# Patient Record
Sex: FEMALE | Race: WHITE | NOT HISPANIC OR LATINO | Employment: OTHER | URBAN - METROPOLITAN AREA
[De-identification: names, ages, dates, MRNs, and addresses within clinical notes are randomized per-mention and may not be internally consistent; named-entity substitution may affect disease eponyms.]

---

## 2017-01-20 ENCOUNTER — LAB VISIT (OUTPATIENT)
Dept: LAB | Facility: HOSPITAL | Age: 82
End: 2017-01-20
Attending: INTERNAL MEDICINE
Payer: MEDICARE

## 2017-01-20 DIAGNOSIS — E03.4 HYPOTHYROIDISM DUE TO ACQUIRED ATROPHY OF THYROID: ICD-10-CM

## 2017-01-20 LAB
ALBUMIN SERPL BCP-MCNC: 4.2 G/DL
ALP SERPL-CCNC: 60 U/L
ALT SERPL W/O P-5'-P-CCNC: 12 U/L
ANION GAP SERPL CALC-SCNC: 14 MMOL/L
AST SERPL-CCNC: 18 U/L
BASOPHILS # BLD AUTO: 0.03 K/UL
BASOPHILS NFR BLD: 0.4 %
BILIRUB SERPL-MCNC: 0.9 MG/DL
BUN SERPL-MCNC: 16 MG/DL
CALCIUM SERPL-MCNC: 9.4 MG/DL
CHLORIDE SERPL-SCNC: 105 MMOL/L
CHOLEST/HDLC SERPL: 4.3 {RATIO}
CO2 SERPL-SCNC: 22 MMOL/L
CREAT SERPL-MCNC: 1 MG/DL
DIFFERENTIAL METHOD: NORMAL
EOSINOPHIL # BLD AUTO: 0.1 K/UL
EOSINOPHIL NFR BLD: 0.9 %
ERYTHROCYTE [DISTWIDTH] IN BLOOD BY AUTOMATED COUNT: 13.4 %
EST. GFR  (AFRICAN AMERICAN): >60 ML/MIN/1.73 M^2
EST. GFR  (NON AFRICAN AMERICAN): 53.7 ML/MIN/1.73 M^2
GLUCOSE SERPL-MCNC: 102 MG/DL
HCT VFR BLD AUTO: 44.9 %
HDL/CHOLESTEROL RATIO: 23.4 %
HDLC SERPL-MCNC: 222 MG/DL
HDLC SERPL-MCNC: 52 MG/DL
HGB BLD-MCNC: 14.9 G/DL
LDLC SERPL CALC-MCNC: 136 MG/DL
LYMPHOCYTES # BLD AUTO: 3 K/UL
LYMPHOCYTES NFR BLD: 38.6 %
MCH RBC QN AUTO: 30.3 PG
MCHC RBC AUTO-ENTMCNC: 33.2 %
MCV RBC AUTO: 91 FL
MONOCYTES # BLD AUTO: 0.7 K/UL
MONOCYTES NFR BLD: 9.3 %
NEUTROPHILS # BLD AUTO: 3.9 K/UL
NEUTROPHILS NFR BLD: 50.5 %
NONHDLC SERPL-MCNC: 170 MG/DL
PLATELET # BLD AUTO: 221 K/UL
PMV BLD AUTO: 11.1 FL
POTASSIUM SERPL-SCNC: 4.6 MMOL/L
PROT SERPL-MCNC: 7.4 G/DL
RBC # BLD AUTO: 4.91 M/UL
SODIUM SERPL-SCNC: 141 MMOL/L
TRIGL SERPL-MCNC: 170 MG/DL
TSH SERPL DL<=0.005 MIU/L-ACNC: 1.88 UIU/ML
WBC # BLD AUTO: 7.73 K/UL

## 2017-01-20 PROCEDURE — 80061 LIPID PANEL: CPT

## 2017-01-20 PROCEDURE — 80053 COMPREHEN METABOLIC PANEL: CPT

## 2017-01-20 PROCEDURE — 84443 ASSAY THYROID STIM HORMONE: CPT

## 2017-01-20 PROCEDURE — 85025 COMPLETE CBC W/AUTO DIFF WBC: CPT

## 2017-01-20 PROCEDURE — 36415 COLL VENOUS BLD VENIPUNCTURE: CPT

## 2017-01-23 ENCOUNTER — OFFICE VISIT (OUTPATIENT)
Dept: INTERNAL MEDICINE | Facility: CLINIC | Age: 82
End: 2017-01-23
Payer: MEDICARE

## 2017-01-23 VITALS
WEIGHT: 152.56 LBS | DIASTOLIC BLOOD PRESSURE: 70 MMHG | HEIGHT: 59 IN | BODY MASS INDEX: 30.76 KG/M2 | HEART RATE: 72 BPM | SYSTOLIC BLOOD PRESSURE: 124 MMHG

## 2017-01-23 DIAGNOSIS — E89.0 POSTOPERATIVE HYPOTHYROIDISM: Primary | ICD-10-CM

## 2017-01-23 DIAGNOSIS — I70.0 AORTIC ATHEROSCLEROSIS: ICD-10-CM

## 2017-01-23 PROCEDURE — 1126F AMNT PAIN NOTED NONE PRSNT: CPT | Mod: S$GLB,,, | Performed by: INTERNAL MEDICINE

## 2017-01-23 PROCEDURE — 3074F SYST BP LT 130 MM HG: CPT | Mod: S$GLB,,, | Performed by: INTERNAL MEDICINE

## 2017-01-23 PROCEDURE — 3078F DIAST BP <80 MM HG: CPT | Mod: S$GLB,,, | Performed by: INTERNAL MEDICINE

## 2017-01-23 PROCEDURE — 1157F ADVNC CARE PLAN IN RCRD: CPT | Mod: S$GLB,,, | Performed by: INTERNAL MEDICINE

## 2017-01-23 PROCEDURE — 99999 PR PBB SHADOW E&M-EST. PATIENT-LVL III: CPT | Mod: PBBFAC,,, | Performed by: INTERNAL MEDICINE

## 2017-01-23 PROCEDURE — 1159F MED LIST DOCD IN RCRD: CPT | Mod: S$GLB,,, | Performed by: INTERNAL MEDICINE

## 2017-01-23 PROCEDURE — 99499 UNLISTED E&M SERVICE: CPT | Mod: S$GLB,,, | Performed by: INTERNAL MEDICINE

## 2017-01-23 PROCEDURE — 1160F RVW MEDS BY RX/DR IN RCRD: CPT | Mod: S$GLB,,, | Performed by: INTERNAL MEDICINE

## 2017-01-23 PROCEDURE — 99214 OFFICE O/P EST MOD 30 MIN: CPT | Mod: S$GLB,,, | Performed by: INTERNAL MEDICINE

## 2017-01-23 NOTE — PROGRESS NOTES
Subjective:       Patient ID: Isabel Hernandez is a 81 y.o. female.    Chief Complaint: Hypothyroidism    HPI  Review of Systems   Constitutional: Negative for activity change and fatigue.   Respiratory: Negative for chest tightness.    Cardiovascular: Negative for chest pain and palpitations.   Gastrointestinal: Negative for abdominal pain.   Endocrine: Negative for cold intolerance.   Genitourinary: Negative for difficulty urinating.   Musculoskeletal: Negative for back pain.   Skin: Negative for pallor.   Neurological: Negative for dizziness, weakness and light-headedness.   Psychiatric/Behavioral: Negative for dysphoric mood.       Objective:      Physical Exam   Constitutional: She is oriented to person, place, and time. She appears well-developed and well-nourished.   HENT:   Head: Normocephalic and atraumatic.   Mouth/Throat: Oropharynx is clear and moist.   Eyes: Pupils are equal, round, and reactive to light.   Neck: Normal range of motion. Neck supple.   Cardiovascular: Normal rate, regular rhythm, normal heart sounds and intact distal pulses.  Exam reveals no gallop and no friction rub.    No murmur heard.  Pulmonary/Chest: Effort normal and breath sounds normal. No respiratory distress. She has no wheezes.   Abdominal: Soft. Bowel sounds are normal. She exhibits no distension. There is no tenderness.   Musculoskeletal: Normal range of motion.   Neurological: She is alert and oriented to person, place, and time.   Skin: Skin is warm and dry.   Psychiatric: She has a normal mood and affect. Her behavior is normal.   Nursing note and vitals reviewed.      Assessment:       1. Postoperative hypothyroidism    2. Aortic atherosclerosis        Plan:       Isabel was seen today for hypothyroidism.    Diagnoses and all orders for this visit:    Postoperative hypothyroidism  Comments:  Stable; Asymptomatic with current medication regimen  Orders:  -     Comprehensive metabolic panel; Future  -     CBC auto  differential; Future  -     Lipid panel; Future  -     TSH; Future    Aortic atherosclerosis  Comments:  Stable; Daily aspirin. Discussed proper diet and exercise.            Return in about 6 months (around 7/23/2017) for ANNUAL EXAM WITH PCP/[facility] BEFORE, SOONER IF NEEDED, F/U APPOINTMENT WITH ME, WITH LAB BEFORE.

## 2017-04-25 ENCOUNTER — OFFICE VISIT (OUTPATIENT)
Dept: DERMATOLOGY | Facility: CLINIC | Age: 82
End: 2017-04-25
Payer: MEDICARE

## 2017-04-25 DIAGNOSIS — D18.00 ANGIOMA: ICD-10-CM

## 2017-04-25 DIAGNOSIS — L82.1 SK (SEBORRHEIC KERATOSIS): ICD-10-CM

## 2017-04-25 DIAGNOSIS — Z12.83 SCREENING EXAM FOR SKIN CANCER: ICD-10-CM

## 2017-04-25 DIAGNOSIS — L57.0 AK (ACTINIC KERATOSIS): Primary | ICD-10-CM

## 2017-04-25 DIAGNOSIS — L81.4 LENTIGO: ICD-10-CM

## 2017-04-25 PROCEDURE — 1160F RVW MEDS BY RX/DR IN RCRD: CPT | Mod: S$GLB,,, | Performed by: DERMATOLOGY

## 2017-04-25 PROCEDURE — 99999 PR PBB SHADOW E&M-EST. PATIENT-LVL II: CPT | Mod: PBBFAC,,, | Performed by: DERMATOLOGY

## 2017-04-25 PROCEDURE — 17000 DESTRUCT PREMALG LESION: CPT | Mod: S$GLB,,, | Performed by: DERMATOLOGY

## 2017-04-25 PROCEDURE — 99213 OFFICE O/P EST LOW 20 MIN: CPT | Mod: 25,S$GLB,, | Performed by: DERMATOLOGY

## 2017-04-25 PROCEDURE — 1159F MED LIST DOCD IN RCRD: CPT | Mod: S$GLB,,, | Performed by: DERMATOLOGY

## 2017-04-25 PROCEDURE — 17003 DESTRUCT PREMALG LES 2-14: CPT | Mod: S$GLB,,, | Performed by: DERMATOLOGY

## 2017-04-25 NOTE — PATIENT INSTRUCTIONS

## 2017-04-25 NOTE — PROGRESS NOTES
Subjective:       Patient ID:  Isabel Hernandez is a 81 y.o. female who presents for   Chief Complaint   Patient presents with    Spot     left forehead x few months, scabs no prior tx     Skin Check     UBSE     HPI Comments: History of Present Illness: The patient presents for follow up of skin check.    The patient was last seen on: 6/8/2015 for cryosurgery to actinic keratoses which have resolved.     Other skin complaints: lesion on L superior forehead x 3 months, see HPI.    Denies personal history of NMSC or MM.         Spot  - Initial  Affected locations: forehead  Duration: 3 months  Signs / symptoms: crusting  Severity: mild  Timing: constant (she thinks a lesion in that area was previously biopsied, was told it was benign)  Aggravated by: nothing  Relieving factors/Treatments tried: nothing        Review of Systems   Skin: Positive for activity-related sunscreen use ( rarely outdoors, when outside for hours does wear sunscreen). Negative for itching, rash, dry skin, daily sunscreen use, recent sunburn and wears hat.   Hematologic/Lymphatic: Does not bruise/bleed easily (on asa).        Objective:    Physical Exam   Constitutional: She appears well-developed and well-nourished. She is obese.  No distress.   Neurological: She is alert and oriented to person, place, and time. She is not disoriented.   Psychiatric: She has a normal mood and affect.   Skin:   Areas Examined (abnormalities noted in diagram):   Scalp / Hair Palpated and Inspected  Head / Face Inspection Performed  Neck Inspection Performed  Chest / Axilla Inspection Performed  Abdomen Inspection Performed  Back Inspection Performed  RUE Inspected  LUE Inspection Performed                       Diagram Legend     Erythematous scaling macule/papule c/w actinic keratosis       Vascular papule c/w angioma      Pigmented verrucoid papule/plaque c/w seborrheic keratosis      Yellow umbilicated papule c/w sebaceous hyperplasia      Irregularly shaped  tan macule c/w lentigo     1-2 mm smooth white papules consistent with Milia      Movable subcutaneous cyst with punctum c/w epidermal inclusion cyst      Subcutaneous movable cyst c/w pilar cyst      Firm pink to brown papule c/w dermatofibroma      Pedunculated fleshy papule(s) c/w skin tag(s)      Evenly pigmented macule c/w junctional nevus     Mildly variegated pigmented, slightly irregular-bordered macule c/w mildly atypical nevus      Flesh colored to evenly pigmented papule c/w intradermal nevus       Pink pearly papule/plaque c/w basal cell carcinoma      Erythematous hyperkeratotic cursted plaque c/w SCC      Surgical scar with no sign of skin cancer recurrence      Open and closed comedones      Inflammatory papules and pustules      Verrucoid papule consistent consistent with wart     Erythematous eczematous patches and plaques     Dystrophic onycholytic nail with subungual debris c/w onychomycosis     Umbilicated papule    Erythematous-base heme-crusted tan verrucoid plaque consistent with inflamed seborrheic keratosis     Erythematous Silvery Scaling Plaque c/w Psoriasis     See annotation      Assessment / Plan:        AK (actinic keratosis)  Cryosurgery Procedure Note    Verbal consent from the patient is obtained and the patient is aware of the precancerous quality and need for treatment of these lesions. Liquid nitrogen cryosurgery is applied to the 7 actinic keratoses, as detailed in the physical exam, to produce a freeze injury. The patient is aware that blisters may form and is instructed on wound care with gentle cleansing and use of vaseline ointment to keep moist until healed. The patient is supplied a handout on cryosurgery and is instructed to call if lesions do not completely resolve.    If lesion forehead not resolved in 2months, f/u  Angiomas   - stable and chronic      SK (seborrheic keratosis)   - stable and chronic      Screening exam for skin cancer  Upper body skin examination  performed today including at least 6 points as noted in physical examination. No lesions suspicious for malignancy noted.      Lentigo  This is a benign hyperpigmented sun induced lesion. Daily sun protection will reduce the number of new lesions. Treatment of these benign lesions are considered cosmetic.               Return in about 1 year (around 4/25/2018).

## 2017-05-23 ENCOUNTER — TELEPHONE (OUTPATIENT)
Dept: INTERNAL MEDICINE | Facility: CLINIC | Age: 82
End: 2017-05-23

## 2017-05-23 NOTE — TELEPHONE ENCOUNTER
----- Message from Javier Martinez sent at 5/23/2017  3:35 PM CDT -----  Contact: Self [phone]  Type: Orders Request    What orders/ testing are being requested? Labs    Is there a future appointment scheduled for the patient with PCP? Yes    When?08/02/2017    Comments:Please call and advice    Thanks

## 2017-07-11 RX ORDER — LEVOTHYROXINE SODIUM 50 UG/1
TABLET ORAL
Qty: 90 TABLET | Refills: 3 | Status: SHIPPED | OUTPATIENT
Start: 2017-07-11 | End: 2018-07-18 | Stop reason: SDUPTHER

## 2017-07-26 ENCOUNTER — OFFICE VISIT (OUTPATIENT)
Dept: INTERNAL MEDICINE | Facility: CLINIC | Age: 82
End: 2017-07-26
Payer: MEDICARE

## 2017-07-26 ENCOUNTER — LAB VISIT (OUTPATIENT)
Dept: LAB | Facility: HOSPITAL | Age: 82
End: 2017-07-26
Attending: NURSE PRACTITIONER
Payer: MEDICARE

## 2017-07-26 VITALS
SYSTOLIC BLOOD PRESSURE: 130 MMHG | WEIGHT: 142.44 LBS | HEIGHT: 59 IN | OXYGEN SATURATION: 98 % | HEART RATE: 61 BPM | DIASTOLIC BLOOD PRESSURE: 70 MMHG | BODY MASS INDEX: 28.72 KG/M2

## 2017-07-26 DIAGNOSIS — I70.0 AORTIC ATHEROSCLEROSIS: ICD-10-CM

## 2017-07-26 DIAGNOSIS — Z00.00 ENCOUNTER FOR PREVENTIVE HEALTH EXAMINATION: Primary | ICD-10-CM

## 2017-07-26 DIAGNOSIS — E89.0 POSTOPERATIVE HYPOTHYROIDISM: ICD-10-CM

## 2017-07-26 DIAGNOSIS — N18.30 CKD (CHRONIC KIDNEY DISEASE), STAGE III: ICD-10-CM

## 2017-07-26 DIAGNOSIS — N20.0 NEPHROLITHIASIS: ICD-10-CM

## 2017-07-26 DIAGNOSIS — M81.0 OSTEOPOROSIS, UNSPECIFIED OSTEOPOROSIS TYPE, UNSPECIFIED PATHOLOGICAL FRACTURE PRESENCE: ICD-10-CM

## 2017-07-26 LAB
ALBUMIN SERPL BCP-MCNC: 4.1 G/DL
ALP SERPL-CCNC: 59 U/L
ALT SERPL W/O P-5'-P-CCNC: 15 U/L
ANION GAP SERPL CALC-SCNC: 12 MMOL/L
AST SERPL-CCNC: 17 U/L
BASOPHILS # BLD AUTO: 0.04 K/UL
BASOPHILS NFR BLD: 0.5 %
BILIRUB SERPL-MCNC: 0.7 MG/DL
BUN SERPL-MCNC: 14 MG/DL
CALCIUM SERPL-MCNC: 9.5 MG/DL
CHLORIDE SERPL-SCNC: 106 MMOL/L
CHOLEST/HDLC SERPL: 4 {RATIO}
CO2 SERPL-SCNC: 24 MMOL/L
CREAT SERPL-MCNC: 1 MG/DL
DIFFERENTIAL METHOD: NORMAL
EOSINOPHIL # BLD AUTO: 0.1 K/UL
EOSINOPHIL NFR BLD: 0.9 %
ERYTHROCYTE [DISTWIDTH] IN BLOOD BY AUTOMATED COUNT: 13.6 %
EST. GFR  (AFRICAN AMERICAN): >60 ML/MIN/1.73 M^2
EST. GFR  (NON AFRICAN AMERICAN): 54 ML/MIN/1.73 M^2
GLUCOSE SERPL-MCNC: 103 MG/DL
HCT VFR BLD AUTO: 44.9 %
HDL/CHOLESTEROL RATIO: 24.9 %
HDLC SERPL-MCNC: 233 MG/DL
HDLC SERPL-MCNC: 58 MG/DL
HGB BLD-MCNC: 14.9 G/DL
LDLC SERPL CALC-MCNC: 149.8 MG/DL
LYMPHOCYTES # BLD AUTO: 3.3 K/UL
LYMPHOCYTES NFR BLD: 38.4 %
MCH RBC QN AUTO: 30.6 PG
MCHC RBC AUTO-ENTMCNC: 33.2 G/DL
MCV RBC AUTO: 92 FL
MONOCYTES # BLD AUTO: 0.8 K/UL
MONOCYTES NFR BLD: 9.6 %
NEUTROPHILS # BLD AUTO: 4.3 K/UL
NEUTROPHILS NFR BLD: 50.4 %
NONHDLC SERPL-MCNC: 175 MG/DL
PLATELET # BLD AUTO: 224 K/UL
PMV BLD AUTO: 11 FL
POTASSIUM SERPL-SCNC: 4.8 MMOL/L
PROT SERPL-MCNC: 7.3 G/DL
RBC # BLD AUTO: 4.87 M/UL
SODIUM SERPL-SCNC: 142 MMOL/L
TRIGL SERPL-MCNC: 126 MG/DL
TSH SERPL DL<=0.005 MIU/L-ACNC: 2.83 UIU/ML
WBC # BLD AUTO: 8.51 K/UL

## 2017-07-26 PROCEDURE — 99999 PR PBB SHADOW E&M-EST. PATIENT-LVL III: CPT | Mod: PBBFAC,,, | Performed by: NURSE PRACTITIONER

## 2017-07-26 PROCEDURE — 99499 UNLISTED E&M SERVICE: CPT | Mod: S$GLB,,, | Performed by: NURSE PRACTITIONER

## 2017-07-26 PROCEDURE — G0439 PPPS, SUBSEQ VISIT: HCPCS | Mod: S$GLB,,, | Performed by: NURSE PRACTITIONER

## 2017-07-26 NOTE — PROGRESS NOTES
"Isabel Hernandez presented for a  Medicare AWV and comprehensive Health Risk Assessment today. The following components were reviewed and updated:    · Medical history  · Family History  · Social history  · Allergies and Current Medications  · Health Risk Assessment  · Health Maintenance  · Care Team     ** See Completed Assessments for Annual Wellness Visit within the encounter summary.**       The following assessments were completed:  · Living Situation  · CAGE  · Depression Screening  · Timed Get Up and Go  · Whisper Test  · Cognitive Function Screening  · Nutrition Screening  · ADL Screening  · PAQ Screening    Vitals:    07/26/17 0911   BP: 130/70   Pulse: 61   SpO2: 98%   Weight: 64.6 kg (142 lb 6.7 oz)   Height: 4' 11" (1.499 m)     Body mass index is 28.76 kg/m².  Physical Exam   Constitutional: She is oriented to person, place, and time. She appears well-developed and well-nourished.   HENT:   Head: Normocephalic and atraumatic.   Nose: Nose normal.   Eyes: Conjunctivae and EOM are normal.   Neck: Normal range of motion. Neck supple.   Cardiovascular: Normal rate, regular rhythm, normal heart sounds and intact distal pulses.    No murmur heard.  Pulmonary/Chest: Effort normal and breath sounds normal.   Musculoskeletal: Normal range of motion.   Neurological: She is alert and oriented to person, place, and time.   Skin: Skin is warm and dry.   Psychiatric: She has a normal mood and affect. Her behavior is normal. Judgment and thought content normal.   Nursing note and vitals reviewed.        Diagnoses and health risks identified today and associated recommendations/orders:    1. Encounter for preventive health examination  Assessment performed. Health maintenance updated. Chart review completed.    2. Aortic atherosclerosis  Noted on imagine. Not currently on statin. BP controlled. Followed by PCP.    3. Postoperative hypothyroidism  Stable with current regimen. Followed by PCP.    4. Osteoporosis, unspecified " osteoporosis type, unspecified pathological fracture presence  Stable. Last bone density 2015, repeat in 2 years. Not currently using medication therapy. Followed by PCP.    5. Nephrolithiasis  Stable. Followed by PCP.    6. CKD (chronic kidney disease), stage III  Last creatinine 1.0. Stable, Followed by PCP.      Provided Isabel with a 5-10 year written screening schedule and personal prevention plan. Recommendations were developed using the USPSTF age appropriate recommendations. Education, counseling, and referrals were provided as needed. After Visit Summary printed and given to patient which includes a list of additional screenings\tests needed.    Return for follow up with Primary Care Provider as instructed, ;sooner if problems, HRA in 1 year.    TRINI Pereira

## 2017-07-26 NOTE — PATIENT INSTRUCTIONS
Counseling and Referral of Other Preventative  (Italic type indicates deductible and co-insurance are waived)    Patient Name: Isabel Hernandez  Today's Date: 7/26/2017      SERVICE LIMITATIONS RECOMMENDATION    Vaccines    · Pneumococcal (once after 65)    · Influenza (annually)    · Hepatitis B (if medium/high risk)    · Prevnar 13      Hepatitis B medium/high risk factors:       - End-stage renal disease       - Hemophiliacs who received Factor VII or         IX concentrates       - Clients of institutions for the mentally             retarded       - Persons who live in the same house as          a HepB carrier       - Homosexual men       - Illicit injectable drug abusers     Pneumococcal: Done, no repeat necessary     Influenza: Done, repeat in one year     Hepatitis B: N/A     Prevnar 13: Done, no repeat necessary    Mammogram (biennial age 50-74)  Annually (age 40 or over)  Done this year, repeat every year    Pap (up to age 70 and after 70 if unknown history or abnormal study last 10 years)    N/A     The USPSTF recommends against screening for cervical cancer in women older than age 65 years who have had adequate prior screening and are not otherwise at high risk for cervical cancer.      Colorectal cancer screening (to age 75)    · Fecal occult blood test (annual)  · Flexible sigmoidoscopy (5y)  · Screening colonoscopy (10y)  · Barium enema   Last done 2009, recommend to repeat every 7-10  years    Diabetes self-management training (no USPSTF recommendations)  Requires referral by treating physician for patient with diabetes or renal disease. 10 hours of initial DSMT sessions of no less than 30 minutes each in a continuous 12-month period. 2 hours of follow-up DSMT in subsequent years.  N/A    Bone mass measurements (age 65 & older, biennial)  Requires diagnosis related to osteoporosis or estrogen deficiency. Biennial benefit unless patient has history of long-term glucocorticoid  Last done 9/2015,  recommend to repeat every 2  years    Glaucoma screening (no USPSTF recommendation)  Diabetes mellitus, family history   , age 50 or over    American, age 65 or over  Done this year, repeat every year    Medical nutrition therapy for diabetes or renal disease (no recommended schedule)  Requires referral by treating physician for patient with diabetes or renal disease or kidney transplant within the past 3 years.  Can be provided in same year as diabetes self-management training (DSMT), and CMS recommends medical nutrition therapy take place after DSMT. Up to 3 hours for initial year and 2 hours in subsequent years.  N/A    Cardiovascular screening blood tests (every 5 years)  · Fasting lipid panel  Order as a panel if possible  Done this year, repeat every year    Diabetes screening tests (at least every 3 years, Medicare covers annually or at 6-month intervals for prediabetic patients)  · Fasting blood sugar (FBS) or glucose tolerance test (GTT)  Patient must be diagnosed with one of the following:       - Hypertension       - Dyslipidemia       - Obesity (BMI 30kg/m2)       - Previous elevated impaired FBS or GTT       ... or any two of the following:       - Overweight (BMI 25 but <30)       - Family history of diabetes       - Age 65 or older       - History of gestational diabetes or birth of baby weighing more than 9 pounds  Done this year, repeat every year    Abdominal aortic aneurysm screening (once)  · Sonogram   Limited to patients who meet one of the following criteria:       - Men who are 65-75 years old and have smoked more than 100 cigarette in their lifetime       - Anyone with a family history of abdominal aortic aneurysm       - Anyone recommended for screening by the USPSTF  N/A    HIV screening (annually for increased risk patients)  · HIV-1 and HIV-2 by EIA, or PEPE, rapid antibody test or oral mucosa transudate  Patients must be at increased risk for HIV infection per  USPSTF guidelines or pregnant. Tests covered annually for patient at increased risk or as requested by the patient. Pregnant patients may receive up to 3 tests during pregnancy.  Risks discussed, screening is not recommended       Smoking cessation counseling (up to 8 sessions per year)  Patients must be asymptomatic of tobacco-related conditions to receive as a preventative service.  Non-smoker    Subsequent annual wellness visit  At least 12 months since last AWV  Return in one year     The following information is provided to all patients.  This information is to help you find resources for any of the problems found today that may be affecting your health:                Living healthy guide: www.Count includes the [facility].[place].[facility]      Understanding Diabetes: www.diabetes.org      Eating healthy: www.cdc.gov/healthyweight      CDC home safety checklist: www.cdc.gov/steadi/patient.html      Agency on Aging: www.goea.[place].[facility]      Alcoholics anonymous (AA): www.aa.org      Physical Activity: www.david.nih.gov/cr8pyia      Tobacco use: www.quitwithusla.org

## 2017-08-02 ENCOUNTER — OFFICE VISIT (OUTPATIENT)
Dept: INTERNAL MEDICINE | Facility: CLINIC | Age: 82
End: 2017-08-02
Payer: MEDICARE

## 2017-08-02 VITALS
HEART RATE: 72 BPM | SYSTOLIC BLOOD PRESSURE: 124 MMHG | BODY MASS INDEX: 30.35 KG/M2 | WEIGHT: 150.56 LBS | HEIGHT: 59 IN | DIASTOLIC BLOOD PRESSURE: 64 MMHG

## 2017-08-02 DIAGNOSIS — E03.9 HYPOTHYROIDISM, UNSPECIFIED TYPE: Primary | ICD-10-CM

## 2017-08-02 DIAGNOSIS — J30.9 CHRONIC ALLERGIC RHINITIS, UNSPECIFIED SEASONALITY, UNSPECIFIED TRIGGER: ICD-10-CM

## 2017-08-02 DIAGNOSIS — Z00.00 ROUTINE GENERAL MEDICAL EXAMINATION AT A HEALTH CARE FACILITY: ICD-10-CM

## 2017-08-02 PROCEDURE — 99214 OFFICE O/P EST MOD 30 MIN: CPT | Mod: S$GLB,,, | Performed by: INTERNAL MEDICINE

## 2017-08-02 PROCEDURE — 3008F BODY MASS INDEX DOCD: CPT | Mod: S$GLB,,, | Performed by: INTERNAL MEDICINE

## 2017-08-02 PROCEDURE — 99499 UNLISTED E&M SERVICE: CPT | Mod: S$GLB,,, | Performed by: INTERNAL MEDICINE

## 2017-08-02 PROCEDURE — 1126F AMNT PAIN NOTED NONE PRSNT: CPT | Mod: S$GLB,,, | Performed by: INTERNAL MEDICINE

## 2017-08-02 PROCEDURE — 1159F MED LIST DOCD IN RCRD: CPT | Mod: S$GLB,,, | Performed by: INTERNAL MEDICINE

## 2017-08-02 PROCEDURE — 99999 PR PBB SHADOW E&M-EST. PATIENT-LVL III: CPT | Mod: PBBFAC,,, | Performed by: INTERNAL MEDICINE

## 2017-08-07 NOTE — PROGRESS NOTES
Subjective:       Patient ID: Isabel Hernandez is a 81 y.o. female.    Chief Complaint: Annual Exam and Sinus Problem    Patient presents for routine annual exam.  Has been feeling well.  A few specific concerns today.  Prior lab results, need for future labs, screening studies and issues related to family history were assessed.        Sinus Problem   This is a chronic problem. The problem has been waxing and waning since onset. There has been no fever. Associated symptoms include congestion, sinus pressure and sneezing. Pertinent negatives include no headaches, shortness of breath or sore throat. Past treatments include nothing.     Review of Systems   Constitutional: Negative for fatigue.   HENT: Positive for congestion, sinus pressure and sneezing. Negative for sore throat.    Eyes: Negative for visual disturbance.   Respiratory: Negative for shortness of breath.    Cardiovascular: Negative for chest pain and palpitations.   Gastrointestinal: Negative for abdominal pain.   Genitourinary: Negative for dysuria, frequency and hematuria.   Musculoskeletal: Negative for joint swelling.   Skin: Negative for rash.   Neurological: Negative for speech difficulty, weakness and headaches.   Psychiatric/Behavioral: Negative for dysphoric mood.       Objective:      Physical Exam   Constitutional: She is oriented to person, place, and time. She appears well-developed and well-nourished. No distress.   HENT:   Head: Normocephalic and atraumatic.   Mouth/Throat: Oropharynx is clear and moist.   Eyes: Conjunctivae are normal. Pupils are equal, round, and reactive to light.   Neck: Normal range of motion. Neck supple.   Cardiovascular: Normal rate, regular rhythm and normal heart sounds.    Pulmonary/Chest: Effort normal and breath sounds normal. She has no wheezes.   Abdominal: Soft. Bowel sounds are normal. There is no tenderness.   Musculoskeletal: Normal range of motion. She exhibits no edema or tenderness.   Neurological: She  is alert and oriented to person, place, and time. No cranial nerve deficit.   Skin: No erythema.   Psychiatric: She has a normal mood and affect.   Vitals reviewed.      Assessment:       1. Hypothyroidism, unspecified type    2. Routine general medical examination at a health care facility    3. Chronic allergic rhinitis, unspecified seasonality, unspecified trigger        Plan:       Isabel was seen today for annual exam and sinus problem.    Diagnoses and all orders for this visit:    Hypothyroidism, unspecified type  -     CBC auto differential; Future  -     Comprehensive metabolic panel; Future  -     Lipid panel; Future  -     TSH; Future    Routine general medical examination at a health care facility  -     CBC auto differential; Future  -     Comprehensive metabolic panel; Future  -     Lipid panel; Future  -     TSH; Future    Chronic allergic rhinitis, unspecified seasonality, unspecified trigger  Comments:  trial of Flonase        Return in about 1 year (around 8/2/2018) for PHYSICAL EXAM, WITH LAB BEFORE.

## 2017-10-03 ENCOUNTER — OFFICE VISIT (OUTPATIENT)
Dept: DERMATOLOGY | Facility: CLINIC | Age: 82
End: 2017-10-03
Payer: MEDICARE

## 2017-10-03 DIAGNOSIS — D48.9 NEOPLASM OF UNCERTAIN BEHAVIOR: Primary | ICD-10-CM

## 2017-10-03 PROCEDURE — 88305 TISSUE EXAM BY PATHOLOGIST: CPT | Performed by: PATHOLOGY

## 2017-10-03 PROCEDURE — 11100 PR BIOPSY OF SKIN LESION: CPT | Mod: S$GLB,,, | Performed by: DERMATOLOGY

## 2017-10-03 PROCEDURE — 99999 PR PBB SHADOW E&M-EST. PATIENT-LVL II: CPT | Mod: PBBFAC,,, | Performed by: DERMATOLOGY

## 2017-10-03 PROCEDURE — 99499 UNLISTED E&M SERVICE: CPT | Mod: S$GLB,,, | Performed by: DERMATOLOGY

## 2017-10-03 NOTE — PROGRESS NOTES
Subjective:       Patient ID:  Isabel Hernandez is a 81 y.o. female who presents for   Chief Complaint   Patient presents with    Lesion     forehead     Lesion  - Follow-up  Symptom Course: recurred after cryo on 4/25/17.  Affected locations: forehead  Signs / symptoms: rough        Review of Systems   Skin: Positive for activity-related sunscreen use ( rarely outdoors, when outside for hours does wear sunscreen). Negative for itching, rash, dry skin, daily sunscreen use, recent sunburn and wears hat.   Hematologic/Lymphatic: Does not bruise/bleed easily (on asa).        Objective:    Physical Exam   Constitutional: She appears well-developed and well-nourished. No distress.   Neurological: She is alert and oriented to person, place, and time. She is not disoriented.   Psychiatric: She has a normal mood and affect.   Skin:   Areas Examined (abnormalities noted in diagram):   Head / Face Inspection Performed              Diagram Legend     Erythematous scaling macule/papule c/w actinic keratosis       Vascular papule c/w angioma      Pigmented verrucoid papule/plaque c/w seborrheic keratosis      Yellow umbilicated papule c/w sebaceous hyperplasia      Irregularly shaped tan macule c/w lentigo     1-2 mm smooth white papules consistent with Milia      Movable subcutaneous cyst with punctum c/w epidermal inclusion cyst      Subcutaneous movable cyst c/w pilar cyst      Firm pink to brown papule c/w dermatofibroma      Pedunculated fleshy papule(s) c/w skin tag(s)      Evenly pigmented macule c/w junctional nevus     Mildly variegated pigmented, slightly irregular-bordered macule c/w mildly atypical nevus      Flesh colored to evenly pigmented papule c/w intradermal nevus       Pink pearly papule/plaque c/w basal cell carcinoma      Erythematous hyperkeratotic cursted plaque c/w SCC      Surgical scar with no sign of skin cancer recurrence      Open and closed comedones      Inflammatory papules and pustules       Verrucoid papule consistent consistent with wart     Erythematous eczematous patches and plaques     Dystrophic onycholytic nail with subungual debris c/w onychomycosis     Umbilicated papule    Erythematous-base heme-crusted tan verrucoid plaque consistent with inflamed seborrheic keratosis     Erythematous Silvery Scaling Plaque c/w Psoriasis     See annotation          Assessment / Plan:      Pathology Orders:      Normal Orders This Visit    Tissue Specimen To Pathology, Dermatology     Questions:    Directional Terms:  Other(comment)    Clinical information:  r/o hak vs other    Specific Site:  left upper forehead        Neoplasm of uncertain behavior  -     Tissue Specimen To Pathology, Dermatology  Shave biopsy procedure note:    Shave biopsy performed after verbal consent including risk of infection, scar, recurrence, need for additional treatment of site. Area prepped with alcohol, anesthetized with approximately 1.0cc of 1% lidocaine with epinephrine. Lesional tissue shaved with razor blade. Hemostasis achieved with application of aluminum chloride followed by hyfrecation. No complications. Dressing applied. Wound care explained.    If biopsy positive for malignancy, refer to Dr. White for Mohs surgery consultation.               Return if symptoms worsen or fail to improve.

## 2017-10-03 NOTE — PATIENT INSTRUCTIONS
Shave Biopsy Wound Care    Your doctor has performed a shave biopsy today.  A band aid and vaseline ointment has been placed over the site.  This should remain in place for 24 hours.  It is recommended that you keep the area dry for the first 24 hours.  After 24 hours, you may remove the band aid and wash the area with warm soap and water and apply Vaseline jelly.  Many patients prefer to use Neosporin or Bacitracin ointment.  This is acceptable; however, know that you can develop an allergy to this medication even if you have used it safely for years.  It is important to keep the area moist.  Letting it dry out and get air slows healing time, and will worsen the scar.  Band aid is optional after first 24 hours.      If you notice increasing redness, tenderness, pain, or yellow drainage at the biopsy site, please notify your doctor.  These are signs of an infection.    If your biopsy site is bleeding, apply firm pressure for 15 minutes straight.  Repeat for another 15 minutes, if it is still bleeding.   If the surgical site continues to bleed, then please contact your doctor.      [facility] [place] [address]/ [phone] FAX/ [email or web address]

## 2017-10-16 ENCOUNTER — TELEPHONE (OUTPATIENT)
Dept: DERMATOLOGY | Facility: CLINIC | Age: 82
End: 2017-10-16

## 2017-10-16 RX ORDER — FLUOROURACIL 50 MG/G
CREAM TOPICAL
Qty: 40 G | Refills: 1 | Status: SHIPPED | OUTPATIENT
Start: 2017-10-16 | End: 2019-04-17 | Stop reason: ALTCHOICE

## 2017-10-16 NOTE — TELEPHONE ENCOUNTER
FINAL PATHOLOGIC DIAGNOSIS  1. 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  - THE ATYPICAL SQUAMOUS EPITHELIUM EXTENDS TO THE BASE OF THE BIOPSY, AND AN UNDERLYING  INVASIVE SQUAMOUS CELL CARCINOMA CANNOT BE EXCLUDED    Discussed results with pt.     Sent Rx for efudex cream bid x 4 weeks    F/u 3 months to recheck    Pt expresses understanding and all questions answered

## 2017-10-16 NOTE — TELEPHONE ENCOUNTER
----- Message from Jessica Martinez MD sent at 10/16/2017  7:48 AM CDT -----  FINAL PATHOLOGIC DIAGNOSIS  1. 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  - THE ATYPICAL SQUAMOUS EPITHELIUM EXTENDS TO THE BASE OF THE BIOPSY, AND AN UNDERLYING  INVASIVE SQUAMOUS CELL CARCINOMA CANNOT BE EXCLUDED    Spoke to pt.   Sent rx for efudex to [facility] bid x 4 weeks    F/u with me in 3 months

## 2018-01-19 ENCOUNTER — OFFICE VISIT (OUTPATIENT)
Dept: DERMATOLOGY | Facility: CLINIC | Age: 83
End: 2018-01-19
Payer: MEDICARE

## 2018-01-19 DIAGNOSIS — D04.30 BOWEN'S DISEASE OF FACE: Primary | ICD-10-CM

## 2018-01-19 PROCEDURE — 99999 PR PBB SHADOW E&M-EST. PATIENT-LVL III: CPT | Mod: PBBFAC,,, | Performed by: DERMATOLOGY

## 2018-01-19 PROCEDURE — 99212 OFFICE O/P EST SF 10 MIN: CPT | Mod: S$GLB,,, | Performed by: DERMATOLOGY

## 2018-01-19 NOTE — PROGRESS NOTES
Subjective:       Patient ID:  Isabel Hernandez is a 82 y.o. 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 Negative for itching, rash, dry skin, daily sunscreen use, recent sunburn and wears hat.   Hematologic/Lymphatic: Does not bruise/bleed easily (on asa).        Objective:    Physical Exam   Constitutional: She appears well-developed and well-nourished. No distress.   Neurological: She is alert and oriented to person, place, and time. She is not disoriented.   Psychiatric: She has a normal mood and affect.   Skin:   Areas Examined (abnormalities noted in diagram):   Head / Face Inspection Performed              Diagram Legend     Erythematous scaling macule/papule c/w actinic keratosis       Vascular papule c/w angioma      Pigmented verrucoid papule/plaque c/w seborrheic keratosis      Yellow umbilicated papule c/w sebaceous hyperplasia      Irregularly shaped tan macule c/w lentigo     1-2 mm smooth white papules consistent with Milia      Movable subcutaneous cyst with punctum c/w epidermal inclusion cyst      Subcutaneous movable cyst c/w pilar cyst      Firm pink to brown papule c/w dermatofibroma      Pedunculated fleshy papule(s) c/w skin tag(s)      Evenly pigmented macule c/w junctional nevus     Mildly variegated pigmented, slightly irregular-bordered macule c/w mildly atypical nevus      Flesh colored to evenly pigmented papule c/w intradermal nevus       Pink pearly papule/plaque c/w basal cell carcinoma      Erythematous hyperkeratotic cursted plaque c/w SCC      Surgical scar with no sign of skin cancer recurrence       Open and closed comedones      Inflammatory papules and pustules      Verrucoid papule consistent consistent with wart     Erythematous eczematous patches and plaques     Dystrophic onycholytic nail with subungual debris c/w onychomycosis     Umbilicated papule    Erythematous-base heme-crusted tan verrucoid plaque consistent with inflamed seborrheic keratosis     Erythematous Silvery Scaling Plaque c/w Psoriasis     See annotation      Assessment / Plan:        Bowen's disease of left upper forehead  Restart efudex bid x 2 weeks to left upper forehead                 Follow-up in about 3 months (around 4/19/2018).

## 2018-03-27 ENCOUNTER — TELEPHONE (OUTPATIENT)
Dept: INTERNAL MEDICINE | Facility: CLINIC | Age: 83
End: 2018-03-27

## 2018-03-27 NOTE — TELEPHONE ENCOUNTER
----- Message from Beni Fernandez sent at 3/27/2018  2:03 PM CDT -----  Contact: self/[phone]  Pt is calling to speak with someone in the office. Please advise.        Thanks

## 2018-04-05 ENCOUNTER — TELEPHONE (OUTPATIENT)
Dept: INTERNAL MEDICINE | Facility: CLINIC | Age: 83
End: 2018-04-05

## 2018-04-05 NOTE — TELEPHONE ENCOUNTER
----- Message from Mayelin Altamirano sent at 4/5/2018 10:36 AM CDT -----  Contact: Patient [phone]  Patient has a Physical scheduled on: 08/07/2018    Please call and advise for lab appt.    Thank you

## 2018-04-12 ENCOUNTER — PES CALL (OUTPATIENT)
Dept: ADMINISTRATIVE | Facility: CLINIC | Age: 83
End: 2018-04-12

## 2018-05-02 ENCOUNTER — TELEPHONE (OUTPATIENT)
Dept: INTERNAL MEDICINE | Facility: CLINIC | Age: 83
End: 2018-05-02

## 2018-05-02 NOTE — TELEPHONE ENCOUNTER
----- Message from Juan Chin sent at 5/2/2018  3:19 PM CDT -----  Contact: Patient [phone]  Patient is returning a phone call.  Who left a message for the patient: Rubia  Does patient know what this is regarding:  Yes she said you want to make her another appointment. She would let me try.

## 2018-05-18 ENCOUNTER — OFFICE VISIT (OUTPATIENT)
Dept: DERMATOLOGY | Facility: CLINIC | Age: 83
End: 2018-05-18
Payer: MEDICARE

## 2018-05-18 DIAGNOSIS — Z85.828 HISTORY OF NONMELANOMA SKIN CANCER: Primary | ICD-10-CM

## 2018-05-18 PROCEDURE — 99999 PR PBB SHADOW E&M-EST. PATIENT-LVL II: CPT | Mod: PBBFAC,,, | Performed by: DERMATOLOGY

## 2018-05-18 PROCEDURE — 99212 OFFICE O/P EST SF 10 MIN: CPT | Mod: S$GLB,,, | Performed by: DERMATOLOGY

## 2018-05-18 NOTE — PROGRESS NOTES
Subjective:       Patient ID:  Isabel Hernandez is a 82 y.o. female who presents for   Chief Complaint   Patient presents with    Skin Check     follow up Efudex     Pt here to f/u from treatment of bowen's disease left upper forehead (bx done 10/17) with efudex bid x 1 month (not consecutive)        Review of Systems   Skin: Positive for activity-related sunscreen use ( rarely outdoors, when outside for hours does wear sunscreen). Negative for itching, rash, dry skin, daily sunscreen use, recent sunburn and wears hat.   Hematologic/Lymphatic: Does not bruise/bleed easily (on asa).        Objective:    Physical Exam   Constitutional: She appears well-developed and well-nourished. No distress.   Neurological: She is alert and oriented to person, place, and time. She is not disoriented.   Psychiatric: She has a normal mood and affect.   Skin:                 Diagram Legend     Erythematous scaling macule/papule c/w actinic keratosis       Vascular papule c/w angioma      Pigmented verrucoid papule/plaque c/w seborrheic keratosis      Yellow umbilicated papule c/w sebaceous hyperplasia      Irregularly shaped tan macule c/w lentigo     1-2 mm smooth white papules consistent with Milia      Movable subcutaneous cyst with punctum c/w epidermal inclusion cyst      Subcutaneous movable cyst c/w pilar cyst      Firm pink to brown papule c/w dermatofibroma      Pedunculated fleshy papule(s) c/w skin tag(s)      Evenly pigmented macule c/w junctional nevus     Mildly variegated pigmented, slightly irregular-bordered macule c/w mildly atypical nevus      Flesh colored to evenly pigmented papule c/w intradermal nevus       Pink pearly papule/plaque c/w basal cell carcinoma      Erythematous hyperkeratotic cursted plaque c/w SCC      Surgical scar with no sign of skin cancer recurrence      Open and closed comedones      Inflammatory papules and pustules      Verrucoid papule consistent consistent with wart     Erythematous  eczematous patches and plaques     Dystrophic onycholytic nail with subungual debris c/w onychomycosis     Umbilicated papule    Erythematous-base heme-crusted tan verrucoid plaque consistent with inflamed seborrheic keratosis     Erythematous Silvery Scaling Plaque c/w Psoriasis     See annotation      Assessment / Plan:        History of nonmelanoma skin cancer - bowen's disease left upper forehead  S/p treatment with efudex - clear             Follow-up in about 6 months (around 11/18/2018).

## 2018-06-07 ENCOUNTER — OFFICE VISIT (OUTPATIENT)
Dept: OPTOMETRY | Facility: CLINIC | Age: 83
End: 2018-06-07
Payer: MEDICARE

## 2018-06-07 DIAGNOSIS — H43.821 VITREOMACULAR TRACTION SYNDROME OF RIGHT EYE: ICD-10-CM

## 2018-06-07 DIAGNOSIS — H35.342 MACULAR HOLE OF LEFT EYE: Primary | ICD-10-CM

## 2018-06-07 DIAGNOSIS — H26.9 CORTICAL CATARACT OF BOTH EYES: ICD-10-CM

## 2018-06-07 PROCEDURE — 92004 COMPRE OPH EXAM NEW PT 1/>: CPT | Mod: S$GLB,,, | Performed by: OPTOMETRIST

## 2018-06-07 PROCEDURE — 99999 PR PBB SHADOW E&M-EST. PATIENT-LVL I: CPT | Mod: PBBFAC,,, | Performed by: OPTOMETRIST

## 2018-06-07 PROCEDURE — 92134 CPTRZ OPH DX IMG PST SGM RTA: CPT | Mod: S$GLB,,, | Performed by: OPTOMETRIST

## 2018-06-07 NOTE — PROGRESS NOTES
HPI     Last eye exam 4 years ago  DLS DR Pacheco 9/4/12  Has noticed decrease in vision at near over the past few months  No flashes or floaters   No itching burning or tearing  No diplopia    Last edited by Amy Hdz on 6/7/2018  1:35 PM. (History)            Assessment /Plan     For exam results, see Encounter Report.    Macular hole of left eye    Vitreomacular traction syndrome of right eye   Posterior Segment OCT Retina-  OD VMT, OS mac hole    Cortical cataract of both eyes   Visually significant, consider cataract eval after Retina    RTC 1 year, annual

## 2018-06-15 ENCOUNTER — INITIAL CONSULT (OUTPATIENT)
Dept: OPHTHALMOLOGY | Facility: CLINIC | Age: 83
End: 2018-06-15
Payer: MEDICARE

## 2018-06-15 ENCOUNTER — TELEPHONE (OUTPATIENT)
Dept: OPHTHALMOLOGY | Facility: CLINIC | Age: 83
End: 2018-06-15

## 2018-06-15 VITALS — DIASTOLIC BLOOD PRESSURE: 76 MMHG | HEART RATE: 76 BPM | SYSTOLIC BLOOD PRESSURE: 159 MMHG

## 2018-06-15 DIAGNOSIS — H43.829: ICD-10-CM

## 2018-06-15 DIAGNOSIS — H35.342 MACULAR HOLE, FULL THICKNESS, LEFT: Primary | ICD-10-CM

## 2018-06-15 DIAGNOSIS — H35.349 MACULAR HOLE, UNSPECIFIED LATERALITY: Primary | ICD-10-CM

## 2018-06-15 DIAGNOSIS — H25.13 NUCLEAR SCLEROSIS OF BOTH EYES: ICD-10-CM

## 2018-06-15 DIAGNOSIS — H43.821 VITREOMACULAR TRACTION SYNDROME OF RIGHT EYE: ICD-10-CM

## 2018-06-15 PROCEDURE — 99999 PR PBB SHADOW E&M-EST. PATIENT-LVL II: CPT | Mod: PBBFAC,,, | Performed by: OPHTHALMOLOGY

## 2018-06-15 PROCEDURE — 92014 COMPRE OPH EXAM EST PT 1/>: CPT | Mod: S$GLB,,, | Performed by: OPHTHALMOLOGY

## 2018-06-15 PROCEDURE — 92134 CPTRZ OPH DX IMG PST SGM RTA: CPT | Mod: S$GLB,,, | Performed by: OPHTHALMOLOGY

## 2018-06-15 NOTE — LETTER
Marsha 15, 2018      Rosa Pacheco, OD  [address]  [facility] [address]           [facility] - Ophthal[facility]  [address]  [facility] [address]  Phone: [phone]  Fax: [phone]          Patient: Isabel Hernandez   MR Number: [id number]   YOB: 1935   Date of Visit: 6/15/2018       Dear Dr. Rosa Pacheco:    Thank you for referring Isabel Hernandez to me for evaluation. Attached you will find relevant portions of my assessment and plan of care.    If you have questions, please do not hesitate to call me. I look forward to following Isabel Hernandez along with you.    Sincerely,    Johnathan Christopher MD    Enclosure  CC:  No Recipients    If you would like to receive this communication electronically, please contact [email or web address] or [phone] to request more information on [employer] Link access.    For providers and/or their staff who would like to refer a patient to [facility], please contact us through our one-stop-shop provider referral line, [facility] [place], [facility], at [phone].    If you feel you have received this communication in error or would no longer like to receive these types of communications, please e-mail [email or web address]

## 2018-06-18 ENCOUNTER — TELEPHONE (OUTPATIENT)
Dept: OPHTHALMOLOGY | Facility: CLINIC | Age: 83
End: 2018-06-18

## 2018-06-18 NOTE — TELEPHONE ENCOUNTER
----- Message from Tatiana Acosta sent at 6/18/2018 11:11 AM CDT -----  Contact: Isabel      ----- Message -----  From: Cherry Hernandez  Sent: 6/18/2018  11:09 AM  To: Ashwin Mann Staff    Needs Advice    Reason for call:    Pt called to get further instructions for surgery.  Communication Preference:[phone]  Additional Information:      I called patient back---no answer, no voice mail ---I try again later to reach patient

## 2018-06-25 ENCOUNTER — TELEPHONE (OUTPATIENT)
Dept: OPHTHALMOLOGY | Facility: CLINIC | Age: 83
End: 2018-06-25

## 2018-06-26 ENCOUNTER — ANESTHESIA (OUTPATIENT)
Dept: SURGERY | Facility: HOSPITAL | Age: 83
End: 2018-06-26
Payer: MEDICARE

## 2018-06-26 ENCOUNTER — HOSPITAL ENCOUNTER (OUTPATIENT)
Facility: HOSPITAL | Age: 83
Discharge: HOME | End: 2018-06-26
Attending: OPHTHALMOLOGY | Admitting: OPHTHALMOLOGY
Payer: MEDICARE

## 2018-06-26 ENCOUNTER — SURGERY (OUTPATIENT)
Age: 83
End: 2018-06-26

## 2018-06-26 ENCOUNTER — ANESTHESIA EVENT (OUTPATIENT)
Dept: SURGERY | Facility: HOSPITAL | Age: 83
End: 2018-06-26
Payer: MEDICARE

## 2018-06-26 VITALS
BODY MASS INDEX: 30.35 KG/M2 | SYSTOLIC BLOOD PRESSURE: 139 MMHG | HEART RATE: 56 BPM | OXYGEN SATURATION: 99 % | TEMPERATURE: 99 F | HEIGHT: 59 IN | DIASTOLIC BLOOD PRESSURE: 76 MMHG | WEIGHT: 150.56 LBS | RESPIRATION RATE: 16 BRPM

## 2018-06-26 DIAGNOSIS — H35.342 FULL THICKNESS MACULAR HOLE, LEFT: Primary | ICD-10-CM

## 2018-06-26 DIAGNOSIS — H35.349: ICD-10-CM

## 2018-06-26 PROCEDURE — 36000707: Performed by: OPHTHALMOLOGY

## 2018-06-26 PROCEDURE — 67042 VIT FOR MACULAR HOLE: CPT | Mod: LT,,, | Performed by: OPHTHALMOLOGY

## 2018-06-26 PROCEDURE — 63600175 PHARM REV CODE 636 W HCPCS: Performed by: NURSE ANESTHETIST, CERTIFIED REGISTERED

## 2018-06-26 PROCEDURE — 63600175 PHARM REV CODE 636 W HCPCS: Performed by: OPHTHALMOLOGY

## 2018-06-26 PROCEDURE — 71000033 HC RECOVERY, INTIAL HOUR: Performed by: OPHTHALMOLOGY

## 2018-06-26 PROCEDURE — D9220A PRA ANESTHESIA: Mod: ANES,,, | Performed by: ANESTHESIOLOGY

## 2018-06-26 PROCEDURE — 37000009 HC ANESTHESIA EA ADD 15 MINS: Performed by: OPHTHALMOLOGY

## 2018-06-26 PROCEDURE — 25000003 PHARM REV CODE 250: Performed by: OPHTHALMOLOGY

## 2018-06-26 PROCEDURE — 25000003 PHARM REV CODE 250: Performed by: NURSE ANESTHETIST, CERTIFIED REGISTERED

## 2018-06-26 PROCEDURE — 27201423 OPTIME MED/SURG SUP & DEVICES STERILE SUPPLY: Performed by: OPHTHALMOLOGY

## 2018-06-26 PROCEDURE — 27600004 OPTIME MED/SURG SUP & DEVICES INTRAOCULAR LENS: Performed by: OPHTHALMOLOGY

## 2018-06-26 PROCEDURE — 99499 UNLISTED E&M SERVICE: CPT | Mod: ,,, | Performed by: OPHTHALMOLOGY

## 2018-06-26 PROCEDURE — D9220A PRA ANESTHESIA: Mod: CRNA,,, | Performed by: NURSE ANESTHETIST, CERTIFIED REGISTERED

## 2018-06-26 PROCEDURE — 36000706: Performed by: OPHTHALMOLOGY

## 2018-06-26 PROCEDURE — 37000008 HC ANESTHESIA 1ST 15 MINUTES: Performed by: OPHTHALMOLOGY

## 2018-06-26 PROCEDURE — S0020 INJECTION, BUPIVICAINE HYDRO: HCPCS | Performed by: OPHTHALMOLOGY

## 2018-06-26 RX ORDER — TROPICAMIDE 10 MG/ML
SOLUTION/ DROPS OPHTHALMIC
Status: DISCONTINUED
Start: 2018-06-26 | End: 2018-06-26 | Stop reason: HOSPADM

## 2018-06-26 RX ORDER — TROPICAMIDE 10 MG/ML
1 SOLUTION/ DROPS OPHTHALMIC
Status: DISCONTINUED | OUTPATIENT
Start: 2018-06-26 | End: 2018-06-26 | Stop reason: HOSPADM

## 2018-06-26 RX ORDER — VANCOMYCIN HYDROCHLORIDE 500 MG/10ML
INJECTION, POWDER, LYOPHILIZED, FOR SOLUTION INTRAVENOUS
Status: DISCONTINUED
Start: 2018-06-26 | End: 2018-06-26 | Stop reason: HOSPADM

## 2018-06-26 RX ORDER — EPINEPHRINE 1 MG/ML
INJECTION, SOLUTION INTRACARDIAC; INTRAMUSCULAR; INTRAVENOUS; SUBCUTANEOUS
Status: DISCONTINUED | OUTPATIENT
Start: 2018-06-26 | End: 2018-06-26 | Stop reason: HOSPADM

## 2018-06-26 RX ORDER — INDOCYANINE GREEN AND WATER 25 MG
KIT INJECTION
Status: DISCONTINUED | OUTPATIENT
Start: 2018-06-26 | End: 2018-06-26 | Stop reason: HOSPADM

## 2018-06-26 RX ORDER — ONDANSETRON 2 MG/ML
INJECTION INTRAMUSCULAR; INTRAVENOUS
Status: DISCONTINUED | OUTPATIENT
Start: 2018-06-26 | End: 2018-06-26

## 2018-06-26 RX ORDER — CYCLOPENTOLATE HYDROCHLORIDE 10 MG/ML
1 SOLUTION/ DROPS OPHTHALMIC
Status: DISCONTINUED | OUTPATIENT
Start: 2018-06-26 | End: 2018-06-26 | Stop reason: HOSPADM

## 2018-06-26 RX ORDER — PHENYLEPHRINE HYDROCHLORIDE 25 MG/ML
1 SOLUTION/ DROPS OPHTHALMIC
Status: DISCONTINUED | OUTPATIENT
Start: 2018-06-26 | End: 2018-06-26 | Stop reason: HOSPADM

## 2018-06-26 RX ORDER — SODIUM CHLORIDE 9 MG/ML
INJECTION, SOLUTION INTRAVENOUS CONTINUOUS
Status: DISCONTINUED | OUTPATIENT
Start: 2018-06-26 | End: 2018-06-26 | Stop reason: HOSPADM

## 2018-06-26 RX ORDER — MOXIFLOXACIN 5 MG/ML
SOLUTION/ DROPS OPHTHALMIC
Status: DISCONTINUED
Start: 2018-06-26 | End: 2018-06-26 | Stop reason: HOSPADM

## 2018-06-26 RX ORDER — TETRACAINE HYDROCHLORIDE 5 MG/ML
SOLUTION OPHTHALMIC
Status: DISCONTINUED
Start: 2018-06-26 | End: 2018-06-26 | Stop reason: HOSPADM

## 2018-06-26 RX ORDER — TETRACAINE HYDROCHLORIDE 5 MG/ML
1 SOLUTION OPHTHALMIC
Status: DISCONTINUED | OUTPATIENT
Start: 2018-06-26 | End: 2018-06-26 | Stop reason: HOSPADM

## 2018-06-26 RX ORDER — PREDNISOLONE ACETATE 10 MG/ML
SUSPENSION/ DROPS OPHTHALMIC
Status: DISCONTINUED
Start: 2018-06-26 | End: 2018-06-26 | Stop reason: HOSPADM

## 2018-06-26 RX ORDER — BUPIVACAINE HYDROCHLORIDE 7.5 MG/ML
INJECTION, SOLUTION EPIDURAL; RETROBULBAR
Status: DISCONTINUED
Start: 2018-06-26 | End: 2018-06-26 | Stop reason: HOSPADM

## 2018-06-26 RX ORDER — LORAZEPAM 2 MG/ML
0.25 INJECTION INTRAMUSCULAR ONCE AS NEEDED
Status: DISCONTINUED | OUTPATIENT
Start: 2018-06-26 | End: 2018-06-26 | Stop reason: HOSPADM

## 2018-06-26 RX ORDER — CYCLOPENTOLATE HYDROCHLORIDE 10 MG/ML
SOLUTION/ DROPS OPHTHALMIC
Status: DISCONTINUED
Start: 2018-06-26 | End: 2018-06-26 | Stop reason: HOSPADM

## 2018-06-26 RX ORDER — LIDOCAINE HCL/PF 100 MG/5ML
SYRINGE (ML) INTRAVENOUS
Status: DISCONTINUED | OUTPATIENT
Start: 2018-06-26 | End: 2018-06-26

## 2018-06-26 RX ORDER — EPINEPHRINE 1 MG/ML
INJECTION, SOLUTION INTRACARDIAC; INTRAMUSCULAR; INTRAVENOUS; SUBCUTANEOUS
Status: DISCONTINUED
Start: 2018-06-26 | End: 2018-06-26 | Stop reason: HOSPADM

## 2018-06-26 RX ORDER — ACETAMINOPHEN 325 MG/1
325 TABLET ORAL EVERY 6 HOURS PRN
Refills: 0 | COMMUNITY
Start: 2018-06-26 | End: 2019-04-17

## 2018-06-26 RX ORDER — PROPOFOL 10 MG/ML
VIAL (ML) INTRAVENOUS
Status: DISCONTINUED | OUTPATIENT
Start: 2018-06-26 | End: 2018-06-26

## 2018-06-26 RX ORDER — DEXAMETHASONE SODIUM PHOSPHATE 4 MG/ML
INJECTION, SOLUTION INTRA-ARTICULAR; INTRALESIONAL; INTRAMUSCULAR; INTRAVENOUS; SOFT TISSUE
Status: DISCONTINUED
Start: 2018-06-26 | End: 2018-06-26 | Stop reason: HOSPADM

## 2018-06-26 RX ORDER — MOXIFLOXACIN 5 MG/ML
1 SOLUTION/ DROPS OPHTHALMIC
Status: DISCONTINUED | OUTPATIENT
Start: 2018-06-26 | End: 2018-06-26 | Stop reason: HOSPADM

## 2018-06-26 RX ORDER — ACETAMINOPHEN 325 MG/1
650 TABLET ORAL EVERY 4 HOURS PRN
Status: DISCONTINUED | OUTPATIENT
Start: 2018-06-26 | End: 2018-06-26 | Stop reason: HOSPADM

## 2018-06-26 RX ORDER — MEPERIDINE HYDROCHLORIDE 25 MG/ML
12.5 INJECTION INTRAMUSCULAR; INTRAVENOUS; SUBCUTANEOUS ONCE AS NEEDED
Status: DISCONTINUED | OUTPATIENT
Start: 2018-06-26 | End: 2018-06-26 | Stop reason: HOSPADM

## 2018-06-26 RX ORDER — LIDOCAINE HYDROCHLORIDE 20 MG/ML
INJECTION, SOLUTION EPIDURAL; INFILTRATION; INTRACAUDAL; PERINEURAL
Status: DISCONTINUED
Start: 2018-06-26 | End: 2018-06-26 | Stop reason: HOSPADM

## 2018-06-26 RX ORDER — NEOMYCIN SULFATE, POLYMYXIN B SULFATE, AND DEXAMETHASONE 3.5; 10000; 1 MG/G; [USP'U]/G; MG/G
OINTMENT OPHTHALMIC
Status: DISCONTINUED
Start: 2018-06-26 | End: 2018-06-26 | Stop reason: HOSPADM

## 2018-06-26 RX ORDER — DEXAMETHASONE SODIUM PHOSPHATE 4 MG/ML
INJECTION, SOLUTION INTRA-ARTICULAR; INTRALESIONAL; INTRAMUSCULAR; INTRAVENOUS; SOFT TISSUE
Status: DISCONTINUED | OUTPATIENT
Start: 2018-06-26 | End: 2018-06-26 | Stop reason: HOSPADM

## 2018-06-26 RX ORDER — HYDROMORPHONE HYDROCHLORIDE 1 MG/ML
0.2 INJECTION, SOLUTION INTRAMUSCULAR; INTRAVENOUS; SUBCUTANEOUS EVERY 5 MIN PRN
Status: DISCONTINUED | OUTPATIENT
Start: 2018-06-26 | End: 2018-06-26 | Stop reason: HOSPADM

## 2018-06-26 RX ORDER — PREDNISOLONE ACETATE 10 MG/ML
1 SUSPENSION/ DROPS OPHTHALMIC
Status: DISCONTINUED | OUTPATIENT
Start: 2018-06-26 | End: 2018-06-26 | Stop reason: HOSPADM

## 2018-06-26 RX ORDER — SODIUM CHLORIDE 9 MG/ML
INJECTION, SOLUTION INTRAVENOUS CONTINUOUS PRN
Status: DISCONTINUED | OUTPATIENT
Start: 2018-06-26 | End: 2018-06-26

## 2018-06-26 RX ORDER — FENTANYL CITRATE 50 UG/ML
INJECTION, SOLUTION INTRAMUSCULAR; INTRAVENOUS
Status: DISCONTINUED | OUTPATIENT
Start: 2018-06-26 | End: 2018-06-26

## 2018-06-26 RX ORDER — BUPIVACAINE HYDROCHLORIDE 7.5 MG/ML
INJECTION, SOLUTION EPIDURAL; RETROBULBAR
Status: DISCONTINUED | OUTPATIENT
Start: 2018-06-26 | End: 2018-06-26 | Stop reason: HOSPADM

## 2018-06-26 RX ORDER — PHENYLEPHRINE HYDROCHLORIDE 25 MG/ML
SOLUTION/ DROPS OPHTHALMIC
Status: DISCONTINUED
Start: 2018-06-26 | End: 2018-06-26 | Stop reason: HOSPADM

## 2018-06-26 RX ORDER — INDOCYANINE GREEN AND WATER 25 MG
KIT INJECTION
Status: DISCONTINUED
Start: 2018-06-26 | End: 2018-06-26 | Stop reason: HOSPADM

## 2018-06-26 RX ORDER — VANCOMYCIN HYDROCHLORIDE 500 MG/10ML
INJECTION, POWDER, LYOPHILIZED, FOR SOLUTION INTRAVENOUS
Status: DISCONTINUED | OUTPATIENT
Start: 2018-06-26 | End: 2018-06-26 | Stop reason: HOSPADM

## 2018-06-26 RX ORDER — LIDOCAINE HYDROCHLORIDE 20 MG/ML
INJECTION, SOLUTION EPIDURAL; INFILTRATION; INTRACAUDAL; PERINEURAL
Status: DISCONTINUED | OUTPATIENT
Start: 2018-06-26 | End: 2018-06-26 | Stop reason: HOSPADM

## 2018-06-26 RX ADMIN — LIDOCAINE HYDROCHLORIDE 5 ML: 20 INJECTION, SOLUTION EPIDURAL; INFILTRATION; INTRACAUDAL; PERINEURAL at 09:06

## 2018-06-26 RX ADMIN — MOXIFLOXACIN HYDROCHLORIDE 1 DROP: 5 SOLUTION/ DROPS OPHTHALMIC at 07:06

## 2018-06-26 RX ADMIN — CYCLOPENTOLATE HYDROCHLORIDE 1 DROP: 10 SOLUTION/ DROPS OPHTHALMIC at 07:06

## 2018-06-26 RX ADMIN — BUPIVACAINE HYDROCHLORIDE 5 ML: 7.5 INJECTION, SOLUTION EPIDURAL; RETROBULBAR at 09:06

## 2018-06-26 RX ADMIN — VANCOMYCIN HYDROCHLORIDE 500 MG: 500 INJECTION, POWDER, LYOPHILIZED, FOR SOLUTION INTRAVENOUS at 09:06

## 2018-06-26 RX ADMIN — PREDNISOLONE ACETATE 1 DROP: 10 SUSPENSION/ DROPS OPHTHALMIC at 07:06

## 2018-06-26 RX ADMIN — PHENYLEPHRINE HYDROCHLORIDE 1 DROP: 25 SOLUTION/ DROPS OPHTHALMIC at 07:06

## 2018-06-26 RX ADMIN — INDOCYANINE GREEN 2.5 MG: KIT INTRAVENOUS at 09:06

## 2018-06-26 RX ADMIN — TROPICAMIDE 1 DROP: 10 SOLUTION/ DROPS OPHTHALMIC at 07:06

## 2018-06-26 RX ADMIN — HOMATROPINE HYDROBROMIDE 1 DROP: 50 SOLUTION OPHTHALMIC at 07:06

## 2018-06-26 RX ADMIN — EPINEPHRINE 0.3 MG: 1 INJECTION, SOLUTION INTRAMUSCULAR; SUBCUTANEOUS at 09:06

## 2018-06-26 RX ADMIN — TETRACAINE HYDROCHLORIDE 1 DROP: 5 SOLUTION OPHTHALMIC at 09:06

## 2018-06-26 RX ADMIN — FENTANYL CITRATE 25 MCG: 50 INJECTION, SOLUTION INTRAMUSCULAR; INTRAVENOUS at 09:06

## 2018-06-26 RX ADMIN — PROPOFOL 30 MG: 10 INJECTION, EMULSION INTRAVENOUS at 08:06

## 2018-06-26 RX ADMIN — FENTANYL CITRATE 25 MCG: 50 INJECTION, SOLUTION INTRAMUSCULAR; INTRAVENOUS at 08:06

## 2018-06-26 RX ADMIN — LIDOCAINE HYDROCHLORIDE 100 MG: 20 INJECTION, SOLUTION INTRAVENOUS at 08:06

## 2018-06-26 RX ADMIN — ONDANSETRON 4 MG: 2 INJECTION INTRAMUSCULAR; INTRAVENOUS at 10:06

## 2018-06-26 RX ADMIN — SODIUM CHLORIDE: 9 INJECTION, SOLUTION INTRAVENOUS at 08:06

## 2018-06-26 RX ADMIN — DEXAMETHASONE SODIUM PHOSPHATE 2 MG: 4 INJECTION, SOLUTION INTRAMUSCULAR; INTRAVENOUS at 09:06

## 2018-06-26 RX ADMIN — TETRACAINE HYDROCHLORIDE 1 DROP: 5 SOLUTION OPHTHALMIC at 07:06

## 2018-06-26 NOTE — OP NOTE
Pre-Op Dx: Macular hole, left eye  Post Op Dx: Same  Procedure Performed: Pars plana vitrectomy, internal limiting membrane peel, left eye  Attending Surgeon: Ashwin  Assistant Surgeon: David Bishop  Anesthesia: Local/Mac, retrobulbar injection of 5cc mixture 0.75% bupivicaine, 2% lidocaine  Estimated blood loss: Minimal  Complication: None  Specimen: None  Disposition: Stable to recovery  Findings/Outcome: Stable        Informed consent was obtained and placed in the medical record. The patient was properly identified and taken to the operating room. MAC anesthesia was begun by the anesthesia team. A retrobulbar block consisting of a 50/50 mixture of 2% lidocaine and 0.75% bupivicaine was performed in the left eye without complication. The left eye was prepped and draped in the standard ophthalmic fashion taking care to exclude the lashes from the operative field.    Standard 25 gauge vitrectomy setup was achieved with all ports 4.0 mm posterior to the limbus. The [employer] visualization system alternating with a high magnification contact lens were used. Core vitrectomy was performed. The hyaloid membrane was elevated using the cutter on suction mode.  The peripheral vitreous was shaved to the vitreous base. The ILM was highlighted with ICG dye and peeled from the macular surface including the edges of the macular hole using intraocular ILM forceps.  The retina was inspected for 360 degrees to the ora trang using scleral depression. There were no breaks or detachments.  Fluid/air exchange was performed. The retina was lying nicely flat under air.  The air was exchanged for 20% SF6 gas through the infusion cannula using a chimney through one of the ports for exhaust. The ports were removed. The infusion cannula was removed.  All wounds were checked and noted not to be leaking. The eye was normotensive. A subconjunctival injection of vancomycin and dexamethasone was placed.     The eye was patched. A Marte shield  was placed. The patient was awakened from MAC anesthesia by the anesthesia team having tolerated the procedure well.

## 2018-06-26 NOTE — PLAN OF CARE
Discharge istructions reveiwed with pt and daughter. Understanding verbalized. Pt able to tolerate PO intake. No complaints of pain reported. MD to bedside to address follow up car. Transported to car by RN.

## 2018-06-26 NOTE — H&P
Pre-Operative History & Physical  Ophthalmology      SUBJECTIVE:     History of Present Illness:  Patient is a 82 y.o. female presents with Macular hole, unspecified laterality [H35.349]  Vitreomacular traction syndrome, unspecified laterality [H43.829].    MEDICATIONS:   No prescriptions prior to admission.       ALLERGIES:   Review of patient's allergies indicates:   Allergen Reactions    Codeine Nausea And Vomiting    Fosamax [alendronate] Nausea Only    Iron analogues Nausea And Vomiting       PAST MEDICAL HISTORY:   Past Medical History:   Diagnosis Date    Arthritis     Cataract     Hypothyroidism     Osteoporosis 2015     PAST SURGICAL HISTORY:   Past Surgical History:   Procedure Laterality Date    ADENOIDECTOMY      BREAST SURGERY Bilateral     cyst removal     SECTION      CHOLECYSTECTOMY      HYSTERECTOMY      THYROIDECTOMY, PARTIAL      TONSILLECTOMY       PAST FAMILY HISTORY:   Family History   Problem Relation Age of Onset    Hypertension Mother     Thyroid disease Mother     Kidney disease Mother     Cancer Father         lung    Cataracts Father     Glaucoma Father     Cancer Sister         stomach    Cancer Brother         prostate    No Known Problems Brother     No Known Problems Daughter     Amblyopia Neg Hx     Blindness Neg Hx     Diabetes Neg Hx     Macular degeneration Neg Hx     Retinal detachment Neg Hx     Strabismus Neg Hx     Stroke Neg Hx     Melanoma Neg Hx      SOCIAL HISTORY:   Social History   Substance Use Topics    Smoking status: Never Smoker    Smokeless tobacco: Never Used    Alcohol use No        MENTAL STATUS: Alert    REVIEW OF SYSTEMS: Negative    OBJECTIVE:     Vital Signs (Most Recent)       Physical Exam:  General: NAD  HEENT: Atraumatic  Lungs: Adequate respirations, LCTAB  Heart: RRR, No murmur  Abdomen: Soft NT    ASSESSMENT/PLAN:     Patient is a 82 y.o. female with Macular hole, unspecified laterality  [H35.349]  Vitreomacular traction syndrome, unspecified laterality [H43.829].     - Plan for surgical correction 25 ga PPM/ILM peel OS  Local MAC         60 minutes   - Risks/benefits/alternatives of the procedure including, but not limited to scarring, bleeding, infection, loss or decreased vision, and/or need for possible repeat surgery discussed with the patient and family.   - Informed consent obtained prior to surgery and the patient/family voiced good understanding.    Thom Bishop  6/26/2018  6:09 AM

## 2018-06-26 NOTE — ANESTHESIA POSTPROCEDURE EVALUATION
"Anesthesia Post Evaluation    Patient: Isabel Hernandez    Procedure(s) Performed: Procedure(s) (LRB):  VITRECTOMY, PARS PLANA APPROch   internal limitng membrane peel (Left)    Final Anesthesia Type: MAC  Patient location during evaluation: OPS  Patient participation: Yes- Able to Participate  Level of consciousness: awake and alert  Post-procedure vital signs: reviewed and stable  Pain management: adequate  Airway patency: patent  PONV status at discharge: No PONV  Anesthetic complications: no      Cardiovascular status: blood pressure returned to baseline and hemodynamically stable  Respiratory status: unassisted, spontaneous ventilation and room air  Hydration status: euvolemic  Follow-up not needed.        Visit Vitals  BP (!) 180/77 (BP Location: Right arm, Patient Position: Lying)   Pulse 71   Temp 36.6 °C (97.9 °F) (Temporal)   Resp 16   Ht 4' 11" (1.499 m)   Wt 68.3 kg (150 lb 9.2 oz)   SpO2 98%   BMI 30.41 kg/m²       Pain/Beatrice Score: Pain Assessment Performed: Yes (6/26/2018  7:31 AM)  Presence of Pain: denies (6/26/2018  7:31 AM)      "

## 2018-06-26 NOTE — ANESTHESIA PREPROCEDURE EVALUATION
06/26/2018  Isabel Hernandez is a 82 y.o., female.    Anesthesia Evaluation    I have reviewed the Patient Summary Reports.    I have reviewed the Nursing Notes.      Review of Systems  Anesthesia Hx:  No problems with previous Anesthesia    Hematology/Oncology:  Hematology Normal   Oncology Normal     EENT/Dental:EENT/Dental Normal   Cardiovascular:  Cardiovascular Normal     Pulmonary:  Pulmonary Normal    Renal/:   Chronic Renal Disease, CRI    Hepatic/GI:  Hepatic/GI Normal    Musculoskeletal:  Musculoskeletal Normal    Neurological:  Neurology Normal    Endocrine:   Hypothyroidism    Dermatological:  Skin Normal    Psych:  Psychiatric Normal           Physical Exam  General:  Well nourished    Airway/Jaw/Neck:  Airway Findings: Mouth Opening: Normal Tongue: Normal  General Airway Assessment: Adult  Mallampati: II  TM Distance: Normal, at least 6 cm        Eyes/Ears/Nose:  EYES/EARS/NOSE FINDINGS: Normal   Dental:  Dental Findings: In tact   Chest/Lungs:  Chest/Lungs Clear    Heart/Vascular:  Heart Findings: Normal Heart murmur: negative Vascular Findings: Normal    Abdomen:  Abdomen Findings: Normal    Musculoskeletal:  Musculoskeletal Findings: Normal   Skin:  Skin Findings: Normal    Mental Status:  Mental Status Findings: Normal        Anesthesia Plan  Type of Anesthesia, risks & benefits discussed:  Anesthesia Type:  general, MAC  Patient's Preference:   Intra-op Monitoring Plan:   Intra-op Monitoring Plan Comments:   Post Op Pain Control Plan:   Post Op Pain Control Plan Comments:   Induction:   IV  Beta Blocker:  Patient is not currently on a Beta-Blocker (No further documentation required).       Informed Consent: Patient understands risks and agrees with Anesthesia plan.  Questions answered. Anesthesia consent signed with patient.  ASA Score: 3     Day of Surgery Review of History & Physical:     H&P update referred to the surgeon.         Ready For Surgery From Anesthesia Perspective.

## 2018-06-26 NOTE — BRIEF OP NOTE
Pre-Op Dx: Macular hole, left eye    Post Op Dx: Same    Procedure Performed: Pars plana vitrectomy, internal limiting membrane peel, left eye    Attending Surgeon: Ashwin    Assistant Surgeon: David Bishop    Anesthesia: Local/Mac, retrobulbar injection of 5cc mixture 0.75% bupivicaine, 2% lidocaine    Estimated blood loss: Minimal    Complication: None    Specimen: None    Disposition: Stable to recovery    Findings/Outcome: Stable    Date of Discharge: 06/26/2018    Discharge Disposition: Home    F/U: 06/27/18

## 2018-06-26 NOTE — INTERVAL H&P NOTE
H&P completed on 06/26/18 has been reviewed, the patient has been examined and:  I concur with the findings and no changes have occurred since H&P was written.    Active Hospital Problems    Diagnosis  POA    Full thickness macular hole, left [H35.342]  Yes      Resolved Hospital Problems    Diagnosis Date Resolved POA   No resolved problems to display.

## 2018-06-26 NOTE — DISCHARGE INSTRUCTIONS
Face down positioning as much as possible. When cannot maintain face down, lie with right side down        Having a Vitrectomy    A vitrectomy is a type of eye surgery to treat problems with the retina and vitreous. The vitreous is a gel-like substance that fills the middle portion of your eye. During the surgery, your surgeon removes the vitreous and replaces it with another solution.  What to tell your health care provider  Before your surgery, tell your health care provider:  · What medicines you take. This includes over-the-counter medicines such as ibuprofen. It also includes vitamins, herbs, and other supplements. You may need to stop taking some medicines before the procedure, such as blood thinners and aspirin.  · If you smoke. You may need to stop before your surgery. Smoking can delay healing. Talk with your healthcare provider if you need help to stop smoking.  · If youve had recent changes in your health. This includes an infection or fever.  · If you are sensitive or allergic to anything. This includes medicines, latex, tape, and anesthetic medicines.  · If you are pregnant. Also tell your healthcare provider if you think you may be pregnant.  Getting ready for your surgery  Make sure to:  · Ask a family member or friend to take you home from the hospital  · Make plans for some help at home while you recover  · Follow all other instructions from your health care provider  · Read the consent form and ask questions if something is not clear  · Not eat or drink after midnight before your surgery  Tests before your surgery  Before your surgery, your doctor may look at your retina. Special tools are used to shine a light in your eye and look at your retina. You may need to have your eyes dilated for this eye exam. You also may need to have an ultrasound of your eye. This helps your doctor view the retina. An ultrasound uses sound waves to create images on a computer screen.  On the day of your surgery  Talk  with your doctor about what to expect during your surgery. The details of the surgery may differ somewhat. A doctor specially trained in eye surgery (ophthalmologist) will do your operation. In general, you can expect the following:  · You may have general anesthesia. This will cause you to sleep through the surgery. Or you may be awake during the surgery. You will receive a medicine to help you relax. You also may be given anesthetic eye drops and injections. This is to make sure you dont feel anything.  · Your surgeon will make an incision in the outer layer of your eye.  · He or she will make a small cut in the white part of the eye (sclera).  · Your doctor will remove the vitreous and any scar tissue or other material.  · Your doctor will do other repairs to your eye as needed. For example, he or she might use a laser to fix a tear in your retina. In some cases, your doctor may inject a gas bubble into your eye. This is to help keep the retina in place.  · Your doctor will replace the vitreous with another type of fluid. It may be replaced with silicone oil or saline.  · Your doctor will close your incisions with stitches.  · Your doctor will put an antibiotic ointment on your eye to help prevent infection.  · Your eye will be covered with a patch.  After your surgery  Youll likely be able to go home the same day. Have someone drive you home.  Recovering at home  Follow all of your doctors instructions about eye care. Your eye may be a little sore after the procedure. You can take over-the-counter pain medicine as approved by your healthcare provider. You may need to use eye drops with antibiotics. This is to help prevent infection. You may need to wear an eye patch for a day or so.  If you had a gas bubble placed in your eye during your vitrectomy, you will need to follow specific instructions about positioning. You will also need to not travel on an airplane for a period of time after the surgery. Ask your  doctor when it will be safe for you to fly again.  Follow-up care  You will need close follow-up with your doctor to see how well the surgery worked. You may have an appointment the day after the surgery. You may need follow-up surgery in the future to remove the replacement fluid from your eye.  Your vision may not be completely normal after your vitrectomy, especially if you had permanent damage to your retina. Ask your doctor how much improvement you can expect.     When to call your health care provider  Call your health care provider right away if you have any of the below:  · Vision that gets worse  · Pain or swelling around your eye that gets worse   Date Last Reviewed: 6/16/2015  © 2142-7361 [email or web address]. [address] [place] [address], [place] [address]. All rights reserved. This information is not intended as a substitute for professional medical care. Always follow your healthcare professional's instructions.

## 2018-06-26 NOTE — TRANSFER OF CARE
"Anesthesia Transfer of Care Note    Patient: Isabel Hernandez    Procedure(s) Performed: Procedure(s) (LRB):  VITRECTOMY, PARS PLANA APPROch   internal limitng membrane peel (Left)    Patient location: PACU    Anesthesia Type: MAC    Transport from OR: Transported from OR on room air with adequate spontaneous ventilation    Post pain: adequate analgesia    Post assessment: no apparent anesthetic complications and tolerated procedure well    Post vital signs: stable    Level of consciousness: awake, alert and oriented    Nausea/Vomiting: no nausea/vomiting    Complications: none    Transfer of care protocol was followed      Last vitals:   Visit Vitals  BP (!) 180/77 (BP Location: Right arm, Patient Position: Lying)   Pulse 71   Temp 36.6 °C (97.9 °F) (Temporal)   Resp 16   Ht 4' 11" (1.499 m)   Wt 68.3 kg (150 lb 9.2 oz)   SpO2 98%   BMI 30.41 kg/m²     "

## 2018-06-27 ENCOUNTER — OFFICE VISIT (OUTPATIENT)
Dept: OPHTHALMOLOGY | Facility: CLINIC | Age: 83
End: 2018-06-27
Payer: MEDICARE

## 2018-06-27 DIAGNOSIS — H35.342 MACULAR HOLE, FULL THICKNESS, LEFT: Primary | ICD-10-CM

## 2018-06-27 PROCEDURE — 99999 PR PBB SHADOW E&M-EST. PATIENT-LVL II: CPT | Mod: PBBFAC,,, | Performed by: OPHTHALMOLOGY

## 2018-06-27 PROCEDURE — 99024 POSTOP FOLLOW-UP VISIT: CPT | Mod: S$GLB,,, | Performed by: OPHTHALMOLOGY

## 2018-06-27 NOTE — PROGRESS NOTES
HPI     DLS:  06/26/2018 ---S/p PPV/ILM OS      Pt is her tosday for POD 1---patch was removed by tech this am and po   instruction was reviewed with patient and daughter ----    No pain.  Able to position face down overnight.  Va very blurry          Last edited by Johnathan Christopher MD on 6/27/2018  8:11 AM. (History)            Assessment /Plan     For exam results, see Encounter Report.    Macular hole, full thickness, left  -     Posterior Segment OCT Retina-Both eyes    Doing well POD#1  PF 0/4  Vig 0/4  HA 0/1  Maxitrol marisela 0/HS  No vigorous activity, no lifting, bending, etc.  Marte shield sleeping  Marte shield, glasses, or sunglasses all times for protection   No shower   Face down down positioning  RD/Endophthalmitis precautions   RTC 1 wk sooner PRN if any problems (ishaan pain, redness, dimming or loss of vision)

## 2018-06-27 NOTE — ANESTHESIA POSTPROCEDURE EVALUATION
"Anesthesia Post Evaluation    Patient: Isabel Hernandez    Procedure(s) Performed: Procedure(s) (LRB):  VITRECTOMY, PARS PLANA APPROch   internal limitng membrane peel (Left)    Final Anesthesia Type: MAC  Patient location during evaluation: PACU  Patient participation: Yes- Able to Participate  Level of consciousness: awake and alert  Post-procedure vital signs: reviewed and stable  Pain management: adequate  Airway patency: patent  PONV status at discharge: No PONV  Anesthetic complications: no      Cardiovascular status: blood pressure returned to baseline  Respiratory status: spontaneous ventilation and room air  Hydration status: euvolemic  Follow-up not needed.        Visit Vitals  /76   Pulse (!) 56   Temp 36.9 °C (98.5 °F) (Skin)   Resp 16   Ht 4' 11" (1.499 m)   Wt 68.3 kg (150 lb 9.2 oz)   SpO2 99%   BMI 30.41 kg/m²       Pain/Beatrice Score: Pain Assessment Performed: Yes (6/26/2018 10:57 AM)  Presence of Pain: denies (6/26/2018 10:57 AM)  Beatrice Score: 10 (6/26/2018 10:57 AM)      "

## 2018-07-03 ENCOUNTER — TELEPHONE (OUTPATIENT)
Dept: OPHTHALMOLOGY | Facility: CLINIC | Age: 83
End: 2018-07-03

## 2018-07-03 NOTE — TELEPHONE ENCOUNTER
----- Message from Viet Wells sent at 7/3/2018 11:02 AM CDT -----  Contact: Pt  Needs Advice    Reason for call:    Would like to know if she need to put eye drops in, before appt 7-5-18.   Communication Preference: [phone]   Additional Information: she would like a call today.

## 2018-07-05 ENCOUNTER — OFFICE VISIT (OUTPATIENT)
Dept: OPHTHALMOLOGY | Facility: CLINIC | Age: 83
End: 2018-07-05
Payer: MEDICARE

## 2018-07-05 DIAGNOSIS — H35.342 MACULAR HOLE, FULL THICKNESS, LEFT: Primary | ICD-10-CM

## 2018-07-05 PROCEDURE — 92134 CPTRZ OPH DX IMG PST SGM RTA: CPT | Mod: S$GLB,,, | Performed by: OPHTHALMOLOGY

## 2018-07-05 PROCEDURE — 99024 POSTOP FOLLOW-UP VISIT: CPT | Mod: S$GLB,,, | Performed by: OPHTHALMOLOGY

## 2018-07-05 PROCEDURE — 99999 PR PBB SHADOW E&M-EST. PATIENT-LVL I: CPT | Mod: PBBFAC,,, | Performed by: OPHTHALMOLOGY

## 2018-07-05 RX ORDER — PREDNISOLONE ACETATE 10 MG/ML
1 SUSPENSION/ DROPS OPHTHALMIC 4 TIMES DAILY
Qty: 10 ML | Refills: 0 | Status: SHIPPED | OUTPATIENT
Start: 2018-07-05 | End: 2018-08-23

## 2018-07-05 NOTE — PROGRESS NOTES
HPI     DLS:  06/27/2018 ---    1 week ck     S/p PPV/ILM OS (6/26/18)     Pt noticed that VA in OS is getting better---denies eye pain    Face Down position was completed on Sunday       Gtts V/PF OS QID          HA OS QHS              Last edited by Tessa Wood on 7/5/2018  7:54 AM. (History)        [place] SDOCT:     OS: good quality, hole closed, outer retina atrophy, no ME  Much improved over preop scan      Assessment /Plan     For exam results, see Encounter Report.    There are no diagnoses linked to this encounter.    Doing well POW#1  PF 0/4  Vig d/c  HA d/c  No vigorous activity, no lifting, bending, etc.  Marte shield sleeping    RD/Endophthalmitis precautions   RTC 2 wk sooner PRN if any problems (ishaan pain, redness, dimming or loss of vision)

## 2018-07-11 ENCOUNTER — TELEPHONE (OUTPATIENT)
Dept: OPHTHALMOLOGY | Facility: CLINIC | Age: 83
End: 2018-07-11

## 2018-07-11 NOTE — TELEPHONE ENCOUNTER
----- Message from Tatiana Acosta sent at 7/11/2018 10:39 AM CDT -----  Contact: Isabel      ----- Message -----  From: Brigid Felix  Sent: 7/11/2018  10:35 AM  To: Ashwin Mann Staff    Needs Advice    Reason for call:  Pt states that she has a bubble in her eye since the procedure done not too long ago but it is black now and she wants to be sure that it is ok.    Communication Preference: [phone]    Additional Information:    MD talked with patient. Patient will follow up as planned

## 2018-07-18 RX ORDER — LEVOTHYROXINE SODIUM 50 UG/1
TABLET ORAL
Qty: 90 TABLET | Refills: 3 | Status: SHIPPED | OUTPATIENT
Start: 2018-07-18 | End: 2019-05-21 | Stop reason: SDUPTHER

## 2018-07-19 ENCOUNTER — OFFICE VISIT (OUTPATIENT)
Dept: OPHTHALMOLOGY | Facility: CLINIC | Age: 83
End: 2018-07-19
Payer: MEDICARE

## 2018-07-19 VITALS — DIASTOLIC BLOOD PRESSURE: 72 MMHG | SYSTOLIC BLOOD PRESSURE: 149 MMHG | HEART RATE: 73 BPM

## 2018-07-19 DIAGNOSIS — H35.342 MACULAR HOLE, FULL THICKNESS, LEFT: ICD-10-CM

## 2018-07-19 PROCEDURE — 99024 POSTOP FOLLOW-UP VISIT: CPT | Mod: S$GLB,,, | Performed by: OPHTHALMOLOGY

## 2018-07-19 PROCEDURE — 99999 PR PBB SHADOW E&M-EST. PATIENT-LVL III: CPT | Mod: PBBFAC,,, | Performed by: OPHTHALMOLOGY

## 2018-07-19 PROCEDURE — 92134 CPTRZ OPH DX IMG PST SGM RTA: CPT | Mod: S$GLB,,, | Performed by: OPHTHALMOLOGY

## 2018-07-19 NOTE — PROGRESS NOTES
HPI     DLS:  07/05/2018 - Dr. Christopher    Pt states that vision is little bettter no perfect but better than last   visit don't have that floater anymore.  No pain flashes or double vision.     S/p PPV/ILM OS (6/26/18)     Eye Med(s):  PredForte - OS QID                Last edited by Ashia Taylor MA on 7/19/2018  1:52 PM. (History)          [place] SDOCT:   OD: good quality, VMT, stable since 7/5/18 scan  OS: good quality, hole closed, outer retina atrophy, no ME, stable since 7/5/18 scan    Assessment /Plan     For exam results, see Encounter Report.    Macular hole, full thickness, left  -     Posterior Segment OCT Retina-Both eyes    Hole closed, doing well  Taper PF 0/3,0/2,0/1,D/C- decreasing by 1 gtt/day/wk until off  RD/endophthalmitis precautions  RTC 1 month, sooner PRN

## 2018-08-22 ENCOUNTER — LAB VISIT (OUTPATIENT)
Dept: LAB | Facility: HOSPITAL | Age: 83
End: 2018-08-22
Attending: INTERNAL MEDICINE
Payer: MEDICARE

## 2018-08-22 DIAGNOSIS — Z00.00 ROUTINE GENERAL MEDICAL EXAMINATION AT A HEALTH CARE FACILITY: ICD-10-CM

## 2018-08-22 DIAGNOSIS — E03.9 HYPOTHYROIDISM, UNSPECIFIED TYPE: ICD-10-CM

## 2018-08-22 LAB
ALBUMIN SERPL BCP-MCNC: 4.1 G/DL
ALP SERPL-CCNC: 56 U/L
ALT SERPL W/O P-5'-P-CCNC: 13 U/L
ANION GAP SERPL CALC-SCNC: 11 MMOL/L
AST SERPL-CCNC: 17 U/L
BASOPHILS # BLD AUTO: 0.04 K/UL
BASOPHILS NFR BLD: 0.4 %
BILIRUB SERPL-MCNC: 0.9 MG/DL
BUN SERPL-MCNC: 17 MG/DL
CALCIUM SERPL-MCNC: 9.2 MG/DL
CHLORIDE SERPL-SCNC: 105 MMOL/L
CHOLEST SERPL-MCNC: 222 MG/DL
CHOLEST/HDLC SERPL: 4 {RATIO}
CO2 SERPL-SCNC: 23 MMOL/L
CREAT SERPL-MCNC: 0.9 MG/DL
DIFFERENTIAL METHOD: NORMAL
EOSINOPHIL # BLD AUTO: 0.1 K/UL
EOSINOPHIL NFR BLD: 0.6 %
ERYTHROCYTE [DISTWIDTH] IN BLOOD BY AUTOMATED COUNT: 13.6 %
EST. GFR  (AFRICAN AMERICAN): >60 ML/MIN/1.73 M^2
EST. GFR  (NON AFRICAN AMERICAN): >60 ML/MIN/1.73 M^2
GLUCOSE SERPL-MCNC: 107 MG/DL
HCT VFR BLD AUTO: 43.8 %
HDLC SERPL-MCNC: 55 MG/DL
HDLC SERPL: 24.8 %
HGB BLD-MCNC: 14.6 G/DL
LDLC SERPL CALC-MCNC: 138 MG/DL
LYMPHOCYTES # BLD AUTO: 3.6 K/UL
LYMPHOCYTES NFR BLD: 37.9 %
MCH RBC QN AUTO: 30.7 PG
MCHC RBC AUTO-ENTMCNC: 33.3 G/DL
MCV RBC AUTO: 92 FL
MONOCYTES # BLD AUTO: 0.7 K/UL
MONOCYTES NFR BLD: 7.9 %
NEUTROPHILS # BLD AUTO: 5 K/UL
NEUTROPHILS NFR BLD: 53 %
NONHDLC SERPL-MCNC: 167 MG/DL
PLATELET # BLD AUTO: 221 K/UL
PMV BLD AUTO: 10.9 FL
POTASSIUM SERPL-SCNC: 4.5 MMOL/L
PROT SERPL-MCNC: 7.2 G/DL
RBC # BLD AUTO: 4.76 M/UL
SODIUM SERPL-SCNC: 139 MMOL/L
TRIGL SERPL-MCNC: 145 MG/DL
TSH SERPL DL<=0.005 MIU/L-ACNC: 1.99 UIU/ML
WBC # BLD AUTO: 9.42 K/UL

## 2018-08-22 PROCEDURE — 36415 COLL VENOUS BLD VENIPUNCTURE: CPT

## 2018-08-22 PROCEDURE — 80053 COMPREHEN METABOLIC PANEL: CPT

## 2018-08-22 PROCEDURE — 84443 ASSAY THYROID STIM HORMONE: CPT

## 2018-08-22 PROCEDURE — 80061 LIPID PANEL: CPT

## 2018-08-22 PROCEDURE — 85025 COMPLETE CBC W/AUTO DIFF WBC: CPT

## 2018-08-23 ENCOUNTER — OFFICE VISIT (OUTPATIENT)
Dept: OPHTHALMOLOGY | Facility: CLINIC | Age: 83
End: 2018-08-23
Payer: MEDICARE

## 2018-08-23 VITALS — DIASTOLIC BLOOD PRESSURE: 73 MMHG | HEART RATE: 66 BPM | SYSTOLIC BLOOD PRESSURE: 146 MMHG

## 2018-08-23 DIAGNOSIS — H35.342 MACULAR HOLE, FULL THICKNESS, LEFT: Primary | ICD-10-CM

## 2018-08-23 DIAGNOSIS — H43.821 VITREOMACULAR TRACTION SYNDROME OF RIGHT EYE: ICD-10-CM

## 2018-08-23 PROCEDURE — 92134 CPTRZ OPH DX IMG PST SGM RTA: CPT | Mod: S$GLB,,, | Performed by: OPHTHALMOLOGY

## 2018-08-23 PROCEDURE — 99024 POSTOP FOLLOW-UP VISIT: CPT | Mod: S$GLB,,, | Performed by: OPHTHALMOLOGY

## 2018-08-23 PROCEDURE — 99999 PR PBB SHADOW E&M-EST. PATIENT-LVL II: CPT | Mod: PBBFAC,,, | Performed by: OPHTHALMOLOGY

## 2018-08-23 NOTE — PROGRESS NOTES
HPI     DLS:  07/05/2018 - Dr. Christopher    Pt states that vision is about the same as last visit but little better.   Va OD stable.  No pain flashes, floaters or double vision.     S/p PPV/ILM OS (6/26/18)     Eye Med(s):  none                Last edited by Johnathan Christopher MD on 8/23/2018  9:53 AM. (History)        [place] SDOCT:   OD: good quality, VMT, stable since 7/19/18 scan  OS: good quality, hole closed, outer retina atrophy, no ME, stable since 7/19/18 scan    Assessment /Plan     For exam results, see Encounter Report.    Macular hole, full thickness, left  -     Posterior Segment OCT Retina-Both eyes  -     Posterior Segment OCT Retina-Both eyes; Future    Vitreomacular traction syndrome of right eye  -     Posterior Segment OCT Retina-Both eyes  -     Posterior Segment OCT Retina-Both eyes; Future      VMT OD stable over 3 mo obs  MH OS closed.  Vision still limited by outer atrophy.  Hole was likely chronic as pt didn't notice Va change  Va improved OS.  Pt happy with improvement    Stable off gtts    RD and MH precautions.  RTC 6 months, sooner PRN

## 2018-08-27 ENCOUNTER — OFFICE VISIT (OUTPATIENT)
Dept: INTERNAL MEDICINE | Facility: CLINIC | Age: 83
End: 2018-08-27
Payer: MEDICARE

## 2018-08-27 VITALS
HEIGHT: 59 IN | HEIGHT: 59 IN | BODY MASS INDEX: 28.98 KG/M2 | SYSTOLIC BLOOD PRESSURE: 134 MMHG | DIASTOLIC BLOOD PRESSURE: 62 MMHG | BODY MASS INDEX: 28.98 KG/M2 | WEIGHT: 143.75 LBS | SYSTOLIC BLOOD PRESSURE: 134 MMHG | DIASTOLIC BLOOD PRESSURE: 62 MMHG | HEART RATE: 71 BPM | OXYGEN SATURATION: 98 % | HEART RATE: 71 BPM | OXYGEN SATURATION: 98 % | WEIGHT: 143.75 LBS | RESPIRATION RATE: 18 BRPM

## 2018-08-27 DIAGNOSIS — N18.30 CKD (CHRONIC KIDNEY DISEASE), STAGE III: ICD-10-CM

## 2018-08-27 DIAGNOSIS — H35.342 FULL THICKNESS MACULAR HOLE, LEFT: ICD-10-CM

## 2018-08-27 DIAGNOSIS — Z00.00 ENCOUNTER FOR PREVENTIVE HEALTH EXAMINATION: Primary | ICD-10-CM

## 2018-08-27 DIAGNOSIS — M81.0 OSTEOPOROSIS, UNSPECIFIED OSTEOPOROSIS TYPE, UNSPECIFIED PATHOLOGICAL FRACTURE PRESENCE: ICD-10-CM

## 2018-08-27 DIAGNOSIS — E89.0 POSTOPERATIVE HYPOTHYROIDISM: ICD-10-CM

## 2018-08-27 DIAGNOSIS — I70.0 AORTIC ATHEROSCLEROSIS: ICD-10-CM

## 2018-08-27 DIAGNOSIS — E03.9 HYPOTHYROIDISM, UNSPECIFIED TYPE: Primary | ICD-10-CM

## 2018-08-27 PROCEDURE — 3075F SYST BP GE 130 - 139MM HG: CPT | Mod: CPTII,S$GLB,, | Performed by: NURSE PRACTITIONER

## 2018-08-27 PROCEDURE — 99999 PR PBB SHADOW E&M-EST. PATIENT-LVL IV: CPT | Mod: PBBFAC,,, | Performed by: NURSE PRACTITIONER

## 2018-08-27 PROCEDURE — 3078F DIAST BP <80 MM HG: CPT | Mod: CPTII,S$GLB,, | Performed by: INTERNAL MEDICINE

## 2018-08-27 PROCEDURE — 99499 UNLISTED E&M SERVICE: CPT | Mod: HCNC,S$GLB,, | Performed by: INTERNAL MEDICINE

## 2018-08-27 PROCEDURE — 3075F SYST BP GE 130 - 139MM HG: CPT | Mod: CPTII,S$GLB,, | Performed by: INTERNAL MEDICINE

## 2018-08-27 PROCEDURE — 99214 OFFICE O/P EST MOD 30 MIN: CPT | Mod: S$GLB,,, | Performed by: INTERNAL MEDICINE

## 2018-08-27 PROCEDURE — 3078F DIAST BP <80 MM HG: CPT | Mod: CPTII,S$GLB,, | Performed by: NURSE PRACTITIONER

## 2018-08-27 PROCEDURE — G0439 PPPS, SUBSEQ VISIT: HCPCS | Mod: S$GLB,,, | Performed by: NURSE PRACTITIONER

## 2018-08-27 PROCEDURE — 99999 PR PBB SHADOW E&M-EST. PATIENT-LVL III: CPT | Mod: PBBFAC,,, | Performed by: INTERNAL MEDICINE

## 2018-08-27 NOTE — PROGRESS NOTES
Subjective:       Patient ID: Isabel Hernandez is a 82 y.o. female.    Chief Complaint: Annual Exam    Patient presents for routine annual exam.  Has been feeling well.  No specific concerns today.  Prior lab results, need for future labs, screening studies and issues related to family history were assessed.        Review of Systems   Constitutional: Negative for fatigue.   HENT: Negative for sore throat.    Eyes: Negative for visual disturbance.   Respiratory: Negative for shortness of breath.    Cardiovascular: Negative for chest pain and palpitations.   Gastrointestinal: Negative for abdominal pain.   Genitourinary: Negative for dysuria, frequency and hematuria.   Musculoskeletal: Negative for joint swelling.   Skin: Negative for rash.   Neurological: Negative for speech difficulty and weakness.   Psychiatric/Behavioral: Negative for dysphoric mood.       Objective:      Physical Exam   Constitutional: She is oriented to person, place, and time. She appears well-developed and well-nourished. No distress.   HENT:   Head: Normocephalic and atraumatic.   Mouth/Throat: Oropharynx is clear and moist.   Eyes: Conjunctivae are normal. Pupils are equal, round, and reactive to light.   Neck: Normal range of motion. Neck supple.   Cardiovascular: Normal rate, regular rhythm and normal heart sounds.   Pulmonary/Chest: Effort normal and breath sounds normal. She has no wheezes.   Abdominal: Soft. Bowel sounds are normal. There is no tenderness.   Musculoskeletal: Normal range of motion. She exhibits no edema or tenderness.   Neurological: She is alert and oriented to person, place, and time. No cranial nerve deficit.   Skin: No erythema.   Psychiatric: She has a normal mood and affect.   Vitals reviewed.      Assessment:       1. Hypothyroidism, unspecified type    2. Aortic atherosclerosis    3. CKD (chronic kidney disease), stage III    4. Full thickness macular hole, left        Plan:       Isabel was seen today for annual  exam.    Diagnoses and all orders for this visit:    Hypothyroidism, unspecified type  Comments:  stable, cont meds  Orders:  -     Basic metabolic panel; Future  -     TSH; Future    Aortic atherosclerosis  Comments:  cont asa    CKD (chronic kidney disease), stage III  Comments:  stable  Orders:  -     Basic metabolic panel; Future  -     TSH; Future    Full thickness macular hole, left  Comments:  review recent event, educ given        Follow-up in about 6 months (around 2/27/2019) for F/U WITH ME OR APC, WITH LAB BEFORE.

## 2018-08-27 NOTE — PROGRESS NOTES
"Isabel Hernandez presented for a  Medicare AWV and comprehensive Health Risk Assessment today. The following components were reviewed and updated:    · Medical history  · Family History  · Social history  · Allergies and Current Medications  · Health Risk Assessment  · Health Maintenance  · Care Team     ** See Completed Assessments for Annual Wellness Visit within the encounter summary.**       The following assessments were completed:  · Living Situation  · CAGE  · Depression Screening  · Timed Get Up and Go  · Whisper Test  · Cognitive Function Screening        · Nutrition Screening  · ADL Screening  · PAQ Screening    Vitals:    08/27/18 0924   BP: 134/62   Pulse: 71   Resp: 18   SpO2: 98%   Weight: 65.2 kg (143 lb 11.8 oz)   Height: 4' 11" (1.499 m)     Body mass index is 29.03 kg/m².     Physical Exam   Constitutional: She is oriented to person, place, and time. Vital signs are normal. She appears well-developed and well-nourished.   overweight   HENT:   Head: Normocephalic.   Right Ear: Hearing, tympanic membrane, external ear and ear canal normal.   Left Ear: Hearing, tympanic membrane, external ear and ear canal normal.   Eyes: Conjunctivae, EOM and lids are normal. Pupils are equal, round, and reactive to light. Lids are everted and swept, no foreign bodies found.   Neck: Trachea normal, normal range of motion and full passive range of motion without pain. Neck supple. No JVD present. Carotid bruit is not present.   Cardiovascular: Normal rate, regular rhythm, S1 normal, S2 normal, normal heart sounds, intact distal pulses and normal pulses.   Pulmonary/Chest: Effort normal and breath sounds normal.   Abdominal: Soft. Normal appearance and bowel sounds are normal. There is no hepatosplenomegaly.   Musculoskeletal: Normal range of motion.   Neurological: She is alert and oriented to person, place, and time. She has normal strength and normal reflexes.   Skin: Skin is warm, dry and intact. Capillary refill takes " less than 2 seconds.   Psychiatric: She has a normal mood and affect. Her speech is normal and behavior is normal. Judgment and thought content normal. Cognition and memory are normal.   Vitals reviewed.        Diagnoses and health risks identified today and associated recommendations/orders:    1. Encounter for preventive health examination  Exam done    Health Maintenance updated    Records reviewed    2. Aortic atherosclerosis  Noted on imagine. Not currently on statin. BP controlled. Followed by PCP.    3. CKD (chronic kidney disease), stage III  Stable, followed by PCP      4. Postoperative hypothyroidism  Stable, followed by PCP  On thyroid meds, advised to take separate from other meds and vitamins    5. Osteoporosis, unspecified osteoporosis type, unspecified pathological fracture presence  DEXA ordered, schedule 9-30-18 or later    6. BMI 29.0-29.9,adult  BMI reviewed      Provided Isabel with a 5-10 year written screening schedule and personal prevention plan. Recommendations were developed using the USPSTF age appropriate recommendations. Education, counseling, and referrals were provided as needed. After Visit Summary printed and given to patient which includes a list of additional screenings\tests needed.    F/U in 6 months with PCP Dr. Nye, saw him today prior to my HRA visit.    Janay Talavera, DNP

## 2018-08-27 NOTE — PATIENT INSTRUCTIONS
Counseling and Referral of Other Preventative  (Italic type indicates deductible and co-insurance are waived)    Patient Name: Isabel Hernandez  Today's Date: 8/27/2018    Health Maintenance       Date Due Completion Date    Influenza Vaccine 10/15/2018 (Originally 8/1/2018) 10/4/2017    Override on 10/10/2016: Done    DEXA SCAN 09/30/2018 9/30/2015--ordered     Override on 9/23/2015: (N/S) (ordered)    Lipid Panel 08/22/2023 8/22/2018    TETANUS VACCINE 05/23/2026 5/23/2016 (Done)    Override on 5/23/2016: Done        Orders Placed This Encounter   Procedures    DXA Bone Density Spine And Hip     The following information is provided to all patients.  This information is to help you find resources for any of the problems found today that may be affecting your health:                Living healthy guide: www.[facility].[place].[facility]      Understanding Diabetes: www.diabetes.org      Eating healthy: www.cdc.gov/healthyweight      [facility] home safety checklist: www.cdc.gov/steadi/patient.html      Agency on Aging: www.goea.[place].[facility]      Alcoholics anonymous (AA): www.aa.org      Physical Activity: www.david.nih.gov/df5jovg      Tobacco use: www.quitwithusla.org

## 2018-09-09 ENCOUNTER — OFFICE VISIT (OUTPATIENT)
Dept: URGENT CARE | Facility: CLINIC | Age: 83
End: 2018-09-09
Payer: MEDICARE

## 2018-09-09 VITALS
DIASTOLIC BLOOD PRESSURE: 84 MMHG | TEMPERATURE: 99 F | BODY MASS INDEX: 28.22 KG/M2 | HEART RATE: 81 BPM | SYSTOLIC BLOOD PRESSURE: 165 MMHG | OXYGEN SATURATION: 95 % | HEIGHT: 59 IN | WEIGHT: 140 LBS

## 2018-09-09 DIAGNOSIS — L08.9 INFECTED SKIN TEAR: Primary | ICD-10-CM

## 2018-09-09 DIAGNOSIS — T14.8XXA INFECTED SKIN TEAR: Primary | ICD-10-CM

## 2018-09-09 PROCEDURE — 1101F PT FALLS ASSESS-DOCD LE1/YR: CPT | Mod: CPTII,S$GLB,, | Performed by: FAMILY MEDICINE

## 2018-09-09 PROCEDURE — 3079F DIAST BP 80-89 MM HG: CPT | Mod: CPTII,S$GLB,, | Performed by: FAMILY MEDICINE

## 2018-09-09 PROCEDURE — 99213 OFFICE O/P EST LOW 20 MIN: CPT | Mod: S$GLB,,, | Performed by: FAMILY MEDICINE

## 2018-09-09 PROCEDURE — 3077F SYST BP >= 140 MM HG: CPT | Mod: CPTII,S$GLB,, | Performed by: FAMILY MEDICINE

## 2018-09-09 RX ORDER — MUPIROCIN 20 MG/G
OINTMENT TOPICAL
Qty: 22 G | Refills: 1 | Status: SHIPPED | OUTPATIENT
Start: 2018-09-09 | End: 2019-04-17 | Stop reason: ALTCHOICE

## 2018-09-09 RX ORDER — AMOXICILLIN AND CLAVULANATE POTASSIUM 875; 125 MG/1; MG/1
1 TABLET, FILM COATED ORAL 2 TIMES DAILY
Qty: 14 TABLET | Refills: 0 | Status: SHIPPED | OUTPATIENT
Start: 2018-09-09 | End: 2018-09-12 | Stop reason: SINTOL

## 2018-09-09 NOTE — PATIENT INSTRUCTIONS
Wound Check, Infection  You have a wound that has become infected. The wound will not heal properly unless the infection is cleared. Infection in a wound may also spread if it is not treated. In most cases, antibiotic medicines are prescribed to treat a wound infection.   Symptoms of a wound infection include:  · Redness or swelling around the wound  · Warmth coming from the wound  · New or worsening pain  · Red streaks around the wound  · Draining pus  · Fever  Home care  Follow all directions you are given to treat the infection.  Medicines  Take all medicines as prescribed.   · If you were given antibiotics, take them until they are gone or your healthcare provider tells you to stop. It is vital to finish the antibiotics even if you feel better. If you do not finish them, the infection may come back and be harder to treat.  · If your infection is not responding to the medicines you are taking, you may be prescribed new medicines.  · Take medicine for pain as directed by your healthcare provider.  Wound care  Care for your wound as directed by your healthcare provider.  · Apply a warm compress (clean cloth soaked in hot water) to the infected area for about 5 to 10 minutes at a time. Be very careful not to burn yourself. Test the cloth on a non-infected area to make sure it is not too hot.  · Continue to change the dressing daily. If it becomes wet, stained with wound fluid, or dirty, change it sooner. To change it:  ¨ Wash your hands with soap and water before changing the dressing.  ¨ Carefully remove the dressing and tape. If it sticks to the wound, you may need to wet it a little to remove it. (Do not do this if your healthcare provider has told you not to.)  ¨ Gently clean the wound with clean water (or saline) using gauze, a clean washcloth, or cotton swab.  ¨ Do not use soap, alcohol, peroxide or other cleansers.  ¨ If you were told to dry the wound before putting on a new dressing, gently pat. Do not  rub.  ¨ Throw out the old dressing.  ¨ Wash your hands again before opening the new, clean dressing.  ¨ Wash your hands again when you are done.  Follow-up care  Follow up with your healthcare provider as advised. If a culture was done, you will be notified if your treatment needs to change. Call as directed for the results.  When to seek medical advice  Call your health care provider right away if any of these occur:  · Symptoms of infection don't start to improve within 2 days of starting antibiotics  · Symptoms of infection get worse  · New symptoms, such as red streaks around the wound  · Fever of 100.4°F (38.0°C) or higher for more than 2 days after starting the antibiotics  Date Last Reviewed: 8/10/2015  © 5021-1629 [employer]. [address] [place] [address], [place] [address]. All rights reserved. This information is not intended as a substitute for professional medical care. Always follow your healthcare professional's instructions.

## 2018-09-09 NOTE — PROGRESS NOTES
"Subjective:       Patient ID: Isabel Hernandez is a 82 y.o. female.    Vitals:  height is 4' 11" (1.499 m) and weight is 63.5 kg (140 lb). Her oral temperature is 99 °F (37.2 °C). Her blood pressure is 165/84 (abnormal) and her pulse is 81. Her oxygen saturation is 95%.     Chief Complaint: Abrasion    This is a 82 y.o. female   with Past Medical History:  No date: Arthritis  No date: Cataract  No date: Hypothyroidism  2015: Osteoporosis   and Past Surgical History:  No date: ADENOIDECTOMY  No date: BREAST SURGERY; Bilateral      Comment:  cyst removal  No date:  SECTION  No date: CHOLECYSTECTOMY  No date: HYSTERECTOMY  No date: THYROIDECTOMY, PARTIAL  No date: TONSILLECTOMY  2018: VITRECTOMY BY PARS PLANA APPROACH; Left      Comment:  Procedure: VITRECTOMY, PARS PLANA APPROch   internal                limitng membrane peel;  Surgeon: Johnathan Christopher MD;               Location: [facility] OR [address] [place] [address];  Service: Ophthalmology;                 Laterality: Left;  2018: VITRECTOMY, PARS PLANA APPROch   internal limitng membrane   peel; Left      Comment:  Performed by Johnathan Christopher MD at [facility] OR [address] [place] [address]  who presents today with a chief complaint of a skin tear on her right arm. She scraped her arm over a week ago while at home. The wound has not healed since then. She's used triple antibiotic ointment to help with her symptoms. She says she soaks the wound twice a day .      Arm Injury    The incident occurred more than 1 week ago. The incident occurred at home. There was no injury mechanism. The pain is present in the right forearm. The pain does not radiate. The pain is at a severity of 0/10. The patient is experiencing no pain. Pertinent negatives include no chest pain. Nothing aggravates the symptoms.     Review of Systems   Eyes: Negative for blurred vision.   Cardiovascular: Negative for chest pain.   Skin: Positive for color change, poor wound healing and suspicious lesions. "   Musculoskeletal: Negative for back pain.   Gastrointestinal: Negative for abdominal pain and nausea.   Psychiatric/Behavioral: The patient is not nervous/anxious.        Objective:      Physical Exam   Constitutional: She appears well-developed and well-nourished.   Eyes: EOM are normal. Pupils are equal, round, and reactive to light.   Cardiovascular: Normal rate and regular rhythm.   Skin: Abrasion (irregular 4X1 cm skin tear right forearm  with scant mucopurulent drainage and surrounding erythma right forearm) noted. There is erythema.   Nursing note and vitals reviewed.      Assessment:       1. Infected skin tear        Plan:         Infected skin tear  -     mupirocin (BACTROBAN) 2 % ointment; Apply to affected area 3 times daily  Dispense: 22 g; Refill: 1  -     amoxicillin-clavulanate 875-125mg (AUGMENTIN) 875-125 mg per tablet; Take 1 tablet by mouth 2 (two) times daily. for 7 days  Dispense: 14 tablet; Refill: 0    to followup with PCP this week. Discussed wound care with patient.

## 2018-09-10 ENCOUNTER — TELEPHONE (OUTPATIENT)
Dept: INTERNAL MEDICINE | Facility: CLINIC | Age: 83
End: 2018-09-10

## 2018-09-10 NOTE — TELEPHONE ENCOUNTER
----- Message from Yamilet Rodriguez sent at 9/10/2018  8:20 AM CDT -----  Contact: self [phone]      ----- Message -----  From: Sarabjit Temple  Sent: 9/10/2018   7:59 AM  To: Parris Collins Staff    Patient requesting a call from the office to discuss infection in right arm . Please advise, Thanks

## 2018-09-10 NOTE — TELEPHONE ENCOUNTER
----- Message from Yamilet Rodriguez sent at 9/10/2018  8:20 AM CDT -----  Contact: self [phone]      ----- Message -----  From: Sarabjit Temple  Sent: 9/10/2018   7:59 AM  To: Parris Collins Staff    Patient requesting a call from the office to discuss infection in right arm . Please advise, Thanks

## 2018-09-10 NOTE — TELEPHONE ENCOUNTER
Pt. states that she she saw an urgent care doctor over the weekend for an infected skin tear.  She was told to f/u on Wednesday.  She insists on seeing Dr Nye only. She states that she is a friend of his.

## 2018-09-10 NOTE — TELEPHONE ENCOUNTER
----- Message from Maliha Reed sent at 9/10/2018  3:53 PM CDT -----  Contact: patient [phone]  Pt called again for the nurse . She called this morning to ask a question and thought that Danielle would call her back.

## 2018-09-12 ENCOUNTER — OFFICE VISIT (OUTPATIENT)
Dept: INTERNAL MEDICINE | Facility: CLINIC | Age: 83
End: 2018-09-12
Payer: MEDICARE

## 2018-09-12 VITALS
BODY MASS INDEX: 28.98 KG/M2 | WEIGHT: 143.75 LBS | OXYGEN SATURATION: 97 % | SYSTOLIC BLOOD PRESSURE: 136 MMHG | HEIGHT: 59 IN | HEART RATE: 81 BPM | DIASTOLIC BLOOD PRESSURE: 82 MMHG

## 2018-09-12 DIAGNOSIS — S51.811A SKIN TEAR OF FOREARM WITHOUT COMPLICATION, RIGHT, INITIAL ENCOUNTER: Primary | ICD-10-CM

## 2018-09-12 PROCEDURE — 99999 PR PBB SHADOW E&M-EST. PATIENT-LVL III: CPT | Mod: PBBFAC,,, | Performed by: INTERNAL MEDICINE

## 2018-09-12 PROCEDURE — 1101F PT FALLS ASSESS-DOCD LE1/YR: CPT | Mod: CPTII,,, | Performed by: INTERNAL MEDICINE

## 2018-09-12 PROCEDURE — 3079F DIAST BP 80-89 MM HG: CPT | Mod: CPTII,,, | Performed by: INTERNAL MEDICINE

## 2018-09-12 PROCEDURE — 99212 OFFICE O/P EST SF 10 MIN: CPT | Mod: S$PBB,,, | Performed by: INTERNAL MEDICINE

## 2018-09-12 PROCEDURE — 99213 OFFICE O/P EST LOW 20 MIN: CPT | Mod: PBBFAC | Performed by: INTERNAL MEDICINE

## 2018-09-12 PROCEDURE — 3075F SYST BP GE 130 - 139MM HG: CPT | Mod: CPTII,,, | Performed by: INTERNAL MEDICINE

## 2018-09-12 NOTE — PROGRESS NOTES
Subjective:       Patient ID: Isabel Hernandez is a 82 y.o. female.    Chief Complaint: Follow-up and Wound Infection    Laceration    The incident occurred 3 to 5 days ago. The laceration is located on the right arm. The laceration is 4 cm in size. The laceration mechanism was a blunt object. The pain is mild.     Review of Systems    Objective:      Physical Exam   Skin:            Assessment:       1. Skin tear of forearm without complication, right, initial encounter        Plan:       Isabel was seen today for follow-up and wound infection.    Diagnoses and all orders for this visit:    Skin tear of forearm without complication, right, initial encounter  Comments:  no sign infection-  local care discussed- stop Augmentin (diarrhea)        Follow-up if symptoms worsen or fail to improve.

## 2019-01-07 ENCOUNTER — TELEPHONE (OUTPATIENT)
Dept: INTERNAL MEDICINE | Facility: CLINIC | Age: 84
End: 2019-01-07

## 2019-01-07 NOTE — TELEPHONE ENCOUNTER
----- Message from Belkys Ceron sent at 1/7/2019 10:23 AM CST -----  Doctor appointment and lab have been scheduled.  Please link lab orders to the lab appointment.  Date of doctor appointment:  4/17  Physical or EP:  6 Follow up  Date of lab appointment:  4/12  Comments:

## 2019-02-21 ENCOUNTER — OFFICE VISIT (OUTPATIENT)
Dept: OPHTHALMOLOGY | Facility: CLINIC | Age: 84
End: 2019-02-21
Payer: MEDICARE

## 2019-02-21 DIAGNOSIS — H43.821 VITREOMACULAR TRACTION SYNDROME OF RIGHT EYE: Primary | ICD-10-CM

## 2019-02-21 DIAGNOSIS — H35.342 MACULAR HOLE, FULL THICKNESS, LEFT: ICD-10-CM

## 2019-02-21 DIAGNOSIS — H25.13 NUCLEAR SCLEROSIS OF BOTH EYES: ICD-10-CM

## 2019-02-21 PROCEDURE — 92134 POSTERIOR SEGMENT OCT RETINA (OCULAR COHERENCE TOMOGRAPHY)-BOTH EYES: ICD-10-PCS | Mod: HCNC,S$GLB,, | Performed by: OPHTHALMOLOGY

## 2019-02-21 PROCEDURE — 99999 PR PBB SHADOW E&M-EST. PATIENT-LVL II: ICD-10-PCS | Mod: PBBFAC,HCNC,, | Performed by: OPHTHALMOLOGY

## 2019-02-21 PROCEDURE — 92134 CPTRZ OPH DX IMG PST SGM RTA: CPT | Mod: HCNC,S$GLB,, | Performed by: OPHTHALMOLOGY

## 2019-02-21 PROCEDURE — 92014 COMPRE OPH EXAM EST PT 1/>: CPT | Mod: HCNC,S$GLB,, | Performed by: OPHTHALMOLOGY

## 2019-02-21 PROCEDURE — 92014 PR EYE EXAM, EST PATIENT,COMPREHESV: ICD-10-PCS | Mod: HCNC,S$GLB,, | Performed by: OPHTHALMOLOGY

## 2019-02-21 PROCEDURE — 99999 PR PBB SHADOW E&M-EST. PATIENT-LVL II: CPT | Mod: PBBFAC,HCNC,, | Performed by: OPHTHALMOLOGY

## 2019-02-21 NOTE — PROGRESS NOTES
HPI     DLS:  8/23/18 - Dr. Christopher  Pt here for evaluation of vision and eyes.    VA stable OU, good OD and blurry OS.  Feels that vision has improved OS   since MH surgery.  No f/f/pain OU.     S/p PPV/ILM OS (6/26/18)     Eye Med(s):  none                Last edited by Johnathan Christopher MD on 2/22/2019 11:06 AM. (History)      [place] SDOCT:   OD: good quality, VMT, stable since 8/23/18 scan  OS: good quality, hole closed, outer retina atrophy, no ME, stable since 8/23/18 scan        Assessment /Plan     For exam results, see Encounter Report.    Vitreomacular traction syndrome of right eye  Comments:  Stable  good vision  better-seeing eye  Observe  Orders:  -     Posterior Segment OCT Retina-Both eyes    Macular hole, full thickness, left  Comments:  Closed s/p MH repair.   Per pt had visual benefit  Acuity still poor, limited by outer atrophy  Likely some cataract contribuition  Hole also was chronic preop  Orders:  -     Posterior Segment OCT Retina-Both eyes    Nuclear sclerosis of both eyes  Comments:  No contraindication to CE/IOL  Pt not interested now      Refer back to Dr Pacheco for comprehensive care/refraction  RTC with me 6 months to watch VMT OD, sooner PRN

## 2019-03-27 ENCOUNTER — OFFICE VISIT (OUTPATIENT)
Dept: OPTOMETRY | Facility: CLINIC | Age: 84
End: 2019-03-27
Payer: COMMERCIAL

## 2019-03-27 DIAGNOSIS — H26.9 CORTICAL CATARACT OF BOTH EYES: ICD-10-CM

## 2019-03-27 DIAGNOSIS — H25.13 NS (NUCLEAR SCLEROSIS), BILATERAL: ICD-10-CM

## 2019-03-27 DIAGNOSIS — H43.821 VITREOMACULAR TRACTION SYNDROME OF RIGHT EYE: ICD-10-CM

## 2019-03-27 DIAGNOSIS — H35.342 MACULAR HOLE, FULL THICKNESS, LEFT: ICD-10-CM

## 2019-03-27 DIAGNOSIS — H52.4 PRESBYOPIA: Primary | ICD-10-CM

## 2019-03-27 PROCEDURE — 92014 PR EYE EXAM, EST PATIENT,COMPREHESV: ICD-10-PCS | Mod: S$GLB,,, | Performed by: OPTOMETRIST

## 2019-03-27 PROCEDURE — 92015 PR REFRACTION: ICD-10-PCS | Mod: S$GLB,,, | Performed by: OPTOMETRIST

## 2019-03-27 PROCEDURE — 99999 PR PBB SHADOW E&M-EST. PATIENT-LVL II: CPT | Mod: PBBFAC,,, | Performed by: OPTOMETRIST

## 2019-03-27 PROCEDURE — 99999 PR PBB SHADOW E&M-EST. PATIENT-LVL II: ICD-10-PCS | Mod: PBBFAC,,, | Performed by: OPTOMETRIST

## 2019-03-27 PROCEDURE — 92015 DETERMINE REFRACTIVE STATE: CPT | Mod: S$GLB,,, | Performed by: OPTOMETRIST

## 2019-03-27 PROCEDURE — 92014 COMPRE OPH EXAM EST PT 1/>: CPT | Mod: S$GLB,,, | Performed by: OPTOMETRIST

## 2019-03-30 NOTE — PROGRESS NOTES
HPI     Pt here because she wants to get a new rx and new glasses. Pt denies   flashes of light, floaters, eye pain, itching or burning, or double   vision. Pt not currently using gtts.    Last edited by Cherelle King on 3/27/2019  1:24 PM. (History)            Assessment /Plan     For exam results, see Encounter Report.    Presbyopia   Rx specs, increased add power    Cortical cataract of both eyes  NS (nuclear sclerosis), bilateral  OS>OD  Monitor    Vitreomacular traction syndrome of right eye  Comments:  Stable  good vision  better-seeing eye  Observe  Feb 2019    Posterior Segment OCT Retina-Both eyes     Macular hole, full thickness, left  Comments:  Closed s/p MH repair.   Per pt had visual benefit  Acuity still poor, limited by outer atrophy  Likely some cataract contribuition  Hole also was chronic preop  Orders:  -     Posterior Segment OCT Retina-Both eyes       RTC Ashwin 6 months to watch VMT OD, sooner PRN

## 2019-04-03 ENCOUNTER — PES CALL (OUTPATIENT)
Dept: ADMINISTRATIVE | Facility: CLINIC | Age: 84
End: 2019-04-03

## 2019-04-12 ENCOUNTER — LAB VISIT (OUTPATIENT)
Dept: LAB | Facility: HOSPITAL | Age: 84
End: 2019-04-12
Payer: MEDICARE

## 2019-04-12 DIAGNOSIS — E03.9 HYPOTHYROIDISM, UNSPECIFIED TYPE: ICD-10-CM

## 2019-04-12 DIAGNOSIS — N18.30 CKD (CHRONIC KIDNEY DISEASE), STAGE III: ICD-10-CM

## 2019-04-12 LAB
ANION GAP SERPL CALC-SCNC: 11 MMOL/L (ref 8–16)
BUN SERPL-MCNC: 14 MG/DL (ref 8–23)
CALCIUM SERPL-MCNC: 9.5 MG/DL (ref 8.7–10.5)
CHLORIDE SERPL-SCNC: 104 MMOL/L (ref 95–110)
CO2 SERPL-SCNC: 25 MMOL/L (ref 23–29)
CREAT SERPL-MCNC: 1 MG/DL (ref 0.5–1.4)
EST. GFR  (AFRICAN AMERICAN): >60 ML/MIN/1.73 M^2
EST. GFR  (NON AFRICAN AMERICAN): 53 ML/MIN/1.73 M^2
GLUCOSE SERPL-MCNC: 90 MG/DL (ref 70–110)
POTASSIUM SERPL-SCNC: 4.1 MMOL/L (ref 3.5–5.1)
SODIUM SERPL-SCNC: 140 MMOL/L (ref 136–145)
TSH SERPL DL<=0.005 MIU/L-ACNC: 2.76 UIU/ML (ref 0.4–4)

## 2019-04-12 PROCEDURE — 36415 COLL VENOUS BLD VENIPUNCTURE: CPT | Mod: HCNC

## 2019-04-12 PROCEDURE — 84443 ASSAY THYROID STIM HORMONE: CPT | Mod: HCNC

## 2019-04-12 PROCEDURE — 80048 BASIC METABOLIC PNL TOTAL CA: CPT | Mod: HCNC

## 2019-04-17 ENCOUNTER — OFFICE VISIT (OUTPATIENT)
Dept: INTERNAL MEDICINE | Facility: CLINIC | Age: 84
End: 2019-04-17
Payer: MEDICARE

## 2019-04-17 VITALS
SYSTOLIC BLOOD PRESSURE: 108 MMHG | TEMPERATURE: 98 F | HEART RATE: 68 BPM | DIASTOLIC BLOOD PRESSURE: 62 MMHG | HEIGHT: 59 IN | WEIGHT: 141.56 LBS | BODY MASS INDEX: 28.54 KG/M2

## 2019-04-17 DIAGNOSIS — I70.0 AORTIC ATHEROSCLEROSIS: ICD-10-CM

## 2019-04-17 DIAGNOSIS — E03.9 HYPOTHYROIDISM, UNSPECIFIED TYPE: Primary | ICD-10-CM

## 2019-04-17 DIAGNOSIS — N18.30 CKD (CHRONIC KIDNEY DISEASE), STAGE III: ICD-10-CM

## 2019-04-17 PROCEDURE — 3074F PR MOST RECENT SYSTOLIC BLOOD PRESSURE < 130 MM HG: ICD-10-PCS | Mod: HCNC,CPTII,S$GLB, | Performed by: INTERNAL MEDICINE

## 2019-04-17 PROCEDURE — 1101F PR PT FALLS ASSESS DOC 0-1 FALLS W/OUT INJ PAST YR: ICD-10-PCS | Mod: HCNC,CPTII,S$GLB, | Performed by: INTERNAL MEDICINE

## 2019-04-17 PROCEDURE — 1101F PT FALLS ASSESS-DOCD LE1/YR: CPT | Mod: HCNC,CPTII,S$GLB, | Performed by: INTERNAL MEDICINE

## 2019-04-17 PROCEDURE — 3078F PR MOST RECENT DIASTOLIC BLOOD PRESSURE < 80 MM HG: ICD-10-PCS | Mod: HCNC,CPTII,S$GLB, | Performed by: INTERNAL MEDICINE

## 2019-04-17 PROCEDURE — 99214 OFFICE O/P EST MOD 30 MIN: CPT | Mod: HCNC,S$GLB,, | Performed by: INTERNAL MEDICINE

## 2019-04-17 PROCEDURE — 99214 PR OFFICE/OUTPT VISIT, EST, LEVL IV, 30-39 MIN: ICD-10-PCS | Mod: HCNC,S$GLB,, | Performed by: INTERNAL MEDICINE

## 2019-04-17 PROCEDURE — 3078F DIAST BP <80 MM HG: CPT | Mod: HCNC,CPTII,S$GLB, | Performed by: INTERNAL MEDICINE

## 2019-04-17 PROCEDURE — 99999 PR PBB SHADOW E&M-EST. PATIENT-LVL III: CPT | Mod: PBBFAC,HCNC,, | Performed by: INTERNAL MEDICINE

## 2019-04-17 PROCEDURE — 99999 PR PBB SHADOW E&M-EST. PATIENT-LVL III: ICD-10-PCS | Mod: PBBFAC,HCNC,, | Performed by: INTERNAL MEDICINE

## 2019-04-17 PROCEDURE — 3074F SYST BP LT 130 MM HG: CPT | Mod: HCNC,CPTII,S$GLB, | Performed by: INTERNAL MEDICINE

## 2019-04-18 NOTE — PROGRESS NOTES
Subjective:       Patient ID: Isabel Hernandez is a 83 y.o. female.    Chief Complaint: Hypothyroidism; Osteoporosis; and Nasal Congestion    Thyroid Problem   Presents for follow-up visit. Patient reports no cold intolerance, fatigue, heat intolerance, palpitations, weight gain or weight loss. The symptoms have been stable.     Review of Systems   Constitutional: Negative for fatigue, weight gain and weight loss.   HENT: Negative for sore throat.    Eyes: Negative for visual disturbance.   Respiratory: Negative for shortness of breath.    Cardiovascular: Negative for chest pain and palpitations.   Gastrointestinal: Negative for abdominal pain.   Endocrine: Negative for cold intolerance and heat intolerance.   Genitourinary: Negative for dysuria, frequency and hematuria.   Musculoskeletal: Negative for joint swelling.   Skin: Negative for rash.   Neurological: Negative for speech difficulty and weakness.   Psychiatric/Behavioral: Negative for dysphoric mood.       Objective:      Physical Exam   Constitutional: She is oriented to person, place, and time. She appears well-developed and well-nourished. No distress.   HENT:   Head: Normocephalic and atraumatic.   Mouth/Throat: Oropharynx is clear and moist.   Eyes: Pupils are equal, round, and reactive to light. Conjunctivae are normal.   Neck: Normal range of motion. Neck supple.   Cardiovascular: Normal rate, regular rhythm and normal heart sounds.   Pulmonary/Chest: Effort normal and breath sounds normal. She has no wheezes.   Abdominal: Soft. Bowel sounds are normal. There is no tenderness.   Musculoskeletal: Normal range of motion. She exhibits no edema or tenderness.   Neurological: She is alert and oriented to person, place, and time. No cranial nerve deficit.   Skin: No erythema.   Psychiatric: She has a normal mood and affect.   Vitals reviewed.      Assessment:       1. Hypothyroidism, unspecified type    2. Aortic atherosclerosis    3. CKD (chronic kidney  disease), stage III        Plan:       Isabel was seen today for hypothyroidism, osteoporosis and nasal congestion.    Diagnoses and all orders for this visit:    Hypothyroidism, unspecified type  -     CBC auto differential; Future  -     Comprehensive metabolic panel; Future  -     Lipid panel; Future  -     TSH; Future    Aortic atherosclerosis    CKD (chronic kidney disease), stage III        Follow up in about 1 year (around 4/17/2020) for PHYSICAL EXAM, WITH LAB BEFORE.

## 2019-05-22 RX ORDER — LEVOTHYROXINE SODIUM 50 UG/1
TABLET ORAL
Qty: 90 TABLET | Refills: 3 | Status: SHIPPED | OUTPATIENT
Start: 2019-05-22 | End: 2020-03-16

## 2019-06-10 ENCOUNTER — OFFICE VISIT (OUTPATIENT)
Dept: INTERNAL MEDICINE | Facility: CLINIC | Age: 84
End: 2019-06-10
Payer: MEDICARE

## 2019-06-10 VITALS
HEART RATE: 60 BPM | WEIGHT: 141.56 LBS | DIASTOLIC BLOOD PRESSURE: 70 MMHG | HEIGHT: 59 IN | BODY MASS INDEX: 28.54 KG/M2 | SYSTOLIC BLOOD PRESSURE: 128 MMHG

## 2019-06-10 DIAGNOSIS — N18.30 CKD (CHRONIC KIDNEY DISEASE), STAGE III: ICD-10-CM

## 2019-06-10 DIAGNOSIS — I70.0 AORTIC ATHEROSCLEROSIS: ICD-10-CM

## 2019-06-10 DIAGNOSIS — M81.0 AGE-RELATED OSTEOPOROSIS WITHOUT CURRENT PATHOLOGICAL FRACTURE: ICD-10-CM

## 2019-06-10 DIAGNOSIS — E03.9 HYPOTHYROIDISM, UNSPECIFIED TYPE: ICD-10-CM

## 2019-06-10 DIAGNOSIS — H35.342 MACULAR HOLE, FULL THICKNESS, LEFT: ICD-10-CM

## 2019-06-10 DIAGNOSIS — H43.821 VITREOMACULAR TRACTION SYNDROME OF RIGHT EYE: ICD-10-CM

## 2019-06-10 DIAGNOSIS — Z00.00 ENCOUNTER FOR PREVENTIVE HEALTH EXAMINATION: Primary | ICD-10-CM

## 2019-06-10 DIAGNOSIS — N20.0 NEPHROLITHIASIS: ICD-10-CM

## 2019-06-10 PROCEDURE — 3078F PR MOST RECENT DIASTOLIC BLOOD PRESSURE < 80 MM HG: ICD-10-PCS | Mod: HCNC,CPTII,S$GLB, | Performed by: NURSE PRACTITIONER

## 2019-06-10 PROCEDURE — 3078F DIAST BP <80 MM HG: CPT | Mod: HCNC,CPTII,S$GLB, | Performed by: NURSE PRACTITIONER

## 2019-06-10 PROCEDURE — 3074F SYST BP LT 130 MM HG: CPT | Mod: HCNC,CPTII,S$GLB, | Performed by: NURSE PRACTITIONER

## 2019-06-10 PROCEDURE — 99999 PR PBB SHADOW E&M-EST. PATIENT-LVL IV: ICD-10-PCS | Mod: PBBFAC,HCNC,, | Performed by: NURSE PRACTITIONER

## 2019-06-10 PROCEDURE — G0439 PPPS, SUBSEQ VISIT: HCPCS | Mod: HCNC,S$GLB,, | Performed by: NURSE PRACTITIONER

## 2019-06-10 PROCEDURE — 3074F PR MOST RECENT SYSTOLIC BLOOD PRESSURE < 130 MM HG: ICD-10-PCS | Mod: HCNC,CPTII,S$GLB, | Performed by: NURSE PRACTITIONER

## 2019-06-10 PROCEDURE — G0439 PR MEDICARE ANNUAL WELLNESS SUBSEQUENT VISIT: ICD-10-PCS | Mod: HCNC,S$GLB,, | Performed by: NURSE PRACTITIONER

## 2019-06-10 PROCEDURE — 99999 PR PBB SHADOW E&M-EST. PATIENT-LVL IV: CPT | Mod: PBBFAC,HCNC,, | Performed by: NURSE PRACTITIONER

## 2019-06-10 NOTE — PATIENT INSTRUCTIONS
Counseling and Referral of Other Preventative  (Italic type indicates deductible and co-insurance are waived)    Patient Name: Isabel Hernandez  Today's Date: 6/10/2019    Health Maintenance       Date Due Completion Date    DEXA SCAN 09/30/2018 9/30/2015 - order placed - need to schedule    Override on 9/23/2015: (N/S) (ordered)    Shingles Vaccine (2 of 3) 09/01/2020 (Originally 7/17/2014) 5/22/2014-Obtain new shingles vaccine - SHINGRIX - when available    Influenza Vaccine 08/01/2019 10/17/2018    Override on 10/10/2016: Done    Lipid Panel 08/22/2023 8/22/2018 -scheduled for July or aug    TETANUS VACCINE 05/23/2026 5/23/2016 (Done)    Override on 5/23/2016: Done        No orders of the defined types were placed in this encounter.    The following information is provided to all patients.  This information is to help you find resources for any of the problems found today that may be affecting your health:                Living healthy guide: www.[facility].[place].gov      Understanding Diabetes: www.diabetes.org      Eating healthy: www.cdc.gov/healthyweight      CDC home safety checklist: www.cdc.gov/steadi/patient.html      Agency on Aging: www.goea.[place].gov      Alcoholics anonymous (AA): www.aa.org      Physical Activity: www.david.nih.gov/pq7ubxn      Tobacco use: www.quitwithusla.org

## 2019-06-10 NOTE — PROGRESS NOTES
I offered to discuss end of life issues, including information on how to make advance directives that the patient could use to name someone who would make medical decisions on their behalf if they became too ill to make themselves.    ___Patient declined  _X_Advanced Directives on file

## 2019-06-10 NOTE — PROGRESS NOTES
"Isabel Hernandez presented for a  Medicare AWV and comprehensive Health Risk Assessment today. The following components were reviewed and updated:    · Medical history  · Family History  · Social history  · Allergies and Current Medications  · Health Risk Assessment  · Health Maintenance  · Care Team     ** See Completed Assessments for Annual Wellness Visit within the encounter summary.**       The following assessments were completed:  · Living Situation  · CAGE  · Depression Screening  · Timed Get Up and Go  · Whisper Test  · Cognitive Function Screening  ·   ·   ·   · Nutrition Screening  · ADL Screening  · PAQ Screening    Vitals:    06/10/19 1050   BP: 128/70   BP Location: Right arm   Pulse: 60   Weight: 64.2 kg (141 lb 8.6 oz)   Height: 4' 11" (1.499 m)     Body mass index is 28.59 kg/m².  Physical Exam   Constitutional: She is oriented to person, place, and time.   Overweight   HENT:   Head: Normocephalic.   Cardiovascular: Normal rate and regular rhythm.   Pulmonary/Chest: Effort normal and breath sounds normal.   Abdominal: Soft. Bowel sounds are normal.   Musculoskeletal: Normal range of motion. She exhibits no edema.   Neurological: She is alert and oriented to person, place, and time.   Skin: Skin is warm.   Psychiatric: She has a normal mood and affect.   Nursing note and vitals reviewed.        Diagnoses and health risks identified today and associated recommendations/orders:    1. Encounter for preventive health examination  Here for Health Risk Assessment/Annual Wellness Visit.  Health maintenance reviewed and updated. Follow up in one year.    2. Aortic atherosclerosis  Chronic, stable. Noted CT Renal Stone Study 9/08/04. . Followed by PCP.    3. CKD (chronic kidney disease), stage III  Chronic, stable. Followed by PCP.    4. Nephrolithiasis  Chronic, stable. Followed by PCP.    5. Hypothyroidism, unspecified type  Chronic, stable on current medication. Followed by PCP.    6. Age-related osteoporosis " without current pathological fracture  Chronic, stable. Followed by PCP.    7. Macular hole, full thickness, left  Chronic, stable. Followed by Optometry/Ophthalmology.    8. Vitreomacular traction syndrome of right eye  Chronic, stable. Followed by Optometry/Ophthalmology.    9. Abnormal cognitive screening  New onset. Mini-Cog score 4/7. 2/3 word recall, 2/4 clock drawing (5/7 in 2018). She lives alone and reports that she is managing medication/finances without difficulty. Driving without getting lost. PCP notified.     Provided Isabel with a 5-10 year written screening schedule and personal prevention plan. Recommendations were developed using the USPSTF age appropriate recommendations. Education, counseling, and referrals were provided as needed. After Visit Summary printed and given to patient which includes a list of additional screenings\tests needed.    Follow up in 10 months (on 4/17/2020). with PCP    Lynne Esqueda NP

## 2019-06-10 NOTE — Clinical Note
Here for HRA. Mini-Cog score 4/7. 2/3 word recall, 2/4 clock drawing (5/7 in 2018). She lives alone and reports that she is managing medication/finances without difficulty. Driving without getting lost. Thanks

## 2019-07-18 ENCOUNTER — HOSPITAL ENCOUNTER (OUTPATIENT)
Dept: RADIOLOGY | Facility: CLINIC | Age: 84
Discharge: HOME | End: 2019-07-18
Attending: NURSE PRACTITIONER
Payer: MEDICARE

## 2019-07-18 DIAGNOSIS — M81.0 OSTEOPOROSIS, UNSPECIFIED OSTEOPOROSIS TYPE, UNSPECIFIED PATHOLOGICAL FRACTURE PRESENCE: ICD-10-CM

## 2019-07-18 PROCEDURE — 77080 DXA BONE DENSITY AXIAL: CPT | Mod: 26,HCNC,, | Performed by: INTERNAL MEDICINE

## 2019-07-18 PROCEDURE — 77080 DXA BONE DENSITY AXIAL: CPT | Mod: TC,HCNC

## 2019-07-18 PROCEDURE — 77080 DEXA BONE DENSITY SPINE HIP: ICD-10-PCS | Mod: 26,HCNC,, | Performed by: INTERNAL MEDICINE

## 2019-07-23 ENCOUNTER — TELEPHONE (OUTPATIENT)
Dept: INTERNAL MEDICINE | Facility: CLINIC | Age: 84
End: 2019-07-23

## 2019-07-23 NOTE — PROGRESS NOTES
Bone density showed osteoporosis. I noticed that fosamax was listed as a drug allergy so may need to talk with Dr. Nye for any other tx options. Recommend you take adequate Calcium 1000-1200mg daily and Vitamin D 800-1000 units daily. Repeat DEXA scan in 2 yrs.

## 2019-07-23 NOTE — TELEPHONE ENCOUNTER
I spoke to the patient. She states that she is allergic to Calcium because she had kidney stones in the past. She states that she is allergic to Fosamax because of nausea.  She states that she will try the Vitamin D.

## 2019-09-05 ENCOUNTER — OFFICE VISIT (OUTPATIENT)
Dept: OPHTHALMOLOGY | Facility: CLINIC | Age: 84
End: 2019-09-05
Payer: MEDICARE

## 2019-09-05 DIAGNOSIS — H43.821 VITREOMACULAR TRACTION SYNDROME OF RIGHT EYE: Primary | ICD-10-CM

## 2019-09-05 DIAGNOSIS — H35.342 MACULAR HOLE, FULL THICKNESS, LEFT: ICD-10-CM

## 2019-09-05 DIAGNOSIS — H25.13 NUCLEAR SCLEROSIS OF BOTH EYES: ICD-10-CM

## 2019-09-05 PROCEDURE — 99999 PR PBB SHADOW E&M-EST. PATIENT-LVL II: CPT | Mod: PBBFAC,HCNC,, | Performed by: OPHTHALMOLOGY

## 2019-09-05 PROCEDURE — 92014 PR EYE EXAM, EST PATIENT,COMPREHESV: ICD-10-PCS | Mod: HCNC,S$GLB,, | Performed by: OPHTHALMOLOGY

## 2019-09-05 PROCEDURE — 92134 CPTRZ OPH DX IMG PST SGM RTA: CPT | Mod: HCNC,S$GLB,, | Performed by: OPHTHALMOLOGY

## 2019-09-05 PROCEDURE — 92134 POSTERIOR SEGMENT OCT RETINA (OCULAR COHERENCE TOMOGRAPHY)-BOTH EYES: ICD-10-PCS | Mod: HCNC,S$GLB,, | Performed by: OPHTHALMOLOGY

## 2019-09-05 PROCEDURE — 92014 COMPRE OPH EXAM EST PT 1/>: CPT | Mod: HCNC,S$GLB,, | Performed by: OPHTHALMOLOGY

## 2019-09-05 PROCEDURE — 99999 PR PBB SHADOW E&M-EST. PATIENT-LVL II: ICD-10-PCS | Mod: PBBFAC,HCNC,, | Performed by: OPHTHALMOLOGY

## 2019-09-08 NOTE — PROGRESS NOTES
Subjective:       Patient ID: Isabel Hernandez is a 83 y.o. female      Chief Complaint   Patient presents with    vitremeomacular traction syndrome OD    follow up as instructed     History of Present Illness  HPI     Pt feels that vision is stable OU.  Happy with current vision.  No   f/f/pain/distortions OU    Last edited by Johnathan Christopher MD on 9/8/2019  1:48 PM. (History)        Imaging:    See report    Assessment/Plan:     1. Vitreomacular traction syndrome of right eye  Stable.  Watch closely.  Better-seeing eye    - Posterior Segment OCT Retina-Both eyes  - Posterior Segment OCT Retina-Both eyes; Future    2. Macular hole, full thickness, left  Hole closed   Vision limited by chronicity and outer layer atrophy  - Posterior Segment OCT Retina-Both eyes  - Posterior Segment OCT Retina-Both eyes; Future    3. Nuclear sclerosis of both eyes  Likely some visual benefit from sx OD but pt happy and better-seeing eye.  Observe    Follow up in about 6 months (around 3/5/2020), or if symptoms worsen or fail to improve, for Dilated examination, OCT Mac.

## 2019-10-08 ENCOUNTER — PATIENT OUTREACH (OUTPATIENT)
Dept: ADMINISTRATIVE | Facility: OTHER | Age: 84
End: 2019-10-08

## 2019-10-11 ENCOUNTER — OFFICE VISIT (OUTPATIENT)
Dept: DERMATOLOGY | Facility: CLINIC | Age: 84
End: 2019-10-11
Payer: MEDICARE

## 2019-10-11 DIAGNOSIS — Z85.828 HISTORY OF NONMELANOMA SKIN CANCER: ICD-10-CM

## 2019-10-11 DIAGNOSIS — D18.00 HEMANGIOMA, UNSPECIFIED SITE: ICD-10-CM

## 2019-10-11 DIAGNOSIS — L57.0 AK (ACTINIC KERATOSIS): Primary | ICD-10-CM

## 2019-10-11 PROCEDURE — 99213 PR OFFICE/OUTPT VISIT, EST, LEVL III, 20-29 MIN: ICD-10-PCS | Mod: 25,HCNC,S$GLB, | Performed by: DERMATOLOGY

## 2019-10-11 PROCEDURE — 1101F PT FALLS ASSESS-DOCD LE1/YR: CPT | Mod: HCNC,CPTII,S$GLB, | Performed by: DERMATOLOGY

## 2019-10-11 PROCEDURE — 17000 PR DESTRUCTION(LASER SURGERY,CRYOSURGERY,CHEMOSURGERY),PREMALIGNANT LESIONS,FIRST LESION: ICD-10-PCS | Mod: HCNC,S$GLB,, | Performed by: DERMATOLOGY

## 2019-10-11 PROCEDURE — 1101F PR PT FALLS ASSESS DOC 0-1 FALLS W/OUT INJ PAST YR: ICD-10-PCS | Mod: HCNC,CPTII,S$GLB, | Performed by: DERMATOLOGY

## 2019-10-11 PROCEDURE — 99999 PR PBB SHADOW E&M-EST. PATIENT-LVL II: CPT | Mod: PBBFAC,HCNC,, | Performed by: DERMATOLOGY

## 2019-10-11 PROCEDURE — 17003 DESTRUCTION, PREMALIGNANT LESIONS; SECOND THROUGH 14 LESIONS: ICD-10-PCS | Mod: HCNC,S$GLB,, | Performed by: DERMATOLOGY

## 2019-10-11 PROCEDURE — 17003 DESTRUCT PREMALG LES 2-14: CPT | Mod: HCNC,S$GLB,, | Performed by: DERMATOLOGY

## 2019-10-11 PROCEDURE — 99213 OFFICE O/P EST LOW 20 MIN: CPT | Mod: 25,HCNC,S$GLB, | Performed by: DERMATOLOGY

## 2019-10-11 PROCEDURE — 99999 PR PBB SHADOW E&M-EST. PATIENT-LVL II: ICD-10-PCS | Mod: PBBFAC,HCNC,, | Performed by: DERMATOLOGY

## 2019-10-11 PROCEDURE — 17000 DESTRUCT PREMALG LESION: CPT | Mod: HCNC,S$GLB,, | Performed by: DERMATOLOGY

## 2019-10-11 NOTE — PROGRESS NOTES
Subjective:       Patient ID:  Isabel Hernandez is a 83 y.o. female who presents for   Chief Complaint   Patient presents with    Skin Check     UBSE     History of Present Illness: The patient presents for follow up of skin check.    The patient was last seen on: 5/18/18 for skin check of treatment of villatoro's left upper forehead (2017) and treated with efudex  This is a high risk patient here to check for the development of new lesions.      Other skin complaints: none        Review of Systems   Skin: Positive for activity-related sunscreen use ( rarely outdoors, when outside for hours does wear sunscreen). Negative for itching, rash, dry skin, daily sunscreen use, recent sunburn and wears hat.   Hematologic/Lymphatic: Does not bruise/bleed easily (on asa).        Objective:    Physical Exam   Constitutional: She appears well-developed and well-nourished. No distress.   Neurological: She is alert and oriented to person, place, and time. She is not disoriented.   Psychiatric: She has a normal mood and affect.   Skin:   Areas Examined (abnormalities noted in diagram):   Head / Face Inspection Performed  Neck Inspection Performed  Chest / Axilla Inspection Performed  Back Inspection Performed  RUE Inspected  LUE Inspection Performed                   Diagram Legend     Erythematous scaling macule/papule c/w actinic keratosis       Vascular papule c/w angioma      Pigmented verrucoid papule/plaque c/w seborrheic keratosis      Yellow umbilicated papule c/w sebaceous hyperplasia      Irregularly shaped tan macule c/w lentigo     1-2 mm smooth white papules consistent with Milia      Movable subcutaneous cyst with punctum c/w epidermal inclusion cyst      Subcutaneous movable cyst c/w pilar cyst      Firm pink to brown papule c/w dermatofibroma      Pedunculated fleshy papule(s) c/w skin tag(s)      Evenly pigmented macule c/w junctional nevus     Mildly variegated pigmented, slightly irregular-bordered macule c/w  mildly atypical nevus      Flesh colored to evenly pigmented papule c/w intradermal nevus       Pink pearly papule/plaque c/w basal cell carcinoma      Erythematous hyperkeratotic cursted plaque c/w SCC      Surgical scar with no sign of skin cancer recurrence      Open and closed comedones      Inflammatory papules and pustules      Verrucoid papule consistent consistent with wart     Erythematous eczematous patches and plaques     Dystrophic onycholytic nail with subungual debris c/w onychomycosis     Umbilicated papule    Erythematous-base heme-crusted tan verrucoid plaque consistent with inflamed seborrheic keratosis     Erythematous Silvery Scaling Plaque c/w Psoriasis     See annotation      Assessment / Plan:        AK (actinic keratosis)  Cryosurgery Procedure Note    Verbal consent from the patient is obtained including, but not limited to, risk of hypopigmentation/hyperpigmentation, scar, recurrence of lesion. The patient is aware of the precancerous quality and need for treatment of these lesions. Liquid nitrogen cryosurgery is applied to the 3 actinic keratoses, as detailed in the physical exam, to produce a freeze injury. The patient is aware that blisters may form and is instructed on wound care with gentle cleansing and use of vaseline ointment to keep moist until healed. The patient is supplied a handout on cryosurgery and is instructed to call if lesions do not completely resolve.      Hemangioma, unspecified site  This is a benign vascular lesion. Reassurance given. No treatment required.       History of nonmelanoma skin cancer  Area(s) of previous NMSC evaluated with no signs of recurrence.    Upper body skin examination performed today including at least 6 points as noted in physical examination. No lesions suspicious for malignancy noted.               Follow up in about 1 year (around 10/11/2020).

## 2019-10-11 NOTE — PATIENT INSTRUCTIONS

## 2020-03-03 ENCOUNTER — TELEPHONE (OUTPATIENT)
Dept: INTERNAL MEDICINE | Facility: CLINIC | Age: 85
End: 2020-03-03

## 2020-03-03 DIAGNOSIS — E03.9 HYPOTHYROIDISM, UNSPECIFIED TYPE: Primary | ICD-10-CM

## 2020-03-03 NOTE — TELEPHONE ENCOUNTER
----- Message from Karri Felix sent at 3/3/2020 10:53 AM CST -----  Contact: self    Doctor appointment and lab have been scheduled.  Please link lab orders to the lab appointment.  Date of doctor appointment:  04/29/20  Date of lab appointment:  04/22/20  Physical or EP: physical  Comments:

## 2020-03-05 ENCOUNTER — OFFICE VISIT (OUTPATIENT)
Dept: OPHTHALMOLOGY | Facility: CLINIC | Age: 85
End: 2020-03-05
Payer: MEDICARE

## 2020-03-05 DIAGNOSIS — H35.342 MACULAR HOLE, FULL THICKNESS, LEFT: ICD-10-CM

## 2020-03-05 DIAGNOSIS — H43.821 VITREOMACULAR TRACTION SYNDROME OF RIGHT EYE: ICD-10-CM

## 2020-03-05 PROCEDURE — 92012 PR EYE EXAM, EST PATIENT,INTERMED: ICD-10-PCS | Mod: HCNC,S$GLB,, | Performed by: OPHTHALMOLOGY

## 2020-03-05 PROCEDURE — 99999 PR PBB SHADOW E&M-EST. PATIENT-LVL II: ICD-10-PCS | Mod: PBBFAC,HCNC,, | Performed by: OPHTHALMOLOGY

## 2020-03-05 PROCEDURE — 99999 PR PBB SHADOW E&M-EST. PATIENT-LVL II: CPT | Mod: PBBFAC,HCNC,, | Performed by: OPHTHALMOLOGY

## 2020-03-05 PROCEDURE — 92012 INTRM OPH EXAM EST PATIENT: CPT | Mod: HCNC,S$GLB,, | Performed by: OPHTHALMOLOGY

## 2020-03-05 PROCEDURE — 92134 POSTERIOR SEGMENT OCT RETINA (OCULAR COHERENCE TOMOGRAPHY)-BOTH EYES: ICD-10-PCS | Mod: HCNC,S$GLB,, | Performed by: OPHTHALMOLOGY

## 2020-03-05 PROCEDURE — 92134 CPTRZ OPH DX IMG PST SGM RTA: CPT | Mod: HCNC,S$GLB,, | Performed by: OPHTHALMOLOGY

## 2020-03-08 NOTE — PROGRESS NOTES
Subjective:       Patient ID: Isabel Hernandez is a 84 y.o. female      Chief Complaint   Patient presents with    Concerns About Ocular Health     History of Present Illness  HPI     6 mons ck       S/p PPV/ILM OS (6/26/18)     Eye Med(s):  none              Pt states there is no changes with [facility] OU  -eye pain   -f/f       Last edited by Tessa Wood on 3/5/2020 10:34 AM. (History)        Imaging:    See report    Assessment/Plan:     1. Vitreomacular traction syndrome of right eye  Stable.  observe.  Better-seeing eye    - Posterior Segment OCT Retina-Both eyes  - Posterior Segment OCT Retina-Both eyes; Future    2. Macular hole, full thickness, left  Hole closed   Vision limited by chronicity and outer layer atrophy  - Posterior Segment OCT Retina-Both eyes  - Posterior Segment OCT Retina-Both eyes; Future      Follow up in about 6 months (around 9/5/2020), or if symptoms worsen or fail to improve, for Comprehensive Examination, OCT Mac.

## 2020-03-16 RX ORDER — LEVOTHYROXINE SODIUM 50 UG/1
TABLET ORAL
Qty: 30 TABLET | Refills: 0 | Status: SHIPPED | OUTPATIENT
Start: 2020-03-16 | End: 2020-04-13

## 2020-04-13 RX ORDER — LEVOTHYROXINE SODIUM 50 UG/1
TABLET ORAL
Qty: 30 TABLET | Refills: 0 | Status: SHIPPED | OUTPATIENT
Start: 2020-04-13 | End: 2020-06-24

## 2020-05-12 ENCOUNTER — PES CALL (OUTPATIENT)
Dept: ADMINISTRATIVE | Facility: CLINIC | Age: 85
End: 2020-05-12

## 2020-07-01 ENCOUNTER — TELEPHONE (OUTPATIENT)
Dept: INTERNAL MEDICINE | Facility: CLINIC | Age: 85
End: 2020-07-01

## 2020-07-01 NOTE — TELEPHONE ENCOUNTER
----- Message from Luis Hernadez sent at 7/1/2020  8:48 AM CDT -----  Regarding: Advice  Contact: Patient [phone]    Comments: Patient stating Insurance will not allow patient to refill Thyroid Rx, unless Thyroid test has been done,. Call to discuss further.    Please call an advise  Thank you

## 2020-07-02 ENCOUNTER — LAB VISIT (OUTPATIENT)
Dept: LAB | Facility: HOSPITAL | Age: 85
End: 2020-07-02
Attending: INTERNAL MEDICINE
Payer: MEDICARE

## 2020-07-02 DIAGNOSIS — E03.9 HYPOTHYROIDISM, UNSPECIFIED TYPE: ICD-10-CM

## 2020-07-02 LAB
ALBUMIN SERPL BCP-MCNC: 4 G/DL (ref 3.5–5.2)
ALP SERPL-CCNC: 59 U/L (ref 55–135)
ALT SERPL W/O P-5'-P-CCNC: 9 U/L (ref 10–44)
ANION GAP SERPL CALC-SCNC: 11 MMOL/L (ref 8–16)
AST SERPL-CCNC: 15 U/L (ref 10–40)
BASOPHILS # BLD AUTO: 0.05 K/UL (ref 0–0.2)
BASOPHILS NFR BLD: 0.6 % (ref 0–1.9)
BILIRUB SERPL-MCNC: 0.7 MG/DL (ref 0.1–1)
BUN SERPL-MCNC: 15 MG/DL (ref 8–23)
CALCIUM SERPL-MCNC: 9.1 MG/DL (ref 8.7–10.5)
CHLORIDE SERPL-SCNC: 105 MMOL/L (ref 95–110)
CHOLEST SERPL-MCNC: 208 MG/DL (ref 120–199)
CHOLEST/HDLC SERPL: 3.5 {RATIO} (ref 2–5)
CO2 SERPL-SCNC: 22 MMOL/L (ref 23–29)
CREAT SERPL-MCNC: 1 MG/DL (ref 0.5–1.4)
DIFFERENTIAL METHOD: ABNORMAL
EOSINOPHIL # BLD AUTO: 0 K/UL (ref 0–0.5)
EOSINOPHIL NFR BLD: 0.4 % (ref 0–8)
ERYTHROCYTE [DISTWIDTH] IN BLOOD BY AUTOMATED COUNT: 13 % (ref 11.5–14.5)
EST. GFR  (AFRICAN AMERICAN): >60 ML/MIN/1.73 M^2
EST. GFR  (NON AFRICAN AMERICAN): 52.6 ML/MIN/1.73 M^2
GLUCOSE SERPL-MCNC: 100 MG/DL (ref 70–110)
HCT VFR BLD AUTO: 44 % (ref 37–48.5)
HDLC SERPL-MCNC: 60 MG/DL (ref 40–75)
HDLC SERPL: 28.8 % (ref 20–50)
HGB BLD-MCNC: 13.9 G/DL (ref 12–16)
IMM GRANULOCYTES # BLD AUTO: 0.02 K/UL (ref 0–0.04)
IMM GRANULOCYTES NFR BLD AUTO: 0.2 % (ref 0–0.5)
LDLC SERPL CALC-MCNC: 123.2 MG/DL (ref 63–159)
LYMPHOCYTES # BLD AUTO: 2.9 K/UL (ref 1–4.8)
LYMPHOCYTES NFR BLD: 35.3 % (ref 18–48)
MCH RBC QN AUTO: 30.4 PG (ref 27–31)
MCHC RBC AUTO-ENTMCNC: 31.6 G/DL (ref 32–36)
MCV RBC AUTO: 96 FL (ref 82–98)
MONOCYTES # BLD AUTO: 0.5 K/UL (ref 0.3–1)
MONOCYTES NFR BLD: 6.6 % (ref 4–15)
NEUTROPHILS # BLD AUTO: 4.6 K/UL (ref 1.8–7.7)
NEUTROPHILS NFR BLD: 56.9 % (ref 38–73)
NONHDLC SERPL-MCNC: 148 MG/DL
NRBC BLD-RTO: 0 /100 WBC
PLATELET # BLD AUTO: 235 K/UL (ref 150–350)
PMV BLD AUTO: 11.4 FL (ref 9.2–12.9)
POTASSIUM SERPL-SCNC: 4.2 MMOL/L (ref 3.5–5.1)
PROT SERPL-MCNC: 6.9 G/DL (ref 6–8.4)
RBC # BLD AUTO: 4.57 M/UL (ref 4–5.4)
SODIUM SERPL-SCNC: 138 MMOL/L (ref 136–145)
TRIGL SERPL-MCNC: 124 MG/DL (ref 30–150)
TSH SERPL DL<=0.005 MIU/L-ACNC: 1.47 UIU/ML (ref 0.4–4)
WBC # BLD AUTO: 8.15 K/UL (ref 3.9–12.7)

## 2020-07-02 PROCEDURE — 80061 LIPID PANEL: CPT | Mod: HCNC

## 2020-07-02 PROCEDURE — 36415 COLL VENOUS BLD VENIPUNCTURE: CPT | Mod: HCNC

## 2020-07-02 PROCEDURE — 85025 COMPLETE CBC W/AUTO DIFF WBC: CPT | Mod: HCNC

## 2020-07-02 PROCEDURE — 84443 ASSAY THYROID STIM HORMONE: CPT | Mod: HCNC

## 2020-07-02 PROCEDURE — 80053 COMPREHEN METABOLIC PANEL: CPT | Mod: HCNC

## 2020-07-17 ENCOUNTER — OFFICE VISIT (OUTPATIENT)
Dept: INTERNAL MEDICINE | Facility: CLINIC | Age: 85
End: 2020-07-17
Payer: MEDICARE

## 2020-07-17 VITALS
HEIGHT: 59 IN | HEART RATE: 72 BPM | DIASTOLIC BLOOD PRESSURE: 64 MMHG | TEMPERATURE: 99 F | SYSTOLIC BLOOD PRESSURE: 124 MMHG | BODY MASS INDEX: 27.69 KG/M2 | WEIGHT: 137.38 LBS

## 2020-07-17 DIAGNOSIS — E03.9 HYPOTHYROIDISM, UNSPECIFIED TYPE: Primary | ICD-10-CM

## 2020-07-17 DIAGNOSIS — N30.00 ACUTE CYSTITIS WITHOUT HEMATURIA: ICD-10-CM

## 2020-07-17 DIAGNOSIS — I70.0 AORTIC ATHEROSCLEROSIS: ICD-10-CM

## 2020-07-17 DIAGNOSIS — N18.30 CKD (CHRONIC KIDNEY DISEASE), STAGE III: ICD-10-CM

## 2020-07-17 PROCEDURE — 99999 PR PBB SHADOW E&M-EST. PATIENT-LVL III: CPT | Mod: PBBFAC,HCNC,, | Performed by: INTERNAL MEDICINE

## 2020-07-17 PROCEDURE — 99999 PR PBB SHADOW E&M-EST. PATIENT-LVL III: ICD-10-PCS | Mod: PBBFAC,HCNC,, | Performed by: INTERNAL MEDICINE

## 2020-07-17 PROCEDURE — 1101F PR PT FALLS ASSESS DOC 0-1 FALLS W/OUT INJ PAST YR: ICD-10-PCS | Mod: HCNC,CPTII,S$GLB, | Performed by: INTERNAL MEDICINE

## 2020-07-17 PROCEDURE — 1126F PR PAIN SEVERITY QUANTIFIED, NO PAIN PRESENT: ICD-10-PCS | Mod: HCNC,S$GLB,, | Performed by: INTERNAL MEDICINE

## 2020-07-17 PROCEDURE — 3078F DIAST BP <80 MM HG: CPT | Mod: HCNC,CPTII,S$GLB, | Performed by: INTERNAL MEDICINE

## 2020-07-17 PROCEDURE — 3074F SYST BP LT 130 MM HG: CPT | Mod: HCNC,CPTII,S$GLB, | Performed by: INTERNAL MEDICINE

## 2020-07-17 PROCEDURE — 3074F PR MOST RECENT SYSTOLIC BLOOD PRESSURE < 130 MM HG: ICD-10-PCS | Mod: HCNC,CPTII,S$GLB, | Performed by: INTERNAL MEDICINE

## 2020-07-17 PROCEDURE — 1101F PT FALLS ASSESS-DOCD LE1/YR: CPT | Mod: HCNC,CPTII,S$GLB, | Performed by: INTERNAL MEDICINE

## 2020-07-17 PROCEDURE — 99214 PR OFFICE/OUTPT VISIT, EST, LEVL IV, 30-39 MIN: ICD-10-PCS | Mod: HCNC,S$GLB,, | Performed by: INTERNAL MEDICINE

## 2020-07-17 PROCEDURE — 99499 UNLISTED E&M SERVICE: CPT | Mod: S$GLB,,, | Performed by: INTERNAL MEDICINE

## 2020-07-17 PROCEDURE — 99214 OFFICE O/P EST MOD 30 MIN: CPT | Mod: HCNC,S$GLB,, | Performed by: INTERNAL MEDICINE

## 2020-07-17 PROCEDURE — 1126F AMNT PAIN NOTED NONE PRSNT: CPT | Mod: HCNC,S$GLB,, | Performed by: INTERNAL MEDICINE

## 2020-07-17 PROCEDURE — 1159F PR MEDICATION LIST DOCUMENTED IN MEDICAL RECORD: ICD-10-PCS | Mod: HCNC,S$GLB,, | Performed by: INTERNAL MEDICINE

## 2020-07-17 PROCEDURE — 99499 RISK ADDL DX/OHS AUDIT: ICD-10-PCS | Mod: S$GLB,,, | Performed by: INTERNAL MEDICINE

## 2020-07-17 PROCEDURE — 3078F PR MOST RECENT DIASTOLIC BLOOD PRESSURE < 80 MM HG: ICD-10-PCS | Mod: HCNC,CPTII,S$GLB, | Performed by: INTERNAL MEDICINE

## 2020-07-17 PROCEDURE — 1159F MED LIST DOCD IN RCRD: CPT | Mod: HCNC,S$GLB,, | Performed by: INTERNAL MEDICINE

## 2020-07-17 RX ORDER — CIPROFLOXACIN 250 MG/1
250 TABLET, FILM COATED ORAL 2 TIMES DAILY
Qty: 10 TABLET | Refills: 0 | Status: SHIPPED | OUTPATIENT
Start: 2020-07-17 | End: 2020-11-30 | Stop reason: ALTCHOICE

## 2020-07-17 RX ORDER — LEVOTHYROXINE SODIUM 50 UG/1
TABLET ORAL
Qty: 90 TABLET | Refills: 3 | Status: SHIPPED | OUTPATIENT
Start: 2020-07-17 | End: 2021-09-28 | Stop reason: SDUPTHER

## 2020-07-17 NOTE — PROGRESS NOTES
Subjective:       Patient ID: Isabel Hernandez is a 84 y.o. female.    Chief Complaint: Hypothyroidism    Thyroid Problem  Presents for follow-up visit. Patient reports no cold intolerance, depressed mood, dry skin, fatigue, leg swelling or palpitations. The symptoms have been stable.   Urinary Tract Infection   This is a new problem. The current episode started in the past 7 days. The problem occurs intermittently. The quality of the pain is described as burning. The pain is mild. There has been no fever. Pertinent negatives include no frequency, hematuria or rash.     Review of Systems   Constitutional: Negative for fatigue.   HENT: Negative for sore throat.    Eyes: Negative for visual disturbance.   Respiratory: Negative for shortness of breath.    Cardiovascular: Negative for chest pain and palpitations.   Gastrointestinal: Negative for abdominal pain.   Endocrine: Negative for cold intolerance.   Genitourinary: Negative for dysuria, frequency and hematuria.   Musculoskeletal: Negative for joint swelling.   Skin: Negative for rash.   Neurological: Negative for speech difficulty and weakness.   Psychiatric/Behavioral: Negative for dysphoric mood.       Objective:      Physical Exam  Vitals signs reviewed.   Constitutional:       General: She is not in acute distress.     Appearance: She is well-developed.   HENT:      Head: Normocephalic and atraumatic.   Eyes:      Conjunctiva/sclera: Conjunctivae normal.      Pupils: Pupils are equal, round, and reactive to light.   Neck:      Musculoskeletal: Normal range of motion and neck supple.   Cardiovascular:      Rate and Rhythm: Normal rate and regular rhythm.      Heart sounds: Normal heart sounds.   Pulmonary:      Effort: Pulmonary effort is normal.      Breath sounds: Normal breath sounds. No wheezing.   Abdominal:      General: Bowel sounds are normal.      Palpations: Abdomen is soft.      Tenderness: There is no abdominal tenderness.   Musculoskeletal: Normal  range of motion.         General: No tenderness.   Skin:     Findings: No erythema.   Neurological:      Mental Status: She is alert and oriented to person, place, and time.      Cranial Nerves: No cranial nerve deficit.         Assessment:       1. Hypothyroidism, unspecified type    2. Acute cystitis without hematuria    3. Aortic atherosclerosis    4. CKD (chronic kidney disease), stage III        Plan:       Isabel was seen today for hypothyroidism.    Diagnoses and all orders for this visit:    Hypothyroidism, unspecified type  -     levothyroxine (SYNTHROID) 50 MCG tablet; TAKE 1 TABLET EVERY DAY. NEED APPOINTMENT FOR ANY FURTHER REFILLS TO RECHECK THYROID LABS.  -     TSH; Future    Acute cystitis without hematuria  -     ciprofloxacin HCl (CIPRO) 250 MG tablet; Take 1 tablet (250 mg total) by mouth 2 (two) times daily.  -     POCT urinalysis, dipstick or tablet reag    Aortic atherosclerosis  Comments:  cont asa daily    CKD (chronic kidney disease), stage III  Comments:  labs stable  Orders:  -     Basic metabolic panel; Future        Follow up in about 6 months (around 1/17/2021) for F/U APPOINTMENT WITH ME, WITH LAB BEFORE.

## 2020-08-14 ENCOUNTER — PES CALL (OUTPATIENT)
Dept: ADMINISTRATIVE | Facility: CLINIC | Age: 85
End: 2020-08-14

## 2020-11-30 ENCOUNTER — OFFICE VISIT (OUTPATIENT)
Dept: INTERNAL MEDICINE | Facility: CLINIC | Age: 85
End: 2020-11-30
Payer: MEDICARE

## 2020-11-30 ENCOUNTER — HOSPITAL ENCOUNTER (OUTPATIENT)
Dept: RADIOLOGY | Facility: HOSPITAL | Age: 85
Discharge: HOME | End: 2020-11-30
Attending: INTERNAL MEDICINE
Payer: MEDICARE

## 2020-11-30 ENCOUNTER — TELEPHONE (OUTPATIENT)
Dept: INTERNAL MEDICINE | Facility: CLINIC | Age: 85
End: 2020-11-30

## 2020-11-30 VITALS
BODY MASS INDEX: 27.74 KG/M2 | SYSTOLIC BLOOD PRESSURE: 120 MMHG | HEIGHT: 59 IN | DIASTOLIC BLOOD PRESSURE: 74 MMHG | HEART RATE: 64 BPM

## 2020-11-30 DIAGNOSIS — M80.08XA VERTEBRAL FRACTURE, OSTEOPOROTIC, INITIAL ENCOUNTER: Primary | ICD-10-CM

## 2020-11-30 DIAGNOSIS — M54.9 DORSALGIA, UNSPECIFIED: ICD-10-CM

## 2020-11-30 PROCEDURE — 99999 PR PBB SHADOW E&M-EST. PATIENT-LVL III: ICD-10-PCS | Mod: PBBFAC,HCNC,, | Performed by: INTERNAL MEDICINE

## 2020-11-30 PROCEDURE — 72100 XR LUMBAR SPINE AP AND LATERAL: ICD-10-PCS | Mod: 26,HCNC,, | Performed by: RADIOLOGY

## 2020-11-30 PROCEDURE — 1157F ADVNC CARE PLAN IN RCRD: CPT | Mod: HCNC,S$GLB,, | Performed by: INTERNAL MEDICINE

## 2020-11-30 PROCEDURE — 1125F PR PAIN SEVERITY QUANTIFIED, PAIN PRESENT: ICD-10-PCS | Mod: HCNC,S$GLB,, | Performed by: INTERNAL MEDICINE

## 2020-11-30 PROCEDURE — 72100 X-RAY EXAM L-S SPINE 2/3 VWS: CPT | Mod: TC,HCNC

## 2020-11-30 PROCEDURE — 99499 RISK ADDL DX/OHS AUDIT: ICD-10-PCS | Mod: HCNC,S$GLB,, | Performed by: INTERNAL MEDICINE

## 2020-11-30 PROCEDURE — 72100 X-RAY EXAM L-S SPINE 2/3 VWS: CPT | Mod: 26,HCNC,, | Performed by: RADIOLOGY

## 2020-11-30 PROCEDURE — 1157F PR ADVANCE CARE PLAN OR EQUIV PRESENT IN MEDICAL RECORD: ICD-10-PCS | Mod: HCNC,S$GLB,, | Performed by: INTERNAL MEDICINE

## 2020-11-30 PROCEDURE — 1159F PR MEDICATION LIST DOCUMENTED IN MEDICAL RECORD: ICD-10-PCS | Mod: HCNC,S$GLB,, | Performed by: INTERNAL MEDICINE

## 2020-11-30 PROCEDURE — 99499 UNLISTED E&M SERVICE: CPT | Mod: HCNC,S$GLB,, | Performed by: INTERNAL MEDICINE

## 2020-11-30 PROCEDURE — 3074F SYST BP LT 130 MM HG: CPT | Mod: HCNC,CPTII,S$GLB, | Performed by: INTERNAL MEDICINE

## 2020-11-30 PROCEDURE — 1125F AMNT PAIN NOTED PAIN PRSNT: CPT | Mod: HCNC,S$GLB,, | Performed by: INTERNAL MEDICINE

## 2020-11-30 PROCEDURE — 3074F PR MOST RECENT SYSTOLIC BLOOD PRESSURE < 130 MM HG: ICD-10-PCS | Mod: HCNC,CPTII,S$GLB, | Performed by: INTERNAL MEDICINE

## 2020-11-30 PROCEDURE — 1159F MED LIST DOCD IN RCRD: CPT | Mod: HCNC,S$GLB,, | Performed by: INTERNAL MEDICINE

## 2020-11-30 PROCEDURE — 3078F PR MOST RECENT DIASTOLIC BLOOD PRESSURE < 80 MM HG: ICD-10-PCS | Mod: HCNC,CPTII,S$GLB, | Performed by: INTERNAL MEDICINE

## 2020-11-30 PROCEDURE — 3078F DIAST BP <80 MM HG: CPT | Mod: HCNC,CPTII,S$GLB, | Performed by: INTERNAL MEDICINE

## 2020-11-30 PROCEDURE — 99213 PR OFFICE/OUTPT VISIT, EST, LEVL III, 20-29 MIN: ICD-10-PCS | Mod: HCNC,S$GLB,, | Performed by: INTERNAL MEDICINE

## 2020-11-30 PROCEDURE — 99213 OFFICE O/P EST LOW 20 MIN: CPT | Mod: HCNC,S$GLB,, | Performed by: INTERNAL MEDICINE

## 2020-11-30 PROCEDURE — 99999 PR PBB SHADOW E&M-EST. PATIENT-LVL III: CPT | Mod: PBBFAC,HCNC,, | Performed by: INTERNAL MEDICINE

## 2020-11-30 RX ORDER — HYDROCODONE BITARTRATE AND ACETAMINOPHEN 5; 325 MG/1; MG/1
1 TABLET ORAL EVERY 8 HOURS PRN
Qty: 50 TABLET | Refills: 0 | Status: SHIPPED | OUTPATIENT
Start: 2020-11-30 | End: 2021-10-27

## 2020-11-30 RX ORDER — INFLUENZA A VIRUS A/MICHIGAN/45/2015 X-275 (H1N1) ANTIGEN (FORMALDEHYDE INACTIVATED), INFLUENZA A VIRUS A/SINGAPORE/INFIMH-16-0019/2016 IVR-186 (H3N2) ANTIGEN (FORMALDEHYDE INACTIVATED), INFLUENZA B VIRUS B/PHUKET/3073/2013 ANTIGEN (FORMALDEHYDE INACTIVATED), AND INFLUENZA B VIRUS B/MARYLAND/15/2016 BX-69A ANTIGEN (FORMALDEHYDE INACTIVATED) 60; 60; 60; 60 UG/.7ML; UG/.7ML; UG/.7ML; UG/.7ML
INJECTION, SUSPENSION INTRAMUSCULAR
COMMUNITY
Start: 2020-10-20 | End: 2020-11-30 | Stop reason: ALTCHOICE

## 2020-11-30 RX ORDER — MELOXICAM 7.5 MG/1
7.5 TABLET ORAL DAILY
Qty: 30 TABLET | Refills: 1 | Status: SHIPPED | OUTPATIENT
Start: 2020-11-30 | End: 2021-10-27

## 2020-11-30 RX ORDER — CYCLOBENZAPRINE HCL 5 MG
5 TABLET ORAL NIGHTLY
Qty: 30 TABLET | Refills: 1 | Status: SHIPPED | OUTPATIENT
Start: 2020-11-30 | End: 2021-10-27

## 2020-11-30 NOTE — TELEPHONE ENCOUNTER
----- Message from Radha Dominguez sent at 11/30/2020  8:42 AM CST -----  Regarding: Pts son in law Mr. Marquis Mobile# [phone]  Mr. Marquis would like a call back in regards to him saying that the patient have severe lower back pain, and she's been having this pain for about a week now.

## 2020-11-30 NOTE — PROGRESS NOTES
Subjective:       Patient ID: Isabel Hernandez is a 85 y.o. female.    Chief Complaint: Back Pain, Hypothyroidism, and Chronic Kidney Disease    Back Pain  This is a new (pain began suddenly when moving a mattress) problem. The current episode started in the past 7 days. The problem occurs constantly. The problem has been waxing and waning since onset. The pain is present in the lumbar spine. The quality of the pain is described as aching and shooting. The pain does not radiate. The pain is moderate. The pain is worse during the day. The symptoms are aggravated by bending, position, standing and twisting. Pertinent negatives include no bladder incontinence or bowel incontinence. Risk factors include history of osteoporosis.     Review of Systems   Gastrointestinal: Negative for bowel incontinence.   Genitourinary: Negative for bladder incontinence.   Musculoskeletal: Positive for back pain.       Objective:      Physical Exam  Constitutional:       General: She is in acute distress.   Musculoskeletal:      Thoracic back: She exhibits tenderness, pain and spasm.        Back:          Assessment:       1. Vertebral fracture, osteoporotic, initial encounter    2. Dorsalgia, unspecified        Plan:       Isabel was seen today for back pain, hypothyroidism and chronic kidney disease.    Diagnoses and all orders for this visit:    Vertebral fracture, osteoporotic, initial encounter  Comments:  fracture of T12- correlates to symptoms-  discussed expectations and timetables.  Discussed kyphoplasty if symptoms become unmanageable  Orders:  -     meloxicam (MOBIC) 7.5 MG tablet; Take 1 tablet (7.5 mg total) by mouth once daily. For antiinflammatory effect  -     cyclobenzaprine (FLEXERIL) 5 MG tablet; Take 1 tablet (5 mg total) by mouth nightly. For muscle spasm  -     HYDROcodone-acetaminophen (NORCO) 5-325 mg per tablet; Take 1 tablet by mouth every 8 (eight) hours as needed for Pain.    Dorsalgia, unspecified  -     X-Ray  Lumbar Spine Ap And Lateral; Future  -     meloxicam (MOBIC) 7.5 MG tablet; Take 1 tablet (7.5 mg total) by mouth once daily. For antiinflammatory effect  -     cyclobenzaprine (FLEXERIL) 5 MG tablet; Take 1 tablet (5 mg total) by mouth nightly. For muscle spasm  -     HYDROcodone-acetaminophen (NORCO) 5-325 mg per tablet; Take 1 tablet by mouth every 8 (eight) hours as needed for Pain.        Follow up if symptoms worsen or fail to improve.

## 2020-12-21 ENCOUNTER — PES CALL (OUTPATIENT)
Dept: ADMINISTRATIVE | Facility: CLINIC | Age: 85
End: 2020-12-21

## 2021-01-04 ENCOUNTER — IMMUNIZATION (OUTPATIENT)
Dept: INTERNAL MEDICINE | Facility: CLINIC | Age: 86
End: 2021-01-04
Payer: MEDICARE

## 2021-01-04 DIAGNOSIS — Z23 NEED FOR VACCINATION: ICD-10-CM

## 2021-01-04 PROCEDURE — 91300 COVID-19, MRNA, LNP-S, PF, 30 MCG/0.3 ML DOSE VACCINE: CPT | Mod: PBBFAC | Performed by: INTERNAL MEDICINE

## 2021-01-26 ENCOUNTER — IMMUNIZATION (OUTPATIENT)
Dept: INTERNAL MEDICINE | Facility: CLINIC | Age: 86
End: 2021-01-26
Payer: MEDICARE

## 2021-01-26 DIAGNOSIS — Z23 NEED FOR VACCINATION: Primary | ICD-10-CM

## 2021-01-26 PROCEDURE — 0002A COVID-19, MRNA, LNP-S, PF, 30 MCG/0.3 ML DOSE VACCINE: CPT | Mod: PBBFAC | Performed by: INTERNAL MEDICINE

## 2021-01-26 PROCEDURE — 91300 COVID-19, MRNA, LNP-S, PF, 30 MCG/0.3 ML DOSE VACCINE: CPT | Mod: PBBFAC | Performed by: INTERNAL MEDICINE

## 2021-07-13 ENCOUNTER — TELEPHONE (OUTPATIENT)
Dept: INTERNAL MEDICINE | Facility: CLINIC | Age: 86
End: 2021-07-13

## 2021-09-28 DIAGNOSIS — E03.9 HYPOTHYROIDISM, UNSPECIFIED TYPE: ICD-10-CM

## 2021-09-30 RX ORDER — LEVOTHYROXINE SODIUM 50 UG/1
TABLET ORAL
Qty: 90 TABLET | Refills: 0 | Status: SHIPPED | OUTPATIENT
Start: 2021-09-30 | End: 2021-10-27

## 2021-10-05 DIAGNOSIS — E03.9 HYPOTHYROIDISM, UNSPECIFIED TYPE: ICD-10-CM

## 2021-10-05 RX ORDER — LEVOTHYROXINE SODIUM 50 UG/1
TABLET ORAL
Qty: 90 TABLET | Refills: 0 | OUTPATIENT
Start: 2021-10-05

## 2021-10-11 RX ORDER — ZOSTER VACCINE RECOMBINANT, ADJUVANTED 50 MCG/0.5
KIT INTRAMUSCULAR
COMMUNITY
Start: 2021-08-02 | End: 2021-10-27 | Stop reason: ALTCHOICE

## 2021-10-18 ENCOUNTER — LAB VISIT (OUTPATIENT)
Dept: LAB | Facility: HOSPITAL | Age: 86
End: 2021-10-18
Attending: INTERNAL MEDICINE
Payer: MEDICARE

## 2021-10-18 DIAGNOSIS — N18.30 CKD (CHRONIC KIDNEY DISEASE), STAGE III: ICD-10-CM

## 2021-10-18 DIAGNOSIS — E03.9 HYPOTHYROIDISM, UNSPECIFIED TYPE: ICD-10-CM

## 2021-10-18 LAB
ANION GAP SERPL CALC-SCNC: 13 MMOL/L (ref 8–16)
BUN SERPL-MCNC: 16 MG/DL (ref 8–23)
CALCIUM SERPL-MCNC: 9 MG/DL (ref 8.7–10.5)
CHLORIDE SERPL-SCNC: 99 MMOL/L (ref 95–110)
CO2 SERPL-SCNC: 23 MMOL/L (ref 23–29)
CREAT SERPL-MCNC: 1 MG/DL (ref 0.5–1.4)
EST. GFR  (AFRICAN AMERICAN): >60 ML/MIN/1.73 M^2
EST. GFR  (NON AFRICAN AMERICAN): 52.2 ML/MIN/1.73 M^2
GLUCOSE SERPL-MCNC: 97 MG/DL (ref 70–110)
POTASSIUM SERPL-SCNC: 3.7 MMOL/L (ref 3.5–5.1)
SODIUM SERPL-SCNC: 135 MMOL/L (ref 136–145)
TSH SERPL DL<=0.005 MIU/L-ACNC: 2.87 UIU/ML (ref 0.4–4)

## 2021-10-18 PROCEDURE — 80048 BASIC METABOLIC PNL TOTAL CA: CPT | Mod: HCNC | Performed by: INTERNAL MEDICINE

## 2021-10-18 PROCEDURE — 36415 COLL VENOUS BLD VENIPUNCTURE: CPT | Mod: HCNC | Performed by: INTERNAL MEDICINE

## 2021-10-18 PROCEDURE — 84443 ASSAY THYROID STIM HORMONE: CPT | Mod: HCNC | Performed by: INTERNAL MEDICINE

## 2021-10-20 ENCOUNTER — IMMUNIZATION (OUTPATIENT)
Dept: INTERNAL MEDICINE | Facility: CLINIC | Age: 86
End: 2021-10-20
Payer: MEDICARE

## 2021-10-20 ENCOUNTER — OFFICE VISIT (OUTPATIENT)
Dept: INTERNAL MEDICINE | Facility: CLINIC | Age: 86
End: 2021-10-20
Payer: MEDICARE

## 2021-10-20 VITALS
DIASTOLIC BLOOD PRESSURE: 68 MMHG | SYSTOLIC BLOOD PRESSURE: 108 MMHG | BODY MASS INDEX: 26.45 KG/M2 | HEART RATE: 68 BPM | HEIGHT: 59 IN | WEIGHT: 131.19 LBS

## 2021-10-20 DIAGNOSIS — I70.0 AORTIC ATHEROSCLEROSIS: ICD-10-CM

## 2021-10-20 DIAGNOSIS — E03.9 HYPOTHYROIDISM, UNSPECIFIED TYPE: Primary | ICD-10-CM

## 2021-10-20 DIAGNOSIS — N18.31 STAGE 3A CHRONIC KIDNEY DISEASE: ICD-10-CM

## 2021-10-20 DIAGNOSIS — Z23 NEED FOR VACCINATION: Primary | ICD-10-CM

## 2021-10-20 DIAGNOSIS — E03.9 HYPOTHYROIDISM, UNSPECIFIED TYPE: ICD-10-CM

## 2021-10-20 PROCEDURE — 3074F PR MOST RECENT SYSTOLIC BLOOD PRESSURE < 130 MM HG: ICD-10-PCS | Mod: HCNC,CPTII,S$GLB, | Performed by: INTERNAL MEDICINE

## 2021-10-20 PROCEDURE — 99214 PR OFFICE/OUTPT VISIT, EST, LEVL IV, 30-39 MIN: ICD-10-PCS | Mod: HCNC,S$GLB,, | Performed by: INTERNAL MEDICINE

## 2021-10-20 PROCEDURE — 3074F SYST BP LT 130 MM HG: CPT | Mod: HCNC,CPTII,S$GLB, | Performed by: INTERNAL MEDICINE

## 2021-10-20 PROCEDURE — 1159F MED LIST DOCD IN RCRD: CPT | Mod: HCNC,CPTII,S$GLB, | Performed by: INTERNAL MEDICINE

## 2021-10-20 PROCEDURE — 99999 PR PBB SHADOW E&M-EST. PATIENT-LVL III: CPT | Mod: PBBFAC,HCNC,, | Performed by: INTERNAL MEDICINE

## 2021-10-20 PROCEDURE — 0003A COVID-19, MRNA, LNP-S, PF, 30 MCG/0.3 ML DOSE VACCINE: CPT | Mod: HCNC,CV19,PBBFAC | Performed by: INTERNAL MEDICINE

## 2021-10-20 PROCEDURE — 1126F PR PAIN SEVERITY QUANTIFIED, NO PAIN PRESENT: ICD-10-PCS | Mod: HCNC,CPTII,S$GLB, | Performed by: INTERNAL MEDICINE

## 2021-10-20 PROCEDURE — 1157F PR ADVANCE CARE PLAN OR EQUIV PRESENT IN MEDICAL RECORD: ICD-10-PCS | Mod: HCNC,CPTII,S$GLB, | Performed by: INTERNAL MEDICINE

## 2021-10-20 PROCEDURE — 1101F PR PT FALLS ASSESS DOC 0-1 FALLS W/OUT INJ PAST YR: ICD-10-PCS | Mod: HCNC,CPTII,S$GLB, | Performed by: INTERNAL MEDICINE

## 2021-10-20 PROCEDURE — 1159F PR MEDICATION LIST DOCUMENTED IN MEDICAL RECORD: ICD-10-PCS | Mod: HCNC,CPTII,S$GLB, | Performed by: INTERNAL MEDICINE

## 2021-10-20 PROCEDURE — 1157F ADVNC CARE PLAN IN RCRD: CPT | Mod: HCNC,CPTII,S$GLB, | Performed by: INTERNAL MEDICINE

## 2021-10-20 PROCEDURE — 99499 RISK ADDL DX/OHS AUDIT: ICD-10-PCS | Mod: S$GLB,,, | Performed by: INTERNAL MEDICINE

## 2021-10-20 PROCEDURE — 3288F PR FALLS RISK ASSESSMENT DOCUMENTED: ICD-10-PCS | Mod: HCNC,CPTII,S$GLB, | Performed by: INTERNAL MEDICINE

## 2021-10-20 PROCEDURE — 99999 PR PBB SHADOW E&M-EST. PATIENT-LVL III: ICD-10-PCS | Mod: PBBFAC,HCNC,, | Performed by: INTERNAL MEDICINE

## 2021-10-20 PROCEDURE — 1160F RVW MEDS BY RX/DR IN RCRD: CPT | Mod: HCNC,CPTII,S$GLB, | Performed by: INTERNAL MEDICINE

## 2021-10-20 PROCEDURE — 99499 UNLISTED E&M SERVICE: CPT | Mod: S$GLB,,, | Performed by: INTERNAL MEDICINE

## 2021-10-20 PROCEDURE — 1126F AMNT PAIN NOTED NONE PRSNT: CPT | Mod: HCNC,CPTII,S$GLB, | Performed by: INTERNAL MEDICINE

## 2021-10-20 PROCEDURE — 1160F PR REVIEW ALL MEDS BY PRESCRIBER/CLIN PHARMACIST DOCUMENTED: ICD-10-PCS | Mod: HCNC,CPTII,S$GLB, | Performed by: INTERNAL MEDICINE

## 2021-10-20 PROCEDURE — 3078F DIAST BP <80 MM HG: CPT | Mod: HCNC,CPTII,S$GLB, | Performed by: INTERNAL MEDICINE

## 2021-10-20 PROCEDURE — 3288F FALL RISK ASSESSMENT DOCD: CPT | Mod: HCNC,CPTII,S$GLB, | Performed by: INTERNAL MEDICINE

## 2021-10-20 PROCEDURE — 91300 COVID-19, MRNA, LNP-S, PF, 30 MCG/0.3 ML DOSE VACCINE: CPT | Mod: HCNC,PBBFAC | Performed by: INTERNAL MEDICINE

## 2021-10-20 PROCEDURE — 99214 OFFICE O/P EST MOD 30 MIN: CPT | Mod: HCNC,S$GLB,, | Performed by: INTERNAL MEDICINE

## 2021-10-20 PROCEDURE — 1101F PT FALLS ASSESS-DOCD LE1/YR: CPT | Mod: HCNC,CPTII,S$GLB, | Performed by: INTERNAL MEDICINE

## 2021-10-20 PROCEDURE — 3078F PR MOST RECENT DIASTOLIC BLOOD PRESSURE < 80 MM HG: ICD-10-PCS | Mod: HCNC,CPTII,S$GLB, | Performed by: INTERNAL MEDICINE

## 2021-10-27 PROBLEM — Z00.00 ROUTINE GENERAL MEDICAL EXAMINATION AT A HEALTH CARE FACILITY: Status: ACTIVE | Noted: 2021-10-27

## 2021-10-27 PROBLEM — Z00.00 ROUTINE GENERAL MEDICAL EXAMINATION AT A HEALTH CARE FACILITY: Status: RESOLVED | Noted: 2021-10-27 | Resolved: 2021-10-27

## 2021-10-27 RX ORDER — LEVOTHYROXINE SODIUM 50 UG/1
TABLET ORAL
Qty: 90 TABLET | Refills: 3
Start: 2021-10-27 | End: 2022-01-25

## 2022-01-21 DIAGNOSIS — E03.9 HYPOTHYROIDISM, UNSPECIFIED TYPE: ICD-10-CM

## 2022-01-21 NOTE — TELEPHONE ENCOUNTER
No new care gaps identified.  Powered by [employer]. Reference number: [id number].   1/21/2022 1:40:54 PM CST

## 2022-01-25 RX ORDER — LEVOTHYROXINE SODIUM 50 UG/1
TABLET ORAL
Qty: 90 TABLET | Refills: 2 | Status: SHIPPED | OUTPATIENT
Start: 2022-01-25 | End: 2022-06-28 | Stop reason: SDUPTHER

## 2022-01-25 NOTE — TELEPHONE ENCOUNTER
Refill Authorization Note   Isabel Hernandez  is requesting a refill authorization.  Brief Assessment and Rationale for Refill:  Approve     Medication Therapy Plan:  approve     Medication Reconciliation Completed: No   Comments:   --->Care Gap information included below if applicable.       Requested Prescriptions   Pending Prescriptions Disp Refills    levothyroxine (SYNTHROID) 50 MCG tablet [Pharmacy Med Name: LEVOTHYROXINE  50 MCG TAB] 90 tablet 2       Endocrinology:  Hypothyroid Agents Failed - 1/25/2022  5:44 PM        Failed - T4 free within 1080 days     No results found for: EXTFREET4, T4FREE, FREET4, B3GUBAIJPUFE, T4FT4           Passed - Patient is at least 18 years old        Passed - Valid encounter within last 15 months     Recent Visits  Date Type Provider Dept   10/20/21 Office Visit Dennis Nye MD [facility]   11/30/20 Office Visit Dennis Nye MD [facility]   07/17/20 Office Visit Dennis Nye MD [facility]   Showing recent visits within past 720 days and meeting all other requirements  Future Appointments  No visits were found meeting these conditions.  Showing future appointments within next 150 days and meeting all other requirements                Passed - Manual Review: FT4 is not required if last TSH is WNL.        Passed - TSH in normal range and within 360 days     TSH   Date Value Ref Range Status   10/18/2021 2.868 0.400 - 4.000 uIU/mL Final   07/02/2020 1.473 0.400 - 4.000 uIU/mL Final   04/12/2019 2.761 0.400 - 4.000 uIU/mL Final                  Appointments  past 12m or future 3m with PCP    Date Provider   Last Visit   10/20/2021 Dennis Nye MD   Next Visit   Visit date not found Dennis Nye MD   ED visits in past 90 days: 0     Note composed:5:44 PM 01/25/2022

## 2022-06-28 DIAGNOSIS — E03.9 HYPOTHYROIDISM, UNSPECIFIED TYPE: ICD-10-CM

## 2022-06-28 RX ORDER — LEVOTHYROXINE SODIUM 50 UG/1
TABLET ORAL
Qty: 90 TABLET | Refills: 1 | Status: SHIPPED | OUTPATIENT
Start: 2022-06-28 | End: 2023-02-10 | Stop reason: SDUPTHER

## 2022-06-28 NOTE — TELEPHONE ENCOUNTER
Refill Decision Note   Isabel David  is requesting a refill authorization.  Brief Assessment and Rationale for Refill:  Approve     Medication Therapy Plan:  TSH-WNL    Medication Reconciliation Completed: No   Comments:     No Care Gaps recommended.     Note composed:2:37 PM 06/28/2022

## 2022-06-28 NOTE — TELEPHONE ENCOUNTER
No new care gaps identified.  [facility] Embedded Care Gaps. Reference number: [id number]. 6/28/2022   9:43:41 AM KEVINT

## 2022-07-20 ENCOUNTER — OFFICE VISIT (OUTPATIENT)
Dept: INTERNAL MEDICINE | Facility: CLINIC | Age: 87
End: 2022-07-20
Payer: MEDICARE

## 2022-07-20 ENCOUNTER — LAB VISIT (OUTPATIENT)
Dept: LAB | Facility: HOSPITAL | Age: 87
End: 2022-07-20
Attending: INTERNAL MEDICINE
Payer: MEDICARE

## 2022-07-20 VITALS
DIASTOLIC BLOOD PRESSURE: 72 MMHG | WEIGHT: 125 LBS | SYSTOLIC BLOOD PRESSURE: 120 MMHG | HEART RATE: 80 BPM | OXYGEN SATURATION: 96 % | HEIGHT: 59 IN | BODY MASS INDEX: 25.2 KG/M2

## 2022-07-20 DIAGNOSIS — E03.9 HYPOTHYROIDISM, UNSPECIFIED TYPE: Primary | ICD-10-CM

## 2022-07-20 DIAGNOSIS — I70.0 AORTIC ATHEROSCLEROSIS: ICD-10-CM

## 2022-07-20 DIAGNOSIS — N18.31 STAGE 3A CHRONIC KIDNEY DISEASE: ICD-10-CM

## 2022-07-20 DIAGNOSIS — F03.90 DEMENTIA WITHOUT BEHAVIORAL DISTURBANCE, UNSPECIFIED DEMENTIA TYPE: ICD-10-CM

## 2022-07-20 DIAGNOSIS — E03.9 HYPOTHYROIDISM, UNSPECIFIED TYPE: ICD-10-CM

## 2022-07-20 LAB
T4 FREE SERPL-MCNC: 0.84 NG/DL (ref 0.71–1.51)
TSH SERPL DL<=0.005 MIU/L-ACNC: 6.4 UIU/ML (ref 0.4–4)

## 2022-07-20 PROCEDURE — 99999 PR PBB SHADOW E&M-EST. PATIENT-LVL III: ICD-10-PCS | Mod: PBBFAC,,, | Performed by: INTERNAL MEDICINE

## 2022-07-20 PROCEDURE — 1126F PR PAIN SEVERITY QUANTIFIED, NO PAIN PRESENT: ICD-10-PCS | Mod: CPTII,S$GLB,, | Performed by: INTERNAL MEDICINE

## 2022-07-20 PROCEDURE — 1160F PR REVIEW ALL MEDS BY PRESCRIBER/CLIN PHARMACIST DOCUMENTED: ICD-10-PCS | Mod: CPTII,S$GLB,, | Performed by: INTERNAL MEDICINE

## 2022-07-20 PROCEDURE — 1157F PR ADVANCE CARE PLAN OR EQUIV PRESENT IN MEDICAL RECORD: ICD-10-PCS | Mod: CPTII,S$GLB,, | Performed by: INTERNAL MEDICINE

## 2022-07-20 PROCEDURE — 99999 PR PBB SHADOW E&M-EST. PATIENT-LVL III: CPT | Mod: PBBFAC,,, | Performed by: INTERNAL MEDICINE

## 2022-07-20 PROCEDURE — 3074F SYST BP LT 130 MM HG: CPT | Mod: CPTII,S$GLB,, | Performed by: INTERNAL MEDICINE

## 2022-07-20 PROCEDURE — 99214 OFFICE O/P EST MOD 30 MIN: CPT | Mod: S$GLB,,, | Performed by: INTERNAL MEDICINE

## 2022-07-20 PROCEDURE — 1126F AMNT PAIN NOTED NONE PRSNT: CPT | Mod: CPTII,S$GLB,, | Performed by: INTERNAL MEDICINE

## 2022-07-20 PROCEDURE — 3288F FALL RISK ASSESSMENT DOCD: CPT | Mod: CPTII,S$GLB,, | Performed by: INTERNAL MEDICINE

## 2022-07-20 PROCEDURE — 84443 ASSAY THYROID STIM HORMONE: CPT | Performed by: INTERNAL MEDICINE

## 2022-07-20 PROCEDURE — 36415 COLL VENOUS BLD VENIPUNCTURE: CPT | Performed by: INTERNAL MEDICINE

## 2022-07-20 PROCEDURE — 1101F PR PT FALLS ASSESS DOC 0-1 FALLS W/OUT INJ PAST YR: ICD-10-PCS | Mod: CPTII,S$GLB,, | Performed by: INTERNAL MEDICINE

## 2022-07-20 PROCEDURE — 1159F MED LIST DOCD IN RCRD: CPT | Mod: CPTII,S$GLB,, | Performed by: INTERNAL MEDICINE

## 2022-07-20 PROCEDURE — 99214 PR OFFICE/OUTPT VISIT, EST, LEVL IV, 30-39 MIN: ICD-10-PCS | Mod: S$GLB,,, | Performed by: INTERNAL MEDICINE

## 2022-07-20 PROCEDURE — 1160F RVW MEDS BY RX/DR IN RCRD: CPT | Mod: CPTII,S$GLB,, | Performed by: INTERNAL MEDICINE

## 2022-07-20 PROCEDURE — 3288F PR FALLS RISK ASSESSMENT DOCUMENTED: ICD-10-PCS | Mod: CPTII,S$GLB,, | Performed by: INTERNAL MEDICINE

## 2022-07-20 PROCEDURE — 3078F PR MOST RECENT DIASTOLIC BLOOD PRESSURE < 80 MM HG: ICD-10-PCS | Mod: CPTII,S$GLB,, | Performed by: INTERNAL MEDICINE

## 2022-07-20 PROCEDURE — 3078F DIAST BP <80 MM HG: CPT | Mod: CPTII,S$GLB,, | Performed by: INTERNAL MEDICINE

## 2022-07-20 PROCEDURE — 1101F PT FALLS ASSESS-DOCD LE1/YR: CPT | Mod: CPTII,S$GLB,, | Performed by: INTERNAL MEDICINE

## 2022-07-20 PROCEDURE — 1159F PR MEDICATION LIST DOCUMENTED IN MEDICAL RECORD: ICD-10-PCS | Mod: CPTII,S$GLB,, | Performed by: INTERNAL MEDICINE

## 2022-07-20 PROCEDURE — 84439 ASSAY OF FREE THYROXINE: CPT | Performed by: INTERNAL MEDICINE

## 2022-07-20 PROCEDURE — 1157F ADVNC CARE PLAN IN RCRD: CPT | Mod: CPTII,S$GLB,, | Performed by: INTERNAL MEDICINE

## 2022-07-20 PROCEDURE — 3074F PR MOST RECENT SYSTOLIC BLOOD PRESSURE < 130 MM HG: ICD-10-PCS | Mod: CPTII,S$GLB,, | Performed by: INTERNAL MEDICINE

## 2022-07-20 RX ORDER — MEMANTINE HYDROCHLORIDE 5 MG-10 MG
KIT ORAL
Qty: 1 PACKET | Refills: 0 | Status: SHIPPED | OUTPATIENT
Start: 2022-07-20 | End: 2023-03-07

## 2022-07-20 NOTE — PROGRESS NOTES
Subjective:       Patient ID: Isabel Hernandez is a 86 y.o. female.    Chief Complaint: Follow-up, Nausea, and Thyroid Problem    Abdominal Pain  This is a chronic problem. The current episode started more than 1 month ago. The onset quality is gradual. The problem occurs intermittently. The pain is located in the epigastric region. The pain is mild. The quality of the pain is colicky. Pertinent negatives include no arthralgias, constipation, diarrhea, dysuria, headaches, hematuria or vomiting.   Neurologic Problem  The patient's primary symptoms include memory loss and weakness. This is a chronic problem. The current episode started more than 1 month ago. The neurological problem developed gradually. The problem has been waxing and waning since onset. Associated symptoms include abdominal pain, confusion and nausea. Pertinent negatives include no chest pain, headaches, neck pain, palpitations or vomiting. Her past medical history is significant for dementia.     Review of Systems   Constitutional: Positive for activity change and unexpected weight change.   HENT: Negative for hearing loss, rhinorrhea and trouble swallowing.    Eyes: Negative for discharge and visual disturbance.   Respiratory: Negative for chest tightness and wheezing.    Cardiovascular: Negative for chest pain and palpitations.   Gastrointestinal: Positive for abdominal pain. Negative for blood in stool, constipation, diarrhea and vomiting.   Endocrine: Negative for polydipsia and polyuria.   Genitourinary: Negative for difficulty urinating, dysuria, hematuria and menstrual problem.   Musculoskeletal: Negative for arthralgias, joint swelling and neck pain.   Neurological: Positive for weakness. Negative for headaches.   Psychiatric/Behavioral: Positive for confusion and memory loss. Negative for dysphoric mood.       Objective:      Physical Exam  Vitals reviewed.   Constitutional:       General: She is not in acute distress.     Appearance: She is  well-developed.   HENT:      Head: Normocephalic and atraumatic.   Eyes:      Conjunctiva/sclera: Conjunctivae normal.      Pupils: Pupils are equal, round, and reactive to light.   Cardiovascular:      Rate and Rhythm: Normal rate and regular rhythm.      Heart sounds: Normal heart sounds.   Pulmonary:      Effort: Pulmonary effort is normal.      Breath sounds: Normal breath sounds. No wheezing.   Abdominal:      General: Bowel sounds are normal.      Palpations: Abdomen is soft.      Tenderness: There is no abdominal tenderness.   Musculoskeletal:         General: No tenderness. Normal range of motion.      Cervical back: Normal range of motion and neck supple.   Skin:     Findings: No erythema.   Neurological:      Mental Status: She is alert and oriented to person, place, and time.      Cranial Nerves: No cranial nerve deficit.   Psychiatric:         Mood and Affect: Mood is anxious.         Speech: Speech normal.         Behavior: Behavior normal.         Thought Content: Thought content normal.         Cognition and Memory: Memory is impaired.         Judgment: Judgment normal.         Assessment:       1. Hypothyroidism, unspecified type    2. Dementia without behavioral disturbance, unspecified dementia type    3. Aortic atherosclerosis    4. Stage 3a chronic kidney disease        Plan:       Isabel was seen today for follow-up, nausea and thyroid problem.    Diagnoses and all orders for this visit:    Hypothyroidism, unspecified type  Comments:  variable compliance due to memeory issues  Orders:  -     TSH; Future    Dementia without behavioral disturbance, unspecified dementia type  Comments:  social withdrawl and somatic symptoms are appearing more regularly  Orders:  -     memantine (NAMENDA TITRATION PACK) tablet pack; Follow package directions.    Aortic atherosclerosis  Comments:  per scan- stable    Stage 3a chronic kidney disease  Comments:  stable on latest labs        Follow up in about 6 weeks  (around 8/31/2022) for F/U APPOINTMENT WITH ME, WITH LAB BEFORE.

## 2022-07-22 ENCOUNTER — PATIENT MESSAGE (OUTPATIENT)
Dept: INTERNAL MEDICINE | Facility: CLINIC | Age: 87
End: 2022-07-22
Payer: MEDICARE

## 2022-08-31 DIAGNOSIS — Z78.0 MENOPAUSE: ICD-10-CM

## 2022-09-05 ENCOUNTER — PATIENT MESSAGE (OUTPATIENT)
Dept: INTERNAL MEDICINE | Facility: CLINIC | Age: 87
End: 2022-09-05
Payer: MEDICARE

## 2022-10-01 ENCOUNTER — PATIENT MESSAGE (OUTPATIENT)
Dept: INTERNAL MEDICINE | Facility: CLINIC | Age: 87
End: 2022-10-01
Payer: MEDICARE

## 2022-10-03 ENCOUNTER — PATIENT MESSAGE (OUTPATIENT)
Dept: INTERNAL MEDICINE | Facility: CLINIC | Age: 87
End: 2022-10-03
Payer: MEDICARE

## 2022-10-14 ENCOUNTER — OFFICE VISIT (OUTPATIENT)
Dept: INTERNAL MEDICINE | Facility: CLINIC | Age: 87
End: 2022-10-14
Payer: MEDICARE

## 2022-10-14 VITALS
SYSTOLIC BLOOD PRESSURE: 124 MMHG | HEART RATE: 95 BPM | BODY MASS INDEX: 25.64 KG/M2 | OXYGEN SATURATION: 96 % | HEIGHT: 59 IN | WEIGHT: 127.19 LBS | DIASTOLIC BLOOD PRESSURE: 60 MMHG

## 2022-10-14 DIAGNOSIS — I70.0 AORTIC ATHEROSCLEROSIS: ICD-10-CM

## 2022-10-14 DIAGNOSIS — E66.3 OVERWEIGHT (BMI 25.0-29.9): ICD-10-CM

## 2022-10-14 DIAGNOSIS — E03.8 OTHER SPECIFIED HYPOTHYROIDISM: Primary | ICD-10-CM

## 2022-10-14 DIAGNOSIS — M81.0 AGE-RELATED OSTEOPOROSIS WITHOUT CURRENT PATHOLOGICAL FRACTURE: ICD-10-CM

## 2022-10-14 DIAGNOSIS — F03.90 DEMENTIA WITHOUT BEHAVIORAL DISTURBANCE, PSYCHOTIC DISTURBANCE, MOOD DISTURBANCE, OR ANXIETY, UNSPECIFIED DEMENTIA SEVERITY, UNSPECIFIED DEMENTIA TYPE: ICD-10-CM

## 2022-10-14 DIAGNOSIS — N18.30 STAGE 3 CHRONIC KIDNEY DISEASE, UNSPECIFIED WHETHER STAGE 3A OR 3B CKD: ICD-10-CM

## 2022-10-14 PROCEDURE — 3078F PR MOST RECENT DIASTOLIC BLOOD PRESSURE < 80 MM HG: ICD-10-PCS | Mod: CPTII,S$GLB,, | Performed by: NURSE PRACTITIONER

## 2022-10-14 PROCEDURE — 99214 PR OFFICE/OUTPT VISIT, EST, LEVL IV, 30-39 MIN: ICD-10-PCS | Mod: S$GLB,,, | Performed by: NURSE PRACTITIONER

## 2022-10-14 PROCEDURE — 3288F FALL RISK ASSESSMENT DOCD: CPT | Mod: CPTII,S$GLB,, | Performed by: NURSE PRACTITIONER

## 2022-10-14 PROCEDURE — 1126F PR PAIN SEVERITY QUANTIFIED, NO PAIN PRESENT: ICD-10-PCS | Mod: CPTII,S$GLB,, | Performed by: NURSE PRACTITIONER

## 2022-10-14 PROCEDURE — 3074F PR MOST RECENT SYSTOLIC BLOOD PRESSURE < 130 MM HG: ICD-10-PCS | Mod: CPTII,S$GLB,, | Performed by: NURSE PRACTITIONER

## 2022-10-14 PROCEDURE — 1101F PR PT FALLS ASSESS DOC 0-1 FALLS W/OUT INJ PAST YR: ICD-10-PCS | Mod: CPTII,S$GLB,, | Performed by: NURSE PRACTITIONER

## 2022-10-14 PROCEDURE — 3288F PR FALLS RISK ASSESSMENT DOCUMENTED: ICD-10-PCS | Mod: CPTII,S$GLB,, | Performed by: NURSE PRACTITIONER

## 2022-10-14 PROCEDURE — 3078F DIAST BP <80 MM HG: CPT | Mod: CPTII,S$GLB,, | Performed by: NURSE PRACTITIONER

## 2022-10-14 PROCEDURE — 1157F ADVNC CARE PLAN IN RCRD: CPT | Mod: CPTII,S$GLB,, | Performed by: NURSE PRACTITIONER

## 2022-10-14 PROCEDURE — 1159F PR MEDICATION LIST DOCUMENTED IN MEDICAL RECORD: ICD-10-PCS | Mod: CPTII,S$GLB,, | Performed by: NURSE PRACTITIONER

## 2022-10-14 PROCEDURE — 99999 PR PBB SHADOW E&M-EST. PATIENT-LVL III: CPT | Mod: PBBFAC,,, | Performed by: NURSE PRACTITIONER

## 2022-10-14 PROCEDURE — 1159F MED LIST DOCD IN RCRD: CPT | Mod: CPTII,S$GLB,, | Performed by: NURSE PRACTITIONER

## 2022-10-14 PROCEDURE — 3074F SYST BP LT 130 MM HG: CPT | Mod: CPTII,S$GLB,, | Performed by: NURSE PRACTITIONER

## 2022-10-14 PROCEDURE — 1101F PT FALLS ASSESS-DOCD LE1/YR: CPT | Mod: CPTII,S$GLB,, | Performed by: NURSE PRACTITIONER

## 2022-10-14 PROCEDURE — 99999 PR PBB SHADOW E&M-EST. PATIENT-LVL III: ICD-10-PCS | Mod: PBBFAC,,, | Performed by: NURSE PRACTITIONER

## 2022-10-14 PROCEDURE — 99214 OFFICE O/P EST MOD 30 MIN: CPT | Mod: S$GLB,,, | Performed by: NURSE PRACTITIONER

## 2022-10-14 PROCEDURE — 1160F RVW MEDS BY RX/DR IN RCRD: CPT | Mod: CPTII,S$GLB,, | Performed by: NURSE PRACTITIONER

## 2022-10-14 PROCEDURE — 1160F PR REVIEW ALL MEDS BY PRESCRIBER/CLIN PHARMACIST DOCUMENTED: ICD-10-PCS | Mod: CPTII,S$GLB,, | Performed by: NURSE PRACTITIONER

## 2022-10-14 PROCEDURE — 1126F AMNT PAIN NOTED NONE PRSNT: CPT | Mod: CPTII,S$GLB,, | Performed by: NURSE PRACTITIONER

## 2022-10-14 PROCEDURE — 1157F PR ADVANCE CARE PLAN OR EQUIV PRESENT IN MEDICAL RECORD: ICD-10-PCS | Mod: CPTII,S$GLB,, | Performed by: NURSE PRACTITIONER

## 2022-10-14 NOTE — PATIENT INSTRUCTIONS
Recheck labs, will message with results, if stable follow up in 6 months with PCP Dr. Nye or sooner as needed    Continue to monitor memory issue, use Namenda only if necessary

## 2022-10-14 NOTE — PROGRESS NOTES
Subjective:       Patient ID: Isabel Hernandez is a 86 y.o. female.    Chief Complaint: Annual Exam    Pt of Dr. Nye, here for follow up. Pl    Last OV with PCP 7-20-22, at that visit Dr. Nye started him on a taper Namenda pack but she never started it as family states she has had improvement of memory issues. See ROS.    Had flu shot at [facility]    I have reviewed the HPI/ROS info pt entered in portal prior to visit today below     Review of Systems   Constitutional:  Negative for activity change, appetite change and unexpected weight change.   HENT:  Negative for dental problem, hearing loss, rhinorrhea and trouble swallowing.    Eyes:  Negative for discharge and visual disturbance.   Respiratory:  Negative for apnea, cough, chest tightness, shortness of breath and wheezing.    Cardiovascular:  Negative for chest pain, palpitations and leg swelling.   Gastrointestinal:  Negative for abdominal distention, abdominal pain, anal bleeding, blood in stool, constipation, diarrhea, nausea, rectal pain and vomiting.   Endocrine: Negative for cold intolerance, heat intolerance, polydipsia, polyphagia and polyuria.   Genitourinary:  Negative for difficulty urinating, dysuria, hematuria, menstrual problem, pelvic pain and vaginal pain.   Musculoskeletal:  Negative for arthralgias, joint swelling and neck pain.   Integumentary:  Negative for color change.   Allergic/Immunologic: Negative for environmental allergies, food allergies and immunocompromised state.   Neurological:  Positive for memory loss. Negative for dizziness, speech difficulty, weakness, light-headedness, numbness and headaches.        Improving per daughter--playing Bingo, going to senior center. QOD she does things and this has improved things so she did not need to take the Namenda   Hematological:  Negative for adenopathy. Does not bruise/bleed easily.   Psychiatric/Behavioral:  Positive for confusion. Negative for agitation, behavioral problems,  dysphoric mood, sleep disturbance and suicidal ideas.         Improving per daughter--playing Bingo, going to [facility]. QOD she does things and this has improved things so she did not need to take the Namenda       Review of patient's allergies indicates:   Allergen Reactions    Codeine Nausea And Vomiting    Fosamax [alendronate] Nausea Only    Iron analogues Nausea And Vomiting     Current Outpatient Medications:     levothyroxine (SYNTHROID) 50 MCG tablet, Take one tablet by mouth daily, Disp: 90 tablet, Rfl: 1    memantine (NAMENDA TITRATION PACK) tablet pack, Follow package directions., Disp: 1 packet, Rfl: 0    Patient Active Problem List   Diagnosis    Hypothyroid    Nephrolithiasis    Aortic atherosclerosis    Age-related osteoporosis without current pathological fracture    CKD (chronic kidney disease), stage III    Macular hole, full thickness, left    Vitreomacular traction syndrome of right eye     Past Medical History:   Diagnosis Date    Arthritis     Cataract     Hypothyroidism     Osteoporosis 2015     Past Surgical History:   Procedure Laterality Date    ADENOIDECTOMY      BREAST SURGERY Bilateral     cyst removal     SECTION      CHOLECYSTECTOMY      HYSTERECTOMY      THYROIDECTOMY, PARTIAL      TONSILLECTOMY      VITRECTOMY BY PARS PLANA APPROACH Left 2018    Procedure: VITRECTOMY, PARS PLANA APPROch   internal limitng membrane peel;  Surgeon: Johnathan Christopher MD;  Location: [facility] OR [address] [place] [address];  Service: Ophthalmology;  Laterality: Left;     Social History     Socioeconomic History    Marital status:    Occupational History    Occupation: Retired   Tobacco Use    Smoking status: Never    Smokeless tobacco: Never   Substance and Sexual Activity    Alcohol use: No    Drug use: No    Sexual activity: Never   Other Topics Concern    Are you pregnant or think you may be? No    Breast-feeding No     Family History   Problem Relation Age of Onset    Hypertension Mother      "Thyroid disease Mother     Kidney disease Mother     Cancer Father         lung    Cataracts Father     Glaucoma Father     Cancer Sister         stomach    Cancer Brother         prostate    No Known Problems Brother     No Known Problems Daughter     Amblyopia Neg Hx     Blindness Neg Hx     Diabetes Neg Hx     Macular degeneration Neg Hx     Retinal detachment Neg Hx     Strabismus Neg Hx     Stroke Neg Hx     Melanoma Neg Hx        Objective:      Vitals:    10/14/22 1521   BP: 124/60   Pulse: 95   SpO2: 96%   Weight: 57.7 kg (127 lb 3.3 oz)   Height: 4' 11" (1.499 m)   PainSc: 0-No pain     Body mass index is 25.69 kg/m².    Physical Exam  Vitals and nursing note reviewed.   Constitutional:       Appearance: Normal appearance. She is well-developed.   HENT:      Head: Normocephalic.      Right Ear: Hearing, tympanic membrane, ear canal and external ear normal. There is no impacted cerumen.      Left Ear: Hearing, tympanic membrane, ear canal and external ear normal. There is no impacted cerumen.      Nose: Nose normal.      Mouth/Throat:      Mouth: Mucous membranes are moist.      Pharynx: Oropharynx is clear.   Eyes:      General: Lids are normal. Lids are everted, no foreign bodies appreciated.      Conjunctiva/sclera: Conjunctivae normal.      Pupils: Pupils are equal, round, and reactive to light.   Neck:      Vascular: No carotid bruit or JVD.      Trachea: Trachea normal.   Cardiovascular:      Rate and Rhythm: Normal rate and regular rhythm.      Pulses: Normal pulses.      Heart sounds: Normal heart sounds, S1 normal and S2 normal.   Pulmonary:      Effort: Pulmonary effort is normal.      Breath sounds: Normal breath sounds.   Abdominal:      General: Abdomen is flat. Bowel sounds are normal.      Palpations: Abdomen is soft.   Musculoskeletal:         General: Normal range of motion.      Cervical back: Full passive range of motion without pain, normal range of motion and neck supple.   Skin:     " General: Skin is warm and dry.      Capillary Refill: Capillary refill takes less than 2 seconds.   Neurological:      General: No focal deficit present.      Mental Status: She is alert and oriented to person, place, and time. Mental status is at baseline.      Deep Tendon Reflexes: Reflexes are normal and symmetric.   Psychiatric:         Mood and Affect: Mood normal.         Speech: Speech normal.         Behavior: Behavior normal.         Thought Content: Thought content normal.         Judgment: Judgment normal.       Assessment:       Problem List Items Addressed This Visit          Cardiac/Vascular    Aortic atherosclerosis    Relevant Orders    Lipid Panel       Renal/    CKD (chronic kidney disease), stage III    Relevant Orders    Comprehensive Metabolic Panel    CBC Auto Differential       Endocrine    Hypothyroid - Primary    Relevant Orders    TSH    T4, Free       Orthopedic    Age-related osteoporosis without current pathological fracture     Other Visit Diagnoses       Dementia without behavioral disturbance, psychotic disturbance, mood disturbance, or anxiety, unspecified dementia severity, unspecified dementia type        BMI 25.0-25.9,adult        Overweight (BMI 25.0-29.9)                  Plan:       Isabel was seen today for annual exam.    Diagnoses and all orders for this visit:    Other specified hypothyroidism  -     TSH; Future  -     T4, Free; Future    On thyroid meds, advised to take separate from other meds and vitamins    Stage 3 chronic kidney disease, unspecified whether stage 3a or 3b CKD  -     Comprehensive Metabolic Panel; Future  -     CBC Auto Differential; Future    Goal BP < 140/80, LDL goal < 100, low salt diet, avoid otc NSAIDs.    Aortic atherosclerosis  -     Lipid Panel; Future    Age-related osteoporosis without current pathological fracture  Has DEXA scheduled 11/1/22    Dementia without behavioral disturbance, psychotic disturbance, mood disturbance, or anxiety,  unspecified dementia severity, unspecified dementia type  Memory has improved with increased activity per daughter    Continue to monitor memory issue, use Namenda only if necessary    BMI 25.0-25.9,adult  BMI reviewed    Overweight (BMI 25.0-29.9)  BMI reviewed.    Diet and exercise to lose weight.    Recheck labs, will message with results, if stable follow up in 6 months with PCP Dr. Nye or sooner as needed    Follow up in about 6 months (around 4/14/2023) for with PCP Dr. Nye for follow up or sooner as needed.

## 2022-11-01 ENCOUNTER — LAB VISIT (OUTPATIENT)
Dept: LAB | Facility: HOSPITAL | Age: 87
End: 2022-11-01
Payer: MEDICARE

## 2022-11-01 DIAGNOSIS — N18.30 STAGE 3 CHRONIC KIDNEY DISEASE, UNSPECIFIED WHETHER STAGE 3A OR 3B CKD: ICD-10-CM

## 2022-11-01 DIAGNOSIS — I70.0 AORTIC ATHEROSCLEROSIS: ICD-10-CM

## 2022-11-01 DIAGNOSIS — E03.8 OTHER SPECIFIED HYPOTHYROIDISM: ICD-10-CM

## 2022-11-01 LAB
ALBUMIN SERPL BCP-MCNC: 3.7 G/DL (ref 3.5–5.2)
ALP SERPL-CCNC: 65 U/L (ref 55–135)
ALT SERPL W/O P-5'-P-CCNC: 7 U/L (ref 10–44)
ANION GAP SERPL CALC-SCNC: 11 MMOL/L (ref 8–16)
AST SERPL-CCNC: 15 U/L (ref 10–40)
BASOPHILS # BLD AUTO: 0.05 K/UL (ref 0–0.2)
BASOPHILS NFR BLD: 0.6 % (ref 0–1.9)
BILIRUB SERPL-MCNC: 0.8 MG/DL (ref 0.1–1)
BUN SERPL-MCNC: 16 MG/DL (ref 8–23)
CALCIUM SERPL-MCNC: 9 MG/DL (ref 8.7–10.5)
CHLORIDE SERPL-SCNC: 104 MMOL/L (ref 95–110)
CHOLEST SERPL-MCNC: 215 MG/DL (ref 120–199)
CHOLEST/HDLC SERPL: 4 {RATIO} (ref 2–5)
CO2 SERPL-SCNC: 21 MMOL/L (ref 23–29)
CREAT SERPL-MCNC: 0.8 MG/DL (ref 0.5–1.4)
DIFFERENTIAL METHOD: ABNORMAL
EOSINOPHIL # BLD AUTO: 0.1 K/UL (ref 0–0.5)
EOSINOPHIL NFR BLD: 1.4 % (ref 0–8)
ERYTHROCYTE [DISTWIDTH] IN BLOOD BY AUTOMATED COUNT: 13.6 % (ref 11.5–14.5)
EST. GFR  (NO RACE VARIABLE): >60 ML/MIN/1.73 M^2
GLUCOSE SERPL-MCNC: 97 MG/DL (ref 70–110)
HCT VFR BLD AUTO: 44.6 % (ref 37–48.5)
HDLC SERPL-MCNC: 54 MG/DL (ref 40–75)
HDLC SERPL: 25.1 % (ref 20–50)
HGB BLD-MCNC: 14.6 G/DL (ref 12–16)
IMM GRANULOCYTES # BLD AUTO: 0.03 K/UL (ref 0–0.04)
IMM GRANULOCYTES NFR BLD AUTO: 0.4 % (ref 0–0.5)
LDLC SERPL CALC-MCNC: 135.8 MG/DL (ref 63–159)
LYMPHOCYTES # BLD AUTO: 3.5 K/UL (ref 1–4.8)
LYMPHOCYTES NFR BLD: 43.3 % (ref 18–48)
MCH RBC QN AUTO: 31.1 PG (ref 27–31)
MCHC RBC AUTO-ENTMCNC: 32.7 G/DL (ref 32–36)
MCV RBC AUTO: 95 FL (ref 82–98)
MONOCYTES # BLD AUTO: 0.6 K/UL (ref 0.3–1)
MONOCYTES NFR BLD: 7.7 % (ref 4–15)
NEUTROPHILS # BLD AUTO: 3.7 K/UL (ref 1.8–7.7)
NEUTROPHILS NFR BLD: 46.6 % (ref 38–73)
NONHDLC SERPL-MCNC: 161 MG/DL
NRBC BLD-RTO: 0 /100 WBC
PLATELET # BLD AUTO: 229 K/UL (ref 150–450)
PMV BLD AUTO: 10.9 FL (ref 9.2–12.9)
POTASSIUM SERPL-SCNC: 4.2 MMOL/L (ref 3.5–5.1)
PROT SERPL-MCNC: 6.7 G/DL (ref 6–8.4)
RBC # BLD AUTO: 4.69 M/UL (ref 4–5.4)
SODIUM SERPL-SCNC: 136 MMOL/L (ref 136–145)
T4 FREE SERPL-MCNC: 0.91 NG/DL (ref 0.71–1.51)
TRIGL SERPL-MCNC: 126 MG/DL (ref 30–150)
TSH SERPL DL<=0.005 MIU/L-ACNC: 5.89 UIU/ML (ref 0.4–4)
WBC # BLD AUTO: 7.97 K/UL (ref 3.9–12.7)

## 2022-11-01 PROCEDURE — 84443 ASSAY THYROID STIM HORMONE: CPT | Performed by: NURSE PRACTITIONER

## 2022-11-01 PROCEDURE — 80053 COMPREHEN METABOLIC PANEL: CPT | Performed by: NURSE PRACTITIONER

## 2022-11-01 PROCEDURE — 84439 ASSAY OF FREE THYROXINE: CPT | Performed by: NURSE PRACTITIONER

## 2022-11-01 PROCEDURE — 36415 COLL VENOUS BLD VENIPUNCTURE: CPT | Performed by: NURSE PRACTITIONER

## 2022-11-01 PROCEDURE — 85025 COMPLETE CBC W/AUTO DIFF WBC: CPT | Performed by: NURSE PRACTITIONER

## 2022-11-01 PROCEDURE — 80061 LIPID PANEL: CPT | Performed by: NURSE PRACTITIONER

## 2023-02-10 ENCOUNTER — PATIENT MESSAGE (OUTPATIENT)
Dept: PRIMARY CARE CLINIC | Facility: CLINIC | Age: 88
End: 2023-02-10
Payer: MEDICARE

## 2023-02-10 DIAGNOSIS — E03.9 HYPOTHYROIDISM, UNSPECIFIED TYPE: ICD-10-CM

## 2023-02-10 RX ORDER — LEVOTHYROXINE SODIUM 50 UG/1
TABLET ORAL
Qty: 90 TABLET | Refills: 1 | Status: SHIPPED | OUTPATIENT
Start: 2023-02-10 | End: 2023-07-06

## 2023-02-10 NOTE — TELEPHONE ENCOUNTER
Pt's daughter requesting Synthroid refill for pt (former pt of Dr. Nye). Pt has est care appt with you on 3/7. Pended refill if okay

## 2023-02-15 DIAGNOSIS — E03.9 HYPOTHYROIDISM, UNSPECIFIED TYPE: ICD-10-CM

## 2023-02-17 RX ORDER — LEVOTHYROXINE SODIUM 50 UG/1
TABLET ORAL
Qty: 90 TABLET | Refills: 1 | OUTPATIENT
Start: 2023-02-17

## 2023-03-07 ENCOUNTER — LAB VISIT (OUTPATIENT)
Dept: LAB | Facility: HOSPITAL | Age: 88
End: 2023-03-07
Attending: INTERNAL MEDICINE
Payer: MEDICARE

## 2023-03-07 ENCOUNTER — OFFICE VISIT (OUTPATIENT)
Dept: PRIMARY CARE CLINIC | Facility: CLINIC | Age: 88
End: 2023-03-07
Payer: MEDICARE

## 2023-03-07 VITALS
DIASTOLIC BLOOD PRESSURE: 64 MMHG | TEMPERATURE: 97 F | SYSTOLIC BLOOD PRESSURE: 118 MMHG | BODY MASS INDEX: 25.87 KG/M2 | RESPIRATION RATE: 18 BRPM | WEIGHT: 128.31 LBS | HEART RATE: 87 BPM | OXYGEN SATURATION: 98 % | HEIGHT: 59 IN

## 2023-03-07 DIAGNOSIS — M81.0 AGE-RELATED OSTEOPOROSIS WITHOUT CURRENT PATHOLOGICAL FRACTURE: ICD-10-CM

## 2023-03-07 DIAGNOSIS — I70.0 AORTIC ATHEROSCLEROSIS: ICD-10-CM

## 2023-03-07 DIAGNOSIS — E03.4 HYPOTHYROIDISM DUE TO ACQUIRED ATROPHY OF THYROID: ICD-10-CM

## 2023-03-07 DIAGNOSIS — E03.4 HYPOTHYROIDISM DUE TO ACQUIRED ATROPHY OF THYROID: Primary | ICD-10-CM

## 2023-03-07 DIAGNOSIS — N18.2 STAGE 2 CHRONIC KIDNEY DISEASE: ICD-10-CM

## 2023-03-07 LAB — TSH SERPL DL<=0.005 MIU/L-ACNC: 3.08 UIU/ML (ref 0.4–4)

## 2023-03-07 PROCEDURE — 3074F PR MOST RECENT SYSTOLIC BLOOD PRESSURE < 130 MM HG: ICD-10-PCS | Mod: CPTII,S$GLB,, | Performed by: INTERNAL MEDICINE

## 2023-03-07 PROCEDURE — 1157F ADVNC CARE PLAN IN RCRD: CPT | Mod: CPTII,S$GLB,, | Performed by: INTERNAL MEDICINE

## 2023-03-07 PROCEDURE — 99214 PR OFFICE/OUTPT VISIT, EST, LEVL IV, 30-39 MIN: ICD-10-PCS | Mod: S$GLB,,, | Performed by: INTERNAL MEDICINE

## 2023-03-07 PROCEDURE — 3288F PR FALLS RISK ASSESSMENT DOCUMENTED: ICD-10-PCS | Mod: CPTII,S$GLB,, | Performed by: INTERNAL MEDICINE

## 2023-03-07 PROCEDURE — 3078F PR MOST RECENT DIASTOLIC BLOOD PRESSURE < 80 MM HG: ICD-10-PCS | Mod: CPTII,S$GLB,, | Performed by: INTERNAL MEDICINE

## 2023-03-07 PROCEDURE — 1126F AMNT PAIN NOTED NONE PRSNT: CPT | Mod: CPTII,S$GLB,, | Performed by: INTERNAL MEDICINE

## 2023-03-07 PROCEDURE — 1159F PR MEDICATION LIST DOCUMENTED IN MEDICAL RECORD: ICD-10-PCS | Mod: CPTII,S$GLB,, | Performed by: INTERNAL MEDICINE

## 2023-03-07 PROCEDURE — 1126F PR PAIN SEVERITY QUANTIFIED, NO PAIN PRESENT: ICD-10-PCS | Mod: CPTII,S$GLB,, | Performed by: INTERNAL MEDICINE

## 2023-03-07 PROCEDURE — 36415 COLL VENOUS BLD VENIPUNCTURE: CPT | Performed by: INTERNAL MEDICINE

## 2023-03-07 PROCEDURE — 1101F PR PT FALLS ASSESS DOC 0-1 FALLS W/OUT INJ PAST YR: ICD-10-PCS | Mod: CPTII,S$GLB,, | Performed by: INTERNAL MEDICINE

## 2023-03-07 PROCEDURE — 84443 ASSAY THYROID STIM HORMONE: CPT | Mod: HCNC | Performed by: INTERNAL MEDICINE

## 2023-03-07 PROCEDURE — 99999 PR PBB SHADOW E&M-EST. PATIENT-LVL III: CPT | Mod: PBBFAC,,, | Performed by: INTERNAL MEDICINE

## 2023-03-07 PROCEDURE — 1160F RVW MEDS BY RX/DR IN RCRD: CPT | Mod: CPTII,S$GLB,, | Performed by: INTERNAL MEDICINE

## 2023-03-07 PROCEDURE — 1159F MED LIST DOCD IN RCRD: CPT | Mod: CPTII,S$GLB,, | Performed by: INTERNAL MEDICINE

## 2023-03-07 PROCEDURE — 99499 UNLISTED E&M SERVICE: CPT | Mod: S$GLB,,, | Performed by: INTERNAL MEDICINE

## 2023-03-07 PROCEDURE — 3078F DIAST BP <80 MM HG: CPT | Mod: CPTII,S$GLB,, | Performed by: INTERNAL MEDICINE

## 2023-03-07 PROCEDURE — 3288F FALL RISK ASSESSMENT DOCD: CPT | Mod: CPTII,S$GLB,, | Performed by: INTERNAL MEDICINE

## 2023-03-07 PROCEDURE — 99499 RISK ADDL DX/OHS AUDIT: ICD-10-PCS | Mod: S$GLB,,, | Performed by: INTERNAL MEDICINE

## 2023-03-07 PROCEDURE — 1160F PR REVIEW ALL MEDS BY PRESCRIBER/CLIN PHARMACIST DOCUMENTED: ICD-10-PCS | Mod: CPTII,S$GLB,, | Performed by: INTERNAL MEDICINE

## 2023-03-07 PROCEDURE — 1101F PT FALLS ASSESS-DOCD LE1/YR: CPT | Mod: CPTII,S$GLB,, | Performed by: INTERNAL MEDICINE

## 2023-03-07 PROCEDURE — 1157F PR ADVANCE CARE PLAN OR EQUIV PRESENT IN MEDICAL RECORD: ICD-10-PCS | Mod: CPTII,S$GLB,, | Performed by: INTERNAL MEDICINE

## 2023-03-07 PROCEDURE — 3074F SYST BP LT 130 MM HG: CPT | Mod: CPTII,S$GLB,, | Performed by: INTERNAL MEDICINE

## 2023-03-07 PROCEDURE — 99214 OFFICE O/P EST MOD 30 MIN: CPT | Mod: S$GLB,,, | Performed by: INTERNAL MEDICINE

## 2023-03-07 PROCEDURE — 99999 PR PBB SHADOW E&M-EST. PATIENT-LVL III: ICD-10-PCS | Mod: PBBFAC,,, | Performed by: INTERNAL MEDICINE

## 2023-03-07 NOTE — PROGRESS NOTES
[facility] Note    Chief Complaint      Chief Complaint   Patient presents with    Establish Care       History of Present Illness      Isabel Hernandez is a 87 y.o. female who presents today for   Chief Complaint   Patient presents with    Establish Care   .  Patient comes to appointment here for establish visit with me . She ahd been patient of dr barrett . She jsut had labd done  all reviewed . Her thyroid was undertreated she admits she had not been taking meds . Her daughter is now more involved in her medication and she is taking as prescribed . Has received covid vaccine x 3 .     Problem List Items Addressed This Visit          Cardiac/Vascular    Aortic atherosclerosis    Overview     Stable             Renal/    Stage 2 chronic kidney disease    Overview     Las gfr > 60               Endocrine    Hypothyroid - Primary    Overview     Repeat tsh             Orthopedic    Age-related osteoporosis without current pathological fracture    Overview     Is on no meds currently ddexa is scheduled . Could not tolerate alendronate               Past Medical History:  Past Medical History:   Diagnosis Date    Arthritis     Cataract     Hypothyroidism     Osteoporosis 2015       Past Surgical History:  Past Surgical History:   Procedure Laterality Date    ADENOIDECTOMY      APPENDECTOMY      BREAST SURGERY Bilateral     cyst removal     SECTION      CHOLECYSTECTOMY      HYSTERECTOMY      THYROIDECTOMY, PARTIAL      TONSILLECTOMY      VITRECTOMY BY PARS PLANA APPROACH Left 2018    Procedure: VITRECTOMY, PARS PLANA APPROch   internal limitng membrane peel;  Surgeon: Johnathan Christopher MD;  Location: [facility] OR [address] [place] [address];  Service: Ophthalmology;  Laterality: Left;       Family History:  family history includes Cancer in her brother, father, and sister; Cataracts in her father; Glaucoma in her father; Hypertension in her mother; Kidney disease in her mother;  Miscarriages / Stillbirths in her mother; No Known Problems in her brother and daughter; Thyroid disease in her mother.    Social History:  Social History     Socioeconomic History    Marital status:    Occupational History    Occupation: Retired   Tobacco Use    Smoking status: Never    Smokeless tobacco: Never   Substance and Sexual Activity    Alcohol use: No    Drug use: No    Sexual activity: Not Currently     Partners: Male     Birth control/protection: None   Other Topics Concern    Are you pregnant or think you may be? No    Breast-feeding No       Review of Systems:   Review of Systems   Constitutional:  Negative for fever and weight loss.   HENT:  Negative for congestion, hearing loss and sore throat.    Eyes:  Negative for blurred vision.   Respiratory:  Negative for cough and shortness of breath.    Cardiovascular:  Negative for chest pain, palpitations, claudication and leg swelling.   Gastrointestinal:  Positive for nausea. Negative for abdominal pain, constipation, diarrhea and heartburn.   Genitourinary:  Negative for dysuria.   Musculoskeletal:  Negative for back pain and myalgias.   Skin:  Negative for rash.   Neurological:  Negative for focal weakness and headaches.   Psychiatric/Behavioral:  Negative for depression and suicidal ideas. The patient is not nervous/anxious.        Medications:  Outpatient Encounter Medications as of 3/7/2023   Medication Sig Dispense Refill    levothyroxine (SYNTHROID) 50 MCG tablet Take one tablet by mouth daily 90 tablet 1    [DISCONTINUED] levothyroxine (SYNTHROID) 50 MCG tablet Take one tablet by mouth daily 90 tablet 1    [DISCONTINUED] memantine (NAMENDA TITRATION PACK) tablet pack Follow package directions. 1 packet 0     No facility-administered encounter medications on file as of 3/7/2023.        Allergies:  Review of patient's allergies indicates:   Allergen Reactions    Codeine Nausea And Vomiting    Fosamax [alendronate] Nausea Only    Iron  analogues Nausea And Vomiting         Physical Exam         Vitals:    03/07/23 1459   BP: 118/64   Pulse: 87   Resp: 18   Temp: 97.4 °F (36.3 °C)         Physical Exam  Constitutional:       Appearance: She is well-developed.   Eyes:      Pupils: Pupils are equal, round, and reactive to light.   Neck:      Thyroid: No thyromegaly.   Cardiovascular:      Rate and Rhythm: Normal rate.      Heart sounds: Normal heart sounds. No murmur heard.    No friction rub. No gallop.   Pulmonary:      Breath sounds: Normal breath sounds.   Abdominal:      General: Bowel sounds are normal.      Palpations: Abdomen is soft.   Musculoskeletal:         General: Normal range of motion.      Cervical back: Normal range of motion.   Lymphadenopathy:      Cervical: No cervical adenopathy.   Skin:     General: Skin is warm.      Findings: No rash.   Neurological:      Mental Status: She is alert and oriented to person, place, and time.      Cranial Nerves: No cranial nerve deficit.   Psychiatric:         Behavior: Behavior normal.        Laboratory:  CBC:  No results for input(s): WBC, RBC, HGB, HCT, PLT, MCV, MCH, MCHC in the last 2160 hours.  CMP:  No results for input(s): GLU, CALCIUM, ALBUMIN, PROT, NA, K, CO2, CL, BUN, ALKPHOS, ALT, AST, BILITOT in the last 2160 hours.    Invalid input(s): CREATININ  URINALYSIS:  No results for input(s): COLORU, CLARITYU, SPECGRAV, PHUR, PROTEINUA, GLUCOSEU, BILIRUBINCON, BLOODU, WBCU, RBCU, BACTERIA, MUCUS, NITRITE, LEUKOCYTESUR, UROBILINOGEN, HYALINECASTS in the last 2160 hours.   LIPIDS:  No results for input(s): TSH, HDL, CHOL, TRIG, LDLCALC, CHOLHDL, NONHDLCHOL, TOTALCHOLEST in the last 2160 hours.  TSH:  No results for input(s): TSH in the last 2160 hours.  A1C:  No results for input(s): HGBA1C in the last 2160 hours.    Radiology:        Assessment:     Isabel Hernandez is a 87 y.o.female with:    Hypothyroidism due to acquired atrophy of thyroid  -     TSH; Future; Expected date:  03/07/2023    Stage 2 chronic kidney disease    Aortic atherosclerosis    Age-related osteoporosis without current pathological fracture          Plan:     Problem List Items Addressed This Visit          Cardiac/Vascular    Aortic atherosclerosis    Overview     Stable             Renal/    Stage 2 chronic kidney disease    Overview     Las gfr > 60 11/22              Endocrine    Hypothyroid - Primary    Overview     Repeat tsh             Orthopedic    Age-related osteoporosis without current pathological fracture    Overview     Is on no meds currently ddexa is scheduled . Could not tolerate alendronate             As above, continue current medications and maintain follow up with specialists.  Return to clinic in 6 months.      Frederick W Dantagnan [facility]

## 2023-03-27 ENCOUNTER — HOSPITAL ENCOUNTER (OUTPATIENT)
Dept: RADIOLOGY | Facility: CLINIC | Age: 88
Discharge: HOME | End: 2023-03-27
Attending: INTERNAL MEDICINE
Payer: MEDICARE

## 2023-03-27 DIAGNOSIS — Z78.0 MENOPAUSE: ICD-10-CM

## 2023-03-27 PROCEDURE — 77080 DXA BONE DENSITY AXIAL: CPT | Mod: 26,HCNC,, | Performed by: INTERNAL MEDICINE

## 2023-03-27 PROCEDURE — 77080 DXA BONE DENSITY AXIAL: CPT | Mod: TC,HCNC

## 2023-03-27 PROCEDURE — 77080 DEXA BONE DENSITY SPINE HIP: ICD-10-PCS | Mod: 26,HCNC,, | Performed by: INTERNAL MEDICINE

## 2023-05-04 DIAGNOSIS — Z78.0 MENOPAUSE: ICD-10-CM

## 2023-05-06 ENCOUNTER — PATIENT MESSAGE (OUTPATIENT)
Dept: PRIMARY CARE CLINIC | Facility: CLINIC | Age: 88
End: 2023-05-06
Payer: MEDICARE

## 2023-05-10 ENCOUNTER — PATIENT MESSAGE (OUTPATIENT)
Dept: PRIMARY CARE CLINIC | Facility: CLINIC | Age: 88
End: 2023-05-10
Payer: MEDICARE

## 2023-06-29 ENCOUNTER — PES CALL (OUTPATIENT)
Dept: ADMINISTRATIVE | Facility: CLINIC | Age: 88
End: 2023-06-29
Payer: MEDICARE

## 2023-07-06 DIAGNOSIS — E03.9 HYPOTHYROIDISM, UNSPECIFIED TYPE: ICD-10-CM

## 2023-07-06 RX ORDER — LEVOTHYROXINE SODIUM 50 UG/1
TABLET ORAL
Qty: 90 TABLET | Refills: 2 | Status: SHIPPED | OUTPATIENT
Start: 2023-07-06

## 2023-07-06 NOTE — TELEPHONE ENCOUNTER
Refill Decision Note   Isabel Hernandez  is requesting a refill authorization.  Brief Assessment and Rationale for Refill:  Approve     Medication Therapy Plan:         Comments:     Note composed:11:06 AM 07/06/2023

## 2023-07-06 NOTE — TELEPHONE ENCOUNTER
No care due was identified.  [facility] Embedded Care Due Messages. Reference number: [id number].   7/06/2023 10:58:04 AM CDT

## 2023-08-22 ENCOUNTER — OFFICE VISIT (OUTPATIENT)
Dept: INTERNAL MEDICINE | Facility: CLINIC | Age: 88
End: 2023-08-22
Payer: MEDICARE

## 2023-08-22 VITALS
HEIGHT: 59 IN | HEART RATE: 70 BPM | SYSTOLIC BLOOD PRESSURE: 126 MMHG | DIASTOLIC BLOOD PRESSURE: 64 MMHG | OXYGEN SATURATION: 96 % | BODY MASS INDEX: 25.64 KG/M2 | WEIGHT: 127.19 LBS

## 2023-08-22 DIAGNOSIS — I70.0 AORTIC ATHEROSCLEROSIS: ICD-10-CM

## 2023-08-22 DIAGNOSIS — F03.90 DEMENTIA WITHOUT BEHAVIORAL DISTURBANCE, PSYCHOTIC DISTURBANCE, MOOD DISTURBANCE, OR ANXIETY, UNSPECIFIED DEMENTIA SEVERITY, UNSPECIFIED DEMENTIA TYPE: ICD-10-CM

## 2023-08-22 DIAGNOSIS — Z00.00 ENCOUNTER FOR PREVENTIVE HEALTH EXAMINATION: Primary | ICD-10-CM

## 2023-08-22 DIAGNOSIS — N18.30 STAGE 3 CHRONIC KIDNEY DISEASE, UNSPECIFIED WHETHER STAGE 3A OR 3B CKD: ICD-10-CM

## 2023-08-22 DIAGNOSIS — E03.4 HYPOTHYROIDISM DUE TO ACQUIRED ATROPHY OF THYROID: ICD-10-CM

## 2023-08-22 PROCEDURE — 1126F PR PAIN SEVERITY QUANTIFIED, NO PAIN PRESENT: ICD-10-PCS | Mod: HCNC,CPTII,S$GLB,

## 2023-08-22 PROCEDURE — 1160F RVW MEDS BY RX/DR IN RCRD: CPT | Mod: HCNC,CPTII,S$GLB,

## 2023-08-22 PROCEDURE — 3078F PR MOST RECENT DIASTOLIC BLOOD PRESSURE < 80 MM HG: ICD-10-PCS | Mod: HCNC,CPTII,S$GLB,

## 2023-08-22 PROCEDURE — 3288F FALL RISK ASSESSMENT DOCD: CPT | Mod: HCNC,CPTII,S$GLB,

## 2023-08-22 PROCEDURE — 3078F DIAST BP <80 MM HG: CPT | Mod: HCNC,CPTII,S$GLB,

## 2023-08-22 PROCEDURE — 1170F PR FUNCTIONAL STATUS ASSESSED: ICD-10-PCS | Mod: HCNC,CPTII,S$GLB,

## 2023-08-22 PROCEDURE — G0439 PPPS, SUBSEQ VISIT: HCPCS | Mod: HCNC,S$GLB,,

## 2023-08-22 PROCEDURE — 1126F AMNT PAIN NOTED NONE PRSNT: CPT | Mod: HCNC,CPTII,S$GLB,

## 2023-08-22 PROCEDURE — 1157F PR ADVANCE CARE PLAN OR EQUIV PRESENT IN MEDICAL RECORD: ICD-10-PCS | Mod: HCNC,CPTII,S$GLB,

## 2023-08-22 PROCEDURE — G0439 PR MEDICARE ANNUAL WELLNESS SUBSEQUENT VISIT: ICD-10-PCS | Mod: HCNC,S$GLB,,

## 2023-08-22 PROCEDURE — 3288F PR FALLS RISK ASSESSMENT DOCUMENTED: ICD-10-PCS | Mod: HCNC,CPTII,S$GLB,

## 2023-08-22 PROCEDURE — 1101F PR PT FALLS ASSESS DOC 0-1 FALLS W/OUT INJ PAST YR: ICD-10-PCS | Mod: HCNC,CPTII,S$GLB,

## 2023-08-22 PROCEDURE — 1101F PT FALLS ASSESS-DOCD LE1/YR: CPT | Mod: HCNC,CPTII,S$GLB,

## 2023-08-22 PROCEDURE — 3074F PR MOST RECENT SYSTOLIC BLOOD PRESSURE < 130 MM HG: ICD-10-PCS | Mod: HCNC,CPTII,S$GLB,

## 2023-08-22 PROCEDURE — 99999 PR PBB SHADOW E&M-EST. PATIENT-LVL IV: ICD-10-PCS | Mod: PBBFAC,HCNC,,

## 2023-08-22 PROCEDURE — 1170F FXNL STATUS ASSESSED: CPT | Mod: HCNC,CPTII,S$GLB,

## 2023-08-22 PROCEDURE — 1160F PR REVIEW ALL MEDS BY PRESCRIBER/CLIN PHARMACIST DOCUMENTED: ICD-10-PCS | Mod: HCNC,CPTII,S$GLB,

## 2023-08-22 PROCEDURE — 99999 PR PBB SHADOW E&M-EST. PATIENT-LVL IV: CPT | Mod: PBBFAC,HCNC,,

## 2023-08-22 PROCEDURE — 3074F SYST BP LT 130 MM HG: CPT | Mod: HCNC,CPTII,S$GLB,

## 2023-08-22 PROCEDURE — 1159F PR MEDICATION LIST DOCUMENTED IN MEDICAL RECORD: ICD-10-PCS | Mod: HCNC,CPTII,S$GLB,

## 2023-08-22 PROCEDURE — 1159F MED LIST DOCD IN RCRD: CPT | Mod: HCNC,CPTII,S$GLB,

## 2023-08-22 PROCEDURE — 1157F ADVNC CARE PLAN IN RCRD: CPT | Mod: HCNC,CPTII,S$GLB,

## 2023-08-22 NOTE — PATIENT INSTRUCTIONS
Counseling and Referral of Other Preventative  (Italic type indicates deductible and co-insurance are waived)    Patient Name: Isabel Hernandez  Today's Date: 8/22/2023    Health Maintenance       Date Due Completion Date    COVID-19 Vaccine (4 - Pfizer risk series) 12/15/2021 10/20/2021    Influenza Vaccine (1) 09/01/2023 9/30/2022    DEXA Scan 03/27/2025 3/27/2023    Override on 9/23/2015: (N/S) (ordered)    TETANUS VACCINE 05/23/2026 5/23/2016 (Done)    Override on 5/23/2016: Done    Lipid Panel 11/01/2027 11/1/2022        No orders of the defined types were placed in this encounter.    The following information is provided to all patients.  This information is to help you find resources for any of the problems found today that may be affecting your health:                Living healthy guide: www.[facility].[place].[facility]      Understanding Diabetes: www.diabetes.org      Eating healthy: www.cdc.gov/healthyweight      CDC home safety checklist: www.cdc.gov/steadi/patient.html      Agency on Aging: www.goea.[place].[facility]      Alcoholics anonymous (AA): www.aa.org      Physical Activity: www.david.nih.gov/hp3dikz      Tobacco use: www.quitwithusla.org

## 2023-08-22 NOTE — PROGRESS NOTES
"  Isabel Hernandez presented for a  Medicare AWV and comprehensive Health Risk Assessment today.  She is a patient of Dr. Shabazz and is new to me.  Pt is accompanied by her daughter to the visit.  The following components were reviewed and updated:    Medical history  Family History  Social history  Allergies and Current Medications  Health Risk Assessment  Health Maintenance  Care Team     ** See Completed Assessments for Annual Wellness Visit within the encounter summary.**    The following assessments were completed:  Living Situation  CAGE  Depression Screening  Timed Get Up and Go  Whisper Test  Cognitive Function Screening  Nutrition Screening  ADL Screening  PAQ Screening  Review for opioid screen: pt does not have rx for opioid  Review for substance use disorder:  pt does not use substance          Vitals:    08/22/23 1321   BP: 126/64   BP Location: Right arm   Patient Position: Sitting   BP Method: Medium (Manual)   Pulse: 70   SpO2: 96%   Weight: 57.7 kg (127 lb 3.3 oz)   Height: 4' 11" (1.499 m)     Body mass index is 25.69 kg/m².  Physical Exam  Vitals and nursing note reviewed.   Constitutional:       Appearance: Normal appearance.   HENT:      Head: Normocephalic and atraumatic.   Cardiovascular:      Rate and Rhythm: Normal rate and regular rhythm.      Pulses: Normal pulses.           Radial pulses are 2+ on the right side and 2+ on the left side.        Dorsalis pedis pulses are 2+ on the right side and 2+ on the left side.        Posterior tibial pulses are 2+ on the right side and 2+ on the left side.      Heart sounds: Normal heart sounds.   Pulmonary:      Effort: Pulmonary effort is normal.      Breath sounds: Normal breath sounds.   Musculoskeletal:      Right lower leg: No edema.      Left lower leg: No edema.   Skin:     General: Skin is warm and dry.      Capillary Refill: Capillary refill takes less than 2 seconds.   Neurological:      General: No focal deficit present.      Mental Status: " "She is alert. Mental status is at baseline.   Psychiatric:         Mood and Affect: Mood normal.         Behavior: Behavior normal.        Diagnoses and health risks identified today and associated recommendations/orders:    1. Encounter for preventive health examination  - Assessment and evaluation performed as stated above    2. Dementia without behavioral disturbance, psychotic disturbance, mood disturbance, or anxiety, unspecified dementia severity, unspecified dementia type  - stable.  Pt and daughter decided that Ms. Hernandez does not want to take medicine for dementia as previously prescribed by former pcp.  Pt stated, "my mom had dementia and she didn't have any problems up to the day she passed".  Pt's daughter stated that they will entertain the idea of medication again, in the future, if needed.  Medication uses and mechanism of action were discussed during visit.  Pt's daughter verbalized understanding of all education.  Followed by current pcp.    3. Aortic atherosclerosis  - stable on diet.  Followed by pcp    4. Stage 3 chronic kidney disease, unspecified whether stage 3a or 3b CKD  - stable on diet.  Followed by pcp.    5. Hypothyroidism due to acquired atrophy of thyroid  - stable on levothyroxine.  Followed by pcp.      Provided Isabel with a 5-10 year written screening schedule and personal prevention plan. Recommendations were developed using the USPSTF age appropriate recommendations. Education, counseling, and referrals were provided as needed. After Visit Summary printed and given to patient which includes a list of additional screenings\tests needed.    No follow-ups on file.      Maggie Rutledge NP  I offered to discuss advanced care planning, including how to pick a person who would make decisions for you if you were unable to make them for yourself, called a health care power of , and what kind of decisions you might make such as use of life sustaining treatments such as ventilators " and tube feeding when faced with a life limiting illness recorded on a living will that they will need to know. (How you want to be cared for as you near the end of your natural life)     X  Patient has advanced directives on file, which we reviewed, and they do not wish to make changes.

## 2023-09-27 ENCOUNTER — PATIENT MESSAGE (OUTPATIENT)
Dept: PRIMARY CARE CLINIC | Facility: CLINIC | Age: 88
End: 2023-09-27
Payer: MEDICARE

## 2024-01-11 DIAGNOSIS — Z00.00 ENCOUNTER FOR MEDICARE ANNUAL WELLNESS EXAM: ICD-10-CM

## 2024-03-11 ENCOUNTER — TELEPHONE (OUTPATIENT)
Dept: PRIMARY CARE CLINIC | Facility: CLINIC | Age: 89
End: 2024-03-11

## 2024-03-11 ENCOUNTER — OFFICE VISIT (OUTPATIENT)
Dept: PRIMARY CARE CLINIC | Facility: CLINIC | Age: 89
End: 2024-03-11
Payer: MEDICARE

## 2024-03-11 ENCOUNTER — LAB VISIT (OUTPATIENT)
Dept: LAB | Facility: HOSPITAL | Age: 89
End: 2024-03-11
Attending: INTERNAL MEDICINE
Payer: MEDICARE

## 2024-03-11 VITALS
RESPIRATION RATE: 18 BRPM | HEART RATE: 52 BPM | WEIGHT: 127.19 LBS | HEIGHT: 59 IN | BODY MASS INDEX: 25.64 KG/M2 | SYSTOLIC BLOOD PRESSURE: 120 MMHG | OXYGEN SATURATION: 95 % | TEMPERATURE: 98 F | DIASTOLIC BLOOD PRESSURE: 74 MMHG

## 2024-03-11 DIAGNOSIS — I70.0 AORTIC ATHEROSCLEROSIS: ICD-10-CM

## 2024-03-11 DIAGNOSIS — M81.0 AGE-RELATED OSTEOPOROSIS WITHOUT CURRENT PATHOLOGICAL FRACTURE: ICD-10-CM

## 2024-03-11 DIAGNOSIS — N18.2 STAGE 2 CHRONIC KIDNEY DISEASE: ICD-10-CM

## 2024-03-11 DIAGNOSIS — E03.4 HYPOTHYROIDISM DUE TO ACQUIRED ATROPHY OF THYROID: ICD-10-CM

## 2024-03-11 DIAGNOSIS — E03.4 HYPOTHYROIDISM DUE TO ACQUIRED ATROPHY OF THYROID: Primary | ICD-10-CM

## 2024-03-11 LAB
ALBUMIN SERPL BCP-MCNC: 3.7 G/DL (ref 3.5–5.2)
ALP SERPL-CCNC: 79 U/L (ref 55–135)
ALT SERPL W/O P-5'-P-CCNC: 7 U/L (ref 10–44)
ANION GAP SERPL CALC-SCNC: 12 MMOL/L (ref 8–16)
AST SERPL-CCNC: 15 U/L (ref 10–40)
BILIRUB SERPL-MCNC: 0.4 MG/DL (ref 0.1–1)
BUN SERPL-MCNC: 11 MG/DL (ref 8–23)
CALCIUM SERPL-MCNC: 9.3 MG/DL (ref 8.7–10.5)
CHLORIDE SERPL-SCNC: 104 MMOL/L (ref 95–110)
CO2 SERPL-SCNC: 22 MMOL/L (ref 23–29)
CREAT SERPL-MCNC: 1 MG/DL (ref 0.5–1.4)
EST. GFR  (NO RACE VARIABLE): 54.9 ML/MIN/1.73 M^2
GLUCOSE SERPL-MCNC: 106 MG/DL (ref 70–110)
POTASSIUM SERPL-SCNC: 3.7 MMOL/L (ref 3.5–5.1)
PROT SERPL-MCNC: 7.1 G/DL (ref 6–8.4)
SODIUM SERPL-SCNC: 138 MMOL/L (ref 136–145)
TSH SERPL DL<=0.005 MIU/L-ACNC: 2.39 UIU/ML (ref 0.4–4)

## 2024-03-11 PROCEDURE — 1126F AMNT PAIN NOTED NONE PRSNT: CPT | Mod: CPTII,S$GLB,, | Performed by: INTERNAL MEDICINE

## 2024-03-11 PROCEDURE — 1157F ADVNC CARE PLAN IN RCRD: CPT | Mod: CPTII,S$GLB,, | Performed by: INTERNAL MEDICINE

## 2024-03-11 PROCEDURE — 3288F FALL RISK ASSESSMENT DOCD: CPT | Mod: CPTII,S$GLB,, | Performed by: INTERNAL MEDICINE

## 2024-03-11 PROCEDURE — 84443 ASSAY THYROID STIM HORMONE: CPT | Mod: HCNC | Performed by: INTERNAL MEDICINE

## 2024-03-11 PROCEDURE — 1101F PT FALLS ASSESS-DOCD LE1/YR: CPT | Mod: CPTII,S$GLB,, | Performed by: INTERNAL MEDICINE

## 2024-03-11 PROCEDURE — 1160F RVW MEDS BY RX/DR IN RCRD: CPT | Mod: CPTII,S$GLB,, | Performed by: INTERNAL MEDICINE

## 2024-03-11 PROCEDURE — 80053 COMPREHEN METABOLIC PANEL: CPT | Mod: HCNC | Performed by: INTERNAL MEDICINE

## 2024-03-11 PROCEDURE — 99214 OFFICE O/P EST MOD 30 MIN: CPT | Mod: S$GLB,,, | Performed by: INTERNAL MEDICINE

## 2024-03-11 PROCEDURE — 99999 PR PBB SHADOW E&M-EST. PATIENT-LVL III: CPT | Mod: PBBFAC,,, | Performed by: INTERNAL MEDICINE

## 2024-03-11 PROCEDURE — 1159F MED LIST DOCD IN RCRD: CPT | Mod: CPTII,S$GLB,, | Performed by: INTERNAL MEDICINE

## 2024-03-11 PROCEDURE — 36415 COLL VENOUS BLD VENIPUNCTURE: CPT | Performed by: INTERNAL MEDICINE

## 2024-03-11 NOTE — PROGRESS NOTES
[facility] Note    Chief Complaint      Chief Complaint   Patient presents with    Follow-up     6m       History of Present Illness      Isabel Hernandez is a 88 y.o. female who presents today for   Chief Complaint   Patient presents with    Follow-up     6m   .  Patient comes to appointment here for 6m checkup for chronic issues as below .she is due for lasb with this visit ofay for thyroid and gfr . She is now living next door to daughter . She has no complaints today. Bone density with osteoporosis     Problem List Items Addressed This Visit          Cardiac/Vascular    Aortic atherosclerosis    Overview     Stable             Renal/    Stage 2 chronic kidney disease    Overview     Las gfr > 60               Endocrine    Hypothyroid - Primary    Overview     Repeat tsh today          Age-related osteoporosis without current pathological fracture    Overview     Leslie ot tolerate oral medicatiions due to nausea . Will start prolia              Past Medical History:  Past Medical History:   Diagnosis Date    Arthritis     Cataract     Hypothyroidism     Osteoporosis 2015       Past Surgical History:  Past Surgical History:   Procedure Laterality Date    ADENOIDECTOMY      APPENDECTOMY      BREAST SURGERY Bilateral     cyst removal     SECTION      CHOLECYSTECTOMY      HYSTERECTOMY      THYROIDECTOMY, PARTIAL      TONSILLECTOMY      VITRECTOMY BY PARS PLANA APPROACH Left 2018    Procedure: VITRECTOMY, PARS PLANA APPROch   internal limitng membrane peel;  Surgeon: Johnathan Christopher MD;  Location: [facility] OR [address] [place] [address];  Service: Ophthalmology;  Laterality: Left;       Family History:  family history includes Cancer in her brother, father, and sister; Cataracts in her father; Glaucoma in her father; Hypertension in her mother; Kidney disease in her mother; Miscarriages / Stillbirths in her mother; No Known Problems in her brother and daughter; Thyroid disease in her  mother.    Social History:  Social History     Socioeconomic History    Marital status:    Occupational History    Occupation: Retired   Tobacco Use    Smoking status: Never    Smokeless tobacco: Never   Substance and Sexual Activity    Alcohol use: No    Drug use: No    Sexual activity: Not Currently     Partners: Male     Birth control/protection: None   Other Topics Concern    Are you pregnant or think you may be? No    Breast-feeding No     Social Determinants of Health     Financial Resource Strain: Low Risk  (8/22/2023)    Overall Financial Resource Strain (CARDIA)     Difficulty of Paying Living Expenses: Not hard at all   Food Insecurity: No Food Insecurity (8/22/2023)    Hunger Vital Sign     Worried About Running Out of Food in the Last Year: Never true     Ran Out of Food in the Last Year: Never true   Transportation Needs: No Transportation Needs (8/22/2023)    PRAPARE - Transportation     Lack of Transportation (Medical): No     Lack of Transportation (Non-Medical): No   Physical Activity: Inactive (8/22/2023)    Exercise Vital Sign     Days of Exercise per Week: 0 days     Minutes of Exercise per Session: 0 min   Stress: No Stress Concern Present (8/22/2023)    [place] [employer] - Occupational Stress Questionnaire     Feeling of Stress : Not at all   Social Connections: Moderately Isolated (8/22/2023)    Social Connection and Isolation Panel [NHANES]     Frequency of Communication with Friends and Family: More than three times a week     Frequency of Social Gatherings with Friends and Family: More than three times a week     Attends Taoist Services: More than 4 times per year     Active Member of Clubs or Organizations: No     Attends Club or Organization Meetings: Never     Marital Status:    Housing Stability: Unknown (8/22/2023)    Housing Stability Vital Sign     Unable to Pay for Housing in the Last Year: No     Unstable Housing in the Last Year: No        Review of Systems:   Review of Systems   Constitutional:  Negative for fever and weight loss.   HENT:  Negative for congestion, hearing loss and sore throat.    Eyes:  Negative for blurred vision.   Respiratory:  Negative for cough and shortness of breath.    Cardiovascular:  Negative for chest pain, palpitations, claudication and leg swelling.   Gastrointestinal:  Negative for abdominal pain, constipation, diarrhea and heartburn.   Genitourinary:  Negative for dysuria.   Musculoskeletal:  Negative for back pain and myalgias.   Skin:  Negative for rash.   Neurological:  Negative for focal weakness and headaches.   Psychiatric/Behavioral:  Negative for depression and suicidal ideas. The patient is not nervous/anxious.          Medications:  Outpatient Encounter Medications as of 3/11/2024   Medication Sig Dispense Refill    levothyroxine (SYNTHROID) 50 MCG tablet TAKE 1 TABLET EVERY DAY 90 tablet 2     No facility-administered encounter medications on file as of 3/11/2024.        Allergies:  Review of patient's allergies indicates:   Allergen Reactions    Codeine Nausea And Vomiting    Fosamax [alendronate] Nausea Only    Iron analogues Nausea And Vomiting         Physical Exam         Vitals:    03/11/24 1351   BP: 120/74   Pulse: (!) 52   Resp: 18   Temp: 98 °F (36.7 °C)         Physical Exam  Constitutional:       Appearance: She is well-developed.   Eyes:      Pupils: Pupils are equal, round, and reactive to light.   Neck:      Thyroid: No thyromegaly.   Cardiovascular:      Rate and Rhythm: Normal rate.      Heart sounds: Normal heart sounds. No murmur heard.     No friction rub. No gallop.   Pulmonary:      Breath sounds: Normal breath sounds.   Abdominal:      General: Bowel sounds are normal.      Palpations: Abdomen is soft.   Musculoskeletal:         General: Normal range of motion.      Cervical back: Normal range of motion.   Lymphadenopathy:      Cervical: No cervical adenopathy.   Skin:      "General: Skin is warm.      Findings: No rash.   Neurological:      Mental Status: She is alert and oriented to person, place, and time.      Cranial Nerves: No cranial nerve deficit.   Psychiatric:         Behavior: Behavior normal.          Laboratory:  CBC:  No results for input(s): "WBC", "RBC", "HGB", "HCT", "PLT", "MCV", "MCH", "MCHC" in the last 2160 hours.  CMP:  No results for input(s): "GLU", "CALCIUM", "ALBUMIN", "PROT", "NA", "K", "CO2", "CL", "BUN", "ALKPHOS", "ALT", "AST", "BILITOT" in the last 2160 hours.    Invalid input(s): "CREATININ"  URINALYSIS:  No results for input(s): "COLORU", "CLARITYU", "SPECGRAV", "PHUR", "PROTEINUA", "GLUCOSEU", "BILIRUBINCON", "BLOODU", "WBCU", "RBCU", "BACTERIA", "MUCUS", "NITRITE", "LEUKOCYTESUR", "UROBILINOGEN", "HYALINECASTS" in the last 2160 hours.   LIPIDS:  No results for input(s): "TSH", "HDL", "CHOL", "TRIG", "LDLCALC", "CHOLHDL", "NONHDLCHOL", "TOTALCHOLEST" in the last 2160 hours.  TSH:  No results for input(s): "TSH" in the last 2160 hours.  A1C:  No results for input(s): "HGBA1C" in the last 2160 hours.    Radiology:        Assessment:     Isabel Hernandez is a 88 y.o.female with:    Hypothyroidism due to acquired atrophy of thyroid  -     TSH; Future; Expected date: 03/11/2024    Age-related osteoporosis without current pathological fracture    Aortic atherosclerosis    Stage 2 chronic kidney disease  -     Comprehensive Metabolic Panel; Future; Expected date: 03/11/2024          Plan:     Problem List Items Addressed This Visit          Cardiac/Vascular    Aortic atherosclerosis    Overview     Stable             Renal/    Stage 2 chronic kidney disease    Overview     Las gfr > 60 11/22              Endocrine    Hypothyroid - Primary    Overview     Repeat tsh today          Age-related osteoporosis without current pathological fracture    Overview     Leslie ot tolerate oral medicatiions due to nausea . Will start prolia            As above, continue " current medications and maintain follow up with specialists.  Return to clinic in 6 months.      Frederick W Dantagnan [facility]

## 2024-03-12 ENCOUNTER — PATIENT MESSAGE (OUTPATIENT)
Dept: PRIMARY CARE CLINIC | Facility: CLINIC | Age: 89
End: 2024-03-12
Payer: MEDICARE

## 2024-03-21 ENCOUNTER — OFFICE VISIT (OUTPATIENT)
Dept: INTERNAL MEDICINE | Facility: CLINIC | Age: 89
End: 2024-03-21
Payer: MEDICARE

## 2024-03-21 VITALS
HEIGHT: 59 IN | WEIGHT: 125 LBS | OXYGEN SATURATION: 98 % | SYSTOLIC BLOOD PRESSURE: 128 MMHG | DIASTOLIC BLOOD PRESSURE: 66 MMHG | HEART RATE: 77 BPM | BODY MASS INDEX: 25.2 KG/M2

## 2024-03-21 DIAGNOSIS — Z29.11 NEED FOR RSV IMMUNIZATION: ICD-10-CM

## 2024-03-21 DIAGNOSIS — F03.90 DEMENTIA WITHOUT BEHAVIORAL DISTURBANCE, PSYCHOTIC DISTURBANCE, MOOD DISTURBANCE, OR ANXIETY, UNSPECIFIED DEMENTIA SEVERITY, UNSPECIFIED DEMENTIA TYPE: ICD-10-CM

## 2024-03-21 DIAGNOSIS — Z00.00 ENCOUNTER FOR PREVENTIVE HEALTH EXAMINATION: Primary | ICD-10-CM

## 2024-03-21 DIAGNOSIS — E66.3 OVERWEIGHT (BMI 25.0-29.9): ICD-10-CM

## 2024-03-21 DIAGNOSIS — E03.8 OTHER SPECIFIED HYPOTHYROIDISM: ICD-10-CM

## 2024-03-21 DIAGNOSIS — N18.30 STAGE 3 CHRONIC KIDNEY DISEASE, UNSPECIFIED WHETHER STAGE 3A OR 3B CKD: ICD-10-CM

## 2024-03-21 DIAGNOSIS — I70.0 AORTIC ATHEROSCLEROSIS: ICD-10-CM

## 2024-03-21 DIAGNOSIS — M81.0 AGE-RELATED OSTEOPOROSIS WITHOUT CURRENT PATHOLOGICAL FRACTURE: ICD-10-CM

## 2024-03-21 PROCEDURE — 1157F ADVNC CARE PLAN IN RCRD: CPT | Mod: HCNC,CPTII,S$GLB, | Performed by: NURSE PRACTITIONER

## 2024-03-21 PROCEDURE — 1101F PT FALLS ASSESS-DOCD LE1/YR: CPT | Mod: HCNC,CPTII,S$GLB, | Performed by: NURSE PRACTITIONER

## 2024-03-21 PROCEDURE — 1160F RVW MEDS BY RX/DR IN RCRD: CPT | Mod: HCNC,CPTII,S$GLB, | Performed by: NURSE PRACTITIONER

## 2024-03-21 PROCEDURE — 1159F MED LIST DOCD IN RCRD: CPT | Mod: HCNC,CPTII,S$GLB, | Performed by: NURSE PRACTITIONER

## 2024-03-21 PROCEDURE — G0439 PPPS, SUBSEQ VISIT: HCPCS | Mod: HCNC,S$GLB,, | Performed by: NURSE PRACTITIONER

## 2024-03-21 PROCEDURE — 1170F FXNL STATUS ASSESSED: CPT | Mod: HCNC,CPTII,S$GLB, | Performed by: NURSE PRACTITIONER

## 2024-03-21 PROCEDURE — 1126F AMNT PAIN NOTED NONE PRSNT: CPT | Mod: HCNC,CPTII,S$GLB, | Performed by: NURSE PRACTITIONER

## 2024-03-21 PROCEDURE — 99999 PR PBB SHADOW E&M-EST. PATIENT-LVL IV: CPT | Mod: PBBFAC,HCNC,, | Performed by: NURSE PRACTITIONER

## 2024-03-21 PROCEDURE — 3288F FALL RISK ASSESSMENT DOCD: CPT | Mod: HCNC,CPTII,S$GLB, | Performed by: NURSE PRACTITIONER

## 2024-03-21 NOTE — PROGRESS NOTES
"  Isabel Hernandez presented for a  Medicare AWV and comprehensive Health Risk Assessment today. The following components were reviewed and updated:    Medical history  Family History  Social history  Allergies and Current Medications  Health Risk Assessment  Health Maintenance  Care Team         ** See Completed Assessments for Annual Wellness Visit within the encounter summary.**         The following assessments were completed:  Living Situation  CAGE  Depression Screening  Timed Get Up and Go  Whisper Test--abnormal  Cognitive Function Screening--pt has dementia  Nutrition Screening  ADL Screening  PAQ Screening      Opioid documentation:      Patient does not have a current opioid prescription.        Vitals:    03/21/24 1257   BP: 128/66   BP Location: Left arm   Patient Position: Sitting   BP Method: Medium (Manual)   Pulse: 77   SpO2: 98%   Weight: 56.7 kg (125 lb)   Height: 4' 11" (1.499 m)     Body mass index is 25.25 kg/m².  Physical Exam  Vitals and nursing note reviewed.   Constitutional:       Appearance: Normal appearance.   HENT:      Head: Normocephalic.      Nose: Nose normal.      Mouth/Throat:      Mouth: Mucous membranes are moist.   Eyes:      Conjunctiva/sclera: Conjunctivae normal.   Cardiovascular:      Rate and Rhythm: Normal rate and regular rhythm.      Heart sounds: Normal heart sounds.   Pulmonary:      Effort: Pulmonary effort is normal.      Breath sounds: Normal breath sounds.   Musculoskeletal:         General: Normal range of motion.      Cervical back: Normal range of motion.   Skin:     General: Skin is warm and dry.   Neurological:      General: No focal deficit present.      Mental Status: She is alert and oriented to person, place, and time.   Psychiatric:         Mood and Affect: Mood normal.         Behavior: Behavior normal.         Thought Content: Thought content normal.         Judgment: Judgment normal.             Diagnoses and health risks identified today and associated " recommendations/orders:    1. Encounter for preventive health examination  Exam done    Health Maintenance updated    Records reviewed    2. Stage 3 chronic kidney disease, unspecified whether stage 3a or 3b CKD  Chronic, followed by PCP    3. Dementia without behavioral disturbance, psychotic disturbance, mood disturbance, or anxiety, unspecified dementia severity, unspecified dementia type  Chronic, followed by PCP    4. Aortic atherosclerosis  Chronic, followed by PCP    5. Age-related osteoporosis without current pathological fracture  Pt's daughter states PCP was suppose to start pt on Prolia but I do not see orders, will message PCP directly regarding this    6. Other specified hypothyroidism  Chronic, followed by PCP    7. Need for RSV immunization  Ordered today    8. BMI 25.0-25.9,adult  BMI reviewed    9. Overweight (BMI 25.0-29.9)  BMI reviewed.    Diet and exercise to lose weight.      Provided Isabel with a 5-10 year written screening schedule and personal prevention plan. Recommendations were developed using the USPSTF age appropriate recommendations. Education, counseling, and referrals were provided as needed. After Visit Summary printed and given to patient which includes a list of additional screenings\tests needed.    Follow up in about 25 weeks (around 9/12/2024) for with PCP Dr. Shabazz as scheduled.    Janay Talavera, ALAINA      I offered to discuss advanced care planning, including how to pick a person who would make decisions for you if you were unable to make them for yourself, called a health care power of , and what kind of decisions you might make such as use of life sustaining treatments such as ventilators and tube feeding when faced with a life limiting illness recorded on a living will that they will need to know. (How you want to be cared for as you near the end of your natural life)     X  Patient has advanced directives on file, which we reviewed, and they do not wish to  make changes.

## 2024-03-21 NOTE — PATIENT INSTRUCTIONS
Counseling and Referral of Other Preventative  (Italic type indicates deductible and co-insurance are waived)    Patient Name: Isabel Hernandez  Today's Date: 3/21/2024    Health Maintenance         Date Due Completion Date    RSV Vaccine (Age 60+ and Pregnant patients) (1 - 1-dose 60+ series) Ordered today Ordered today    COVID-19 Vaccine (4 - 2023-24 season) 03/21/2025 (Originally 9/1/2023) 10/20/2021    TETANUS VACCINE 05/23/2026 5/23/2016 (Done)    Override on 5/23/2016: Done    Lipid Panel 11/01/2027 11/1/2022          No orders of the defined types were placed in this encounter.    The following information is provided to all patients.  This information is to help you find resources for any of the problems found today that may be affecting your health:                  Living healthy guide: www.[facility].[place].gov      Understanding Diabetes: www.diabetes.org      Eating healthy: www.cdc.gov/healthyweight      CDC home safety checklist: www.cdc.gov/steadi/patient.html      Agency on Aging: www.goea.[place].[facility]      Alcoholics anonymous (AA): www.aa.org      Physical Activity: www.david.nih.gov/ly7npoh      Tobacco use: www.quitwithusla.org

## 2024-03-21 NOTE — Clinical Note
Good afternoon, Saw pt for a Medicare AWV visit. Her daughter states you wanted to start her on Prolia for osteoporosis from her DEXA last year. She states no one has replied to her messages nor set this up so if you could follow up with them on this they are concerned. I did not see an order placed for Prolia in the chart.  Thanks Janay Talavera DNP, FNP-C

## 2024-04-05 ENCOUNTER — PATIENT MESSAGE (OUTPATIENT)
Dept: PRIMARY CARE CLINIC | Facility: CLINIC | Age: 89
End: 2024-04-05
Payer: MEDICARE

## 2024-04-24 DIAGNOSIS — E03.9 HYPOTHYROIDISM, UNSPECIFIED TYPE: ICD-10-CM

## 2024-04-24 RX ORDER — LEVOTHYROXINE SODIUM 50 UG/1
50 TABLET ORAL DAILY
Qty: 90 TABLET | Refills: 3 | Status: SHIPPED | OUTPATIENT
Start: 2024-04-24

## 2024-04-24 NOTE — TELEPHONE ENCOUNTER
No care due was identified.  [facility] Embedded Care Due Messages. Reference number: [id number].   4/24/2024 2:49:59 AM CDT

## 2024-04-24 NOTE — TELEPHONE ENCOUNTER
Refill Decision Note   Isabel Hernandez  is requesting a refill authorization.  Brief Assessment and Rationale for Refill:  Approve     Medication Therapy Plan:         Comments:     Note composed:5:31 PM 04/24/2024

## 2024-05-01 ENCOUNTER — PATIENT MESSAGE (OUTPATIENT)
Dept: PRIMARY CARE CLINIC | Facility: CLINIC | Age: 89
End: 2024-05-01
Payer: MEDICARE

## 2024-07-15 ENCOUNTER — PATIENT MESSAGE (OUTPATIENT)
Dept: PRIMARY CARE CLINIC | Facility: CLINIC | Age: 89
End: 2024-07-15
Payer: MEDICARE

## 2024-07-15 ENCOUNTER — PATIENT MESSAGE (OUTPATIENT)
Dept: INTERNAL MEDICINE | Facility: CLINIC | Age: 89
End: 2024-07-15
Payer: MEDICARE

## 2024-07-15 DIAGNOSIS — Z76.89 ENCOUNTER FOR NAIL CARE: Primary | ICD-10-CM

## 2024-09-04 ENCOUNTER — OFFICE VISIT (OUTPATIENT)
Dept: PODIATRY | Facility: CLINIC | Age: 89
End: 2024-09-04
Payer: MEDICARE

## 2024-09-04 VITALS
SYSTOLIC BLOOD PRESSURE: 129 MMHG | HEIGHT: 59 IN | WEIGHT: 125 LBS | BODY MASS INDEX: 25.2 KG/M2 | HEART RATE: 78 BPM | DIASTOLIC BLOOD PRESSURE: 77 MMHG | TEMPERATURE: 97 F

## 2024-09-04 DIAGNOSIS — B35.1 TINEA UNGUIUM: ICD-10-CM

## 2024-09-04 DIAGNOSIS — I73.9 PERIPHERAL ARTERIAL DISEASE: Primary | ICD-10-CM

## 2024-09-04 PROCEDURE — 3288F FALL RISK ASSESSMENT DOCD: CPT | Mod: CPTII,S$GLB,, | Performed by: STUDENT IN AN ORGANIZED HEALTH CARE EDUCATION/TRAINING PROGRAM

## 2024-09-04 PROCEDURE — 99999 PR PBB SHADOW E&M-EST. PATIENT-LVL III: CPT | Mod: PBBFAC,,, | Performed by: STUDENT IN AN ORGANIZED HEALTH CARE EDUCATION/TRAINING PROGRAM

## 2024-09-04 PROCEDURE — 1126F AMNT PAIN NOTED NONE PRSNT: CPT | Mod: CPTII,S$GLB,, | Performed by: STUDENT IN AN ORGANIZED HEALTH CARE EDUCATION/TRAINING PROGRAM

## 2024-09-04 PROCEDURE — 99203 OFFICE O/P NEW LOW 30 MIN: CPT | Mod: 25,S$GLB,, | Performed by: STUDENT IN AN ORGANIZED HEALTH CARE EDUCATION/TRAINING PROGRAM

## 2024-09-04 PROCEDURE — 1159F MED LIST DOCD IN RCRD: CPT | Mod: CPTII,S$GLB,, | Performed by: STUDENT IN AN ORGANIZED HEALTH CARE EDUCATION/TRAINING PROGRAM

## 2024-09-04 PROCEDURE — 1157F ADVNC CARE PLAN IN RCRD: CPT | Mod: CPTII,S$GLB,, | Performed by: STUDENT IN AN ORGANIZED HEALTH CARE EDUCATION/TRAINING PROGRAM

## 2024-09-04 PROCEDURE — 1101F PT FALLS ASSESS-DOCD LE1/YR: CPT | Mod: CPTII,S$GLB,, | Performed by: STUDENT IN AN ORGANIZED HEALTH CARE EDUCATION/TRAINING PROGRAM

## 2024-09-04 PROCEDURE — 11721 DEBRIDE NAIL 6 OR MORE: CPT | Mod: Q8,S$GLB,, | Performed by: STUDENT IN AN ORGANIZED HEALTH CARE EDUCATION/TRAINING PROGRAM

## 2024-09-04 NOTE — PROGRESS NOTES
Subjective:     Patient    Isabel Hernandez is a 88 y.o. female.    Problem    New to [facility]. Presents for thick discolored toenails of both feet that her and her family have difficulty managing. No other concerns at this time. Denies numbness, tingling, burning.     History    History obtained from patient and review of medical records.     Past Medical History:   Diagnosis Date    Arthritis     Cataract     Hypothyroidism     Osteoporosis 2015       Past Surgical History:   Procedure Laterality Date    ADENOIDECTOMY      APPENDECTOMY      BREAST SURGERY Bilateral     cyst removal     SECTION      CHOLECYSTECTOMY      HYSTERECTOMY      THYROIDECTOMY, PARTIAL      TONSILLECTOMY      VITRECTOMY BY PARS PLANA APPROACH Left 2018    Procedure: VITRECTOMY, PARS PLANA APPROch   internal limitng membrane peel;  Surgeon: Johnathan Christopher MD;  Location: [facility] OR [address] [place] [address];  Service: Ophthalmology;  Laterality: Left;        Objective:     Vitals  Wt Readings from Last 1 Encounters:   24 56.7 kg (125 lb)     Temp Readings from Last 1 Encounters:   24 97 °F (36.1 °C) (Oral)     BP Readings from Last 1 Encounters:   24 129/77     Pulse Readings from Last 1 Encounters:   24 78       Dermatological Exam    Skin:  Pedal hair growth diminished and Pedal skin thin and shiny on right  Pedal hair growth diminished and Pedal skin thin and shiny on left    Nails:  10 nail(s) elongated, 10 nail(s) thickened, and 10 nail(s) discolored    Vascular Exam    Arteries:  Posterior tibial artery palpable on right  Dorsalis pedis artery palpable on right  Posterior tibial artery nonpalpable on left  Dorsalis pedis artery palpable on left    Veins:  Telangectasia on right  Telangectasia on left    Swellin+ nonpitting on right  1+ nonpitting on left    Neurological Exam    Farina touch test:  6/6 sites sensed, light touch intact     Musculoskeletal Exam    Footwear:  Casual on  right  Casual on left    Gait Exam:   Ambulatory Status: Ambulatory  Gait: Normal  Assistive Devices: None    Foot Progression Angle:  Normal on right  Normal on left    Right Lower Extremity Additional Findings:  Right foot and ankle function, strength, and range of motion unremarkable except as noted above.     Left Lower Extremity Additional Findings:  Left foot and ankle function, strength, and range of motion unremarkable except as noted above.    Imaging and Other Tests    Imaging:  Independently reviewed and interpreted imaging, findings are as follows: N/A     Assessment:     Encounter Diagnoses   Name Primary?    Peripheral arterial disease Yes    Tinea unguium         Plan:     I counseled the patient on her conditions, their implications and medical management.    Peripheral arterial disease: chronic stable  -No urgent concerns, will monitor.     Tinea unguium: chronic stable   -Discussed treatment options including (1) monitoring, (2) debridement, (3) topical antifungals, (4) oral antifungals. Discussed potential risks, benefits, alternatives to each. Patient opted for debridement only.  -Nails debrided x10, thickness and length reduced using sterile nail nippers, see procedure note.         Return to clinic in 3 months for routine foot care, call sooner PRN.

## 2024-09-04 NOTE — PROCEDURES
"Routine Foot Care    Date/Time: 9/4/2024 2:15 PM    Performed by: Colten Oleary DPM  Authorized by: Colten Oleary DPM    Time out: Immediately prior to procedure a "time out" was called to verify the correct patient, procedure, equipment, support staff and site/side marked as required.    Consent Done?:  Yes (Verbal)  Hyperkeratotic Skin Lesions?: No      Nail Care Type:  Debride  Location(s): All  (Left 1st Toe, Left 3rd Toe, Left 2nd Toe, Left 4th Toe, Left 5th Toe, Right 1st Toe, Right 2nd Toe, Right 3rd Toe, Right 4th Toe and Right 5th Toe)  Patient tolerance:  Patient tolerated the procedure well with no immediate complications    "

## 2024-09-23 ENCOUNTER — PATIENT MESSAGE (OUTPATIENT)
Dept: PRIMARY CARE CLINIC | Facility: CLINIC | Age: 89
End: 2024-09-23
Payer: MEDICARE

## 2024-11-29 ENCOUNTER — TELEPHONE (OUTPATIENT)
Dept: PODIATRY | Facility: CLINIC | Age: 89
End: 2024-11-29
Payer: MEDICARE

## 2024-11-29 NOTE — TELEPHONE ENCOUNTER
Spoke with pt daughter in regards to provider being on call. Pt r/s with a different provider and will schedule back with Anirudh for next appt. Pt daughter verbalized understanding.

## 2025-02-12 DIAGNOSIS — E03.9 HYPOTHYROIDISM, UNSPECIFIED TYPE: ICD-10-CM

## 2025-02-12 RX ORDER — LEVOTHYROXINE SODIUM 50 UG/1
50 TABLET ORAL
Qty: 90 TABLET | Refills: 0 | Status: SHIPPED | OUTPATIENT
Start: 2025-02-12

## 2025-02-12 NOTE — TELEPHONE ENCOUNTER
Provider Staff:  Action required for this patient    Requires labs      Please see care gap opportunities below in Care Due Message.    Thanks!  [facility]     Appointments      Date Provider   Last Visit   3/11/2024 Kristian Shabazz MD   Next Visit   6/2/2025 Kristian Shabazz MD     Refill Decision Note   Isabel Hernandez  is requesting a refill authorization.  Brief Assessment and Rationale for Refill:  Approve     Medication Therapy Plan:         Comments:     Note composed:4:11 AM 02/12/2025

## 2025-02-12 NOTE — TELEPHONE ENCOUNTER
Care Due:                  Date            Visit Type   Department     Provider  --------------------------------------------------------------------------------                                EP -                              PRIMARY      [facility]  Last Visit: 03-      CARE ([facility])   BRIAN Shabazz                              EP -                              PRIMARY      [facility]  Next Visit: 06-      CARE ([facility])   BRIAN Shabazz                                                            Last  Test          Frequency    Reason                     Performed    Due Date  --------------------------------------------------------------------------------    TSH.........  12 months..  levothyroxine............  03- 03-    [facility] Embedded Care Due Messages. Reference number: [id number].   2/12/2025 1:48:10 AM CST

## 2025-02-21 DIAGNOSIS — Z00.00 ENCOUNTER FOR MEDICARE ANNUAL WELLNESS EXAM: ICD-10-CM

## 2025-03-03 ENCOUNTER — PATIENT MESSAGE (OUTPATIENT)
Dept: PRIMARY CARE CLINIC | Facility: CLINIC | Age: 89
End: 2025-03-03
Payer: MEDICARE

## 2025-03-04 ENCOUNTER — NURSE TRIAGE (OUTPATIENT)
Dept: ADMINISTRATIVE | Facility: CLINIC | Age: 89
End: 2025-03-04
Payer: MEDICARE

## 2025-03-04 NOTE — TELEPHONE ENCOUNTER
Pt daughter calling in on behalf of pt. States pt was found on the floor yesterday morning. States pt was awake and reported she was dizzy and had been vomiting. Uncertain how long pt was on floor yesterday. Daughter states she got pt back to sofa (pt sleeps on sofa) with assist at that time.  States pt seemed to improve throughout the day and was better later in the day. States seemed like vomiting stopped and pt was eventually able to keep some fluids and small amount of food down last night.   Today when daughter went to house pt was again found on the floor. Daughter states looks like pt slid off the sofa and found on floor sitting up awake and leaning against the sofa. No obvious signs of injury to pt per daughter. Does not know how long pt was on floor today either. States pt has some baseline confusion and is poor historian so daughter was not able to get much information about recent falls from pt. Falls were both unwitnessed. Pt is reporting some weakness and dizziness today. + nausea today. Denied vomiting. Daughter got pt off floor with assist and got pt into a rolling computer chair which she used to get pt to restroom after getting her up off the floor. Pt was able to transfer self to and from toilet. But is too weak to walk. Pt is currently still sitting in computer chair with wheels. States pt has no obvious changes in mental status. Hx low BP but family has not checked pt BP and has not checked pt for fever. Concerned that pt is too weak to get around like she usually does. Care advice per protocol. Advised daughter to call 911. Daughter states she thinks she and her  can wheel pt to car in chair and get her into car to take her to ED. Advised based on symptoms and in interest of pt safety, best to call 911 for EMS assist as do not want pt to fall and injure self or others trying to get to car. States she will consider that but adds mother does not want her to call 911. States she will wait  for  to get back home and decide how to best get pt to ED. Recommended EMS again and advised if condition worsens while waiting for family to get back home, call 911 immediately. Daughter verbalized understanding. Advised to monitor and to call back with concerns or worsening symptoms. Daughter verbalized understanding.       Reason for Disposition   SEVERE dizziness (e.g., unable to stand, requires support to walk, feels like passing out now)   [1] SEVERE weakness (i.e., unable to walk or barely able to walk, requires support) AND [2] new-onset or getting worse    Additional Information   Negative: SEVERE difficulty breathing (e.g., struggling for each breath, speaks in single words)   Negative: [1] Difficulty breathing or swallowing AND [2] started suddenly after medicine, an allergic food or bee sting   Negative: Shock suspected (e.g., cold/pale/clammy skin, too weak to stand, low BP, rapid pulse)   Negative: Difficult to awaken or acting confused (e.g., disoriented, slurred speech)   Negative: [1] Weakness (i.e., paralysis, loss of muscle strength) of the face, arm or leg on one side of the body AND [2] sudden onset AND [3] present now   Negative: [1] Numbness (i.e., loss of sensation) of the face, arm or leg on one side of the body AND [2] sudden onset AND [3] present now   Negative: [1] Loss of speech or garbled speech AND [2] sudden onset AND [3] present now   Negative: Overdose (accidental or intentional) of medications   Negative: [1] Fainted > 15 minutes ago AND [2] still feels too weak or dizzy to stand   Negative: Heart beating < 50 beats per minute OR > 140 beats per minute   Negative: Sounds like a life-threatening emergency to the triager   Negative: Difficulty breathing   Negative: Major injury from dangerous force (e.g., fall > 10 feet or 3 meters)   Negative: [1] Major bleeding (e.g., actively dripping or spurting) AND [2] can't be stopped   Negative: Shock suspected (e.g., cold/pale/clammy  skin, too weak to stand)   Negative: Difficult to awaken or acting confused (e.g., disoriented, slurred speech)    Protocols used: Dizziness - [id number], Falls and [id number]

## 2025-03-05 ENCOUNTER — HOSPITAL ENCOUNTER (INPATIENT)
Facility: HOSPITAL | Age: 89
LOS: 1 days | Discharge: NURSING, REHAB OR LONG-TERM CARE | DRG: 690 | End: 2025-03-07
Attending: STUDENT IN AN ORGANIZED HEALTH CARE EDUCATION/TRAINING PROGRAM
Payer: MEDICARE
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
## 2025-03-05 DIAGNOSIS — R07.9 CHEST PAIN: ICD-10-CM

## 2025-03-05 DIAGNOSIS — R94.31 QT PROLONGATION: ICD-10-CM

## 2025-03-05 DIAGNOSIS — N30.00 ACUTE CYSTITIS WITHOUT HEMATURIA: Primary | ICD-10-CM

## 2025-03-05 PROBLEM — E87.6 HYPOKALEMIA: Status: ACTIVE | Noted: 2025-03-05

## 2025-03-05 PROBLEM — R11.2 NAUSEA AND VOMITING: Status: ACTIVE | Noted: 2025-03-05

## 2025-03-05 LAB
ALBUMIN SERPL BCP-MCNC: 3 G/DL (ref 3.5–5.2)
ALP SERPL-CCNC: 55 U/L (ref 40–150)
ALT SERPL W/O P-5'-P-CCNC: 15 U/L (ref 10–44)
ANION GAP SERPL CALC-SCNC: 12 MMOL/L (ref 8–16)
AST SERPL-CCNC: 33 U/L (ref 10–40)
BACTERIA #/AREA URNS AUTO: ABNORMAL /HPF
BASOPHILS # BLD AUTO: 0.05 K/UL (ref 0–0.2)
BASOPHILS NFR BLD: 0.5 % (ref 0–1.9)
BILIRUB SERPL-MCNC: 1.2 MG/DL (ref 0.1–1)
BILIRUB UR QL STRIP: NEGATIVE
BUN SERPL-MCNC: 14 MG/DL (ref 8–23)
CALCIUM SERPL-MCNC: 8 MG/DL (ref 8.7–10.5)
CHLORIDE SERPL-SCNC: 103 MMOL/L (ref 95–110)
CK SERPL-CCNC: 262 U/L (ref 20–180)
CLARITY UR REFRACT.AUTO: ABNORMAL
CO2 SERPL-SCNC: 24 MMOL/L (ref 23–29)
COLOR UR AUTO: YELLOW
CREAT SERPL-MCNC: 0.7 MG/DL (ref 0.5–1.4)
DIFFERENTIAL METHOD BLD: ABNORMAL
EOSINOPHIL # BLD AUTO: 0.1 K/UL (ref 0–0.5)
EOSINOPHIL NFR BLD: 0.5 % (ref 0–8)
ERYTHROCYTE [DISTWIDTH] IN BLOOD BY AUTOMATED COUNT: 12.5 % (ref 11.5–14.5)
EST. GFR  (NO RACE VARIABLE): >60 ML/MIN/1.73 M^2
GLUCOSE SERPL-MCNC: 80 MG/DL (ref 70–110)
GLUCOSE UR QL STRIP: NEGATIVE
HCT VFR BLD AUTO: 37.2 % (ref 37–48.5)
HCV AB SERPL QL IA: NORMAL
HGB BLD-MCNC: 12.8 G/DL (ref 12–16)
HGB UR QL STRIP: ABNORMAL
HIV 1+2 AB+HIV1 P24 AG SERPL QL IA: NORMAL
IMM GRANULOCYTES # BLD AUTO: 0.05 K/UL (ref 0–0.04)
IMM GRANULOCYTES NFR BLD AUTO: 0.5 % (ref 0–0.5)
KETONES UR QL STRIP: NEGATIVE
LEUKOCYTE ESTERASE UR QL STRIP: ABNORMAL
LYMPHOCYTES # BLD AUTO: 3 K/UL (ref 1–4.8)
LYMPHOCYTES NFR BLD: 28.8 % (ref 18–48)
MCH RBC QN AUTO: 31.4 PG (ref 27–31)
MCHC RBC AUTO-ENTMCNC: 34.4 G/DL (ref 32–36)
MCV RBC AUTO: 91 FL (ref 82–98)
MICROSCOPIC COMMENT: ABNORMAL
MONOCYTES # BLD AUTO: 0.8 K/UL (ref 0.3–1)
MONOCYTES NFR BLD: 7.4 % (ref 4–15)
NEUTROPHILS # BLD AUTO: 6.5 K/UL (ref 1.8–7.7)
NEUTROPHILS NFR BLD: 62.3 % (ref 38–73)
NITRITE UR QL STRIP: NEGATIVE
NON-SQ EPI CELLS #/AREA URNS AUTO: 2 /HPF
NRBC BLD-RTO: 0 /100 WBC
PH UR STRIP: 6 [PH] (ref 5–8)
PLATELET # BLD AUTO: 178 K/UL (ref 150–450)
PMV BLD AUTO: 10.5 FL (ref 9.2–12.9)
POTASSIUM SERPL-SCNC: 3 MMOL/L (ref 3.5–5.1)
PROT SERPL-MCNC: 5.9 G/DL (ref 6–8.4)
PROT UR QL STRIP: NEGATIVE
RBC # BLD AUTO: 4.08 M/UL (ref 4–5.4)
RBC #/AREA URNS AUTO: 17 /HPF (ref 0–4)
SODIUM SERPL-SCNC: 139 MMOL/L (ref 136–145)
SP GR UR STRIP: 1.01 (ref 1–1.03)
SQUAMOUS #/AREA URNS AUTO: 21 /HPF
T4 FREE SERPL-MCNC: 1.09 NG/DL (ref 0.71–1.51)
TSH SERPL DL<=0.005 MIU/L-ACNC: 4.34 UIU/ML (ref 0.4–4)
UNSPECIFIED CRY UR QL COMP ASSIST: 7
URN SPEC COLLECT METH UR: ABNORMAL
WBC # BLD AUTO: 10.41 K/UL (ref 3.9–12.7)
WBC #/AREA URNS AUTO: >100 /HPF (ref 0–5)

## 2025-03-05 PROCEDURE — 25000003 PHARM REV CODE 250: Mod: HCNC | Performed by: STUDENT IN AN ORGANIZED HEALTH CARE EDUCATION/TRAINING PROGRAM

## 2025-03-05 PROCEDURE — 82550 ASSAY OF CK (CPK): CPT | Mod: HCNC | Performed by: STUDENT IN AN ORGANIZED HEALTH CARE EDUCATION/TRAINING PROGRAM

## 2025-03-05 PROCEDURE — 87086 URINE CULTURE/COLONY COUNT: CPT | Mod: HCNC | Performed by: STUDENT IN AN ORGANIZED HEALTH CARE EDUCATION/TRAINING PROGRAM

## 2025-03-05 PROCEDURE — 96375 TX/PRO/DX INJ NEW DRUG ADDON: CPT | Mod: HCNC

## 2025-03-05 PROCEDURE — 96361 HYDRATE IV INFUSION ADD-ON: CPT | Mod: HCNC

## 2025-03-05 PROCEDURE — 83735 ASSAY OF MAGNESIUM: CPT | Mod: HCNC | Performed by: STUDENT IN AN ORGANIZED HEALTH CARE EDUCATION/TRAINING PROGRAM

## 2025-03-05 PROCEDURE — 84439 ASSAY OF FREE THYROXINE: CPT | Mod: HCNC | Performed by: STUDENT IN AN ORGANIZED HEALTH CARE EDUCATION/TRAINING PROGRAM

## 2025-03-05 PROCEDURE — 99285 EMERGENCY DEPT VISIT HI MDM: CPT | Mod: 25,HCNC

## 2025-03-05 PROCEDURE — 86803 HEPATITIS C AB TEST: CPT | Mod: HCNC | Performed by: PHYSICIAN ASSISTANT

## 2025-03-05 PROCEDURE — 63600175 PHARM REV CODE 636 W HCPCS: Mod: HCNC | Performed by: STUDENT IN AN ORGANIZED HEALTH CARE EDUCATION/TRAINING PROGRAM

## 2025-03-05 PROCEDURE — 96374 THER/PROPH/DIAG INJ IV PUSH: CPT | Mod: HCNC

## 2025-03-05 PROCEDURE — 85025 COMPLETE CBC W/AUTO DIFF WBC: CPT | Mod: HCNC | Performed by: STUDENT IN AN ORGANIZED HEALTH CARE EDUCATION/TRAINING PROGRAM

## 2025-03-05 PROCEDURE — 84443 ASSAY THYROID STIM HORMONE: CPT | Mod: HCNC | Performed by: STUDENT IN AN ORGANIZED HEALTH CARE EDUCATION/TRAINING PROGRAM

## 2025-03-05 PROCEDURE — 87389 HIV-1 AG W/HIV-1&-2 AB AG IA: CPT | Mod: HCNC | Performed by: PHYSICIAN ASSISTANT

## 2025-03-05 PROCEDURE — 25500020 PHARM REV CODE 255: Mod: HCNC | Performed by: STUDENT IN AN ORGANIZED HEALTH CARE EDUCATION/TRAINING PROGRAM

## 2025-03-05 PROCEDURE — 93005 ELECTROCARDIOGRAM TRACING: CPT | Mod: HCNC

## 2025-03-05 PROCEDURE — 80053 COMPREHEN METABOLIC PANEL: CPT | Mod: HCNC | Performed by: STUDENT IN AN ORGANIZED HEALTH CARE EDUCATION/TRAINING PROGRAM

## 2025-03-05 PROCEDURE — 83690 ASSAY OF LIPASE: CPT | Mod: HCNC | Performed by: STUDENT IN AN ORGANIZED HEALTH CARE EDUCATION/TRAINING PROGRAM

## 2025-03-05 PROCEDURE — 81001 URINALYSIS AUTO W/SCOPE: CPT | Mod: HCNC | Performed by: STUDENT IN AN ORGANIZED HEALTH CARE EDUCATION/TRAINING PROGRAM

## 2025-03-05 RX ORDER — ACETAMINOPHEN 325 MG/1
650 TABLET ORAL EVERY 4 HOURS PRN
Status: DISCONTINUED | OUTPATIENT
Start: 2025-03-06 | End: 2025-03-07 | Stop reason: HOSPADM

## 2025-03-05 RX ORDER — TALC
6 POWDER (GRAM) TOPICAL NIGHTLY PRN
Status: DISCONTINUED | OUTPATIENT
Start: 2025-03-06 | End: 2025-03-07 | Stop reason: HOSPADM

## 2025-03-05 RX ORDER — GLUCAGON 1 MG
1 KIT INJECTION
Status: DISCONTINUED | OUTPATIENT
Start: 2025-03-06 | End: 2025-03-07 | Stop reason: HOSPADM

## 2025-03-05 RX ORDER — SODIUM CHLORIDE 0.9 % (FLUSH) 0.9 %
5 SYRINGE (ML) INJECTION
Status: DISCONTINUED | OUTPATIENT
Start: 2025-03-06 | End: 2025-03-07 | Stop reason: HOSPADM

## 2025-03-05 RX ORDER — POTASSIUM CHLORIDE 20 MEQ/1
40 TABLET, EXTENDED RELEASE ORAL ONCE
Status: DISCONTINUED | OUTPATIENT
Start: 2025-03-05 | End: 2025-03-05

## 2025-03-05 RX ORDER — ONDANSETRON HYDROCHLORIDE 2 MG/ML
4 INJECTION, SOLUTION INTRAVENOUS
Status: COMPLETED | OUTPATIENT
Start: 2025-03-05 | End: 2025-03-06

## 2025-03-05 RX ORDER — ONDANSETRON HYDROCHLORIDE 2 MG/ML
4 INJECTION, SOLUTION INTRAVENOUS
Status: COMPLETED | OUTPATIENT
Start: 2025-03-05 | End: 2025-03-05

## 2025-03-05 RX ORDER — POTASSIUM CHLORIDE 7.45 MG/ML
10 INJECTION INTRAVENOUS
Status: DISPENSED | OUTPATIENT
Start: 2025-03-06 | End: 2025-03-06

## 2025-03-05 RX ORDER — IPRATROPIUM BROMIDE AND ALBUTEROL SULFATE 2.5; .5 MG/3ML; MG/3ML
3 SOLUTION RESPIRATORY (INHALATION) EVERY 4 HOURS PRN
Status: DISCONTINUED | OUTPATIENT
Start: 2025-03-06 | End: 2025-03-07 | Stop reason: HOSPADM

## 2025-03-05 RX ORDER — IBUPROFEN 200 MG
16 TABLET ORAL
Status: DISCONTINUED | OUTPATIENT
Start: 2025-03-06 | End: 2025-03-07 | Stop reason: HOSPADM

## 2025-03-05 RX ORDER — ALUMINUM HYDROXIDE, MAGNESIUM HYDROXIDE, AND SIMETHICONE 1200; 120; 1200 MG/30ML; MG/30ML; MG/30ML
30 SUSPENSION ORAL 4 TIMES DAILY PRN
Status: DISCONTINUED | OUTPATIENT
Start: 2025-03-06 | End: 2025-03-07 | Stop reason: HOSPADM

## 2025-03-05 RX ORDER — ACETAMINOPHEN 500 MG
1000 TABLET ORAL EVERY 8 HOURS PRN
Status: DISCONTINUED | OUTPATIENT
Start: 2025-03-06 | End: 2025-03-07 | Stop reason: HOSPADM

## 2025-03-05 RX ORDER — IBUPROFEN 200 MG
24 TABLET ORAL
Status: DISCONTINUED | OUTPATIENT
Start: 2025-03-06 | End: 2025-03-07 | Stop reason: HOSPADM

## 2025-03-05 RX ORDER — IBUPROFEN 200 MG
600 TABLET ORAL
Status: DISCONTINUED | OUTPATIENT
Start: 2025-03-05 | End: 2025-03-06

## 2025-03-05 RX ORDER — NALOXONE HCL 0.4 MG/ML
0.02 VIAL (ML) INJECTION
Status: DISCONTINUED | OUTPATIENT
Start: 2025-03-06 | End: 2025-03-07 | Stop reason: HOSPADM

## 2025-03-05 RX ORDER — CEFTRIAXONE 1 G/1
1 INJECTION, POWDER, FOR SOLUTION INTRAMUSCULAR; INTRAVENOUS
Status: COMPLETED | OUTPATIENT
Start: 2025-03-05 | End: 2025-03-05

## 2025-03-05 RX ORDER — ONDANSETRON HYDROCHLORIDE 2 MG/ML
4 INJECTION, SOLUTION INTRAVENOUS EVERY 8 HOURS PRN
Status: DISCONTINUED | OUTPATIENT
Start: 2025-03-06 | End: 2025-03-07 | Stop reason: HOSPADM

## 2025-03-05 RX ORDER — BISACODYL 10 MG/1
10 SUPPOSITORY RECTAL DAILY PRN
Status: DISCONTINUED | OUTPATIENT
Start: 2025-03-06 | End: 2025-03-07 | Stop reason: HOSPADM

## 2025-03-05 RX ORDER — ACETAMINOPHEN 500 MG
1000 TABLET ORAL
Status: DISCONTINUED | OUTPATIENT
Start: 2025-03-05 | End: 2025-03-06

## 2025-03-05 RX ORDER — SIMETHICONE 80 MG
1 TABLET,CHEWABLE ORAL 4 TIMES DAILY PRN
Status: DISCONTINUED | OUTPATIENT
Start: 2025-03-06 | End: 2025-03-07 | Stop reason: HOSPADM

## 2025-03-05 RX ORDER — POLYETHYLENE GLYCOL 3350 17 G/17G
17 POWDER, FOR SOLUTION ORAL 2 TIMES DAILY PRN
Status: DISCONTINUED | OUTPATIENT
Start: 2025-03-06 | End: 2025-03-07 | Stop reason: HOSPADM

## 2025-03-05 RX ADMIN — CEFTRIAXONE 1 G: 1 INJECTION, POWDER, FOR SOLUTION INTRAMUSCULAR; INTRAVENOUS at 08:03

## 2025-03-05 RX ADMIN — SODIUM CHLORIDE, POTASSIUM CHLORIDE, SODIUM LACTATE AND CALCIUM CHLORIDE 1000 ML: 600; 310; 30; 20 INJECTION, SOLUTION INTRAVENOUS at 08:03

## 2025-03-05 RX ADMIN — ONDANSETRON 4 MG: 2 INJECTION INTRAMUSCULAR; INTRAVENOUS at 09:03

## 2025-03-05 RX ADMIN — IOHEXOL 75 ML: 350 INJECTION, SOLUTION INTRAVENOUS at 08:03

## 2025-03-06 PROBLEM — I44.7 LBBB (LEFT BUNDLE BRANCH BLOCK): Status: ACTIVE | Noted: 2025-03-06

## 2025-03-06 PROBLEM — R53.1 GENERALIZED WEAKNESS: Status: ACTIVE | Noted: 2025-03-06

## 2025-03-06 LAB
ALBUMIN SERPL BCP-MCNC: 2.8 G/DL (ref 3.5–5.2)
ALP SERPL-CCNC: 53 U/L (ref 40–150)
ALT SERPL W/O P-5'-P-CCNC: 15 U/L (ref 10–44)
ANION GAP SERPL CALC-SCNC: 10 MMOL/L (ref 8–16)
AST SERPL-CCNC: 38 U/L (ref 10–40)
BACTERIA UR CULT: NORMAL
BACTERIA UR CULT: NORMAL
BASOPHILS # BLD AUTO: 0.05 K/UL (ref 0–0.2)
BASOPHILS NFR BLD: 0.6 % (ref 0–1.9)
BILIRUB SERPL-MCNC: 1 MG/DL (ref 0.1–1)
BUN SERPL-MCNC: 12 MG/DL (ref 8–23)
CALCIUM SERPL-MCNC: 7.8 MG/DL (ref 8.7–10.5)
CHLORIDE SERPL-SCNC: 106 MMOL/L (ref 95–110)
CO2 SERPL-SCNC: 22 MMOL/L (ref 23–29)
CREAT SERPL-MCNC: 0.7 MG/DL (ref 0.5–1.4)
DIFFERENTIAL METHOD BLD: ABNORMAL
EOSINOPHIL # BLD AUTO: 0.1 K/UL (ref 0–0.5)
EOSINOPHIL NFR BLD: 0.7 % (ref 0–8)
ERYTHROCYTE [DISTWIDTH] IN BLOOD BY AUTOMATED COUNT: 12.5 % (ref 11.5–14.5)
EST. GFR  (NO RACE VARIABLE): >60 ML/MIN/1.73 M^2
GLUCOSE SERPL-MCNC: 81 MG/DL (ref 70–110)
HCT VFR BLD AUTO: 35 % (ref 37–48.5)
HGB BLD-MCNC: 11.9 G/DL (ref 12–16)
IMM GRANULOCYTES # BLD AUTO: 0.05 K/UL (ref 0–0.04)
IMM GRANULOCYTES NFR BLD AUTO: 0.6 % (ref 0–0.5)
INFLUENZA A, MOLECULAR: NEGATIVE
INFLUENZA B, MOLECULAR: NEGATIVE
LIPASE SERPL-CCNC: 24 U/L (ref 4–60)
LYMPHOCYTES # BLD AUTO: 2.3 K/UL (ref 1–4.8)
LYMPHOCYTES NFR BLD: 27.4 % (ref 18–48)
MAGNESIUM SERPL-MCNC: 1.8 MG/DL (ref 1.6–2.6)
MAGNESIUM SERPL-MCNC: 2 MG/DL (ref 1.6–2.6)
MCH RBC QN AUTO: 31 PG (ref 27–31)
MCHC RBC AUTO-ENTMCNC: 34 G/DL (ref 32–36)
MCV RBC AUTO: 91 FL (ref 82–98)
MONOCYTES # BLD AUTO: 0.8 K/UL (ref 0.3–1)
MONOCYTES NFR BLD: 9.1 % (ref 4–15)
NEUTROPHILS # BLD AUTO: 5.3 K/UL (ref 1.8–7.7)
NEUTROPHILS NFR BLD: 61.6 % (ref 38–73)
NRBC BLD-RTO: 0 /100 WBC
OHS QRS DURATION: 138 MS
OHS QRS DURATION: 94 MS
OHS QTC CALCULATION: 473 MS
OHS QTC CALCULATION: 523 MS
PHOSPHATE SERPL-MCNC: 2.8 MG/DL (ref 2.7–4.5)
PLATELET # BLD AUTO: 192 K/UL (ref 150–450)
PMV BLD AUTO: 10.7 FL (ref 9.2–12.9)
POCT GLUCOSE: 85 MG/DL (ref 70–110)
POCT GLUCOSE: 95 MG/DL (ref 70–110)
POTASSIUM SERPL-SCNC: 3.7 MMOL/L (ref 3.5–5.1)
PROT SERPL-MCNC: 5.6 G/DL (ref 6–8.4)
RBC # BLD AUTO: 3.84 M/UL (ref 4–5.4)
SARS-COV-2 RNA RESP QL NAA+PROBE: NOT DETECTED
SODIUM SERPL-SCNC: 138 MMOL/L (ref 136–145)
SPECIMEN SOURCE: NORMAL
TROPONIN I SERPL DL<=0.01 NG/ML-MCNC: 5 NG/L (ref 0–14)
WBC # BLD AUTO: 8.53 K/UL (ref 3.9–12.7)

## 2025-03-06 PROCEDURE — 97165 OT EVAL LOW COMPLEX 30 MIN: CPT | Mod: HCNC

## 2025-03-06 PROCEDURE — 63600175 PHARM REV CODE 636 W HCPCS: Mod: HCNC | Performed by: EMERGENCY MEDICINE

## 2025-03-06 PROCEDURE — 83735 ASSAY OF MAGNESIUM: CPT | Mod: HCNC

## 2025-03-06 PROCEDURE — 96376 TX/PRO/DX INJ SAME DRUG ADON: CPT

## 2025-03-06 PROCEDURE — 84100 ASSAY OF PHOSPHORUS: CPT | Mod: HCNC

## 2025-03-06 PROCEDURE — 97530 THERAPEUTIC ACTIVITIES: CPT | Mod: HCNC

## 2025-03-06 PROCEDURE — 85025 COMPLETE CBC W/AUTO DIFF WBC: CPT | Mod: HCNC

## 2025-03-06 PROCEDURE — 25000003 PHARM REV CODE 250: Mod: HCNC

## 2025-03-06 PROCEDURE — 97162 PT EVAL MOD COMPLEX 30 MIN: CPT | Mod: HCNC

## 2025-03-06 PROCEDURE — 93005 ELECTROCARDIOGRAM TRACING: CPT | Mod: HCNC

## 2025-03-06 PROCEDURE — 80053 COMPREHEN METABOLIC PANEL: CPT | Mod: HCNC

## 2025-03-06 PROCEDURE — 63600175 PHARM REV CODE 636 W HCPCS: Mod: HCNC

## 2025-03-06 PROCEDURE — 21400001 HC TELEMETRY ROOM: Mod: HCNC

## 2025-03-06 PROCEDURE — 84484 ASSAY OF TROPONIN QUANT: CPT | Mod: HCNC

## 2025-03-06 PROCEDURE — 36415 COLL VENOUS BLD VENIPUNCTURE: CPT | Mod: HCNC

## 2025-03-06 PROCEDURE — 96375 TX/PRO/DX INJ NEW DRUG ADDON: CPT

## 2025-03-06 PROCEDURE — 87502 INFLUENZA DNA AMP PROBE: CPT | Mod: HCNC

## 2025-03-06 PROCEDURE — 96376 TX/PRO/DX INJ SAME DRUG ADON: CPT | Mod: HCNC

## 2025-03-06 PROCEDURE — 87635 SARS-COV-2 COVID-19 AMP PRB: CPT | Mod: HCNC

## 2025-03-06 PROCEDURE — 97116 GAIT TRAINING THERAPY: CPT | Mod: HCNC

## 2025-03-06 RX ORDER — CEFTRIAXONE 1 G/1
1 INJECTION, POWDER, FOR SOLUTION INTRAMUSCULAR; INTRAVENOUS
Status: DISCONTINUED | OUTPATIENT
Start: 2025-03-06 | End: 2025-03-07 | Stop reason: HOSPADM

## 2025-03-06 RX ORDER — LEVOTHYROXINE SODIUM 50 UG/1
50 TABLET ORAL
Status: DISCONTINUED | OUTPATIENT
Start: 2025-03-06 | End: 2025-03-07 | Stop reason: HOSPADM

## 2025-03-06 RX ORDER — LIDOCAINE 50 MG/G
1 PATCH TOPICAL
Status: DISCONTINUED | OUTPATIENT
Start: 2025-03-06 | End: 2025-03-07 | Stop reason: HOSPADM

## 2025-03-06 RX ADMIN — POTASSIUM CHLORIDE 10 MEQ: 7.46 INJECTION, SOLUTION INTRAVENOUS at 05:03

## 2025-03-06 RX ADMIN — CEFTRIAXONE SODIUM 1 G: 1 INJECTION, POWDER, FOR SOLUTION INTRAMUSCULAR; INTRAVENOUS at 07:03

## 2025-03-06 RX ADMIN — LEVOTHYROXINE SODIUM 50 MCG: 0.05 TABLET ORAL at 06:03

## 2025-03-06 RX ADMIN — POTASSIUM CHLORIDE 10 MEQ: 7.46 INJECTION, SOLUTION INTRAVENOUS at 01:03

## 2025-03-06 RX ADMIN — LIDOCAINE 1 PATCH: 50 PATCH CUTANEOUS at 02:03

## 2025-03-06 RX ADMIN — POTASSIUM CHLORIDE 10 MEQ: 7.46 INJECTION, SOLUTION INTRAVENOUS at 03:03

## 2025-03-06 RX ADMIN — ONDANSETRON 4 MG: 2 INJECTION INTRAMUSCULAR; INTRAVENOUS at 12:03

## 2025-03-06 RX ADMIN — POTASSIUM CHLORIDE 10 MEQ: 7.46 INJECTION, SOLUTION INTRAVENOUS at 04:03

## 2025-03-06 NOTE — SUBJECTIVE & OBJECTIVE
Interval History:  Seen and evaluated on general medical floor  No acute events overnight    Clinical status improved  No new complaints    Objective:     Vital Signs (Most Recent):  Temp: 99.7 °F (37.6 °C) (03/06/25 1216)  Pulse: 77 (03/06/25 1216)  Resp: 16 (03/06/25 1216)  BP: (!) 142/60 (03/06/25 1216)  SpO2: (!) 93 % (03/06/25 1216) Vital Signs (24h Range):  Temp:  [97.7 °F (36.5 °C)-99.7 °F (37.6 °C)] 99.7 °F (37.6 °C)  Pulse:  [63-86] 77  Resp:  [16-20] 16  SpO2:  [93 %-96 %] 93 %  BP: (132-142)/(60-90) 142/60     Weight: 57 kg (125 lb 10.6 oz)  Body mass index is 25.38 kg/m².  No intake or output data in the 24 hours ending 03/06/25 1339      Physical Exam  Vitals and nursing note reviewed.   Constitutional:       General: She is not in acute distress.  HENT:      Head: Normocephalic and atraumatic.      Nose: Nose normal.      Mouth/Throat:      Pharynx: No oropharyngeal exudate.   Eyes:      Extraocular Movements: Extraocular movements intact.      Conjunctiva/sclera: Conjunctivae normal.   Cardiovascular:      Rate and Rhythm: Normal rate and regular rhythm.      Heart sounds: Normal heart sounds.   Pulmonary:      Effort: Pulmonary effort is normal. No respiratory distress.      Breath sounds: Normal breath sounds.   Abdominal:      General: Bowel sounds are normal. There is no distension.      Palpations: Abdomen is soft.      Tenderness: There is abdominal tenderness in the right lower quadrant, periumbilical area, suprapubic area and left lower quadrant. There is no right CVA tenderness, left CVA tenderness or guarding.   Musculoskeletal:      Cervical back: Normal range of motion.      Right lower leg: No edema.      Left lower leg: No edema.      Comments: Pain to left side/flank with movement, only mild TTP (better with lidocaine patch supplied)    Skin:     General: Skin is warm and dry.      Findings: No rash.   Neurological:      General: No focal deficit present.      Mental Status: She is  alert. She is disoriented.   Psychiatric:         Speech: Speech normal.         Behavior: Behavior is cooperative.               Significant Labs: All pertinent labs within the past 24 hours have been reviewed.    Significant Imaging: I have reviewed all pertinent imaging results/findings within the past 24 hours.

## 2025-03-06 NOTE — ED TRIAGE NOTES
Patients endorses dizziness, fatigue and back pain for the past few days. Patients daughter stated she was better yesterday but today was worse. Patient denies C/P, and SOB at this time.

## 2025-03-06 NOTE — ASSESSMENT & PLAN NOTE
Patient's most recent potassium results are listed below.   Recent Labs     03/05/25  1757   K 3.0*     Plan  - Replete potassium per protocol  - Monitor potassium Daily  - Patient's hypokalemia is stable  - f

## 2025-03-06 NOTE — ED NOTES
Telemetry Verification   Patient placed on Telemetry Box  Verified with War Room  Box #  [id number]   Monitor Tech     Rate  70   Rhythm  NS

## 2025-03-06 NOTE — PROGRESS NOTES
[facility] [address] [place] [address] Medicine  Progress Note    Patient Name: Isabel Hernandez  MRN: [id number]  Patient Class: OP- Observation   Admission Date: 3/5/2025  Length of Stay: 0 days  Attending Physician: Valentino Graves DO  Primary Care Provider: Kristian Shabazz MD        Subjective     Principal Problem:Acute cystitis without hematuria        HPI:  Isabel Hernandez is a 89 y.o. female with PMHx of thyroid disease, stage II CKD, history of compression fractures admitted to  with nausea/vomiting, UTI and weakness. Hisotry is obtained with assistance from daughter at bedside. Daughter states pt lives next door on her own, normally ambulates independently. On Monday, daughter found patient on the ground. It looked as though she had fallen because a shoe was out of place and she had vomited on herself. Daughter checks her for injuries and pt has reported just slipping. Tuesday pt had very poor appetite, still felt nauseated. Today, patient was again unable to get herself up at home and was complaining of persistent nausea, generalized weakness, lightheadedness,  belching. They had gone to get her a walker the day prior since she was so weak and they attempted to have her use it but she was too weak to hold on and take a few steps which is very far from her baseline. No known sick contacts. Patient reports feeling confused, mild headache, weakness, abdominal cramping, belching. Also reports left sided low back/flank pain. Denies urinary complaint, diarrhea, or more episodes of emesis. Denies chest pain, SOB, focal weakness or dizziness/vertigo.     In the ED: Afebrile and HDS. UA with 3+ LE with >100 WBC. WBC 10. K 3.0. TB 1.2, . TSH 4.342 and normal free T4. Lipase 24. Flu negative. CT head no acute intracranial abnormality. CT cervical w/ no acute fracture or subluxation of the cervical spine. CT A/P w/ no acute abnormality of the abdomen or pelvis, remote compression fractures of the T8, T10,  T11, and T12 vertebral bodies, colonic diverticulosis without acute diverticulitis.  Given IV Ceftriaxone 1g, 1L IVF, Zofran x 2. Was unable to tolerate oral meds or potassium.     Overview/Hospital Course:  Admitted for dizziness, falls, weakness.  Noted to have UTI.  CTH negative, CT c spine negative, CT A/P w/ no acute abnormality of the abdomen or pelvis, remote compression fractures of the T8, T10, T11, and T12 vertebral bodies, colonic diverticulosis without acute diverticulitis.  She was started on rocephin in the ED which has been continued.  She denies dizziness to me but daughter says that she was complaining of some earlier while she was working with PT.  Urine culture is pending.    Interval History:  Seen and evaluated on general medical floor  No acute events overnight    Clinical status improved  No new complaints    Objective:     Vital Signs (Most Recent):  Temp: 99.7 °F (37.6 °C) (03/06/25 1216)  Pulse: 77 (03/06/25 1216)  Resp: 16 (03/06/25 1216)  BP: (!) 142/60 (03/06/25 1216)  SpO2: (!) 93 % (03/06/25 1216) Vital Signs (24h Range):  Temp:  [97.7 °F (36.5 °C)-99.7 °F (37.6 °C)] 99.7 °F (37.6 °C)  Pulse:  [63-86] 77  Resp:  [16-20] 16  SpO2:  [93 %-96 %] 93 %  BP: (132-142)/(60-90) 142/60     Weight: 57 kg (125 lb 10.6 oz)  Body mass index is 25.38 kg/m².  No intake or output data in the 24 hours ending 03/06/25 1339      Physical Exam  Vitals and nursing note reviewed.   Constitutional:       General: She is not in acute distress.  HENT:      Head: Normocephalic and atraumatic.      Nose: Nose normal.      Mouth/Throat:      Pharynx: No oropharyngeal exudate.   Eyes:      Extraocular Movements: Extraocular movements intact.      Conjunctiva/sclera: Conjunctivae normal.   Cardiovascular:      Rate and Rhythm: Normal rate and regular rhythm.      Heart sounds: Normal heart sounds.   Pulmonary:      Effort: Pulmonary effort is normal. No respiratory distress.      Breath sounds: Normal breath  sounds.   Abdominal:      General: Bowel sounds are normal. There is no distension.      Palpations: Abdomen is soft.      Tenderness: There is abdominal tenderness in the right lower quadrant, periumbilical area, suprapubic area and left lower quadrant. There is no right CVA tenderness, left CVA tenderness or guarding.   Musculoskeletal:      Cervical back: Normal range of motion.      Right lower leg: No edema.      Left lower leg: No edema.      Comments: Pain to left side/flank with movement, only mild TTP (better with lidocaine patch supplied)    Skin:     General: Skin is warm and dry.      Findings: No rash.   Neurological:      General: No focal deficit present.      Mental Status: She is alert. She is disoriented.   Psychiatric:         Speech: Speech normal.         Behavior: Behavior is cooperative.               Significant Labs: All pertinent labs within the past 24 hours have been reviewed.    Significant Imaging: I have reviewed all pertinent imaging results/findings within the past 24 hours.    Assessment and Plan     Assessment & Plan  Acute cystitis without hematuria  Presenting with nausea/generalized weakness/lightheadedness and suprapubic discomfort and confusion all of which may be related to UTI as the rest of her work up thus far is unremarkable besides hypokalemia     CT AP without acute findings   0/4 SIRS. No fever, no leukocytosis   UA infectious. Urine culture pending  IV ceftriaxone empirically; continue  PRN antiemetics  Hypokalemia  Patient's most recent potassium results are listed below.   Recent Labs     03/05/25  1757 03/06/25  0327   K 3.0* 3.7     Plan  Replete potassium per protocol  Monitor potassium Daily  Patient's hypokalemia is stable  Hypothyroid  TSH slightly elevated with normal free T4  Continue home synthroid   Stage 3 chronic kidney disease, unspecified whether stage 3a or 3b CKD  Creatine stable for now. BMP reviewed- noted Estimated Creatinine Clearance: 43.6  mL/min (based on SCr of 0.7 mg/dL). according to latest data. Based on current GFR, CKD stage is stage 2 - GFR 60-89.  Monitor UOP and serial BMP and adjust therapy as needed. Renally dose meds. Avoid nephrotoxic medications and procedures.  Nausea and vomiting  Suspected related to UTI or possibly viral illness   CT AP without acute findings  TBili 1.2 but LFTs otherwise normal, lipase normal, no leukocytosis   Flu/Covid negative   PRN antiemetics ordered (EKG to monitor Qtc)  IV ceftriaxone for UTI  Electrolyte replacement as needed  IVF if not tolerating PO. CLD advance as tolerated   Generalized weakness  Normally ambulates without assistance and lives alone (with daughter next door)  Now acutely weak and unable to even use walker daughter just purchased  Possibly from acute UTI  Flu/covid negative. CPK 200s.   CTH without acute findings.   CT AP with remote thoracic compression fractures only.   No hip/pelvic or other injury noted  PT/OT consulted  IVF/electrolyte replacement  UTI tx    VTE Risk Mitigation (From admission, onward)           Ordered     IP VTE LOW RISK PATIENT  Once         03/05/25 2350     Place sequential compression device  Until discontinued         03/05/25 2350                    Discharge Planning   MURPHY: 3/7/2025     Code Status: Full Code   Medical Readiness for Discharge Date:   Discharge Plan A: Home                Please place Justification for DME        Valentino Graves DO  Department of Hospital Medicine   [facility]

## 2025-03-06 NOTE — H&P
[facility] - Emergency Dept  [facility] Medicine  History & Physical    Patient Name: Isabel Hernandez  MRN: [id number]  Patient Class: OP- Observation  Admission Date: 3/5/2025  Attending Physician: Valentino Graves DO   Primary Care Provider: Kristian Shabazz MD         Patient information was obtained from patient, relative(s), past medical records, and ER records.     Subjective:     Principal Problem:Acute cystitis without hematuria    Chief Complaint:   Chief Complaint   Patient presents with    Fatigue     Pt reports fatigue x2 days and 1 episode of diarrhea. EMS administered 350mL of LR prior to arrival.    Fall    Foreign Body In Throat        HPI: Isabel Hernandez is a 89 y.o. female with PMHx of thyroid disease, stage II CKD, history of compression fractures admitted to  with nausea/vomiting, UTI and weakness. Hisotry is obtained with assistance from daughter at bedside. Daughter states pt lives next door on her own, normally ambulates independently. On Monday, daughter found patient on the ground. It looked as though she had fallen because a shoe was out of place and she had vomited on herself. Daughter checks her for injuries and pt has reported just slipping. Tuesday pt had very poor appetite, still felt nauseated. Today, patient was again unable to get herself up at home and was complaining of persistent nausea, generalized weakness, lightheadedness,  belching. They had gone to get her a walker the day prior since she was so weak and they attempted to have her use it but she was too weak to hold on and take a few steps which is very far from her baseline. No known sick contacts. Patient reports feeling confused, mild headache, weakness, abdominal cramping, belching. Also reports left sided low back/flank pain. Denies urinary complaint, diarrhea, or more episodes of emesis. Denies chest pain, SOB, focal weakness or dizziness/vertigo.     In the ED: Afebrile and HDS. UA with 3+ LE with >100 WBC. WBC  10. K 3.0. TB 1.2, . TSH 4.342 and normal free T4. Lipase 24. Flu negative. CT head no acute intracranial abnormality. CT cervical w/ no acute fracture or subluxation of the cervical spine. CT A/P w/ no acute abnormality of the abdomen or pelvis, remote compression fractures of the T8, T10, T11, and T12 vertebral bodies, colonic diverticulosis without acute diverticulitis.  Given IV Ceftriaxone 1g, 1L IVF, Zofran x 2. Was unable to tolerate oral meds or potassium.     Past Medical History:   Diagnosis Date    Arthritis     Cataract     Hypothyroidism     Osteoporosis 2015       Past Surgical History:   Procedure Laterality Date    ADENOIDECTOMY      APPENDECTOMY      BREAST SURGERY Bilateral     cyst removal     SECTION      CHOLECYSTECTOMY      HYSTERECTOMY      THYROIDECTOMY, PARTIAL      TONSILLECTOMY      VITRECTOMY BY PARS PLANA APPROACH Left 2018    Procedure: VITRECTOMY, PARS PLANA APPROch   internal limitng membrane peel;  Surgeon: Johnathan Christopher MD;  Location: [facility] OR [address] [place] [address];  Service: Ophthalmology;  Laterality: Left;       Review of patient's allergies indicates:   Allergen Reactions    Codeine Nausea And Vomiting    Fosamax [alendronate] Nausea Only    Iron analogues Nausea And Vomiting       No current facility-administered medications on file prior to encounter.     Current Outpatient Medications on File Prior to Encounter   Medication Sig    levothyroxine (SYNTHROID) 50 MCG tablet TAKE 1 TABLET EVERY DAY     Family History       Problem Relation (Age of Onset)    Cancer Father, Sister, Brother    Cataracts Father    Glaucoma Father    Hypertension Mother    Kidney disease Mother    Miscarriages / Stillbirths Mother    No Known Problems Brother, Daughter    Thyroid disease Mother          Tobacco Use    Smoking status: Never    Smokeless tobacco: Never   Substance and Sexual Activity    Alcohol use: No    Drug use: No    Sexual activity: Not Currently     Partners: Male      Birth control/protection: None     Review of Systems   Unable to perform ROS: Mental status change   Constitutional:  Negative for fever.   Respiratory:  Negative for shortness of breath.    Cardiovascular:  Negative for chest pain.   Gastrointestinal:  Positive for abdominal pain and nausea.   Musculoskeletal:  Positive for back pain.   Neurological:  Positive for weakness and light-headedness. Negative for dizziness.   Psychiatric/Behavioral:  Positive for confusion.      Objective:     Vital Signs (Most Recent):  Temp: 98 °F (36.7 °C) (03/05/25 1759)  Pulse: 72 (03/05/25 1759)  Resp: 16 (03/05/25 1759)  BP: 132/85 (03/05/25 1759)  SpO2: 96 % (03/05/25 1759) Vital Signs (24h Range):  Temp:  [97.7 °F (36.5 °C)-98 °F (36.7 °C)] 98 °F (36.7 °C)  Pulse:  [72-86] 72  Resp:  [16-20] 16  SpO2:  [96 %] 96 %  BP: (132-136)/(85-90) 132/85     Weight: 56.7 kg (125 lb)  Body mass index is 25.25 kg/m².     Physical Exam  Vitals and nursing note reviewed.   Constitutional:       General: She is not in acute distress.  HENT:      Head: Normocephalic and atraumatic.      Nose: Nose normal.      Mouth/Throat:      Pharynx: No oropharyngeal exudate.   Eyes:      Extraocular Movements: Extraocular movements intact.      Conjunctiva/sclera: Conjunctivae normal.   Cardiovascular:      Rate and Rhythm: Normal rate and regular rhythm.      Heart sounds: Normal heart sounds.   Pulmonary:      Effort: Pulmonary effort is normal. No respiratory distress.      Breath sounds: Normal breath sounds.   Abdominal:      General: Bowel sounds are normal. There is no distension.      Palpations: Abdomen is soft.      Tenderness: There is abdominal tenderness in the right lower quadrant, periumbilical area, suprapubic area and left lower quadrant. There is no right CVA tenderness, left CVA tenderness or guarding.   Musculoskeletal:      Cervical back: Normal range of motion.      Right lower leg: No edema.      Left lower leg: No edema.       "Comments: Pain to left side/flank with movement, only mild TTP (better with lidocaine patch supplied)    Skin:     General: Skin is warm and dry.      Findings: No rash.   Neurological:      General: No focal deficit present.      Mental Status: She is alert. She is disoriented.   Psychiatric:         Speech: Speech normal.         Behavior: Behavior is cooperative.                Significant Labs: All pertinent labs within the past 24 hours have been reviewed.      CBC:   Recent Labs   Lab 03/05/25  1757   WBC 10.41   HGB 12.8   HCT 37.2        CMP:   Recent Labs   Lab 03/05/25  1757      K 3.0*      CO2 24   GLU 80   BUN 14   CREATININE 0.7   CALCIUM 8.0*   PROT 5.9*   ALBUMIN 3.0*   BILITOT 1.2*   ALKPHOS 55   AST 33   ALT 15   ANIONGAP 12     TSH:   Recent Labs   Lab 03/05/25  1757   TSH 4.342*     Urine Culture: No results for input(s): "LABURIN" in the last 48 hours.  Urine Studies:   Recent Labs   Lab 03/05/25  1758   COLORU Yellow   APPEARANCEUA Hazy*   PHUR 6.0   SPECGRAV 1.010   PROTEINUA Negative   GLUCUA Negative   KETONESU Negative   BILIRUBINUA Negative   OCCULTUA 1+*   NITRITE Negative   LEUKOCYTESUR 3+*   RBCUA 17*   WBCUA >100*   BACTERIA Occasional   SQUAMEPITHEL 21       Significant Imaging: I have reviewed all pertinent imaging results/findings within the past 24 hours.  CT Abdomen Pelvis With IV Contrast NO Oral Contrast  Narrative: EXAMINATION:  CT ABDOMEN PELVIS WITH IV CONTRAST    CLINICAL HISTORY:  UTI, recurrent/complicated (Female);left flank pain, UTI;    TECHNIQUE:  Axial CT images with sagittal and coronal reformats were obtained of the abdomen and pelvis from the hemidiaphragms through the symphysis pubis after the administration of 75mL Omnipaque 350.    COMPARISON:  CT of the abdomen and pelvis from 09/08/2009.    FINDINGS:  Lung Bases: Clear.    Heart: Heart size is normal.  No pericardial effusion.    Liver: The liver is normal in size and demonstrates " homogeneous enhancement without focal lesion.  The portal vasculature is patent.    Biliary tract: No intrahepatic or extrahepatic biliary ductal dilatation.    Gallbladder: The gallbladder is absent.    Pancreas: Normal. No pancreatic ductal dilatation.    Spleen: Normal size without focal lesion.    Adrenals: Unremarkable.    Kidneys and urinary collecting systems: There are multiple simple parapelvic cysts on the right.  No hydronephrosis or urolithiasis.    Lymph nodes: None enlarged.    Stomach and bowel: The stomach is normal.  There is a duodenal diverticulum.  Loops of small and large bowel are normal in caliber without evidence for inflammation or obstruction.  The appendix is not definitely seen.  There is colonic diverticulosis without acute diverticulitis.    Peritoneum and mesentery: No ascites or free intraperitoneal air.  No abdominal fluid collection.    Vasculature: There is severe atherosclerosis.  No aneurysm.    Urinary bladder: No wall thickening.    Reproductive organs: Normal.    Body wall: No abnormality.    Musculoskeletal: There is osteopenia.  There are remote compression fractures of the T8, T10, T11, and T12 vertebral bodies.  There is levoconvex scoliosis of the lumbar spine.  Impression: No acute abnormality of the abdomen or pelvis.    Remote compression fractures of the T8, T10, T11, and T12 vertebral bodies.    Colonic diverticulosis without acute diverticulitis.    Electronically signed by: Jorge Cummings  Date:    03/05/2025  Time:    23:09  CT Cervical Spine Without Contrast  Narrative: EXAMINATION:  CT CERVICAL SPINE WITHOUT CONTRAST    CLINICAL HISTORY:  Neck trauma (Age >= 65y);    TECHNIQUE:  Low dose axial images, sagittal and coronal reformations were performed though the cervical spine without intravenous contrast.    COMPARISON:  None available.    FINDINGS:  Alignment: Normal.    Vertebra: There is no acute fracture or subluxation of the cervical spine.  The vertebral  body heights are maintained.    Discs: Discs are maintained in height.    Degenerative changes: There are multilevel degenerative changes of the cervical spine without evidence of high-grade osseous spinal canal stenosis.    Miscellaneous: The soft tissues of the neck are unremarkable.  The visualized lung apices are clear.  Impression: No acute fracture or subluxation of the cervical spine.    Electronically signed by: Jorge Cummings  Date:    03/05/2025  Time:    23:03  CT Head Without Contrast  Narrative: EXAMINATION:  CT HEAD WITHOUT CONTRAST    CLINICAL HISTORY:  Head trauma, minor (Age >= 65y);    TECHNIQUE:  Low dose axial CT images obtained throughout the head without intravenous contrast. Sagittal and coronal reconstructions were performed.    COMPARISON:  None available.    FINDINGS:  Ventricles and sulci are normal in size for age without evidence of hydrocephalus. No extra-axial blood or fluid collections.  There are chronic microvascular ischemic change.  No parenchymal mass, hemorrhage, edema or major vascular distribution infarct.    No calvarial fracture.  The scalp is unremarkable.  There is complete opacification of the left maxillary sinus with adjacent hyperostosis, compatible with chronic sinusitis.  The remaining paranasal sinuses and mastoid air cells are clear.  There is scattered dental amalgam resulting in streak artifact.  Impression: No acute intracranial abnormality.    Chronic left maxillary sinus disease.    Electronically signed by: Jorge Cummings  Date:    03/05/2025  Time:    23:02     Assessment/Plan:     Assessment & Plan  Hypokalemia  Patient's most recent potassium results are listed below.   Recent Labs     03/05/25  1757   K 3.0*     Plan  - Replete potassium per protocol  - Monitor potassium Daily  - Patient's hypokalemia is stable  - f  Hypothyroid  - TSH slightly elevated with normal free T4  - Continue home synthroid   Stage 3 chronic kidney disease, unspecified whether  stage 3a or 3b CKD  Creatine stable for now. BMP reviewed- noted Estimated Creatinine Clearance: 43.6 mL/min (based on SCr of 0.7 mg/dL). according to latest data. Based on current GFR, CKD stage is stage 2 - GFR 60-89.  Monitor UOP and serial BMP and adjust therapy as needed. Renally dose meds. Avoid nephrotoxic medications and procedures.  Acute cystitis without hematuria  - Presenting with nausea/generalized weakness/lightheadedness and suprapubic discomfort and confusion all of which may be related to UTI as the rest of her work up thus far is unremarkable besides hypokalemia   - CT AP without acute findings   - 0/4 SIRS. No fever, no leukocytosis   - UA infectious. Urine culture pending  - IV ceftriaxone empirically   - PRN antiemetics  Nausea and vomiting  - Suspected related to UTI or possibly viral illness   - CT AP without acute findings  - TB 1.2 but LFTs otherwise normal, lipase normal, no leukocytosis   - Flu/Covid negative   - PRN antiemetics ordered (EKG to monitor Qtc)  - IV ceftriaxone for UTI  - Electrolyte replacement as needed  - IVF if not tolerating PO. CLD advance as tolerated   Generalized weakness  - Normally ambulates without assistance and lives alone (with daughter next door)  - Now acutely weak and unable to even use walker daughter just purchased  - Possibly from acute UTI  - Flu/covid negative. CPK 200s.   - CTH without acute findings.   - CT AP with remote thoracic compression fractures only.   - No hip/pelvic or other injury noted  - PT/OT consulted  - IVF/electrolyte replacement  - UTI tx     VTE Risk Mitigation (From admission, onward)           Ordered     IP VTE LOW RISK PATIENT  Once         03/05/25 2350     Place sequential compression device  Until discontinued         03/05/25 2350                         On 03/05/2025, patient should be placed in hospital observation services under my care in collaboration with Justin Rollins DO.           Shira Mallory PA-C  Department of  [facility] Medicine  [facility] - Emergency Dept

## 2025-03-06 NOTE — ASSESSMENT & PLAN NOTE
Normally ambulates without assistance and lives alone (with daughter next door)  Now acutely weak and unable to even use walker daughter just purchased  Possibly from acute UTI  Flu/covid negative. CPK 200s.   CTH without acute findings.   CT AP with remote thoracic compression fractures only.   No hip/pelvic or other injury noted  PT/OT consulted  IVF/electrolyte replacement  UTI tx

## 2025-03-06 NOTE — HPI
Isabel Hernandez is a 89 y.o. female with PMHx of thyroid disease, stage II CKD, history of compression fractures admitted to  with nausea/vomiting, UTI and weakness. Hisotry is obtained with assistance from daughter at bedside. Daughter states pt lives next door on her own, normally ambulates independently. On Monday, daughter found patient on the ground. It looked as though she had fallen because a shoe was out of place and she had vomited on herself. Daughter checks her for injuries and pt has reported just slipping. Tuesday pt had very poor appetite, still felt nauseated. Today, patient was again unable to get herself up at home and was complaining of persistent nausea, generalized weakness, lightheadedness,  belching. They had gone to get her a walker the day prior since she was so weak and they attempted to have her use it but she was too weak to hold on and take a few steps which is very far from her baseline. No known sick contacts. Patient reports feeling confused, mild headache, weakness, abdominal cramping, belching. Also reports left sided low back/flank pain. Denies urinary complaint, diarrhea, or more episodes of emesis. Denies chest pain, SOB, focal weakness or dizziness/vertigo.     In the ED: Afebrile and HDS. UA with 3+ LE with >100 WBC. WBC 10. K 3.0. TB 1.2, . TSH 4.342 and normal free T4. Lipase 24. Flu negative. CT head no acute intracranial abnormality. CT cervical w/ no acute fracture or subluxation of the cervical spine. CT A/P w/ no acute abnormality of the abdomen or pelvis, remote compression fractures of the T8, T10, T11, and T12 vertebral bodies, colonic diverticulosis without acute diverticulitis.  Given IV Ceftriaxone 1g, 1L IVF, Zofran x 2. Was unable to tolerate oral meds or potassium.

## 2025-03-06 NOTE — ASSESSMENT & PLAN NOTE
- Normally ambulates without assistance and lives alone (with daughter next door)  - Now acutely weak and unable to even use walker daughter just purchased  - Possibly from acute UTI  - Flu/covid negative. CPK 200s.   - CTH without acute findings.   - CT AP with remote thoracic compression fractures only.   - No hip/pelvic or other injury noted  - PT/OT consulted  - IVF/electrolyte replacement  - UTI tx

## 2025-03-06 NOTE — PROVIDER PROGRESS NOTES - EMERGENCY DEPT.
Encounter Date: 3/5/2025    ED Physician Progress Notes        I assumed care of patient at sign out pending: CT scans, admit.  88yo F will need admit for UTI, weak, not eating. Daughter found her on 2 occasional slumped off of couch and unable to stand. Unknown head injury. Not able to walk with rolling walker due to weakness. No obvious CT findings. Pending CT abd/pelvis due to flank pain. Does have UTI. Got abx. Likely medicine admit   Summary of Results:    CT Head Without Contrast   Final Result      No acute intracranial abnormality.      Chronic left maxillary sinus disease.         Electronically signed by: Jorge Cummings   Date:    03/05/2025   Time:    23:02      CT Abdomen Pelvis With IV Contrast NO Oral Contrast   Final Result      No acute abnormality of the abdomen or pelvis.      Remote compression fractures of the T8, T10, T11, and T12 vertebral bodies.      Colonic diverticulosis without acute diverticulitis.         Electronically signed by: Jorge Cummings   Date:    03/05/2025   Time:    23:09      CT Cervical Spine Without Contrast   Final Result      No acute fracture or subluxation of the cervical spine.         Electronically signed by: Jorge Cummings   Date:    03/05/2025   Time:    23:03        .discussed admission with hospital medicine    Disposition:  observation.

## 2025-03-06 NOTE — SUBJECTIVE & OBJECTIVE
Past Medical History:   Diagnosis Date    Arthritis     Cataract     Hypothyroidism     Osteoporosis 2015       Past Surgical History:   Procedure Laterality Date    ADENOIDECTOMY      APPENDECTOMY      BREAST SURGERY Bilateral     cyst removal     SECTION      CHOLECYSTECTOMY      HYSTERECTOMY      THYROIDECTOMY, PARTIAL      TONSILLECTOMY      VITRECTOMY BY PARS PLANA APPROACH Left 2018    Procedure: VITRECTOMY, PARS PLANA APPROch   internal limitng membrane peel;  Surgeon: Johnathan Christopher MD;  Location: [facility] OR [address] [place] [address];  Service: Ophthalmology;  Laterality: Left;       Review of patient's allergies indicates:   Allergen Reactions    Codeine Nausea And Vomiting    Fosamax [alendronate] Nausea Only    Iron analogues Nausea And Vomiting       No current facility-administered medications on file prior to encounter.     Current Outpatient Medications on File Prior to Encounter   Medication Sig    levothyroxine (SYNTHROID) 50 MCG tablet TAKE 1 TABLET EVERY DAY     Family History       Problem Relation (Age of Onset)    Cancer Father, Sister, Brother    Cataracts Father    Glaucoma Father    Hypertension Mother    Kidney disease Mother    Miscarriages / Stillbirths Mother    No Known Problems Brother, Daughter    Thyroid disease Mother          Tobacco Use    Smoking status: Never    Smokeless tobacco: Never   Substance and Sexual Activity    Alcohol use: No    Drug use: No    Sexual activity: Not Currently     Partners: Male     Birth control/protection: None     Review of Systems   Unable to perform ROS: Mental status change   Constitutional:  Negative for fever.   Respiratory:  Negative for shortness of breath.    Cardiovascular:  Negative for chest pain.   Gastrointestinal:  Positive for abdominal pain and nausea.   Musculoskeletal:  Positive for back pain.   Neurological:  Positive for weakness and light-headedness. Negative for dizziness.   Psychiatric/Behavioral:  Positive for confusion.       Objective:     Vital Signs (Most Recent):  Temp: 98 °F (36.7 °C) (03/05/25 1759)  Pulse: 72 (03/05/25 1759)  Resp: 16 (03/05/25 1759)  BP: 132/85 (03/05/25 1759)  SpO2: 96 % (03/05/25 1759) Vital Signs (24h Range):  Temp:  [97.7 °F (36.5 °C)-98 °F (36.7 °C)] 98 °F (36.7 °C)  Pulse:  [72-86] 72  Resp:  [16-20] 16  SpO2:  [96 %] 96 %  BP: (132-136)/(85-90) 132/85     Weight: 56.7 kg (125 lb)  Body mass index is 25.25 kg/m².     Physical Exam  Vitals and nursing note reviewed.   Constitutional:       General: She is not in acute distress.  HENT:      Head: Normocephalic and atraumatic.      Nose: Nose normal.      Mouth/Throat:      Pharynx: No oropharyngeal exudate.   Eyes:      Extraocular Movements: Extraocular movements intact.      Conjunctiva/sclera: Conjunctivae normal.   Cardiovascular:      Rate and Rhythm: Normal rate and regular rhythm.      Heart sounds: Normal heart sounds.   Pulmonary:      Effort: Pulmonary effort is normal. No respiratory distress.      Breath sounds: Normal breath sounds.   Abdominal:      General: Bowel sounds are normal. There is no distension.      Palpations: Abdomen is soft.      Tenderness: There is abdominal tenderness in the right lower quadrant, periumbilical area, suprapubic area and left lower quadrant. There is no right CVA tenderness, left CVA tenderness or guarding.   Musculoskeletal:      Cervical back: Normal range of motion.      Right lower leg: No edema.      Left lower leg: No edema.      Comments: Pain to left side/flank with movement, only mild TTP (better with lidocaine patch supplied)    Skin:     General: Skin is warm and dry.      Findings: No rash.   Neurological:      General: No focal deficit present.      Mental Status: She is alert. She is disoriented.   Psychiatric:         Speech: Speech normal.         Behavior: Behavior is cooperative.                Significant Labs: All pertinent labs within the past 24 hours have been reviewed.      CBC:  "  Recent Labs   Lab 03/05/25  1757   WBC 10.41   HGB 12.8   HCT 37.2        CMP:   Recent Labs   Lab 03/05/25  1757      K 3.0*      CO2 24   GLU 80   BUN 14   CREATININE 0.7   CALCIUM 8.0*   PROT 5.9*   ALBUMIN 3.0*   BILITOT 1.2*   ALKPHOS 55   AST 33   ALT 15   ANIONGAP 12     TSH:   Recent Labs   Lab 03/05/25  1757   TSH 4.342*     Urine Culture: No results for input(s): "LABURIN" in the last 48 hours.  Urine Studies:   Recent Labs   Lab 03/05/25  1758   COLORU Yellow   APPEARANCEUA Hazy*   PHUR 6.0   SPECGRAV 1.010   PROTEINUA Negative   GLUCUA Negative   KETONESU Negative   BILIRUBINUA Negative   OCCULTUA 1+*   NITRITE Negative   LEUKOCYTESUR 3+*   RBCUA 17*   WBCUA >100*   BACTERIA Occasional   SQUAMEPITHEL 21       Significant Imaging: I have reviewed all pertinent imaging results/findings within the past 24 hours.  CT Abdomen Pelvis With IV Contrast NO Oral Contrast  Narrative: EXAMINATION:  CT ABDOMEN PELVIS WITH IV CONTRAST    CLINICAL HISTORY:  UTI, recurrent/complicated (Female);left flank pain, UTI;    TECHNIQUE:  Axial CT images with sagittal and coronal reformats were obtained of the abdomen and pelvis from the hemidiaphragms through the symphysis pubis after the administration of 75mL Omnipaque 350.    COMPARISON:  CT of the abdomen and pelvis from 09/08/2009.    FINDINGS:  Lung Bases: Clear.    Heart: Heart size is normal.  No pericardial effusion.    Liver: The liver is normal in size and demonstrates homogeneous enhancement without focal lesion.  The portal vasculature is patent.    Biliary tract: No intrahepatic or extrahepatic biliary ductal dilatation.    Gallbladder: The gallbladder is absent.    Pancreas: Normal. No pancreatic ductal dilatation.    Spleen: Normal size without focal lesion.    Adrenals: Unremarkable.    Kidneys and urinary collecting systems: There are multiple simple parapelvic cysts on the right.  No hydronephrosis or urolithiasis.    Lymph nodes: None " enlarged.    Stomach and bowel: The stomach is normal.  There is a duodenal diverticulum.  Loops of small and large bowel are normal in caliber without evidence for inflammation or obstruction.  The appendix is not definitely seen.  There is colonic diverticulosis without acute diverticulitis.    Peritoneum and mesentery: No ascites or free intraperitoneal air.  No abdominal fluid collection.    Vasculature: There is severe atherosclerosis.  No aneurysm.    Urinary bladder: No wall thickening.    Reproductive organs: Normal.    Body wall: No abnormality.    Musculoskeletal: There is osteopenia.  There are remote compression fractures of the T8, T10, T11, and T12 vertebral bodies.  There is levoconvex scoliosis of the lumbar spine.  Impression: No acute abnormality of the abdomen or pelvis.    Remote compression fractures of the T8, T10, T11, and T12 vertebral bodies.    Colonic diverticulosis without acute diverticulitis.    Electronically signed by: Jorge Cummings  Date:    03/05/2025  Time:    23:09  CT Cervical Spine Without Contrast  Narrative: EXAMINATION:  CT CERVICAL SPINE WITHOUT CONTRAST    CLINICAL HISTORY:  Neck trauma (Age >= 65y);    TECHNIQUE:  Low dose axial images, sagittal and coronal reformations were performed though the cervical spine without intravenous contrast.    COMPARISON:  None available.    FINDINGS:  Alignment: Normal.    Vertebra: There is no acute fracture or subluxation of the cervical spine.  The vertebral body heights are maintained.    Discs: Discs are maintained in height.    Degenerative changes: There are multilevel degenerative changes of the cervical spine without evidence of high-grade osseous spinal canal stenosis.    Miscellaneous: The soft tissues of the neck are unremarkable.  The visualized lung apices are clear.  Impression: No acute fracture or subluxation of the cervical spine.    Electronically signed by: Jorge Cummings  Date:    03/05/2025  Time:    23:03  CT Head  Without Contrast  Narrative: EXAMINATION:  CT HEAD WITHOUT CONTRAST    CLINICAL HISTORY:  Head trauma, minor (Age >= 65y);    TECHNIQUE:  Low dose axial CT images obtained throughout the head without intravenous contrast. Sagittal and coronal reconstructions were performed.    COMPARISON:  None available.    FINDINGS:  Ventricles and sulci are normal in size for age without evidence of hydrocephalus. No extra-axial blood or fluid collections.  There are chronic microvascular ischemic change.  No parenchymal mass, hemorrhage, edema or major vascular distribution infarct.    No calvarial fracture.  The scalp is unremarkable.  There is complete opacification of the left maxillary sinus with adjacent hyperostosis, compatible with chronic sinusitis.  The remaining paranasal sinuses and mastoid air cells are clear.  There is scattered dental amalgam resulting in streak artifact.  Impression: No acute intracranial abnormality.    Chronic left maxillary sinus disease.    Electronically signed by: Jorge Cummings  Date:    03/05/2025  Time:    23:02

## 2025-03-06 NOTE — ED PROVIDER NOTES
Encounter Date: 3/5/2025       History     Chief Complaint   Patient presents with    Fatigue     Pt reports fatigue x2 days and 1 episode of diarrhea. EMS administered 350mL of LR prior to arrival.    Fall    Foreign Body In Throat     HPI    89 y.o. female with a history of thyroid disease, stage II ckd, presenting to the ED for fatigue for several days, diarrhea and a fall. Patient was found ont eh ground 2 next to the couch by her daughter, and patient was unable to get herself back up without assistance. Her daughter got her back up, however today she was again found having fallen or slid off the couch, and was on the ground, unable to get herself up, so patient was brought to the ED. Here, patient states that she does not feel well, and has poor appetite. She reported an episode of diarrhea, however denied any abdominal pain. She also reports feeling like something is stuck in her throat, but has been able to swallow saliva, and is not having any trouble breathing. She does not think she hit her head when she fell, and denies any numbness, tingling, or weakness in the extremities.      Review of patient's allergies indicates:   Allergen Reactions    Codeine Nausea And Vomiting    Fosamax [alendronate] Nausea Only    Iron analogues Nausea And Vomiting     Past Medical History:   Diagnosis Date    Arthritis     Cataract     Hypothyroidism     Osteoporosis 2015     Past Surgical History:   Procedure Laterality Date    ADENOIDECTOMY      APPENDECTOMY      BREAST SURGERY Bilateral     cyst removal     SECTION      CHOLECYSTECTOMY      HYSTERECTOMY      THYROIDECTOMY, PARTIAL      TONSILLECTOMY      VITRECTOMY BY PARS PLANA APPROACH Left 2018    Procedure: VITRECTOMY, PARS PLANA APPROch   internal limitng membrane peel;  Surgeon: Johnathan Christopher MD;  Location: [facility] OR [address] [place] [address];  Service: Ophthalmology;  Laterality: Left;     Family History   Problem Relation Name Age of Onset    Hypertension  Mother Isabel Langford         Heart stopped at age 90 possible dementia    Thyroid disease Mother Isabel Langford     Kidney disease Mother Isabel Langford         Born with one kidney    Miscarriages / Stillbirths Mother Isabel Langford         One miscarriage    Cancer Father Fernando Langford         Lung cancer - he smoked    Cataracts Father Fernando Langford     Glaucoma Father Fernando Langford     Cancer Sister Karen Berrios         Stomach cancer    Cancer Brother tristen         prostate    No Known Problems Brother clide     No Known Problems Daughter Catarina     Amblyopia Neg Hx      Blindness Neg Hx      Diabetes Neg Hx      Macular degeneration Neg Hx      Retinal detachment Neg Hx      Strabismus Neg Hx      Stroke Neg Hx      Melanoma Neg Hx       Social History[1]  Review of Systems   Constitutional:  Positive for fatigue. Negative for fever.   HENT:  Negative for sore throat.    Respiratory:  Negative for shortness of breath.    Cardiovascular:  Negative for chest pain.   Gastrointestinal:  Positive for diarrhea, nausea and vomiting.   Genitourinary:  Negative for dysuria.   Musculoskeletal:  Negative for back pain.   Skin:  Negative for rash.   Neurological:  Negative for weakness.   Hematological:  Does not bruise/bleed easily.       Physical Exam     Initial Vitals [03/05/25 1414]   BP Pulse Resp Temp SpO2   (!) 136/90 86 20 97.7 °F (36.5 °C) 96 %      MAP       --         Physical Exam    Nursing note and vitals reviewed.  Constitutional: She appears well-developed and well-nourished. She is not diaphoretic. No distress.   HENT:   Head: Normocephalic and atraumatic.   Eyes: EOM are normal. Pupils are equal, round, and reactive to light.   Neck: Neck supple.   Normal range of motion.  Cardiovascular:  Normal rate and regular rhythm.           Pulmonary/Chest: Breath sounds normal.   Abdominal: Abdomen is soft. Bowel sounds are normal. She exhibits no distension. There is no  abdominal tenderness. There is no rebound.   Musculoskeletal:         General: No tenderness or edema. Normal range of motion.      Cervical back: Normal range of motion and neck supple.     Neurological: She is alert and oriented to person, place, and time.   Alert and oriented to person place and time.  CN 2-12 intact bilaterally.  Sensation is equal and intact in all extremities.  Strength:   5/5 right elbow flexion, extension, .  5/5 left elbow flexion, extension, .  5/5 right hip extension, flexion, plantar and dorsiflexion.  5/5 left hip extension, flexion, plantar and dorsiflexion.     Skin: Skin is warm and dry. Capillary refill takes less than 2 seconds.         ED Course   Procedures  Labs Reviewed   CBC W/ AUTO DIFFERENTIAL - Abnormal       Result Value    WBC 10.41      RBC 4.08      Hemoglobin 12.8      Hematocrit 37.2      MCV 91      MCH 31.4 (*)     MCHC 34.4      RDW 12.5      Platelets 178      MPV 10.5      Immature Granulocytes 0.5      Gran # (ANC) 6.5      Immature Grans (Abs) 0.05 (*)     Lymph # 3.0      Mono # 0.8      Eos # 0.1      Baso # 0.05      nRBC 0      Gran % 62.3      Lymph % 28.8      Mono % 7.4      Eosinophil % 0.5      Basophil % 0.5      Differential Method Automated     COMPREHENSIVE METABOLIC PANEL - Abnormal    Sodium 139      Potassium 3.0 (*)     Chloride 103      CO2 24      Glucose 80      BUN 14      Creatinine 0.7      Calcium 8.0 (*)     Total Protein 5.9 (*)     Albumin 3.0 (*)     Total Bilirubin 1.2 (*)     Alkaline Phosphatase 55      AST 33      ALT 15      eGFR >60.0      Anion Gap 12     URINALYSIS, REFLEX TO URINE CULTURE - Abnormal    Specimen UA Urine, Clean Catch      Color, UA Yellow      Appearance, UA Hazy (*)     pH, UA 6.0      Specific Gravity, UA 1.010      Protein, UA Negative      Glucose, UA Negative      Ketones, UA Negative      Bilirubin (UA) Negative      Occult Blood UA 1+ (*)     Nitrite, UA Negative      Leukocytes, UA 3+ (*)      Narrative:     Specimen Source->Urine   CK - Abnormal     (*)    URINALYSIS MICROSCOPIC - Abnormal    RBC, UA 17 (*)     WBC, UA >100 (*)     Bacteria Occasional      Squam Epithel, UA 21      Non-Squam Epith 2 (*)     Unclass Julia UA 7      Microscopic Comment SEE COMMENT      Narrative:     Specimen Source->Urine   TSH - Abnormal    TSH 4.342 (*)     Narrative:     Add on TSH per Dr. Trejo @ 21:44 pm to order # [id number]   HEPATITIS C ANTIBODY    Hepatitis C Ab Non-reactive      Narrative:     Release to patient->Immediate   HIV 1 / 2 ANTIBODY    HIV 1/2 Ag/Ab Non-reactive      Narrative:     Release to patient->Immediate   TSH   T4, FREE    Free T4 1.09      Narrative:     Add on TSH per Dr. Trejo @ 21:44 pm to order # [id number]   MAGNESIUM   MAGNESIUM    Magnesium 2.0      Narrative:     Add on TSH per Dr. Trejo @ 21:44 pm to order # [id number]  Add on MG and Lipase per Shira Mallory PA-C @ 23:55 pm to order   # [id number]   LIPASE   LIPASE    Lipase 24      Narrative:     Add on TSH per Dr. Trejo @ 21:44 pm to order # [id number]  Add on MG and Lipase per Shira Mallory PA-C @ 23:55 pm to order   # [id number]     EKG Readings: (Independently Interpreted)   NSR rate 71. LBBB, Leftward axis. No acute ST or T wave abnormalities.     ECG Results              EKG 12-lead (Final result)        Collection Time Result Time QRS Duration OHS QTC Calculation    03/05/25 18:34:33 03/06/25 16:45:21 138 523                     Final result by Interface, Lab In [facility] (03/06/25 16:45:24)                   Narrative:    Test Reason : W19.XXXA,    Vent. Rate :  71 BPM     Atrial Rate :  71 BPM     P-R Int : 126 ms          QRS Dur : 138 ms      QT Int : 482 ms       P-R-T Axes :  45 -19 106 degrees    QTcB Int : 523 ms    Normal sinus rhythm  Left bundle branch block  Leftward axis  Abnormal ECG  When compared with ECG of 21-Apr-1994 09:28,  Left bundle branch block is now  Present  Confirmed by Johnathan Mandujano ([id number]) on 3/6/2025 4:45:18 PM    Referred By: AAAREFERRAL SELF           Confirmed By: Johnathan Mandujano                                  Imaging Results              CT Head Without Contrast (Final result)  Result time 03/05/25 23:02:04      Final result by Jorge Cummings DO (03/05/25 23:02:04)                   Impression:      No acute intracranial abnormality.    Chronic left maxillary sinus disease.      Electronically signed by: Jorge Cummings  Date:    03/05/2025  Time:    23:02               Narrative:    EXAMINATION:  CT HEAD WITHOUT CONTRAST    CLINICAL HISTORY:  Head trauma, minor (Age >= 65y);    TECHNIQUE:  Low dose axial CT images obtained throughout the head without intravenous contrast. Sagittal and coronal reconstructions were performed.    COMPARISON:  None available.    FINDINGS:  Ventricles and sulci are normal in size for age without evidence of hydrocephalus. No extra-axial blood or fluid collections.  There are chronic microvascular ischemic change.  No parenchymal mass, hemorrhage, edema or major vascular distribution infarct.    No calvarial fracture.  The scalp is unremarkable.  There is complete opacification of the left maxillary sinus with adjacent hyperostosis, compatible with chronic sinusitis.  The remaining paranasal sinuses and mastoid air cells are clear.  There is scattered dental amalgam resulting in streak artifact.                                       CT Abdomen Pelvis With IV Contrast NO Oral Contrast (Final result)  Result time 03/05/25 23:09:56      Final result by Jorge Cummings DO (03/05/25 23:09:56)                   Impression:      No acute abnormality of the abdomen or pelvis.    Remote compression fractures of the T8, T10, T11, and T12 vertebral bodies.    Colonic diverticulosis without acute diverticulitis.      Electronically signed by: Jorge Cummings  Date:    03/05/2025  Time:    23:09               Narrative:     EXAMINATION:  CT ABDOMEN PELVIS WITH IV CONTRAST    CLINICAL HISTORY:  UTI, recurrent/complicated (Female);left flank pain, UTI;    TECHNIQUE:  Axial CT images with sagittal and coronal reformats were obtained of the abdomen and pelvis from the hemidiaphragms through the symphysis pubis after the administration of 75mL Omnipaque 350.    COMPARISON:  CT of the abdomen and pelvis from 09/08/2009.    FINDINGS:  Lung Bases: Clear.    Heart: Heart size is normal.  No pericardial effusion.    Liver: The liver is normal in size and demonstrates homogeneous enhancement without focal lesion.  The portal vasculature is patent.    Biliary tract: No intrahepatic or extrahepatic biliary ductal dilatation.    Gallbladder: The gallbladder is absent.    Pancreas: Normal. No pancreatic ductal dilatation.    Spleen: Normal size without focal lesion.    Adrenals: Unremarkable.    Kidneys and urinary collecting systems: There are multiple simple parapelvic cysts on the right.  No hydronephrosis or urolithiasis.    Lymph nodes: None enlarged.    Stomach and bowel: The stomach is normal.  There is a duodenal diverticulum.  Loops of small and large bowel are normal in caliber without evidence for inflammation or obstruction.  The appendix is not definitely seen.  There is colonic diverticulosis without acute diverticulitis.    Peritoneum and mesentery: No ascites or free intraperitoneal air.  No abdominal fluid collection.    Vasculature: There is severe atherosclerosis.  No aneurysm.    Urinary bladder: No wall thickening.    Reproductive organs: Normal.    Body wall: No abnormality.    Musculoskeletal: There is osteopenia.  There are remote compression fractures of the T8, T10, T11, and T12 vertebral bodies.  There is levoconvex scoliosis of the lumbar spine.                                       CT Cervical Spine Without Contrast (Final result)  Result time 03/05/25 23:03:39      Final result by Jorge Cummings DO (03/05/25  23:03:39)                   Impression:      No acute fracture or subluxation of the cervical spine.      Electronically signed by: Jorge Cummings  Date:    03/05/2025  Time:    23:03               Narrative:    EXAMINATION:  CT CERVICAL SPINE WITHOUT CONTRAST    CLINICAL HISTORY:  Neck trauma (Age >= 65y);    TECHNIQUE:  Low dose axial images, sagittal and coronal reformations were performed though the cervical spine without intravenous contrast.    COMPARISON:  None available.    FINDINGS:  Alignment: Normal.    Vertebra: There is no acute fracture or subluxation of the cervical spine.  The vertebral body heights are maintained.    Discs: Discs are maintained in height.    Degenerative changes: There are multilevel degenerative changes of the cervical spine without evidence of high-grade osseous spinal canal stenosis.    Miscellaneous: The soft tissues of the neck are unremarkable.  The visualized lung apices are clear.                                       Medications   potassium chloride 10 mEq in 100 mL IVPB (10 mEq Intravenous New Bag 3/6/25 0544)   lactated ringers bolus 1,000 mL (0 mLs Intravenous Stopped 3/5/25 2226)   cefTRIAXone injection 1 g (1 g Intravenous Given 3/5/25 2040)   iohexoL (OMNIPAQUE 350) injection 75 mL (75 mLs Intravenous Given 3/5/25 2035)   ondansetron injection 4 mg (4 mg Intravenous Given 3/5/25 2133)   ondansetron injection 4 mg (4 mg Intravenous Given 3/6/25 0057)     Medical Decision Making  89 y.o. female with a history of thyroid disease, stage II ckd, presenting to the ED for fatigue for several days, diarrhea and a fall.    Initial vital signs: stable, afebrile    Differential Diagnosis: UTI, diverticulitis, cholecystitis, appendicitis, pyelonephritis, rhabdomyolysis    Plan: CT head, C-spine, Abd/pelv, labs, UA, EKG. Give IV fluids, antiemetics.      I have reviewed and independently interpreted all available laboratory and imaging studies.    CT head and C-spine negative for  acute concerning abnormalities. No radiopaque esophageal foreign body noted on C-spine CT. UA positive for UTI, CT negative for pyelonephritis. CPK marginally elevated. Patient given IV lfuids, antiemetics, and antibiotics in the ED, and due to her continued fatigue and poor PO intake, admitted to hospital medicine for further treatment.    Amount and/or Complexity of Data Reviewed  Labs: ordered.  Radiology: ordered.    Risk  Prescription drug management.                                      Clinical Impression:  Final diagnoses:  [W19.XXXA] Fall  [N30.00] Acute cystitis without hematuria (Primary)          ED Disposition Condition    Observation Stable                    [1]   Social History  Tobacco Use    Smoking status: Never    Smokeless tobacco: Never   Substance Use Topics    Alcohol use: No    Drug use: No        Santhosh Trejo MD  03/09/25 [id number]

## 2025-03-06 NOTE — HOSPITAL COURSE
Admitted for dizziness, falls, weakness.  Noted to have UTI.  CTH negative, CT c spine negative, CT A/P w/ no acute abnormality of the abdomen or pelvis, remote compression fractures of the T8, T10, T11, and T12 vertebral bodies, colonic diverticulosis without acute diverticulitis.  She was started on rocephin in the ED which has been continued.  She denies dizziness to me but daughter says that she was complaining of some earlier while she was working with PT.  Urine culture is pending with mixed organisms.  PT/OT recommending SNF; placement found at [facility].  Discharging on oral abx.  Also c/o severe dizziness that started on day of presentation and was intermittent.  Given concern for posterior circulation CVA that could be the etiology of her dizziness we did offer MR brain, but she declined.  Instead she wanted to trial meclizine and follow up outpatient.  Pt deemed appropriate for discharge; seen and examined prior to departure.  Plan discussed with pt, who was agreeable and amenable; medications were discussed and reviewed, outpatient follow-up scheduled, ER precautions were given, all questions were answered to the pt's satisfaction, and was subsequently discharged.

## 2025-03-06 NOTE — ASSESSMENT & PLAN NOTE
Patient's most recent potassium results are listed below.   Recent Labs     03/05/25  1757 03/06/25  0327   K 3.0* 3.7     Plan  Replete potassium per protocol  Monitor potassium Daily  Patient's hypokalemia is stable

## 2025-03-06 NOTE — PLAN OF CARE
Problem: Occupational Therapy  Goal: Occupational Therapy Goal  Description: Goals to be met by: 4/5/25     Patient will increase functional independence with ADLs by performing:    UE Dressing with Supervision.  LE Dressing with Minimal Assistance.  Grooming while standing with Stand-by Assistance.  Toileting from bedside commode with Minimal Assistance for hygiene and clothing management.   Supine to sit with Supervision.  Step transfer with Supervision  Toilet transfer to toilet with Supervision.    Outcome: Progressing     OT eval completed.

## 2025-03-06 NOTE — PLAN OF CARE
Inpatient Upgrade Note    Isabel Hernandez has warranted treatment spanning two or more midnights of hospital level care for the management of  UTI, Generalized Weakness, Nausea and Vomiting . She continues to require IV antibiotics, daily labs, further testing/imaging, monitoring of vital signs, medication adjustments, and further evaluation by consultants. Her condition is also complicated by the following comorbidities:  thyroid disease, stage II CKD, history of compression fractures  .

## 2025-03-06 NOTE — PT/OT/SLP EVAL
Occupational Therapy   Evaluation    Name: Isabel Hernandez  MRN: [id number]  Admitting Diagnosis: Acute cystitis without hematuria  Recent Surgery: * No surgery found *      Recommendations:     Discharge Recommendations: Moderate Intensity Therapy  Discharge Equipment Recommendations:  none  Barriers to discharge:   (increased (A) required)    Assessment:     Isabel Hernandez is a 89 y.o. female with a medical diagnosis of Acute cystitis without hematuria.  She presents with decreased independence with ADL's.  Pt with notable dizziness once EOB & standing. Pt at high fall risk due to dizziness. Performance deficits affecting function: weakness, impaired endurance, impaired self care skills, impaired functional mobility, impaired balance, decreased lower extremity function, decreased upper extremity function, impaired cardiopulmonary response to activity.      Rehab Prognosis: Fair; patient would benefit from acute skilled OT services to address these deficits and reach maximum level of function.       Plan:     Patient to be seen 4 x/week to address the above listed problems via self-care/home management, therapeutic activities, therapeutic exercises, neuromuscular re-education  Plan of Care Expires: 04/05/25  Plan of Care Reviewed with: patient, daughter    Subjective     Chief Complaint: dizziness  Patient/Family Comments/goals: Pt reported being fairly dizzy while seated EOB.    Occupational Profile:  Living Environment: Pt resides alone (daughter lives next door) in 1 story house with ramp access or 2-3 steps to enter.  Pt has both a walk-in shower & bathtub for bathing. Pt was independent with ADL's & ambulation PTA.  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  Pt enjoys going to play Bingo at the [employer] center.  Pt has an adjustable bed however sleeps on her sofa.  Equipment Used at Home: walker, rolling (built-in bench in walk-in shower, adjustable bed)  Assistance upon Discharge: unknown    Pain/Comfort:  Pain  Rating 1:  (no report of pain)  Pain Rating Post-Intervention 1:  (none reported)    Patients cultural, spiritual, Islam conflicts given the current situation: no    Objective:     Communicated with: RN prior to session.  Patient found supine with telemetry, peripheral IV, PureWick (daughter present) upon OT entry to room.    General Precautions: Standard, fall, aspiration  Orthopedic Precautions: N/A  Braces: N/A    Occupational Performance:    Bed Mobility:    Patient completed Supine to Sit with minimum assistance    Functional Mobility/Transfers:  Patient completed Sit <> Stand Transfer with minimum assistance  with  no assistive device from EOB  Patient completed Bed <> Chair Transfer using Step Transfer technique with minimum assistance with no assistive device  Functional Mobility: Min (A) with static standing    Activities of Daily Living:  Upper Body Dressing: minimum assistance donning gown around back while seated EOB  Lower Body Dressing: total assistance donning socks seated EOB due to pt normally performs via leaning forward however was dizzy at EOB therefore was recommended by OT to not perform via leaning forward at this time.    Cognitive/Visual Perceptual:  Cognitive/Psychosocial Skills:     -       Oriented to: Person, Place, and Situation   -       Follows Commands/attention:Follows two-step commands  -       Safety awareness/insight to disability: intact     Physical Exam:  Sensation:    -       Intact  Dominant hand:    -       right  Upper Extremity Range of Motion:  BUE WFL except ~90* shoulder flexion  Upper Extremity Strength: BUE WFL except 3-/5 shoulder flexion   Strength: fair BUE    AMPAC 6 Click ADL:  AMPAC Total Score: 14    Treatment & Education:  BP readings during session:  Seated EOB: 144/63, MAP 91, HR 83  Seated in chair just post stand pivot transfer: 117/62, MAP 84, HR 99  Seated in chair after 5 minutes: 128/60, MAP 87, HR 77.    Pt reported dizziness while seated  EOB therefore assessed BP & found the above findings.  SBA-CGA with postural control while seated EOB.  Provided education on safety & need for (A) with OOB activities & transfers especially due to BP changes & dizziness.  Pt & daughter verbalized understanding.  Provided education regarding role of OT, POC, & discharge recommendations with pt & daughter verbalizing understanding.  Pt had no further questions & when asked whether there were any concerns pt reported none.      Patient left up in chair with all lines intact, call button in reach, RN notified, and daughter present    GOALS:   Multidisciplinary Problems       Occupational Therapy Goals          Problem: Occupational Therapy    Goal Priority Disciplines Outcome Interventions   Occupational Therapy Goal     OT, PT/OT Progressing    Description: Goals to be met by: 25     Patient will increase functional independence with ADLs by performing:    UE Dressing with Supervision.  LE Dressing with Minimal Assistance.  Grooming while standing with Stand-by Assistance.  Toileting from bedside commode with Minimal Assistance for hygiene and clothing management.   Supine to sit with Supervision.  Step transfer with Supervision  Toilet transfer to toilet with Supervision.                         DME Justifications:  No DME recommended requiring DME justifications    History:     Past Medical History:   Diagnosis Date    Arthritis     Cataract     Hypothyroidism     Osteoporosis 2015         Past Surgical History:   Procedure Laterality Date    ADENOIDECTOMY      APPENDECTOMY      BREAST SURGERY Bilateral     cyst removal     SECTION      CHOLECYSTECTOMY      HYSTERECTOMY      THYROIDECTOMY, PARTIAL      TONSILLECTOMY      VITRECTOMY BY PARS PLANA APPROACH Left 2018    Procedure: VITRECTOMY, PARS PLANA APPROch   internal limitng membrane peel;  Surgeon: Johnathan Christopher MD;  Location: [facility] OR [address] [place] [address];  Service: Ophthalmology;  Laterality:  Left;       Time Tracking:     OT Date of Treatment: 03/06/25  OT Start Time: 0935  OT Stop Time: 1005  OT Total Time (min): 30 min    Billable Minutes:Evaluation 15  Therapeutic Activity 15    3/6/2025

## 2025-03-06 NOTE — ASSESSMENT & PLAN NOTE
Creatine stable for now. BMP reviewed- noted Estimated Creatinine Clearance: 43.6 mL/min (based on SCr of 0.7 mg/dL). according to latest data. Based on current GFR, CKD stage is stage 2 - GFR 60-89.  Monitor UOP and serial BMP and adjust therapy as needed. Renally dose meds. Avoid nephrotoxic medications and procedures.

## 2025-03-06 NOTE — ED NOTES
Bed: [facility]  Expected date:   Expected time:   Means of arrival:   Comments:  David

## 2025-03-06 NOTE — PLAN OF CARE
[facility] - Observation [facility]  Initial Discharge Assessment       Primary Care Provider: Kristian Shabazz MD    Admission Diagnosis: Fall [W19.XXXA]  Acute cystitis without hematuria [N30.00]  Chest pain [R07.9]  QT prolongation [R94.31]    Admission Date: 3/5/2025  Expected Discharge Date: 3/7/2025         Payor: HUMANA MANAGED MEDICARE / Plan: HUMANA MEDICARE HMO / Product Type: Capitation /     Extended Emergency Contact Information  Primary Emergency Contact: Kadi Ferris  Address: [address] [place] [address] [place]  Mobile Phone: [phone]  Relation: Daughter    Discharge Plan A: (P) Home  Discharge Plan B: (P) [facility]      [id number] - [place], [place] - [address] [facility] [address]  [address]  [facility] [address]  Phone: [phone] Fax: [phone]    [facility] - [facility] [address] [facility]  [address] [facility] [address]  Phone: [phone] Fax: [phone]                completed Discharge Planning Assessment with patient's daughter, Kadi via telephone. Discharge planning booklet given to patient/family and whiteboard updated with MURPHY and phone #. All questions answered.    Patient will have transportation upon discharge.     Kadi reported that patient lives alone; however, she live next door to patient. Kadi reported that prior to hospitalization patient was independent with her ADL's. Kadi reported that patient does not use any assistive devices to assist with ambulation; however, they just brought her a walker due to her experiencing dizziness prior to hospitalization. Kadi reported that patient is not on dialysis and does not go to a Coumadin clinic.      Patient lives in a [facility] with two steps to enter with a ramp.     Discharge Plan A and Plan B have been determined by review of patient's clinical status, future medical and therapeutic needs, and  coverage/benefits for post-acute care in coordination with multidisciplinary team members.      Ada Hyde LMSW  [facility]  Ext. [phone]

## 2025-03-06 NOTE — ASSESSMENT & PLAN NOTE
Presenting with nausea/generalized weakness/lightheadedness and suprapubic discomfort and confusion all of which may be related to UTI as the rest of her work up thus far is unremarkable besides hypokalemia     CT AP without acute findings   0/4 SIRS. No fever, no leukocytosis   UA infectious. Urine culture pending  IV ceftriaxone empirically; continue  PRN antiemetics

## 2025-03-06 NOTE — ASSESSMENT & PLAN NOTE
- Suspected related to UTI or possibly viral illness   - CT AP without acute findings  - TB 1.2 but LFTs otherwise normal, lipase normal, no leukocytosis   - Flu/Covid negative   - PRN antiemetics ordered (EKG to monitor Qtc)  - IV ceftriaxone for UTI  - Electrolyte replacement as needed  - IVF if not tolerating PO. CLD advance as tolerated

## 2025-03-06 NOTE — ASSESSMENT & PLAN NOTE
Suspected related to UTI or possibly viral illness   CT AP without acute findings  TBili 1.2 but LFTs otherwise normal, lipase normal, no leukocytosis   Flu/Covid negative   PRN antiemetics ordered (EKG to monitor Qtc)  IV ceftriaxone for UTI  Electrolyte replacement as needed  IVF if not tolerating PO. CLD advance as tolerated

## 2025-03-06 NOTE — PLAN OF CARE
PT evaluation complete.    Problem: Physical Therapy  Goal: Physical Therapy Goal  Description: Goals to be met by: 25     Patient will increase functional independence with mobility by performin. Supine to sit with Stand-by Assistance  2. Sit to stand transfer with Stand-by Assistance  3. Gait x100 feet with Stand-by Assistance using Rolling Walker     Outcome: Progressing

## 2025-03-06 NOTE — ASSESSMENT & PLAN NOTE
- Presenting with nausea/generalized weakness/lightheadedness and suprapubic discomfort and confusion all of which may be related to UTI as the rest of her work up thus far is unremarkable besides hypokalemia   - CT AP without acute findings   - 0/4 SIRS. No fever, no leukocytosis   - UA infectious. Urine culture pending  - IV ceftriaxone empirically   - PRN antiemetics

## 2025-03-06 NOTE — PT/OT/SLP EVAL
Physical Therapy Evaluation and Treatment    Patient Name: Isabel Hernandez   MRN: [id number]  Recent Surgery: * No surgery found *      Recommendations:     Discharge Recommendations: Moderate Intensity Therapy   Discharge Equipment Recommendations: none   Barriers to discharge:  dizziness with positional changes     Assessment:     Isabel Hernandez is a 89 y.o. female admitted with a medical diagnosis of Acute cystitis without hematuria. She presents with the following impairments/functional limitations: weakness, impaired endurance, impaired functional mobility, gait instability, impaired balance, decreased lower extremity function, impaired cardiopulmonary response to activity. Patient tolerated PT session fairly due to dizziness with standing and ambulating. She ambulated 4 steps forward/backward x3 trials with RW and CGA. She was limited in ambulation distance due to dizziness.     Rehab Prognosis: Good; patient would benefit from acute PT services to address these deficits and reach maximum level of function.    Plan:     During this hospitalization, patient to be seen 4 x/week to address the above listed problems via gait training, therapeutic activities, therapeutic exercises, neuromuscular re-education    Plan of Care Expires: 04/06/25    Subjective     Chief Complaint: Dizzy. Back pain.   Patient Comments/Goals: To get back to living independently.   Pain/Comfort:  Pain Rating 1:  (did not rate with number)  Location 1: back  Pain Addressed 1: Reposition, Distraction    Social History:  Living Environment: Patient lives alone in a [facility] with a ramp present to enter. Her daughter lives next door to her.   Prior Level of Function: Prior to admission, patient was independent for ambulation. Daughter recently bought patient and RW when she was weak, but she was too weak start using the RW.  Equipment Used at Home: walker, rolling (built in bench in walk in shower)  Assistance Upon Discharge:  daughter    Objective:      Communicated with RN prior to session. Patient found up in chair with telemetry, Landon upon PT entry to room. Daughter present in room.     General Precautions: Standard, fall   Orthopedic Precautions: N/A   Braces: N/A    Respiratory Status: Room air    Exams:  Cognition: Patient is oriented to Person, Place, and Situation  RLE ROM: WFL  RLE Strength: WFL  LLE ROM: WFL  LLE Strength: WFL    Functional Mobility:  Gait belt applied - Yes  Bed Mobility  Seated in chair at start of session and returned to chair  Transfers  Sit to Stand: contact guard assistance with rolling walker x2 from bedside chair   Unable to take steps on 2nd standing trial due to dizziness.   Gait  Patient ambulated 4 steps forward/backward x3 trials with rolling walker and contact guard assistance. Limited in gait distance due to dizziness.     Therapeutic Activities and Exercises:   Patient and daughter educated in:  -PT role and POC  -safety with transfers including hand placement  -gait sequencing and RW management  -out of bed activity to maximize recovery including ambulating with nursing staff assistance and RW while in the hospital    AM-PAC 6 CLICK MOBILITY  Total Score:17    Patient left up in chair with all lines intact, call button in reach, RN notified, and daughter present.    GOALS:   Multidisciplinary Problems       Physical Therapy Goals          Problem: Physical Therapy    Goal Priority Disciplines Outcome Interventions   Physical Therapy Goal     PT, PT/OT Progressing    Description: Goals to be met by: 25     Patient will increase functional independence with mobility by performin. Supine to sit with Stand-by Assistance  2. Sit to stand transfer with Stand-by Assistance  3. Gait x100 feet with Stand-by Assistance using Rolling Walker                          History:     Past Medical History:   Diagnosis Date    Arthritis     Cataract     Hypothyroidism     Osteoporosis 2015       Past Surgical  History:   Procedure Laterality Date    ADENOIDECTOMY      APPENDECTOMY      BREAST SURGERY Bilateral     cyst removal     SECTION      CHOLECYSTECTOMY      HYSTERECTOMY      THYROIDECTOMY, PARTIAL      TONSILLECTOMY      VITRECTOMY BY PARS PLANA APPROACH Left 2018    Procedure: VITRECTOMY, PARS PLANA APPROch   internal limitng membrane peel;  Surgeon: Johnathan Christopher MD;  Location: [facility] OR [address] [place] [address];  Service: Ophthalmology;  Laterality: Left;       Time Tracking:     PT Received On: 25  PT Start Time: 1108  PT Stop Time: 1128  PT Total Time (min): 20 min     Billable Minutes: Evaluation 10 and Gait Training 10    3/6/2025

## 2025-03-07 ENCOUNTER — HOSPITAL ENCOUNTER (INPATIENT)
Facility: HOSPITAL | Age: 89
LOS: 28 days | Discharge: ANOTHER HOSPITAL | DRG: 690 | End: 2025-04-04
Attending: HOSPITALIST | Admitting: HOSPITALIST
Payer: MEDICARE

## 2025-03-07 VITALS
HEIGHT: 59 IN | RESPIRATION RATE: 19 BRPM | TEMPERATURE: 98 F | DIASTOLIC BLOOD PRESSURE: 69 MMHG | OXYGEN SATURATION: 92 % | BODY MASS INDEX: 25.34 KG/M2 | SYSTOLIC BLOOD PRESSURE: 160 MMHG | WEIGHT: 125.69 LBS | HEART RATE: 75 BPM

## 2025-03-07 DIAGNOSIS — F03.90 DEMENTIA WITHOUT BEHAVIORAL DISTURBANCE, PSYCHOTIC DISTURBANCE, MOOD DISTURBANCE, OR ANXIETY, UNSPECIFIED DEMENTIA SEVERITY, UNSPECIFIED DEMENTIA TYPE: ICD-10-CM

## 2025-03-07 DIAGNOSIS — N30.00 ACUTE CYSTITIS WITHOUT HEMATURIA: ICD-10-CM

## 2025-03-07 DIAGNOSIS — E03.9 HYPOTHYROIDISM, UNSPECIFIED TYPE: ICD-10-CM

## 2025-03-07 DIAGNOSIS — S22.009A CLOSED FRACTURE OF MULTIPLE THORACIC VERTEBRAE, INITIAL ENCOUNTER: Primary | ICD-10-CM

## 2025-03-07 DIAGNOSIS — R42 DIZZINESS: ICD-10-CM

## 2025-03-07 LAB
ALBUMIN SERPL BCP-MCNC: 2.8 G/DL (ref 3.5–5.2)
ALP SERPL-CCNC: 53 U/L (ref 40–150)
ALT SERPL W/O P-5'-P-CCNC: 14 U/L (ref 10–44)
ANION GAP SERPL CALC-SCNC: 9 MMOL/L (ref 8–16)
AST SERPL-CCNC: 32 U/L (ref 10–40)
BASOPHILS # BLD AUTO: 0.07 K/UL (ref 0–0.2)
BASOPHILS NFR BLD: 0.9 % (ref 0–1.9)
BILIRUB SERPL-MCNC: 0.5 MG/DL (ref 0.1–1)
BUN SERPL-MCNC: 15 MG/DL (ref 8–23)
CALCIUM SERPL-MCNC: 8.1 MG/DL (ref 8.7–10.5)
CHLORIDE SERPL-SCNC: 104 MMOL/L (ref 95–110)
CO2 SERPL-SCNC: 22 MMOL/L (ref 23–29)
CREAT SERPL-MCNC: 0.7 MG/DL (ref 0.5–1.4)
DIFFERENTIAL METHOD BLD: ABNORMAL
EOSINOPHIL # BLD AUTO: 0.1 K/UL (ref 0–0.5)
EOSINOPHIL NFR BLD: 1.5 % (ref 0–8)
ERYTHROCYTE [DISTWIDTH] IN BLOOD BY AUTOMATED COUNT: 12.8 % (ref 11.5–14.5)
EST. GFR  (NO RACE VARIABLE): >60 ML/MIN/1.73 M^2
GLUCOSE SERPL-MCNC: 91 MG/DL (ref 70–110)
HCT VFR BLD AUTO: 37.8 % (ref 37–48.5)
HGB BLD-MCNC: 12 G/DL (ref 12–16)
IMM GRANULOCYTES # BLD AUTO: 0.06 K/UL (ref 0–0.04)
IMM GRANULOCYTES NFR BLD AUTO: 0.8 % (ref 0–0.5)
LYMPHOCYTES # BLD AUTO: 2.5 K/UL (ref 1–4.8)
LYMPHOCYTES NFR BLD: 31.6 % (ref 18–48)
MAGNESIUM SERPL-MCNC: 2.1 MG/DL (ref 1.6–2.6)
MCH RBC QN AUTO: 31.3 PG (ref 27–31)
MCHC RBC AUTO-ENTMCNC: 31.7 G/DL (ref 32–36)
MCV RBC AUTO: 99 FL (ref 82–98)
MONOCYTES # BLD AUTO: 0.7 K/UL (ref 0.3–1)
MONOCYTES NFR BLD: 8.4 % (ref 4–15)
NEUTROPHILS # BLD AUTO: 4.5 K/UL (ref 1.8–7.7)
NEUTROPHILS NFR BLD: 56.8 % (ref 38–73)
NRBC BLD-RTO: 0 /100 WBC
OHS QRS DURATION: 94 MS
OHS QTC CALCULATION: 458 MS
PHOSPHATE SERPL-MCNC: 3.3 MG/DL (ref 2.7–4.5)
PLATELET # BLD AUTO: 175 K/UL (ref 150–450)
PMV BLD AUTO: 10.6 FL (ref 9.2–12.9)
POTASSIUM SERPL-SCNC: 3.8 MMOL/L (ref 3.5–5.1)
PROT SERPL-MCNC: 5.6 G/DL (ref 6–8.4)
RBC # BLD AUTO: 3.83 M/UL (ref 4–5.4)
SODIUM SERPL-SCNC: 135 MMOL/L (ref 136–145)
WBC # BLD AUTO: 7.85 K/UL (ref 3.9–12.7)

## 2025-03-07 PROCEDURE — 25000003 PHARM REV CODE 250: Mod: HCNC | Performed by: INTERNAL MEDICINE

## 2025-03-07 PROCEDURE — 11000004 HC SNF PRIVATE: Mod: HCNC

## 2025-03-07 PROCEDURE — 97110 THERAPEUTIC EXERCISES: CPT | Mod: HCNC,CQ

## 2025-03-07 PROCEDURE — 93005 ELECTROCARDIOGRAM TRACING: CPT | Mod: HCNC

## 2025-03-07 PROCEDURE — 84100 ASSAY OF PHOSPHORUS: CPT | Mod: HCNC

## 2025-03-07 PROCEDURE — 83735 ASSAY OF MAGNESIUM: CPT | Mod: HCNC

## 2025-03-07 PROCEDURE — 80053 COMPREHEN METABOLIC PANEL: CPT | Mod: HCNC

## 2025-03-07 PROCEDURE — 25000242 PHARM REV CODE 250 ALT 637 W/ HCPCS: Mod: HCNC | Performed by: HOSPITALIST

## 2025-03-07 PROCEDURE — 25000003 PHARM REV CODE 250: Mod: HCNC | Performed by: HOSPITALIST

## 2025-03-07 PROCEDURE — 36415 COLL VENOUS BLD VENIPUNCTURE: CPT | Mod: HCNC

## 2025-03-07 PROCEDURE — 93010 ELECTROCARDIOGRAM REPORT: CPT | Mod: HCNC,,, | Performed by: INTERNAL MEDICINE

## 2025-03-07 PROCEDURE — 25000003 PHARM REV CODE 250: Mod: HCNC

## 2025-03-07 PROCEDURE — 85025 COMPLETE CBC W/AUTO DIFF WBC: CPT | Mod: HCNC

## 2025-03-07 PROCEDURE — 97530 THERAPEUTIC ACTIVITIES: CPT | Mod: HCNC,CQ

## 2025-03-07 RX ORDER — MECLIZINE HCL 12.5 MG 12.5 MG/1
12.5 TABLET ORAL 2 TIMES DAILY PRN
Status: DISCONTINUED | OUTPATIENT
Start: 2025-03-07 | End: 2025-03-07 | Stop reason: HOSPADM

## 2025-03-07 RX ORDER — TIZANIDINE 2 MG/1
4 TABLET ORAL EVERY 8 HOURS PRN
Status: DISCONTINUED | OUTPATIENT
Start: 2025-03-07 | End: 2025-03-10

## 2025-03-07 RX ORDER — CALCIUM CARBONATE 200(500)MG
500 TABLET,CHEWABLE ORAL 2 TIMES DAILY PRN
Status: DISCONTINUED | OUTPATIENT
Start: 2025-03-07 | End: 2025-04-04 | Stop reason: HOSPADM

## 2025-03-07 RX ORDER — CEFPODOXIME PROXETIL 100 MG/1
100 TABLET, FILM COATED ORAL EVERY 12 HOURS
Status: COMPLETED | OUTPATIENT
Start: 2025-03-07 | End: 2025-03-13

## 2025-03-07 RX ORDER — AMOXICILLIN 250 MG
1 CAPSULE ORAL 2 TIMES DAILY
Status: DISCONTINUED | OUTPATIENT
Start: 2025-03-07 | End: 2025-03-10

## 2025-03-07 RX ORDER — LEVOTHYROXINE SODIUM 50 UG/1
50 TABLET ORAL
Status: DISCONTINUED | OUTPATIENT
Start: 2025-03-08 | End: 2025-04-04 | Stop reason: HOSPADM

## 2025-03-07 RX ORDER — TALC
6 POWDER (GRAM) TOPICAL NIGHTLY PRN
Status: DISCONTINUED | OUTPATIENT
Start: 2025-03-07 | End: 2025-04-04 | Stop reason: HOSPADM

## 2025-03-07 RX ORDER — ACETAMINOPHEN 325 MG/1
650 TABLET ORAL EVERY 6 HOURS PRN
Status: DISCONTINUED | OUTPATIENT
Start: 2025-03-07 | End: 2025-04-04 | Stop reason: HOSPADM

## 2025-03-07 RX ORDER — CEFPODOXIME PROXETIL 100 MG/1
100 TABLET, FILM COATED ORAL 2 TIMES DAILY
Qty: 12 TABLET | Refills: 0 | Status: ON HOLD | OUTPATIENT
Start: 2025-03-07 | End: 2025-03-13

## 2025-03-07 RX ORDER — ACETAMINOPHEN 325 MG/1
650 TABLET ORAL EVERY 6 HOURS PRN
Status: DISCONTINUED | OUTPATIENT
Start: 2025-03-07 | End: 2025-03-12

## 2025-03-07 RX ORDER — MECLIZINE HCL 12.5 MG 12.5 MG/1
12.5 TABLET ORAL 2 TIMES DAILY PRN
Qty: 14 TABLET | Refills: 0 | Status: ON HOLD | OUTPATIENT
Start: 2025-03-07

## 2025-03-07 RX ADMIN — SENNOSIDES AND DOCUSATE SODIUM 1 TABLET: 50; 8.6 TABLET ORAL at 08:03

## 2025-03-07 RX ADMIN — CEFPODOXIME PROXETIL 100 MG: 100 TABLET, FILM COATED ORAL at 08:03

## 2025-03-07 RX ADMIN — LIDOCAINE 1 PATCH: 50 PATCH CUTANEOUS at 02:03

## 2025-03-07 RX ADMIN — ACETAMINOPHEN 1000 MG: 500 TABLET ORAL at 02:03

## 2025-03-07 RX ADMIN — TIZANIDINE 4 MG: 2 TABLET ORAL at 10:03

## 2025-03-07 RX ADMIN — LEVOTHYROXINE SODIUM 50 MCG: 0.05 TABLET ORAL at 05:03

## 2025-03-07 NOTE — ASSESSMENT & PLAN NOTE
Normally ambulates without assistance and lives alone (with daughter next door)  Now acutely weak and unable to even use walker daughter just purchased  Possibly from acute UTI  Flu/covid negative. CPK 200s.   CTH without acute findings.   CT AP with remote thoracic compression fractures only.   No hip/pelvic or other injury noted  PT/OT consulted  IVF/electrolyte replacement  UTI tx  Working on SNF placement

## 2025-03-07 NOTE — PT/OT/SLP PROGRESS
Physical Therapy Treatment    Patient Name:  Isabel Hernandez   MRN:  [id number]    Recommendations:     Discharge Recommendations: Moderate Intensity Therapy  Discharge Equipment Recommendations: none  Barriers to discharge: None    Assessment:     Isabel Hernandez is a 89 y.o. female admitted with a medical diagnosis of Acute cystitis without hematuria.  She presents with the following impairments/functional limitations: weakness, impaired endurance, impaired self care skills, impaired functional mobility, pain, decreased safety awareness, decreased lower extremity function, decreased upper extremity function, impaired cognition, impaired cardiopulmonary response to activity Pt tolerated treatment session fairly well today. Pt is requiring minimal assistance for bed mobility and transfers. Dizziness and increased back pain limiting additional functional mobility training on this date. BP taken and as follows: sitting EOB: (136/64). Patient remains appropriate for continued skilled services within the acute environment and goals remain appropriate.   .    Rehab Prognosis: Good; patient would benefit from acute skilled PT services to address these deficits and reach maximum level of function.    Recent Surgery: * No surgery found *      Plan:     During this hospitalization, patient to be seen 4 x/week to address the identified rehab impairments via gait training, therapeutic activities, therapeutic exercises, neuromuscular re-education and progress toward the following goals:    Plan of Care Expires:  04/06/25    Subjective     Chief Complaint: back pain and dizziness   Patient/Family Comments/goals: Pt agreeable to PT   Pain/Comfort:  Pain Rating 1:  (not rated)  Location - Orientation 1: generalized  Location 1: back  Pain Addressed 1: Reposition, Distraction  Pain Rating Post-Intervention 1:  (not rated)      Objective:     Communicated with Rn prior to session.  Patient found supine with telemetry, PureWick upon PT  entry to room.     General Precautions: Standard, fall  Orthopedic Precautions: N/A  Braces: N/A  Respiratory Status: Room air     Functional Mobility:  Bed Mobility:     Supine to Sit: contact guard assistance  EOB sitting: SBA     Transfers:     Sit to Stand x  6 trials:  CGA with hand-held assist (x1) and rolling walker (x5)  Pt standing for ~ 5 seconds each trial before having to sit down due to increased dizziness   Bed to Chair: minimum assistance with  no AD  using  Stand Pivot    Pt performed 10 repetitions of seated B LE exercises consisting of: Marching, LAQ, and AP        AM-PAC 6 CLICK MOBILITY  Turning over in bed (including adjusting bedclothes, sheets and blankets)?: 3  Sitting down on and standing up from a chair with arms (e.g., wheelchair, bedside commode, etc.): 3  Moving from lying on back to sitting on the side of the bed?: 3  Moving to and from a bed to a chair (including a wheelchair)?: 3  Need to walk in hospital room?: 3  Climbing 3-5 steps with a railing?: 2  Basic Mobility Total Score: 17       Treatment & Education:  Therapist provided instruction and educated for safety during bed mobility and transfers. As well as proper body mechanics, energy conservation, and fall prevention strategies during tasks listed above, and the effects of prolonged immobility and the importance of performing EOB/OOB activity and exercises to promote healing and reduce recovery time.       Patient left up in chair with all lines intact, call button in reach, Rn notified, and family present..    GOALS:   Multidisciplinary Problems       Physical Therapy Goals          Problem: Physical Therapy    Goal Priority Disciplines Outcome Interventions   Physical Therapy Goal     PT, PT/OT Progressing    Description: Goals to be met by: 25     Patient will increase functional independence with mobility by performin. Supine to sit with Stand-by Assistance  2. Sit to stand transfer with Stand-by Assistance  3.  Gait x100 feet with Stand-by Assistance using Rolling Walker                          DME Justifications:  No DME recommended requiring DME justifications    Time Tracking:     PT Received On: 03/07/25  PT Start Time: 0829     PT Stop Time: 0859  PT Total Time (min): 30 min     Billable Minutes: Therapeutic Activity 20 and Therapeutic Exercise 10    Treatment Type: Treatment  PT/PTA: PTA     Number of PTA visits since last PT visit: 1 03/07/2025

## 2025-03-07 NOTE — ASSESSMENT & PLAN NOTE
Creatine stable for now. BMP reviewed- noted Estimated Creatinine Clearance: 43.6 mL/min (based on SCr of 0.7 mg/dL). according to latest data. Based on current GFR, CKD stage is stage 2 - GFR 60-89.  Monitor UOP and serial BMP and adjust therapy as needed. Renally dose meds. Avoid nephrotoxic medications and procedures

## 2025-03-07 NOTE — PLAN OF CARE
03/07/25 1020   Post-Acute Status   Post-Acute Authorization Placement   Post-Acute Placement Status Referrals Sent     LUISANA met with patient and daughter, Kadi at bedside to review discharge recommendation of post acute therapy and they are agreeable to plan.    LUISANA provided list of facilities in-network with patient's payor plan. Notified that referral sent to below listed facilities from in-network list based on proximity to home/family support:   [facility]    SW instructed Kadi to identify preference.    Preferred Facility: (if more than 1, listed in order of descending preference)  [facility]    If an additional preferred facility not listed above is identified, additional referral to be sent. If above facilities unable to accept, will send additional referrals to in-network providers.       Ada Hyde, COREY  [facility]  Ext. [phone]

## 2025-03-07 NOTE — PLAN OF CARE
03/07/25 1413   Post-Acute Status   Post-Acute Authorization Placement   Post-Acute Placement Status Set-up Complete/Auth obtained     Pt is discharging to [facility] located at [address] [facility], [place] [address]. Nurse Zoila can call report to [phone].    SW arranged stretcher transport via [facility]. Requested  time is 4:00PM. Requested  time does not guarantee arrival time.      Ada Hyde LMSW  [facility]  Ext. [phone]   [phone]

## 2025-03-07 NOTE — PROGRESS NOTES
[facility] [address] [place] [address] Medicine  Progress Note    Patient Name: Isabel Hernandez  MRN: [id number]  Patient Class: IP- Inpatient   Admission Date: 3/5/2025  Length of Stay: 1 days  Attending Physician: Valentino Graves DO  Primary Care Provider: Kristian Shabazz MD        Subjective     Principal Problem:Acute cystitis without hematuria        HPI:  Isabel Hernandez is a 89 y.o. female with PMHx of thyroid disease, stage II CKD, history of compression fractures admitted to  with nausea/vomiting, UTI and weakness. Hisotry is obtained with assistance from daughter at bedside. Daughter states pt lives next door on her own, normally ambulates independently. On Monday, daughter found patient on the ground. It looked as though she had fallen because a shoe was out of place and she had vomited on herself. Daughter checks her for injuries and pt has reported just slipping. Tuesday pt had very poor appetite, still felt nauseated. Today, patient was again unable to get herself up at home and was complaining of persistent nausea, generalized weakness, lightheadedness,  belching. They had gone to get her a walker the day prior since she was so weak and they attempted to have her use it but she was too weak to hold on and take a few steps which is very far from her baseline. No known sick contacts. Patient reports feeling confused, mild headache, weakness, abdominal cramping, belching. Also reports left sided low back/flank pain. Denies urinary complaint, diarrhea, or more episodes of emesis. Denies chest pain, SOB, focal weakness or dizziness/vertigo.     In the ED: Afebrile and HDS. UA with 3+ LE with >100 WBC. WBC 10. K 3.0. TB 1.2, . TSH 4.342 and normal free T4. Lipase 24. Flu negative. CT head no acute intracranial abnormality. CT cervical w/ no acute fracture or subluxation of the cervical spine. CT A/P w/ no acute abnormality of the abdomen or pelvis, remote compression fractures of the T8, T10,  T11, and T12 vertebral bodies, colonic diverticulosis without acute diverticulitis.  Given IV Ceftriaxone 1g, 1L IVF, Zofran x 2. Was unable to tolerate oral meds or potassium.     Overview/Hospital Course:  Admitted for dizziness, falls, weakness.  Noted to have UTI.  CTH negative, CT c spine negative, CT A/P w/ no acute abnormality of the abdomen or pelvis, remote compression fractures of the T8, T10, T11, and T12 vertebral bodies, colonic diverticulosis without acute diverticulitis.  She was started on rocephin in the ED which has been continued.  She denies dizziness to me but daughter says that she was complaining of some earlier while she was working with PT.  Urine culture is pending with mixed organisms.  PT/OT recommending SNF.    Interval History:  Seen and evaluated on general medical floor  No acute events overnight    Clinical status improved  No new complaints  Still having some dizziness    Objective:     Vital Signs (Most Recent):  Temp: 98.7 °F (37.1 °C) (03/07/25 0803)  Pulse: 93 (03/07/25 0857)  Resp: 19 (03/07/25 0803)  BP: 138/65 (03/07/25 0803)  SpO2: (!) 91 % (03/07/25 0803) Vital Signs (24h Range):  Temp:  [97.9 °F (36.6 °C)-99.7 °F (37.6 °C)] 98.7 °F (37.1 °C)  Pulse:  [60-93] 93  Resp:  [16-19] 19  SpO2:  [91 %-94 %] 91 %  BP: (103-168)/(53-70) 138/65     Weight: 57 kg (125 lb 10.6 oz)  Body mass index is 25.38 kg/m².    Intake/Output Summary (Last 24 hours) at 3/7/2025 1050  Last data filed at 3/7/2025 0905  Gross per 24 hour   Intake 480 ml   Output 1000 ml   Net -520 ml         Physical Exam  Vitals and nursing note reviewed.   Constitutional:       General: She is not in acute distress.  HENT:      Head: Normocephalic and atraumatic.      Nose: Nose normal.      Mouth/Throat:      Pharynx: No oropharyngeal exudate.   Eyes:      Extraocular Movements: Extraocular movements intact.      Conjunctiva/sclera: Conjunctivae normal.   Cardiovascular:      Rate and Rhythm: Normal rate and  regular rhythm.      Heart sounds: Normal heart sounds.   Pulmonary:      Effort: Pulmonary effort is normal. No respiratory distress.      Breath sounds: Normal breath sounds.   Abdominal:      General: Bowel sounds are normal. There is no distension.      Palpations: Abdomen is soft.      Tenderness: There is abdominal tenderness in the right lower quadrant, periumbilical area, suprapubic area and left lower quadrant. There is no right CVA tenderness, left CVA tenderness or guarding.   Musculoskeletal:      Cervical back: Normal range of motion.      Right lower leg: No edema.      Left lower leg: No edema.      Comments: Pain to left side/flank with movement, only mild TTP (better with lidocaine patch supplied)    Skin:     General: Skin is warm and dry.      Findings: No rash.   Neurological:      General: No focal deficit present.      Mental Status: She is alert. She is disoriented.   Psychiatric:         Speech: Speech normal.         Behavior: Behavior is cooperative.               Significant Labs: All pertinent labs within the past 24 hours have been reviewed.    Significant Imaging: I have reviewed all pertinent imaging results/findings within the past 24 hours.      Assessment & Plan  Acute cystitis without hematuria  Presenting with nausea/generalized weakness/lightheadedness and suprapubic discomfort and confusion all of which may be related to UTI as the rest of her work up thus far is unremarkable besides hypokalemia     CT AP without acute findings   0/4 SIRS. No fever, no leukocytosis   UA infectious  Urine culture with mixed growth; no predominant organism  IV ceftriaxone empirically; continue  PRN antiemetics    Hypokalemia  Resolved  Monitor    Hypothyroid  TSH slightly elevated with normal free T4  Continue home synthroid    Nausea and vomiting  Suspected related to UTI or possibly viral illness   CT AP without acute findings  TBili 1.2 but LFTs otherwise normal, lipase normal, no leukocytosis    Flu/Covid negative   PRN antiemetics ordered (EKG to monitor Qtc)  IV ceftriaxone for UTI  Electrolyte replacement as needed  IVF if not tolerating PO. CLD advance as tolerated    Generalized weakness  Normally ambulates without assistance and lives alone (with daughter next door)  Now acutely weak and unable to even use walker daughter just purchased  Possibly from acute UTI  Flu/covid negative. CPK 200s.   CTH without acute findings.   CT AP with remote thoracic compression fractures only.   No hip/pelvic or other injury noted  PT/OT consulted  IVF/electrolyte replacement  UTI tx  Working on SNF placement    Dizziness  Episodes sound like vertigo  Some days worse than others  CTH negative  Trial meclizine    Stage 3 chronic kidney disease, unspecified whether stage 3a or 3b CKD  Creatine stable for now. BMP reviewed- noted Estimated Creatinine Clearance: 43.6 mL/min (based on SCr of 0.7 mg/dL). according to latest data. Based on current GFR, CKD stage is stage 2 - GFR 60-89.  Monitor UOP and serial BMP and adjust therapy as needed. Renally dose meds. Avoid nephrotoxic medications and procedures    VTE Risk Mitigation (From admission, onward)           Ordered     IP VTE LOW RISK PATIENT  Once         03/05/25 2350     Place sequential compression device  Until discontinued         03/05/25 2350                    Discharge Planning   MURPHY: 3/10/2025     Code Status: Full Code   Medical Readiness for Discharge Date: 3/7/2025  Discharge Plan A: Home                Please place Justification for DME        Valentino Graves DO  Department of Hospital Medicine   [facility]

## 2025-03-07 NOTE — PLAN OF CARE
SNF Admissions Communication    Patient Name: Isabel Hernandez  Patient MRN: [id number]  Date Completed: 3/7/2025  Communicated with: ryan Wallis daughter    Insurance: Payor: [employer] MEDICARE / Plan: [employer] MEDICARE HMO / Product Type: Capitation /   Insurance Verified/Auth approved:ref number [id number]    Discussed length of stay, insurance reviews, copays starting at day 21, IDT team meetings, therapy expectations, Medicare guidelines regarding rehospitalizations, and admissions packet, that includes other pertinent facility information, to be given to patient at time of admission.    Isolation: No active isolations  Per patient's daughter the goal is to return to her home next door.  Will have assist of ly.  Family is open to other options if patient is not able to return home (has been in contact w/ [facility])

## 2025-03-07 NOTE — SUBJECTIVE & OBJECTIVE
Interval History:  Seen and evaluated on general medical floor  No acute events overnight    Clinical status improved  No new complaints  Still having some dizziness    Objective:     Vital Signs (Most Recent):  Temp: 98.7 °F (37.1 °C) (03/07/25 0803)  Pulse: 93 (03/07/25 0857)  Resp: 19 (03/07/25 0803)  BP: 138/65 (03/07/25 0803)  SpO2: (!) 91 % (03/07/25 0803) Vital Signs (24h Range):  Temp:  [97.9 °F (36.6 °C)-99.7 °F (37.6 °C)] 98.7 °F (37.1 °C)  Pulse:  [60-93] 93  Resp:  [16-19] 19  SpO2:  [91 %-94 %] 91 %  BP: (103-168)/(53-70) 138/65     Weight: 57 kg (125 lb 10.6 oz)  Body mass index is 25.38 kg/m².    Intake/Output Summary (Last 24 hours) at 3/7/2025 1050  Last data filed at 3/7/2025 0905  Gross per 24 hour   Intake 480 ml   Output 1000 ml   Net -520 ml         Physical Exam  Vitals and nursing note reviewed.   Constitutional:       General: She is not in acute distress.  HENT:      Head: Normocephalic and atraumatic.      Nose: Nose normal.      Mouth/Throat:      Pharynx: No oropharyngeal exudate.   Eyes:      Extraocular Movements: Extraocular movements intact.      Conjunctiva/sclera: Conjunctivae normal.   Cardiovascular:      Rate and Rhythm: Normal rate and regular rhythm.      Heart sounds: Normal heart sounds.   Pulmonary:      Effort: Pulmonary effort is normal. No respiratory distress.      Breath sounds: Normal breath sounds.   Abdominal:      General: Bowel sounds are normal. There is no distension.      Palpations: Abdomen is soft.      Tenderness: There is abdominal tenderness in the right lower quadrant, periumbilical area, suprapubic area and left lower quadrant. There is no right CVA tenderness, left CVA tenderness or guarding.   Musculoskeletal:      Cervical back: Normal range of motion.      Right lower leg: No edema.      Left lower leg: No edema.      Comments: Pain to left side/flank with movement, only mild TTP (better with lidocaine patch supplied)    Skin:     General: Skin is  warm and dry.      Findings: No rash.   Neurological:      General: No focal deficit present.      Mental Status: She is alert. She is disoriented.   Psychiatric:         Speech: Speech normal.         Behavior: Behavior is cooperative.               Significant Labs: All pertinent labs within the past 24 hours have been reviewed.    Significant Imaging: I have reviewed all pertinent imaging results/findings within the past 24 hours.

## 2025-03-07 NOTE — ASSESSMENT & PLAN NOTE
Presenting with nausea/generalized weakness/lightheadedness and suprapubic discomfort and confusion all of which may be related to UTI as the rest of her work up thus far is unremarkable besides hypokalemia     CT AP without acute findings   0/4 SIRS. No fever, no leukocytosis   UA infectious  Urine culture with mixed growth; no predominant organism  IV ceftriaxone empirically; continue  PRN antiemetics

## 2025-03-07 NOTE — PLAN OF CARE
[facility]    FACILITY TRANSFER ORDERS      Patient Name: Isabel Hernandez  YOB: 1935    PCP: Kristian Shabazz MD   PCP Address: [address] [place] [address], [address] / [facility] [address]  PCP Phone Number: [phone]  PCP Fax: [phone]    Encounter Date: 03/07/2025    Admit to: SNF    Vital Signs:  Routine    Diagnoses:   Active Hospital Problems    Diagnosis  POA    *Acute cystitis without hematuria [N30.00]  Yes    Generalized weakness [R53.1]  Yes    Hypokalemia [E87.6]  Yes    Nausea and vomiting [R11.2]  Yes    Stage 3 chronic kidney disease, unspecified whether stage 3a or 3b CKD [N18.30]  Yes    Hypothyroid [E03.9]  Yes     Repeat tsh today         Resolved Hospital Problems   No resolved problems to display.       Allergies:  Review of patient's allergies indicates:   Allergen Reactions    Codeine Nausea And Vomiting    Fosamax [alendronate] Nausea Only    Iron analogues Nausea And Vomiting       Diet: regular diet    Activities: Activity as tolerated    Goals of Care Treatment Preferences:  Code Status: Full Code    Living Will: Yes              Nursing: Jaenne     Labs: Routine    CONSULTS:    Physical Therapy to evaluate and treat.  and Occupational Therapy to evaluate and treat.    WOUND CARE ORDERS  None    Medications: Review discharge medications with patient and family and provide education.         Medication List        START taking these medications      cefpodoxime 100 MG tablet  Commonly known as: VANTIN  Take 1 tablet (100 mg total) by mouth 2 (two) times daily. for 6 days     meclizine 12.5 mg tablet  Commonly known as: ANTIVERT  Take 1 tablet (12.5 mg total) by mouth 2 (two) times daily as needed for Dizziness.            CONTINUE taking these medications      levothyroxine 50 MCG tablet  Commonly known as: SYNTHROID  TAKE 1 TABLET EVERY DAY                Immunizations Administered as of 3/7/2025       Name Date Dose VIS Date Route Exp Date     COVID-19, MRNA, LN-S, PF (Pfizer) (Purple Cap) 10/20/2021  1:42 PM 0.3 mL 12/12/2020 Intramuscular 10/31/2021    Site: Left deltoid     Given By: Heena Lentz     : [employer]     Lot: [id number]     COVID-19, MRNA, LN-S, PF (Pfizer) (Purple Cap) 1/26/2021  1:57 PM 0.3 mL 12/12/2020 Intramuscular 5/31/2021    Site: Left deltoid     Given By: Emilie Costello     : [employer]     Lot: [id number]     COVID-19, MRNA, LN-S, PF (Pfizer) (Purple Cap) 1/4/2021 12:35 PM 0.3 mL 12/12/2020 Intramuscular 1/4/2021    Site: Left deltoid     Given By: Justina Li, PharmD     : [employer]     Lot: [id number]             This patient has had both covid vaccinations    Some patients may experience side effects after vaccination.  These may include fever, headache, muscle or joint aches.  Most symptoms resolve with 24-48 hours and do not require urgent medical evaluation unless they persist for more than 72 hours or symptoms are concerning for an unrelated medical condition.          _________________________________  Valentino Graves DO  03/07/2025

## 2025-03-08 PROCEDURE — 97530 THERAPEUTIC ACTIVITIES: CPT | Mod: HCNC

## 2025-03-08 PROCEDURE — 25000003 PHARM REV CODE 250: Mod: HCNC | Performed by: HOSPITALIST

## 2025-03-08 PROCEDURE — 11000004 HC SNF PRIVATE: Mod: HCNC

## 2025-03-08 PROCEDURE — 25000003 PHARM REV CODE 250: Mod: HCNC | Performed by: FAMILY MEDICINE

## 2025-03-08 PROCEDURE — 25000242 PHARM REV CODE 250 ALT 637 W/ HCPCS: Mod: HCNC | Performed by: HOSPITALIST

## 2025-03-08 PROCEDURE — 63600175 PHARM REV CODE 636 W HCPCS: Mod: HCNC | Performed by: FAMILY MEDICINE

## 2025-03-08 PROCEDURE — 25000003 PHARM REV CODE 250: Mod: HCNC | Performed by: INTERNAL MEDICINE

## 2025-03-08 PROCEDURE — 97535 SELF CARE MNGMENT TRAINING: CPT | Mod: HCNC

## 2025-03-08 PROCEDURE — 97165 OT EVAL LOW COMPLEX 30 MIN: CPT | Mod: HCNC

## 2025-03-08 PROCEDURE — 97162 PT EVAL MOD COMPLEX 30 MIN: CPT | Mod: HCNC

## 2025-03-08 RX ORDER — ENOXAPARIN SODIUM 100 MG/ML
40 INJECTION SUBCUTANEOUS EVERY 24 HOURS
Status: DISCONTINUED | OUTPATIENT
Start: 2025-03-08 | End: 2025-03-18

## 2025-03-08 RX ORDER — ONDANSETRON 4 MG/1
4 TABLET, ORALLY DISINTEGRATING ORAL EVERY 8 HOURS PRN
Status: DISCONTINUED | OUTPATIENT
Start: 2025-03-08 | End: 2025-04-04 | Stop reason: HOSPADM

## 2025-03-08 RX ORDER — MECLIZINE HCL 12.5 MG 12.5 MG/1
12.5 TABLET ORAL 3 TIMES DAILY PRN
Status: DISCONTINUED | OUTPATIENT
Start: 2025-03-08 | End: 2025-03-17

## 2025-03-08 RX ADMIN — ENOXAPARIN SODIUM 40 MG: 40 INJECTION SUBCUTANEOUS at 05:03

## 2025-03-08 RX ADMIN — TIZANIDINE 4 MG: 2 TABLET ORAL at 08:03

## 2025-03-08 RX ADMIN — MECLIZINE 12.5 MG: 12.5 TABLET ORAL at 03:03

## 2025-03-08 RX ADMIN — ONDANSETRON 4 MG: 4 TABLET, ORALLY DISINTEGRATING ORAL at 09:03

## 2025-03-08 RX ADMIN — SENNOSIDES AND DOCUSATE SODIUM 1 TABLET: 50; 8.6 TABLET ORAL at 08:03

## 2025-03-08 RX ADMIN — ACETAMINOPHEN 650 MG: 325 TABLET ORAL at 09:03

## 2025-03-08 RX ADMIN — LEVOTHYROXINE SODIUM 50 MCG: 0.05 TABLET ORAL at 05:03

## 2025-03-08 RX ADMIN — CEFPODOXIME PROXETIL 100 MG: 100 TABLET, FILM COATED ORAL at 09:03

## 2025-03-08 RX ADMIN — ACETAMINOPHEN 650 MG: 325 TABLET ORAL at 03:03

## 2025-03-08 RX ADMIN — SENNOSIDES AND DOCUSATE SODIUM 1 TABLET: 50; 8.6 TABLET ORAL at 09:03

## 2025-03-08 RX ADMIN — CEFPODOXIME PROXETIL 100 MG: 100 TABLET, FILM COATED ORAL at 08:03

## 2025-03-08 NOTE — PLAN OF CARE
Problem: Adult Inpatient Plan of Care  Goal: Plan of Care Review  Outcome: Progressing  Flowsheets (Taken 3/8/2025 1242)  Plan of Care Reviewed With: patient  Goal: Patient-Specific Goal (Individualized)  Outcome: Progressing  Goal: Absence of Hospital-Acquired Illness or Injury  Outcome: Progressing  Goal: Optimal Comfort and Wellbeing  Outcome: Progressing  Goal: Readiness for Transition of Care  Outcome: Progressing     Problem: Adult Inpatient Plan of Care  Goal: Patient-Specific Goal (Individualized)  Outcome: Progressing     Problem: Adult Inpatient Plan of Care  Goal: Absence of Hospital-Acquired Illness or Injury  Outcome: Progressing     Problem: Adult Inpatient Plan of Care  Goal: Optimal Comfort and Wellbeing  Outcome: Progressing     Problem: Adult Inpatient Plan of Care  Goal: Readiness for Transition of Care  Outcome: Progressing     Problem: Fall Injury Risk  Goal: Absence of Fall and Fall-Related Injury  Outcome: Progressing     Problem: Skin Injury Risk Increased  Goal: Skin Health and Integrity  Outcome: Progressing     Ms Hernandez remains awake and alert; she is sitting up in recliner in room 315. She is eating lunch at this time. No distress noted. Safety measures maintained. Pt received verbal teaching and demonstration on how to use the call button to signal staff for assistance. Return demonstration given. Pt instructed not to transfer in/out out of recliner without assistance from staff. Ms Hernandez verbalized understanding of information. Call light is within reach. Will continue with plan of care.

## 2025-03-08 NOTE — PLAN OF CARE
Problem: Occupational Therapy  Goal: Occupational Therapy Goal  Description: Goals to be met by: 3/29/25     Patient will increase functional independence with ADLs by performing:    Feeding with [place].  UE Dressing with [place].  LE Dressing with [place].  Grooming while standing with Modified [place].  Toileting from toilet with Modified [place] for hygiene and clothing management.   Bathing from  shower chair/bench with Supervision.  Sitting at edge of bed x10 minutes with [place].  Step transfer with Modified [place]  Toilet transfer to toilet with Modified [place].    Outcome: Progressing  Ot eval completed, POC established

## 2025-03-08 NOTE — PLAN OF CARE
Evaluation completed. POC established.     Problem: Physical Therapy  Goal: Physical Therapy Goal  Description: Goals to be met by: 25     Patient will increase functional independence with mobility by performin. Supine to sit with Supervision  2. Sit to supine with Supervision  3. Rolling to Left and Right with Supervision  4. Sit to stand transfer with Supervision  5. Bed to chair transfer with Supervision using Rolling Walker  6. Gait  x 150 feet with Stand-by Assistance using Rolling Walker  7. Wheelchair propulsion x 50 feet with Supervision using bilateral upper extremities  8. Ascend/descend 4 stairs with bilateral Handrail and Contact Guard Assistance using No Assistive Device.  9. Ascend/Descend 4 inch curb step with Contact Guard Assistance using Rolling Walker    Outcome: Progressing   3/8/2025

## 2025-03-08 NOTE — NURSING
POC reviewed with pt, pt verbalized understanding. Safety maintained throughout shift, bed locked and in lowest position, call light in reach. Pt up in stretch chair by bedside. Non skid sock on when OOB. Pt remained free of fall/trauma. Pt self reports of pain treated with PO muscle relaxer as ordered by MD. VS stable.  Pt reported nausea but no vomiting this am. No signs of distress, rise and fall of chest noted, respirations  even and unlabored   Plan of care on going. No future concerns as of present.

## 2025-03-08 NOTE — PT/OT/SLP EVAL
Occupational Therapy   Evaluation    Name: Isabel Hernandez  MRN: [id number]  Admit Date: 3/7/2025  Recent Surgeries: N/A     General Precautions: Standard, fall  Orthopedic Precautions:N/A   Braces: N/A    Recommendations:     Discharge Recommendations:  Low intensity therapy  Level of Assistance Recommended: Intermittent assistance   Discharge Equipment Recommendations:  none  Barriers to discharge:  Other (Comment) (increased assist needed for self care and mobility)    Assessment:     Isabel Hernandez is a 89 y.o. female with a medical diagnosis of  Acute cystitis without hematuria. . She presents with c/o nausea during a.m attempt. On 2nd attempt pt presents reporting feeling better and good participation with encouragement. She refused shower but willing to change clothes and walk to bathroom with RW and CGA 2* c/o of dizziness. BP checked following dressing and toileting 132/62 seated. Pt is a high fall risk due to confusion and dizziness. Performance deficits affecting function: weakness, impaired endurance, impaired self care skills, impaired functional mobility, gait instability, impaired cognition, decreased safety awareness, impaired cardiopulmonary response to activity.      Rehab Potential is good    Activity Tolerance: Good    Plan:     Patient to be seen 5 x/week to address the above listed problems via self-care/home management, therapeutic activities, therapeutic exercises  Plan of Care Expires: 03/29/25  Plan of Care Reviewed with: patient    Subjective     Chief Complaint: dizziness  Patient/Family Comments/goals:     Occupational Profile:    Living Environment: Pt resides alone (daughter lives next door) in 1 story house with ramp access or 2-3 steps to enter.  Pt has both a walk-in shower & bathtub for bathing. Pt was independent with ADL's & ambulation PTA.  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  Pt enjoys going to play [employer] at the [employer].  Pt has an adjustable bed however sleeps on  her sofa.  Equipment Used at Home: walker, rolling (built-in bench in walk-in shower, adjustable bed)  Assistance upon Discharge: unknown      Pain/Comfort:  Pain Rating 1: 0/10  Pain Rating Post-Intervention 1: 0/10    Patients cultural, spiritual, Lutheran conflicts given the current situation: no    Objective:     Communicated with: RN prior to session.  Patient found up in chair with [employer] upon OT entry to room.    Occupational Performance:       03/08/25 1230   Eating   Was the activity attempted? Yes   Was the activity done independently? No   Assistance Needed Setup / clean-up   Was adaptive equipment used? No   CARE Score - Eating 5   Oral Hygiene   Was the activity attempted? Yes   Was the activity done independently? No   Assistance Needed Setup / clean-up   Was adaptive equipment used? No   CARE Score - Oral Hygiene 5   Toileting Hygiene   Was the activity attempted? Yes   Was the activity done independently? No   Assistance Needed Touching assistance   Was adaptive equipment used? Yes   CARE Score - Toileting Hygiene 4   Shower/Bathe Self   Was the activity attempted? No   Reason if not Attempted Refused to perform   CARE Score - Shower/Bathe Self 7   Upper Body Dressing   Was the activity attempted? Yes   Was the activity done independently? No   Assistance Needed Touching assistance  (for Bra)   Was adaptive equipment used? No   CARE Score - Upper Body Dressing 4   Lower Body Dressing   Was the activity attempted? Yes   Was the activity done independently? No   Assistance Needed Touching assistance   Was adaptive equipment used? No   CARE Score - Lower Body Dressing 4   Putting On/Taking Off Footwear   Was the activity attempted? Yes   Was the activity done independently? Yes   CARE Score - Putting On/Taking Off Footwear 6   Personal Hygiene   Was the activity attempted? Yes   Was the activity done independently? No   Assistance Needed Touching assistance   Was adaptive equipment used? No   CARE  Score - Personal Hygiene 4   Sit to Stand   Was the activity attempted? Yes   Was the activity done independently? No   Assistance Needed Touching assistance   Was adaptive equipment used? Yes   CARE Score - Sit to Stand 4   Toilet Transfer   Was the activity attempted? Yes   Was the activity done independently? No   Assistance Needed Touching assistance   Was adaptive equipment used? Yes   CARE Score - Toilet Transfer 4   Tub/Shower Transfer   Was the activity attempted? No   Reason if not Attempted Refused to perform   CARE Score - Tub/Shower Transfer 7         Cognitive/Visual Perceptual:  Cognitive/Psychosocial Skills:     -       Oriented to: Person and Time   -       Follows Commands/attention:Follows one-step commands  -       Communication: clear/fluent  -       Memory: Impaired STM and Impaired LTM  -       Safety awareness/insight to disability: intact   -       Mood/Affect/Coping skills/emotional control: Appropriate to situation    Physical Exam:  Balance: -       good static sitting and standing  Upper Extremity Range of Motion:     -       Right Upper Extremity: WFL  -       Left Upper Extremity: WFL  Upper Extremity Strength:    -       Right Upper Extremity: WFL  -       Left Upper Extremity: WFL   Strength:    -       Right Upper Extremity: WFL  -       Left Upper Extremity: WFL  Fine Motor Coordination:    -       Intact    AMPAC 6 Click ADL:  AMPAC Total Score: 18    Treatment & Education:  -Education on energy conservation and task modification to maximize safety and (I) during ADLs and mobility  -Education on importance of OOB activity to improve overall activity tolerance and promote recovery  -Pt educated to call for assistance and to transfer with hospital staff only  -Provided education regarding role of OT, POC, & discharge recommendations with pt  verbalizing understanding.  Pt had no further questions & when asked whether there were any concerns pt reported none.    Patient left up in  chair with call button in reach and RN notified    GOALS:   Multidisciplinary Problems       Occupational Therapy Goals          Problem: Occupational Therapy    Goal Priority Disciplines Outcome Interventions   Occupational Therapy Goal     OT, PT/OT Progressing    Description: Goals to be met by: 3/29/25     Patient will increase functional independence with ADLs by performing:    Feeding with [place].  UE Dressing with [place].  LE Dressing with [place].  Grooming while standing with Modified [place].  Toileting from toilet with Modified [place] for hygiene and clothing management.   Bathing from  shower chair/bench with Supervision.  Sitting at edge of bed x10 minutes with [place].  Step transfer with Modified [place]  Toilet transfer to toilet with Modified [place].                         History:     Past Medical History:   Diagnosis Date    Arthritis     Cataract     Hypothyroidism     Osteoporosis 2015         Past Surgical History:   Procedure Laterality Date    ADENOIDECTOMY      APPENDECTOMY      BREAST SURGERY Bilateral     cyst removal     SECTION      CHOLECYSTECTOMY      HYSTERECTOMY      THYROIDECTOMY, PARTIAL      TONSILLECTOMY      VITRECTOMY BY PARS PLANA APPROACH Left 2018    Procedure: VITRECTOMY, PARS PLANA APPROch   internal limitng membrane peel;  Surgeon: Johnathan Christopher MD;  Location: [address];  Service: Ophthalmology;  Laterality: Left;       Time Tracking:     OT Date of Treatment: 25  OT Start Time: 1155  OT Stop Time: 1222  OT Total Time (min): 27 min    Billable Minutes:Evaluation 15  Self Care/Home Management 12    3/8/2025

## 2025-03-08 NOTE — PLAN OF CARE
Problem: Adult Inpatient Plan of Care  Goal: Plan of Care Review  Outcome: Progressing  Goal: Patient-Specific Goal (Individualized)  Outcome: Progressing  Goal: Absence of Hospital-Acquired Illness or Injury  Outcome: Progressing  Goal: Optimal Comfort and Wellbeing  Outcome: Progressing  Goal: Readiness for Transition of Care  Outcome: Progressing     Problem: Fall Injury Risk  Goal: Absence of Fall and Fall-Related Injury  Outcome: Progressing  Intervention: Identify and Manage Contributors  Flowsheets (Taken 3/8/2025 0529)  Self-Care Promotion:   BADL personal objects within reach   BADL personal routines maintained  Medication Review/Management:   medications reviewed   high-risk medications identified  Intervention: Promote Injury-Free Environment  Flowsheets (Taken 3/8/2025 0529)  Safety Promotion/Fall Prevention:   assistive device/personal item within reach   Fall Risk reviewed with patient/family   instructed to call staff for mobility   nonskid shoes/socks when out of bed   side rails raised x 2   lighting adjusted   medications reviewed   Fall Risk signage in place     Problem: Skin Injury Risk Increased  Goal: Skin Health and Integrity  Outcome: Progressing

## 2025-03-08 NOTE — PT/OT/SLP EVAL
"Physical Therapy Evaluation/Treatment Note    Patient Name:  Isabel Hernandez   MRN:  [id number]  Admit Date: 3/7/2025  Admitting Diagnosis: Acute cystitis without hematuria  Recent Surgeries: N/A    General Precautions: Standard, fall   Orthopedic Precautions: N/A   Braces: N/A    Recommendations:     Discharge Recommendations: home health PT   Level of Assistance Recommended: 24 hours light assistance  Discharge Equipment Recommendations: walker, rolling, wheelchair, bath bench   Barriers to discharge: Inaccessible home environment    Assessment:     Isabel Hernandez is a 89 y.o. female admitted with a medical diagnosis of Acute cystitis without hematuria. Performance deficits affecting function  weakness, impaired endurance, impaired self care skills, impaired functional mobility, gait instability, impaired balance, decreased lower extremity function, decreased safety awareness, pain, decreased ROM. Patient agreeable to PT evaluation and treatment this PM. Daughter present throughout session. Patient was Independent with mobility without use of an AD prior to recent fall and hospitalization. Patient currently functioning below baseline level of mobility requiring increased assistance with use of RW for UE support and overall stability during functional transfers and gait training. Patient reports feeling "woozy" and dizzy with mobility and reports back pain with transitional movements. Nurse made aware. Patient will benefit from continued SNF rehabilitation services to address deficits as well as progress mobility towards PLOF and increased functional independence for safe transition to home environment at time of discharge.     Rehab Potential is good     Activity Tolerance: Good    Plan:     Patient to be seen 5 x/week to address the above listed problems via gait training, therapeutic activities, therapeutic exercises, neuromuscular re-education, wheelchair management/training     Plan of Care Expires: 04/07/25  Plan " "of Care Reviewed with: patient    Subjective     Chief Complaint: Pain in back and feeling "woozy" and dizzy with movement (nurse aware)  Patient/Family Comments/goals: [facility]  Pain/Comfort:  Pain Rating 1: other (see comments) (Pain not rated. "Oh it's there.")  Location - Side 1: Left  Location - Orientation 1: lower  Location 1: back  Pain Addressed 1: Reposition, Distraction, Cessation of Activity, Nurse notified  Pain Rating Post-Intervention 1: other (see comments) (Pain not rated. Patient in no apparent distress when seated at rest.)    Living Environment:   Patient resides home alone in [facility] with 2 CAROL. Daughter resides next door. Patient has a WIS and tub/shower. Daughter reports patient prefers tub/shower.     Prior to admission, patients level of function was Independent with mobility without an AD.  Equipment used at home: none .  DME owned (not currently used): none.  Upon discharge, patient will have assistance from daughter.    Patients cultural, spiritual, Moravian conflicts given the current situation: no    Objective:     Communicated with nursing staff prior to session.  Patient found  seated in bedside chair  with  (no active lines)  upon PT entry to room. Daughter present in room.    Exams:  Cognitive Exam:  Patient is oriented to Person, Place, and Situation  RLE ROM: WFL  RLE Strength: WFL  LLE ROM: WFL  LLE Strength: WFL    Functional Mobility:     03/08/25 1516   Prior Functioning: Everyday Activities   Self Care Independent   Indoor Mobility (Ambulation) Independent   Stairs Unknown   Functional Cognition Needed some help  (Daughter reports mild impairments prior to admission.)   Functional Limitation in Range of Motion   Upper Extremity No impairment   Lower Extremity No impairment   Mobility Devices Walker;Wheelchair (manual or electric)   Roll Left and Right   Was the activity attempted? No   Reason if not Attempted Safety concerns  (Patient rolled to L with CGA and HOB " "flat using rail. Patient in pain and unable to complete rolling to R.)   CARE Score - Roll Left and Right 88   Sit to Lying   Was the activity attempted? Yes   Was the activity done independently? No   Assistance Needed Physical assistance  (Phyllis to elevate LLE onto bed when entering to her R side. HOB flat and use of bed rail)   Physical Assistance Level Less than half   Was adaptive equipment used? Yes   CARE Score - Sit to Lying 3   Lying to Sitting on Side of Bed   Was the activity attempted? Yes   Was the activity done independently? No   Assistance Needed Touching assistance  (CGA with HOB flat and use of bed rail)   Was adaptive equipment used? Yes   CARE Score - Lying to Sitting on Side of Bed 4   Sit to Stand   Was the activity attempted? Yes   Was the activity done independently? No   Assistance Needed Touching assistance   Was adaptive equipment used? Yes   CARE Score - Sit to Stand 4   Chair/Bed-to-Chair Transfer   Was the activity attempted? Yes   Was the activity done independently? No   Assistance Needed Physical assistance  (Phyllis with transfer from bed to W/C via stand pivot without an AD. CGA using RW from W/C to bedside chair via stand step.)   Physical Assistance Level Less than half   Was adaptive equipment used? Yes   CARE Score - Chair/Bed-to-Chair Transfer 3   Car Transfer   Was the activity attempted? No   Reason if not Attempted Environmental limitations   CARE Score - Car Transfer 10   Walk 10 Feet   Was the activity attempted? Yes   Was the activity done independently? No   Assistance Needed Touching assistance  (Patient ambulated 12 feet around bed using RW with CGA. Patient reports feeling "woozy" with mobility. Mild improvement reported seated at rest. Nurse aware.)   Was adaptive equipment used? Yes   CARE Score - Walk 10 Feet 4   Walk 50 Feet with Two Turns   Was the activity attempted? No   Reason if not Attempted Safety concerns   CARE Score - Walk 50 Feet with Two Turns 88   Walk " 150 Feet   Was the activity attempted? No   Reason if not Attempted Safety concerns   CARE Score - Walk 150 Feet 88   Walking 10 Feet on Uneven Surfaces   Was the activity attempted? No   Reason if not Attempted Safety concerns   CARE Score - Walking 10 Feet on Uneven Surfaces 88   1 Step (Curb)   Was the activity attempted? No   Reason if not Attempted Safety concerns   CARE Score - 1 Step (Curb) 88   4 Steps   Was the activity attempted? No   Reason if not Attempted Safety concerns   CARE Score - 4 Steps 88   12 Steps   Was the activity attempted? No   Reason if not Attempted Safety concerns   CARE Score - 12 Steps 88   Picking Up Object   Was the activity attempted? No   Reason if not Attempted Safety concerns   CARE Score - Picking Up Object 88   OTHER   Uses a Wheelchair/Scooter? Yes   Wheel 50 Feet with Two Turns   Was the activity attempted? No   Reason if not Attempted Safety concerns  (Patient propelled W/C approximately 35 feet using BUE with close SBA and verbal cues for steering. Patient reports UE fatigue.)   CARE Score - Wheel 50 Feet with Two Turns 88   Wheel 150 Feet   Was the activity attempted? No   Reason if not Attempted Safety concerns   CARE Score - Wheel 150 Feet 88     Education:  Patient and Family member provided with daily orientation and goals of this PT session.  Patient educated to transfer to bedside chair/bedside commode/bathroom with RN/PCT present.   Patient and Family member educated on assistive device use, bed mobility training, Fall risk, plan of care, and transfer training by explanation and demonstration.   Patient and Family member Verbalized Understanding.  Time provided for therapeutic counseling and discussion of current health disposition. All questions answered to satisfaction, within scope of PT practice; voiced no other concerns. White board updated in patient's room, nursing staff notified of session.      Therapeutic Activities and Exercises:   Repeated functional  transfers performed throughout session. See above chart for details.     AM-PAC 6 CLICK MOBILITY  Total Score:17     Patient left  seated in bedside chair  with call button in reach, nursing staff notified, and daughter present.    GOALS:   Multidisciplinary Problems       Physical Therapy Goals          Problem: Physical Therapy    Goal Priority Disciplines Outcome Interventions   Physical Therapy Goal     PT, PT/OT Progressing    Description: Goals to be met by: 25     Patient will increase functional independence with mobility by performin. Supine to sit with Supervision  2. Sit to supine with Supervision  3. Rolling to Left and Right with Supervision  4. Sit to stand transfer with Supervision  5. Bed to chair transfer with Supervision using Rolling Walker  6. Gait  x 150 feet with Stand-by Assistance using Rolling Walker  7. Wheelchair propulsion x 50 feet with Supervision using bilateral upper extremities  8. Ascend/descend 4 stairs with bilateral Handrail and Contact Guard Assistance using No Assistive Device.  9. Ascend/Descend 4 inch curb step with Contact Guard Assistance using Rolling Walker                         History:     Past Medical History:   Diagnosis Date    Arthritis     Cataract     Hypothyroidism     Osteoporosis 2015       Past Surgical History:   Procedure Laterality Date    ADENOIDECTOMY      APPENDECTOMY      BREAST SURGERY Bilateral     cyst removal     SECTION      CHOLECYSTECTOMY      HYSTERECTOMY      THYROIDECTOMY, PARTIAL      TONSILLECTOMY      VITRECTOMY BY PARS PLANA APPROACH Left 2018    Procedure: VITRECTOMY, PARS PLANA APPROch   internal limitng membrane peel;  Surgeon: Johnathan Christopher MD;  Location: [facility] OR [address] [place] [address];  Service: Ophthalmology;  Laterality: Left;       Time Tracking:     PT Received On: 25  PT Start Time: 1424     PT Stop Time: 1455  PT Total Time (min): 31 min     Billable Minutes: Evaluation 20 and Therapeutic Activity  11      03/08/2025

## 2025-03-08 NOTE — PROGRESS NOTES
"                                                        [facility]                                  Skilled Nursing Facility                   Progress Note     Admit Date: 3/7/2025  MURPHY   Principal Problem:  Acute cystitis without hematuria   HPI obtained from patient interview and chart review     Chief Complaint: Nausea, new admit    HPI:   Per hospital notes: "Admitted for dizziness, falls, weakness. Noted to have UTI. CTH negative, CT c spine negative, CT A/P w/ no acute abnormality of the abdomen or pelvis, remote compression fractures of the T8, T10, T11, and T12 vertebral bodies, colonic diverticulosis without acute diverticulitis. She was started on rocephin in the ED which has been continued. She denies dizziness to me but daughter says that she was complaining of some earlier while she was working with PT. Urine culture is pending with mixed organisms. PT/OT recommending SNF. "    She is sitting up in w/c, complained of nausea today but can't recall if she had emesis or if she ate breakfast. Started zofran, meclizine, and lovenox d/t her not currently walking since this illness. With PT only able to stand for a few seconds d/t dizziness.    Past Medical History: Patient has a past medical history of Arthritis, Cataract, Hypothyroidism, and Osteoporosis (2015).    Past Surgical History: Patient has a past surgical history that includes Thyroidectomy, partial; Hysterectomy; Cholecystectomy; Tonsillectomy; Adenoidectomy; Breast surgery (Bilateral);  section; Vitrectomy by pars plana approach (Left, 2018); and Appendectomy.    Social History: Patient reports that she has never smoked. She has never used smokeless tobacco. She reports that she does not drink alcohol and does not use drugs.    Allergies: Patient is allergic to codeine, fosamax [alendronate], and iron analogues.    ROS  Constitutional: Negative for fever   Eyes: Negative for blurred vision, double vision "   Respiratory: Negative for cough, shortness of breath   Cardiovascular: Negative for chest pain, palpitations, and leg swelling.   Gastrointestinal: Negative for abdominal pain, constipation, diarrhea, positive nauseaa  Genitourinary: Negative for dysuria, frequency   Musculoskeletal:  + generalized weakness. Negative for back pain and myalgias.   Skin: Negative for itching and rash.   Neurological: Positive for dizziness, memory impairment  Psychiatric/Behavioral: Negative for depression. The patient is not nervous/anxious.      24 hour Vital Sign Range   Temp:  [97.6 °F (36.4 °C)-98.1 °F (36.7 °C)]   Pulse:  [68-82]   Resp:  [16-19]   BP: (127-160)/(59-69)   SpO2:  [92 %-97 %]     PEx  Constitutional:       General: She is not in acute distress.  HENT:      Head: Normocephalic and atraumatic.      Nose: Nose normal.      Mouth/Throat:      Pharynx: No oropharyngeal exudate.   Eyes:      Extraocular Movements: Extraocular movements intact.      Conjunctiva/sclera: Conjunctivae normal.   Cardiovascular:      Rate and Rhythm: Normal rate and regular rhythm.      Heart sounds: Normal heart sounds.   Pulmonary:      Effort: Pulmonary effort is normal. No respiratory distress.      Breath sounds: Normal breath sounds.   Abdominal:      General: Bowel sounds are normal. There is no distension.   +purewick with lukas urine      Palpations: Abdomen is soft.      Tenderness: There is abdominal tenderness in the right lower quadrant, periumbilical area, suprapubic area and left lower quadrant. There is no right CVA tenderness, left CVA tenderness or guarding.   Musculoskeletal:      Cervical back: Normal range of motion.      Right lower leg: No edema.      Left lower leg: No edema.      Comments: Pain to left side/flank with movement, only mild TTP (better with lidocaine patch supplied)    Skin:     General: Skin is warm and dry.      Findings: No rash.   Neurological:      General: No focal deficit present.      Mental  "Status: She is alert. She is disoriented.   Psychiatric:         Speech: Speech normal.         Behavior: Behavior is cooperative.     No results for input(s): "GLUCOSE", "NA", "K", "CL", "CO2", "BUN", "CREATININE", "CALCIUM", "MG" in the last 24 hours.    No results for input(s): "WBC", "RBC", "HGB", "HCT", "PLT", "MCV", "MCH", "MCHC" in the last 24 hours.      Assessment and Plan:    Acute cystitis without hematuria  CT AP without acute findings   0/4 SIRS. No fever, no leukocytosis   UA infectious  Urine culture with mixed growth; no predominant organism  IV ceftriaxone empirically    Nausea  Dizziness  POA  Last Bowel Movement: 03/06/25   Initiate zofran and meclizine prn    Decreased Mobility   Physical Debility  Weakness   Impaired functional mobility, balance, gait, and endurance   -Patient with decreased bed mobility therefore patient is at high risk for developing wounds and deterioration of any current wounds.   - Continue with PT/OT for gait training and strengthening and restoration of ADL's   - Encourage mobility, OOB in chair, and early ambulation as appropriate  - Fall precautions   - Monitor for bowel and bladder dysfunction  - Monitor for and prevent skin breakdown and pressure ulcers  - initiated DVT prophylaxis with lovenox d/t restricted mobility    KELLY HAILE  Department of Hospital Medicine   [facility] Sk[facility]     DOS: 3/8/2025     Extended Visit  Total time spent: 53 minutes  Description of Time: counseling patient on clinical condition, therapies provided, plan of care, emotional support, coordinating patient care with other care team members, reviewing and interpreting labs and imaging, collaboration with physician, initiating new orders, chart review, and documentation. See interval hx and assessment/plan for details.    Patient note was created using MModal Dictation.  Any errors in syntax or even information may not have been identified and edited on " initial review prior to signing this note.

## 2025-03-08 NOTE — NURSING
Pt arrived to floor for skilled services with admitting diagnosis of Acute cystitis without hematuria via stretcher. Pt required 3 person assistance from stretcher to chair. Pt is AAOx, incontient (continent or incontinent) of B/B. Pt is on a regular diet. Family (daughter) present at arrival. POC reviewed with patient. Pt denies pain at this time.

## 2025-03-09 PROCEDURE — 25000003 PHARM REV CODE 250: Mod: HCNC | Performed by: HOSPITALIST

## 2025-03-09 PROCEDURE — 11000004 HC SNF PRIVATE: Mod: HCNC

## 2025-03-09 PROCEDURE — 63600175 PHARM REV CODE 636 W HCPCS: Mod: HCNC | Performed by: FAMILY MEDICINE

## 2025-03-09 PROCEDURE — 25000242 PHARM REV CODE 250 ALT 637 W/ HCPCS: Mod: HCNC | Performed by: HOSPITALIST

## 2025-03-09 RX ADMIN — CEFPODOXIME PROXETIL 100 MG: 100 TABLET, FILM COATED ORAL at 08:03

## 2025-03-09 RX ADMIN — SENNOSIDES AND DOCUSATE SODIUM 1 TABLET: 50; 8.6 TABLET ORAL at 08:03

## 2025-03-09 RX ADMIN — ENOXAPARIN SODIUM 40 MG: 40 INJECTION SUBCUTANEOUS at 05:03

## 2025-03-09 RX ADMIN — ACETAMINOPHEN 650 MG: 325 TABLET ORAL at 08:03

## 2025-03-09 RX ADMIN — LEVOTHYROXINE SODIUM 50 MCG: 0.05 TABLET ORAL at 05:03

## 2025-03-09 NOTE — PLAN OF CARE
Problem: Fall Injury Risk  Goal: Absence of Fall and Fall-Related Injury  Outcome: Progressing  Intervention: Promote Injury-Free Environment  Flowsheets (Taken 3/9/2025 1216)  Safety Promotion/Fall Prevention: assistive device/personal item within reach     Problem: Fall Injury Risk  Goal: Absence of Fall and Fall-Related Injury  Outcome: Progressing  Intervention: Promote Injury-Free Environment  Flowsheets (Taken 3/9/2025 1216)  Safety Promotion/Fall Prevention: assistive device/personal item within reach     Problem: Fall Injury Risk  Goal: Absence of Fall and Fall-Related Injury  Outcome: Progressing  Intervention: Promote Injury-Free Environment  Flowsheets (Taken 3/9/2025 1216)  Safety Promotion/Fall Prevention: assistive device/personal item within reach

## 2025-03-09 NOTE — PLAN OF CARE
Problem: Fall Injury Risk  Goal: Absence of Fall and Fall-Related Injury  Outcome: Progressing  Intervention: Promote Injury-Free Environment  Flowsheets (Taken 3/9/2025 1216)  Safety Promotion/Fall Prevention: assistive device/personal item within reach

## 2025-03-10 PROBLEM — E44.1 MILD MALNUTRITION: Status: ACTIVE | Noted: 2025-03-10

## 2025-03-10 LAB
ANION GAP SERPL CALC-SCNC: 9 MMOL/L (ref 8–16)
BASOPHILS # BLD AUTO: 0.06 K/UL (ref 0–0.2)
BASOPHILS NFR BLD: 0.7 % (ref 0–1.9)
BUN SERPL-MCNC: 19 MG/DL (ref 8–23)
CALCIUM SERPL-MCNC: 8.2 MG/DL (ref 8.7–10.5)
CHLORIDE SERPL-SCNC: 103 MMOL/L (ref 95–110)
CO2 SERPL-SCNC: 22 MMOL/L (ref 23–29)
CREAT SERPL-MCNC: 0.7 MG/DL (ref 0.5–1.4)
DIFFERENTIAL METHOD BLD: ABNORMAL
EOSINOPHIL # BLD AUTO: 0.2 K/UL (ref 0–0.5)
EOSINOPHIL NFR BLD: 2.1 % (ref 0–8)
ERYTHROCYTE [DISTWIDTH] IN BLOOD BY AUTOMATED COUNT: 12.9 % (ref 11.5–14.5)
EST. GFR  (NO RACE VARIABLE): >60 ML/MIN/1.73 M^2
GLUCOSE SERPL-MCNC: 92 MG/DL (ref 70–110)
HCT VFR BLD AUTO: 37 % (ref 37–48.5)
HGB BLD-MCNC: 12.1 G/DL (ref 12–16)
IMM GRANULOCYTES # BLD AUTO: 0.06 K/UL (ref 0–0.04)
IMM GRANULOCYTES NFR BLD AUTO: 0.7 % (ref 0–0.5)
LYMPHOCYTES # BLD AUTO: 3.6 K/UL (ref 1–4.8)
LYMPHOCYTES NFR BLD: 41.4 % (ref 18–48)
MAGNESIUM SERPL-MCNC: 2.1 MG/DL (ref 1.6–2.6)
MCH RBC QN AUTO: 30.6 PG (ref 27–31)
MCHC RBC AUTO-ENTMCNC: 32.7 G/DL (ref 32–36)
MCV RBC AUTO: 94 FL (ref 82–98)
MONOCYTES # BLD AUTO: 0.7 K/UL (ref 0.3–1)
MONOCYTES NFR BLD: 8.1 % (ref 4–15)
NEUTROPHILS # BLD AUTO: 4 K/UL (ref 1.8–7.7)
NEUTROPHILS NFR BLD: 47 % (ref 38–73)
NRBC BLD-RTO: 0 /100 WBC
PHOSPHATE SERPL-MCNC: 3.4 MG/DL (ref 2.7–4.5)
PLATELET # BLD AUTO: 246 K/UL (ref 150–450)
PMV BLD AUTO: 10.6 FL (ref 9.2–12.9)
POTASSIUM SERPL-SCNC: 3.8 MMOL/L (ref 3.5–5.1)
RBC # BLD AUTO: 3.95 M/UL (ref 4–5.4)
SODIUM SERPL-SCNC: 134 MMOL/L (ref 136–145)
WBC # BLD AUTO: 8.6 K/UL (ref 3.9–12.7)

## 2025-03-10 PROCEDURE — 25000003 PHARM REV CODE 250: Mod: HCNC | Performed by: NURSE PRACTITIONER

## 2025-03-10 PROCEDURE — 25000242 PHARM REV CODE 250 ALT 637 W/ HCPCS: Mod: HCNC | Performed by: NURSE PRACTITIONER

## 2025-03-10 PROCEDURE — 63600175 PHARM REV CODE 636 W HCPCS: Mod: HCNC | Performed by: FAMILY MEDICINE

## 2025-03-10 PROCEDURE — 97530 THERAPEUTIC ACTIVITIES: CPT | Mod: HCNC

## 2025-03-10 PROCEDURE — 97116 GAIT TRAINING THERAPY: CPT | Mod: HCNC

## 2025-03-10 PROCEDURE — 25000003 PHARM REV CODE 250: Mod: HCNC | Performed by: HOSPITALIST

## 2025-03-10 PROCEDURE — 97110 THERAPEUTIC EXERCISES: CPT | Mod: HCNC

## 2025-03-10 PROCEDURE — 36415 COLL VENOUS BLD VENIPUNCTURE: CPT | Mod: HCNC | Performed by: HOSPITALIST

## 2025-03-10 PROCEDURE — 80048 BASIC METABOLIC PNL TOTAL CA: CPT | Mod: HCNC | Performed by: HOSPITALIST

## 2025-03-10 PROCEDURE — 25000242 PHARM REV CODE 250 ALT 637 W/ HCPCS: Mod: HCNC | Performed by: HOSPITALIST

## 2025-03-10 PROCEDURE — 83735 ASSAY OF MAGNESIUM: CPT | Mod: HCNC | Performed by: HOSPITALIST

## 2025-03-10 PROCEDURE — 11000004 HC SNF PRIVATE: Mod: HCNC

## 2025-03-10 PROCEDURE — 97535 SELF CARE MNGMENT TRAINING: CPT | Mod: HCNC

## 2025-03-10 PROCEDURE — 84100 ASSAY OF PHOSPHORUS: CPT | Mod: HCNC | Performed by: HOSPITALIST

## 2025-03-10 PROCEDURE — 85025 COMPLETE CBC W/AUTO DIFF WBC: CPT | Mod: HCNC | Performed by: HOSPITALIST

## 2025-03-10 RX ORDER — SODIUM BICARBONATE 650 MG/1
650 TABLET ORAL ONCE
Status: COMPLETED | OUTPATIENT
Start: 2025-03-10 | End: 2025-03-10

## 2025-03-10 RX ORDER — FAMOTIDINE 20 MG/1
20 TABLET, FILM COATED ORAL DAILY
Status: DISCONTINUED | OUTPATIENT
Start: 2025-03-11 | End: 2025-03-14

## 2025-03-10 RX ORDER — AMOXICILLIN 250 MG
1 CAPSULE ORAL 2 TIMES DAILY PRN
Status: DISCONTINUED | OUTPATIENT
Start: 2025-03-10 | End: 2025-04-04 | Stop reason: HOSPADM

## 2025-03-10 RX ORDER — ALUMINUM HYDROXIDE, MAGNESIUM HYDROXIDE, AND SIMETHICONE 2400; 240; 2400 MG/30ML; MG/30ML; MG/30ML
30 SUSPENSION ORAL EVERY 6 HOURS PRN
Status: DISCONTINUED | OUTPATIENT
Start: 2025-03-10 | End: 2025-04-04 | Stop reason: HOSPADM

## 2025-03-10 RX ADMIN — CEFPODOXIME PROXETIL 100 MG: 100 TABLET, FILM COATED ORAL at 08:03

## 2025-03-10 RX ADMIN — LEVOTHYROXINE SODIUM 50 MCG: 0.05 TABLET ORAL at 05:03

## 2025-03-10 RX ADMIN — ACETAMINOPHEN 650 MG: 325 TABLET ORAL at 08:03

## 2025-03-10 RX ADMIN — ENOXAPARIN SODIUM 40 MG: 40 INJECTION SUBCUTANEOUS at 05:03

## 2025-03-10 RX ADMIN — SENNOSIDES AND DOCUSATE SODIUM 1 TABLET: 50; 8.6 TABLET ORAL at 08:03

## 2025-03-10 RX ADMIN — SODIUM BICARBONATE 650 MG TABLET 650 MG: at 01:03

## 2025-03-10 NOTE — PLAN OF CARE
Problem: Fall Injury Risk  Goal: Absence of Fall and Fall-Related Injury  Outcome: Progressing  Intervention: Promote Injury-Free Environment  Flowsheets (Taken 3/10/2025 2709)  Safety Promotion/Fall Prevention: assistive device/personal item within reach

## 2025-03-10 NOTE — PLAN OF CARE
Continue regular diet, honor food preferences, assist with set up,RD following  Goals: PO to meet 85% of EEN/EPN by next RD follow up  Nutrition Goal Status: new  Communication of RD Recs: other (comment) (POC)     Nutrition Discharge Planning   General diet     Assessment and Plan     Endocrine  Mild malnutrition  Malnutrition Type:  Context: acute illness or injury  Level: mild     Related to (etiology):   Inadequate oral intake, nausea     Signs and Symptoms (as evidenced by):   Decreased appetite      Malnutrition Characteristic Summary:  Energy Intake (Malnutrition): less than 75% for greater than 7 days  Subcutaneous Fat (Malnutrition): mild depletion  Muscle Mass (Malnutrition): moderate depletion        Interventions/Recommendations (treatment strategy):  Continue regular diet, honor food preferences, assist with set up,RD following     Nutrition Diagnosis Status:   New

## 2025-03-10 NOTE — CONSULTS
[facility] - Skilled Nursing  Adult Nutrition  Consult Note    SUMMARY   Recommendations  Continue regular diet, honor food preferences, assist with set up,RD following  Goals: PO to meet 85% of EEN/EPN by next RD follow up  Nutrition Goal Status: new  Communication of RD Recs: other (comment) (POC)    Nutrition Discharge Planning   General diet       Assessment and Plan    Endocrine  Mild malnutrition  Malnutrition Type:  Context: acute illness or injury  Level: mild    Related to (etiology):   Inadequate oral intake, nausea    Signs and Symptoms (as evidenced by):   Decreased appetite     Malnutrition Characteristic Summary:  Energy Intake (Malnutrition): less than 75% for greater than 7 days  Subcutaneous Fat (Malnutrition): mild depletion  Muscle Mass (Malnutrition): moderate depletion      Interventions/Recommendations (treatment strategy):  Continue regular diet, honor food preferences, assist with set up,RD following    Nutrition Diagnosis Status:   New         Malnutrition Assessment 3/10  Malnutrition Context: acute illness or injury  Malnutrition Level: mild  Skin (Micronutrient): pallor, bruised, other (see comments), thinned (skin tears)  Hair/Scalp (Micronutrient): dry  Eyes (Micronutrient): conjunctiva dull  Teeth (Micronutrient): none  Neck/Chest (Micronutrient): muscle wasting  Musculoskeletal/Lower Extremities: muscle wasting, subcutaneous fat loss       Energy Intake (Malnutrition): less than 75% for greater than 7 days  Subcutaneous Fat (Malnutrition): mild depletion  Muscle Mass (Malnutrition): moderate depletion   Orbital Region (Subcutaneous Fat Loss): mild depletion (mild buccal)  Upper Arm Region (Subcutaneous Fat Loss): mild depletion  Thoracic and Lumbar Region: well nourished   Scientologist Region (Muscle Loss): mild depletion  Clavicle and Acromion Bone Region (Muscle Loss): moderate depletion  Dorsal Hand (Muscle Loss): moderate depletion  Patellar Region (Muscle Loss): mild  "depletion  Anterior Thigh Region (Muscle Loss): moderate depletion  Posterior Calf Region (Muscle Loss): mild depletion                 Reason for Assessment    Reason For Assessment: consult  Diagnosis:  (Acute Cystitiis, N/V)  General Information Comments: Debility,dementia, Lives alone but daughter lives next door. Daughter does work,  Past Medical History:   Diagnosis Date    Arthritis     Cataract     Hypothyroidism     Mild malnutrition 3/10/2025    Osteoporosis 2015      Nutrition/Diet History    Nutrition Intake History: Patient has never been a breakfast eater, she eats about 11 am, she is eating breakfast here, today she ate well, one morning she was too nauseated to eat. She will not drink ONS that daughter purchases for her, so will hold off on that for now.  Food Preferences: She likes ghramn crackers and ice cream. milkshakes but not ONS  Spiritual, Cultural Beliefs, Anabaptist Practices, Values that Affect Care: no  Food Allergies: NKFA  Factors Affecting Nutritional Intake: nausea/vomiting  Food Insecurity: No Food Insecurity (3/21/2024)    Hunger Vital Sign     Worried About Running Out of Food in the Last Year: Never true     Ran Out of Food in the Last Year: Never true      Anthropometrics    Height: 4' 11" (149.9 cm)  Height (inches): 59 in  Weight: 54.2 kg (119 lb 7.8 oz)  Weight (lb): 119.49 lb  Weight Method: Standard Scale  Ideal Body Weight (IBW), Female: 95 lb  % Ideal Body Weight, Female (lb): 125.78 %  BMI (Calculated): 24.1  BMI Grade: 18.5-24.9 - normal  Usual Body Weight (UBW), k.7 kg  Weight Change Amount:  (patient weight has been stable all year until this admission)  % Usual Body Weight: 95.79  % Weight Change From Usual Weight: -4.41 %       Lab/Procedures/Meds    Pertinent Labs Reviewed: reviewed  Pertinent Labs Comments: Na 134, Ca 8.2,  Pertinent Medications Reviewed: reviewed  Pertinent Medications Comments: Levothyroxine, Abx, Lovenox    Estimated/Assessed " Needs    Weight Used For Calorie Calculations: 54.2 kg (119 lb 7.8 oz)  Energy Calorie Requirements (kcal): 1235  Energy Need Method: [place]-St Jeor (x 1.4(PAL))  Protein Requirements: 65-70  Weight Used For Protein Calculations: 54.2 kg (119 lb 7.8 oz) (1.2-1.3g/kg)  Fluid Requirements (mL): 1235 or per MD  Estimated Fluid Requirement Method: RDA Method  RDA Method (mL): 1235  CHO Requirement: -      Nutrition Prescription Ordered    Current Diet Order: Regular  Nutrition Order Comments: PO 50-75%  Oral Nutrition Supplement: refuses    Evaluation of Received Nutrient/Fluid Intake    I/O: +1160  Energy Calories Required: meeting needs  Protein Required: not meeting needs  Fluid Required: not meeting needs  Comments: patient may be holding off on fluids as she does not want to have to go to bathroom and wait for help to get there  Tolerance: tolerating  % Intake of Estimated Energy Needs: 50 - 75 %  % Meal Intake: 50 - 75 %    Nutrition Risk    Level of Risk/Frequency of Follow-up: low (one time per week)       Monitor and Evaluation    Monitor and Evaluation: Food and beverage intake, Weight, Electrolyte and renal panel, Gastrointestinal profile       Nutrition Follow-Up    RD Follow-up?: Yes

## 2025-03-10 NOTE — PROGRESS NOTES
[facility]                                  Skilled Nursing Facility                   Progress Note     Admit Date: 3/7/2025  MURPHY 4/1/2025  [id number]  Principal Problem:  Acute cystitis without hematuria   HPI obtained from patient interview and chart review     Chief Complaint: Establish Care/ Initial Visit     HPI:   Isabel Hernandez is an 89 year old female with PMHx of thyroid disease, stage II CKD, history of compression fractures who presents to SNF following hospitalization for acute cystitis.  Admission to SNF for secondary weakness and debility.    Patient originally presented to [facility] ED on 3/5 reporting increased fatigue x2 days, diarrhea, nausea/vomiting.  Per hospital notes, Hisotry is obtained with assistance from daughter at bedside. Daughter states pt lives next door on her own, normally ambulates independently. On Monday, daughter found patient on the ground. It looked as though she had fallen because a shoe was out of place and she had vomited on herself. Daughter checks her for injuries and pt has reported just slipping. Tuesday pt had very poor appetite, still felt nauseated. Today, patient was again unable to get herself up at home and was complaining of persistent nausea, generalized weakness, lightheadedness,  belching. They had gone to get her a walker the day prior since she was so weak and they attempted to have her use it but she was too weak to hold on and take a few steps which is very far from her baseline. Patient reports feeling confused, mild headache, weakness, abdominal cramping, belching. Also reports left sided low back/flank pain.  In the ED: Afebrile and HDS. UA with 3+ LE with >100 WBC. WBC 10. K 3.0. TB 1.2, . TSH 4.342 and normal free T4. Lipase 24. Flu negative. CT head no acute intracranial abnormality. CT cervical w/ no acute fracture or subluxation of the cervical spine. CT A/P w/ no  "acute abnormality of the abdomen or pelvis, remote compression fractures of the T8, T10, T11, and T12 vertebral bodies, colonic diverticulosis without acute diverticulitis. Admitted for dizziness, falls, weakness.  Noted to have UTI.  She was started on rocephin in the ED which has been continued.  She denies dizziness to me but daughter says that she was complaining of some earlier while she was working with PT."  Urine culture with multiple organisms isolated but none in predominance.  PT/OT evaluated patient in his recommending for moderate intensity therapy, patient transferred to SNF.    Today, patient is continuing to report dizziness.  Daughter at bedside states that she is not at her mental baseline.  Patient states her age at 89 which is close to her correct age of 89.  She is oriented to place.  She does not know the year.  Patient's daughter reports that she has been having a lot of belching and acid reflux/GERD symptoms.  Will start patient on Pepcid.  We discussed that tizanidine that is on patient's orders, I feel that this would possibly make her dizziness worse, patient's daughter was not familiar with her ever taking that medication, will discontinue.      Patient will be treated at [facility] with PT and OT to improve functional status and ability to perform ADLs.     Past Medical History: Patient has a past medical history of Arthritis, Cataract, Hypothyroidism, and Osteoporosis (2015).    Past Surgical History: Patient has a past surgical history that includes Thyroidectomy, partial; Hysterectomy; Cholecystectomy; Tonsillectomy; Adenoidectomy; Breast surgery (Bilateral);  section; Vitrectomy by pars plana approach (Left, 2018); and Appendectomy.    Social History: Patient reports that she has never smoked. She has never used smokeless tobacco. She reports that she does not drink alcohol and does not use drugs.    Family History: family history includes Cancer in her brother, " father, and sister; Cataracts in her father; Glaucoma in her father; Hypertension in her mother; Kidney disease in her mother; Miscarriages / Stillbirths in her mother; No Known Problems in her brother and daughter; Thyroid disease in her mother.    Allergies: Patient is allergic to codeine, fosamax [alendronate], and iron analogues.    ROS  Constitutional: Negative for fever   Respiratory: Negative for cough, shortness of breath   Cardiovascular: Negative for chest pain, palpitations  Gastrointestinal: Negative for abdominal pain, constipation, diarrhea, nausea, vomiting.   Genitourinary: Negative for dysuria, frequency   Musculoskeletal:  + generalized weakness. Negative for back pain and myalgias.   Neurological: Negative for  headaches.  +dizziness  Psychiatric/Behavioral: Negative for depression. The patient is nervous/anxious.      24 hour Vital Sign Range   Temp:  [97.9 °F (36.6 °C)-98.3 °F (36.8 °C)]   Pulse:  [70-87]   Resp:  [18]   BP: (122-145)/(60-69)   SpO2:  [95 %-96 %]     Current BMI: Body mass index is 24.13 kg/m².    PEx  Constitutional: Patient appears debilitated.  No distress noted  Cardiovascular: Normal rate, regular rhythm and normal heart sounds.    Pulmonary/Chest: Effort normal and breath sounds are clear  Abdominal: Soft. Bowel sounds are normal.   Musculoskeletal: Normal range of motion.   Neurological: Alert and oriented to person, place.  Disoriented to time.  Impaired higher level thinking  Psychiatric: Normal mood and affect. Behavior is normal.   Skin: Skin is warm and dry.     Recent Labs   Lab 03/10/25  0400   *   K 3.8      CO2 22*   BUN 19   CREATININE 0.7   CALCIUM 8.2*   MG 2.1     Recent Labs   Lab 03/10/25  0400   WBC 8.60   RBC 3.95*   HGB 12.1   HCT 37.0      MCV 94   MCH 30.6   MCHC 32.7     Recent Labs   Lab 03/06/25  0143 03/06/25  0718   POCTGLUCOSE 95 85        Assessment and Plan:    Acute cystitis without hematuria  - Presenting with nausea/  generalized weakness/ lightheadedness and suprapubic discomfort and confusion   - CT AP without acute findings   - UA infectious, Urine culture with mixed growth; no predominant organism  - treated with IV ceftriaxone empirically  - continue cefpodoxime 100 mg BID x6 days, ending 3/13    Nausea and vomiting  - Suspected related to UTI or possibly viral illness   - CT AP without acute findings  - Flu/Covid negative   - continue Zofran PRN     Dizziness  - possible vertigo  - CTH negative  - possible ENT follow-up  - perform Epley maneuver when patient is back in bed  - continue meclizine 12.5 mg TID PRN    Dementia without behavioral disturbance, psychotic disturbance, mood disturbance, or anxiety, unspecified dementia severity, unspecified dementia type   - per past PCP notes patient has opted out of medication for dementia  Delirium precautions:  - Avoid antihistamines, anticholinergics, hypnotics, and minimize opiates while controlling for pain as these medications may exacerbate delirium. Cues for day/night will assist with keeping patient calm and oriented - during daytime, please keep adequate light in room (open windows, lights on) and please keep room dim at night-time to encourage normal sleep-wake cycles. Continuing to have nursing and family reorient the patient and encourage family to visit     CKD Stage 3   - stable, continue monitor twice weekly BMPs, avoid nephrotoxic agents, renally dose medications as appropriate    Metabolic acidosis, mild  - initiated sodium bicarbonate 650 mg x 1 dose    Hyponatremia  - mild, asymptomatic, encourage proper nutrition  - continue monitor twice weekly BMPs    Hypothyroid  - TSH slightly elevated with normal free T4  - Continue home levothyroxine 50 mcg daily    Age-related osteoporosis without current pathological fracture   - per PCP notes, patient is supposed to be on Prolia.  Do not see on dispensed report    Debility   - Continue with PT/OT for gait training and  strengthening and restoration of ADL's   - Encourage mobility, OOB in chair, and early ambulation as appropriate  - Fall precautions   - Monitor for bowel and bladder dysfunction  - Monitor for and prevent skin breakdown and pressure ulcers  - Continue DVT prophylaxis with     - discontinued PRN tizanidine    Anticipate disposition:  Home with home health    IP [facility] RISK OF UNPLANNED READMISSION Model: MODERATE     Follow-up needed during SNF stay-    Follow-up needed after discharge from SNF: PCP     Future Appointments   Date Time Provider Department Center   4/7/2025  2:45 PM Colten Oleary DPM [facility] P[facility]   5/13/2025 12:00 PM Danielle Vaughan FNP [facility] IM [facility]   6/2/2025  2:45 PM Kristian Shabazz MD [facility] [place]   7/21/2025  2:45 PM Kristian Shabazz MD [facility] [place]     Advance Care Planning   This was a voluntary visit in the option to decline counseling was given  Length of Conversation:  18 minutes  Topics Discussed: Disease process of dementia, advanced age  Overview of Materials/Resources given(if applicable):  Discussed code status.  Discussed what it means to be DNR.  Discussed what resuscitative measures are in detail.  All topics recapped and questions answered.  Outcome of Discussion:  Patient's daughter states that patient has a living will that states that patient is a DNR.  Individuals present:  Myself, patient and patient's daughter  Decision Maker: Isabel Hernandez, patient along of her daughter    I certify that SNF services are required to be given on an inpatient basis because Isabel Hernandez needs for skilled nursing care and/or skilled rehabilitation are required on a daily basis and such services can only practically be provided in a skilled nursing facility setting and are for an ongoing condition for which she received inpatient care in the hospital.     Total time of the visit 132 minutes (does not include ACP time)  Description of  non physical exam/non charting time: counseling patient on clinical conditions and therapies provided.  Extensive chart review completed including all consultation notes.  All pertinent laboratory and radiographical images reviewed.  Care coordination with staff      Radha Spencer NP  Department of Hospital Medicine   [facility]     DOS: 3/10/2025       Patient note was created using MModal Dictation.  Any errors in syntax or even information may not have been identified and edited on initial review prior to signing this note.

## 2025-03-10 NOTE — PT/OT/SLP PROGRESS
Occupational Therapy   Treatment    Name: Isabel Hernandez  MRN: [id number]  Admit Date: 3/7/2025  Admitting Diagnosis:  Acute cystitis without hematuria    General Precautions: Standard, fall   Orthopedic Precautions: N/A   Braces: N/A    Recommendations:     Discharge Recommendations:     Level of Assistance Recommended at Discharge: 24 hours physical assistance for all ADL's and home management tasks  Discharge Equipment Recommendations: none  Barriers to discharge:  Other (Comment) (increased assist needed for self care and mobility)    Assessment:     Isabel Hernandez is a 89 y.o. female with a medical diagnosis of Acute cystitis without hematuria .  She presents with performance deficits affecting function are weakness, impaired endurance, impaired self care skills, impaired functional mobility, gait instability, impaired cognition, decreased safety awareness, impaired cardiopulmonary response to activity.   Pt tolerated Tx without incident and is making progress but continues to require assist to perform self care tasks, functional mobility and functional transfers .  She would continue to benefit from OT intervention to further her functional (I)ce and safety.     Rehab Potential is good    Activity tolerance:  Good    Plan:     Patient to be seen 5 x/week to address the above listed problems via self-care/home management, therapeutic activities, therapeutic exercises    Plan of Care Expires: 03/29/25  Plan of Care Reviewed with: patient    Subjective     Communicated with: nurse prior to session. .    Pain/Comfort:  Pain Rating 1: 0/10  Pain Rating Post-Intervention 1: 0/10    Patient's cultural, spiritual, Orthodox conflicts given the current situation:  no    Objective:     Patient found up in bedside chair with  (no active lines) upon OT entry to room.    Bed Mobility:    Pt seated in bedside chair at onset of therapy session.      Functional Mobility/Transfers:  Patient completed Sit <> Stand Transfer with  contact guard assistance  with  rolling walker   Patient completed Bed <> Chair Transfer using Step Transfer technique with contact guard assistance with rolling walker  Patient completed Toilet Transfer Step Transfer technique with contact guard assistance with  rolling walker  Functional Mobility: Pt ambulated with RW from EOB to restroom 8 ft  and performed toilet transfer with  CGA. with no loss of balance observed but V/Cs and minimal contact required for safety.      Activities of Daily Living:  Lower Body Dressing: minimum assistance with Pt donning/doffing pants, socks and slip on shoes. Pt using figure 4 method to don/doff socks and shoes. RW to steady when standing to manage clothing over her bottom.    Toileting: minimum assistance with (A) to thoroughly clean her rear. Pt toileting on raised toilet seat over toilet with RW to steady when standing.    [facility] 6 Click ADL: 18    OT Exercises: Pt performed UBE exercise for 10 minutes with  Mod resistance.  UE exercises performed to increase functional endurance and strength in order increase independence when performing self care tasks, functional ambulation, W/C propulsion, and functional standing activities .     Treatment & Education:  Pt edu on  POC, safety when performing self care tasks ,and safety when performing functional transfers and mobility.  - White board updated  - Self care tasks completed-- as noted above      Patient left up in bedside chair with call button in reach    GOALS:   Multidisciplinary Problems       Occupational Therapy Goals          Problem: Occupational Therapy    Goal Priority Disciplines Outcome Interventions   Occupational Therapy Goal     OT, PT/OT Progressing    Description: Goals to be met by: 3/29/25     Patient will increase functional independence with ADLs by performing:    Feeding with [place].  UE Dressing with [place].  LE Dressing with [place].  Grooming while standing with Modified  [place].  Toileting from toilet with Modified [place] for hygiene and clothing management.   Bathing from  shower chair/bench with Supervision.  Sitting at edge of bed x10 minutes with [place].  Step transfer with Modified [place]  Toilet transfer to toilet with Modified [place].                         Time Tracking:     OT Date of Treatment: 03/10/25  OT Start Time: 1330    OT Stop Time: 1409  OT Total Time (min): 39 min    Billable Minutes:Self Care/Home Management 19  Therapeutic Activity 10  Therapeutic Exercise 10    3/10/2025

## 2025-03-10 NOTE — PT/OT/SLP PROGRESS
"Physical Therapy Treatment    Patient Name:  Isabel Hernandez   MRN:  [id number]  Admit Date: 3/7/2025  Admitting Diagnosis: Acute cystitis without hematuria  Recent Surgeries: N/A    General Precautions: Standard, fall  Orthopedic Precautions: N/A  Braces: N/A    Recommendations:     Discharge Recommendations: home health PT  Level of Assistance Recommended at Discharge: 24 hours light assistance  Discharge Equipment Recommendations: walker, rolling, wheelchair, bath bench  Barriers to discharge: Inaccessible home environment    Assessment:     Isabel Hernandez is a 89 y.o. female admitted with a medical diagnosis of Acute cystitis without hematuria. Patient agreeable to PT treatment this AM. Patient able to ambulate increased gait distance this session. Patient tolerated seated BLE exercises without adverse reaction. Patient pleasant throughout session. Patient will benefit from continued SNF rehabilitation services to address deficits as well as progress mobility towards PLOF and increased functional independence for safe transition to home environment at time of discharge.       Performance deficits affecting function: weakness, impaired endurance, impaired self care skills, impaired functional mobility, gait instability, impaired balance, decreased lower extremity function, decreased safety awareness, impaired cognition, impaired cardiopulmonary response to activity.    Rehab Potential is good    Activity Tolerance: Good    Plan:     Patient to be seen 5 x/week to address the above listed problems via gait training, therapeutic activities, therapeutic exercises, neuromuscular re-education, wheelchair management/training    Plan of Care Expires: 04/07/25  Plan of Care Reviewed with: patient    Subjective     "Where are we going?"     Pain/Comfort:  Pain Rating 1: 0/10  Pain Rating Post-Intervention 1: 0/10    Patient's cultural, spiritual, Scientology conflicts given the current situation:  no    Objective: " "    Communicated with nursing staff prior to session.  Patient found up in chair with  (no active lines) upon PT entry to room.     Therapeutic Activities and Exercises:   Seated BLE exercises x 20 reps including ankle DF/PF, LAQs, hip flexion, hip abduction/adduction and glute sets. Performed to improve ROM and strengthening for safe and efficient engagement in functional mobility.     Repeated functional transfers performed this session. See details below.     Functional Mobility:  Transfers:     Sit to Stand:  stand by assistance and contact guard assistance with rolling walker  Bedside chair to W/C: CGA using RW with stand step transfer  W/C to bedside chair: CGA without an AD via stand pivot  Gait: Patient ambulated 40 feet and 46 feet using RW with CGA. Seated rest break in W/C between trials. No LOB. Steady gait speed. W/C in tow per PT assist.   Balance: Static standing using RW with CGA  Stairs:  Pt ascended/descended 4" curb step with Rolling Walker with no handrails with Contact Guard Assistance.   Wheelchair Propulsion:  Pt propelled Light weight wheelchair x approximately 92 feet on Level tile with  Bilateral upper extremity with Supervision or Set-up Assistance and Stand-by Assistance.     AM-PAC 6 CLICK MOBILITY  17    Patient left up in chair with call button in reach and PCT notified.    GOALS:   Multidisciplinary Problems       Physical Therapy Goals          Problem: Physical Therapy    Goal Priority Disciplines Outcome Interventions   Physical Therapy Goal     PT, PT/OT Progressing    Description: Goals to be met by: 25     Patient will increase functional independence with mobility by performin. Supine to sit with Supervision  2. Sit to supine with Supervision  3. Rolling to Left and Right with Supervision  4. Sit to stand transfer with Supervision  5. Bed to chair transfer with Supervision using Rolling Walker  6. Gait  x 150 feet with Stand-by Assistance using Rolling Walker  7. " Wheelchair propulsion x 50 feet with Supervision using bilateral upper extremities  8. Ascend/descend 4 stairs with bilateral Handrail and Contact Guard Assistance using No Assistive Device.  9. Ascend/Descend 4 inch curb step with Contact Guard Assistance using Rolling Walker                         Time Tracking:     PT Received On: 03/10/25  PT Start Time: 1139  PT Stop Time: 1220  PT Total Time (min): 41 min    Billable Minutes: Gait Training 15, Therapeutic Activity 15, and Therapeutic Exercise 11    Treatment Type: Treatment  PT/PTA: PT     Number of PTA visits since last PT visit: 0     03/10/2025

## 2025-03-10 NOTE — ASSESSMENT & PLAN NOTE
Malnutrition Type:  Context: acute illness or injury  Level: mild    Related to (etiology):   Inadequate oral intake, nausea    Signs and Symptoms (as evidenced by):   Decreased appetite     Malnutrition Characteristic Summary:  Energy Intake (Malnutrition): less than 75% for greater than 7 days  Subcutaneous Fat (Malnutrition): mild depletion  Muscle Mass (Malnutrition): moderate depletion      Interventions/Recommendations (treatment strategy):  Continue regular diet, honor food preferences, assist with set up,RD following    Nutrition Diagnosis Status:   New

## 2025-03-10 NOTE — CLINICAL REVIEW
Clinical Pharmacy Chart Review Note      Admit Date: 3/7/2025   LOS: 3 days       Isabel Hernandez is a 89 y.o. female admitted to [facility]F for PT/OT after hospitalization for acute cystitis without hematuria.    Active Hospital Problems    Diagnosis  POA    *Acute cystitis without hematuria [N30.00]  Yes    Dizziness [R42]  Yes    Nausea and vomiting [R11.2]  Yes    Dementia without behavioral disturbance, psychotic disturbance, mood disturbance, or anxiety, unspecified dementia severity, unspecified dementia type [F03.90]  Yes    Hypothyroid [E03.9]  Yes     Repeat tsh today         Resolved Hospital Problems   No resolved problems to display.     Review of patient's allergies indicates:   Allergen Reactions    Codeine Nausea And Vomiting    Fosamax [alendronate] Nausea Only    Iron analogues Nausea And Vomiting     Patient Active Problem List    Diagnosis Date Noted    Dizziness 03/07/2025    LBBB (left bundle branch block) 03/06/2025    Generalized weakness 03/06/2025    Hypokalemia 03/05/2025    Acute cystitis without hematuria 03/05/2025    Nausea and vomiting 03/05/2025    Dementia without behavioral disturbance, psychotic disturbance, mood disturbance, or anxiety, unspecified dementia severity, unspecified dementia type 08/22/2023    Stage 3 chronic kidney disease, unspecified whether stage 3a or 3b CKD 08/22/2023    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    Macular hole, full thickness, left 06/26/2018    Stage 2 chronic kidney disease 07/26/2017    Nephrolithiasis 09/23/2015    Aortic atherosclerosis 09/23/2015    Age-related osteoporosis without current pathological fracture 09/23/2015    Hypothyroid 03/05/2013       Scheduled Meds:    cefpodoxime  100 mg Oral Q12H    enoxparin  40 mg Subcutaneous Q24H (prophylaxis, 1700)    levothyroxine  50 mcg Oral Before breakfast    senna-docusate 8.6-50 mg  1 tablet Oral BID     Continuous Infusions:   PRN Meds:   Current Facility-Administered Medications:      acetaminophen, 650 mg, Oral, Q6H PRN    acetaminophen, 650 mg, Oral, Q6H PRN    calcium carbonate, 500 mg, Oral, BID PRN    meclizine, 12.5 mg, Oral, TID PRN    melatonin, 6 mg, Oral, Nightly PRN    ondansetron, 4 mg, Oral, Q8H PRN    tiZANidine, 4 mg, Oral, Q8H PRN    OBJECTIVE:     Vital Signs (Last 24H)  Temp:  [97.9 °F (36.6 °C)-98.3 °F (36.8 °C)]   Pulse:  [70-87]   Resp:  [18]   BP: (122-145)/(60-69)   SpO2:  [95 %-96 %]     Laboratory:  CBC:   Recent Labs   Lab 03/06/25  0327 03/07/25  0418 03/10/25  0400   WBC 8.53 7.85 8.60   RBC 3.84* 3.83* 3.95*   HGB 11.9* 12.0 12.1   HCT 35.0* 37.8 37.0    175 246   MCV 91 99* 94   MCH 31.0 31.3* 30.6   MCHC 34.0 31.7* 32.7     BMP:   Recent Labs   Lab 03/06/25 0327 03/07/25  0418 03/10/25  0400   GLU 81 91 92    135* 134*   K 3.7 3.8 3.8    104 103   CO2 22* 22* 22*   BUN 12 15 19   CREATININE 0.7 0.7 0.7   CALCIUM 7.8* 8.1* 8.2*   MG 1.8 2.1 2.1     CMP:   Recent Labs   Lab 03/05/25 1757 03/06/25 0327 03/07/25  0418 03/10/25  0400   GLU 80 81 91 92   CALCIUM 8.0* 7.8* 8.1* 8.2*   ALBUMIN 3.0* 2.8* 2.8*  --    PROT 5.9* 5.6* 5.6*  --     138 135* 134*   K 3.0* 3.7 3.8 3.8   CO2 24 22* 22* 22*    106 104 103   BUN 14 12 15 19   CREATININE 0.7 0.7 0.7 0.7   ALKPHOS 55 53 53  --    ALT 15 15 14  --    AST 33 38 32  --    BILITOT 1.2* 1.0 0.5  --      LFTs:   Recent Labs   Lab 03/05/25  1757 03/06/25  0327 03/07/25  0418   ALT 15 15 14   AST 33 38 32   ALKPHOS 55 53 53   BILITOT 1.2* 1.0 0.5   PROT 5.9* 5.6* 5.6*   ALBUMIN 3.0* 2.8* 2.8*     Others:   Recent Labs   Lab 03/05/25 1757   TSH 4.342*     Lab Results   Component Value Date    TSH 4.342 (H) 03/05/2025    FREET4 1.09 03/05/2025         ASSESSMENT/PLAN:     I have reviewed the medications in compliance with CMS Regulation F756 of the [place]. Based on information gathered, the following items may need to be addressed:    **Meclizine is ordered as needed for dizziness,  nausea/vomiting.   This medication is a Beers drug and potentially inappropriate in older adult patients. Monitor use and consider discontinuation if patient is not using or adverse effects observed.      Medications reviewed by PharmD, please re-consult if needed.      Heena Bauer, Pharm. D.  Clinical Pharmacist  [facility]

## 2025-03-10 NOTE — PLAN OF CARE
Problem: Occupational Therapy  Goal: Occupational Therapy Goal  Description: Goals to be met by: 3/29/25     Patient will increase functional independence with ADLs by performing:    Feeding with [place].  UE Dressing with [place].  LE Dressing with [place].  Grooming while standing with Modified [place].  Toileting from toilet with Modified [place] for hygiene and clothing management.   Bathing from  shower chair/bench with Supervision.  Sitting at edge of bed x10 minutes with [place].  Step transfer with Modified [place]  Toilet transfer to toilet with Modified [place].    Outcome: Progressing

## 2025-03-11 PROBLEM — E87.1 HYPONATREMIA: Status: ACTIVE | Noted: 2025-03-11

## 2025-03-11 PROCEDURE — 25000003 PHARM REV CODE 250: Mod: HCNC | Performed by: HOSPITALIST

## 2025-03-11 PROCEDURE — 97116 GAIT TRAINING THERAPY: CPT | Mod: HCNC,CQ

## 2025-03-11 PROCEDURE — 97530 THERAPEUTIC ACTIVITIES: CPT | Mod: HCNC,CO

## 2025-03-11 PROCEDURE — 25000242 PHARM REV CODE 250 ALT 637 W/ HCPCS: Mod: HCNC | Performed by: NURSE PRACTITIONER

## 2025-03-11 PROCEDURE — 25000003 PHARM REV CODE 250: Mod: HCNC | Performed by: INTERNAL MEDICINE

## 2025-03-11 PROCEDURE — 97530 THERAPEUTIC ACTIVITIES: CPT | Mod: HCNC,CQ

## 2025-03-11 PROCEDURE — 97110 THERAPEUTIC EXERCISES: CPT | Mod: HCNC,CQ

## 2025-03-11 PROCEDURE — 97110 THERAPEUTIC EXERCISES: CPT | Mod: HCNC,CO

## 2025-03-11 PROCEDURE — 63600175 PHARM REV CODE 636 W HCPCS: Mod: HCNC | Performed by: FAMILY MEDICINE

## 2025-03-11 PROCEDURE — 25000003 PHARM REV CODE 250: Mod: HCNC | Performed by: NURSE PRACTITIONER

## 2025-03-11 PROCEDURE — 11000004 HC SNF PRIVATE: Mod: HCNC

## 2025-03-11 RX ORDER — HYDROCODONE BITARTRATE AND ACETAMINOPHEN 5; 325 MG/1; MG/1
1 TABLET ORAL ONCE
Refills: 0 | Status: COMPLETED | OUTPATIENT
Start: 2025-03-11 | End: 2025-03-11

## 2025-03-11 RX ADMIN — LEVOTHYROXINE SODIUM 50 MCG: 0.05 TABLET ORAL at 06:03

## 2025-03-11 RX ADMIN — HYDROCODONE BITARTRATE AND ACETAMINOPHEN 1 TABLET: 5; 325 TABLET ORAL at 10:03

## 2025-03-11 RX ADMIN — ACETAMINOPHEN 650 MG: 325 TABLET ORAL at 09:03

## 2025-03-11 RX ADMIN — FAMOTIDINE 20 MG: 20 TABLET, FILM COATED ORAL at 08:03

## 2025-03-11 RX ADMIN — CEFPODOXIME PROXETIL 100 MG: 100 TABLET, FILM COATED ORAL at 08:03

## 2025-03-11 RX ADMIN — ACETAMINOPHEN 650 MG: 325 TABLET ORAL at 08:03

## 2025-03-11 RX ADMIN — ENOXAPARIN SODIUM 40 MG: 40 INJECTION SUBCUTANEOUS at 05:03

## 2025-03-11 NOTE — PT/OT/SLP PROGRESS
"Physical Therapy Treatment    Patient Name:  Isabel Hernandez   MRN:  [id number]  Admit Date: 3/7/2025  Admitting Diagnosis: Acute cystitis without hematuria  Recent Surgeries:     General Precautions: Standard, fall  Orthopedic Precautions: N/A  Braces: N/A    Recommendations:     Discharge Recommendations: home health PT  Level of Assistance Recommended at Discharge: 24 hours light assistance  Discharge Equipment Recommendations: walker, rolling, wheelchair, bath bench  Barriers to discharge: Inaccessible home environment    Assessment:     Isabel Hernandez is a 89 y.o. female admitted with a medical diagnosis of Acute cystitis without hematuria . Pt tolerated well, pt is very pleasant, demo good effort, most limiting factor LBP and some dizziness off/on, pt said neither is new,  146/65 HR 74 nsg aware/notified, very quiet, pt would continue to benefit from skilled PT services to improve overall functional mobility, strength and endurance.  .      Performance deficits affecting function: weakness, impaired endurance, impaired self care skills, impaired functional mobility, gait instability, impaired balance, decreased lower extremity function, decreased safety awareness, impaired cognition, impaired cardiopulmonary response to activity.    Rehab Potential is good    Activity Tolerance: Fair    Plan:     Patient to be seen 5 x/week to address the above listed problems via gait training, therapeutic activities, therapeutic exercises, neuromuscular re-education, wheelchair management/training    Plan of Care Expires: 04/07/25  Plan of Care Reviewed with: patient    Subjective     "Not to good, stomach upset" nsg present giving meds pt agreeable to therapy "might do better after lunch".     Pain/Comfort:  Pain Rating 1: 7/10  Location - Side 1: Left  Location - Orientation 1: lower  Location 1: back  Pain Addressed 1: Reposition, Distraction, Cessation of Activity, Nurse notified (originally asked for tylenol but later said " she wanted to wait)  Pain Rating Post-Intervention 1: 7/10 (inc with mobility, dec with rest and repositioning)    Patient's cultural, spiritual, Yarsanism conflicts given the current situation:  no    Objective:       Patient found  with  (in BSC) upon PT entry to room.   Patient Education: Education on the importance/benefits of PT services, Safety/proper tech with trfs/gait, On the importance of increasing OOB tolerance/prolong affects of bed rest, Use of the call button for A with OOB mobility/use of AD/fall risk., pt verbalized understanding      Therapeutic Activities and Exercises: 2x10 reps AP,GS,LAQ,hip flex,abd/add ed within tolerable ROM    Functional Mobility:  Transfers:     Sit to Stand:  stand by assistance and contact guard assistance with rolling walker  Bed to Chair: stand by assistance and contact guard assistance with  no AD and rolling walker  using  Stand Pivot and vcs for sequencing/positioning with/without use of AD  Gait: amb with RW CGA 52 ft and 38 ft seated rest break wc follow    AM-PAC 6 CLICK MOBILITY  17    Patient left up in chair with call button in reach and belonging sin reach .    GOALS:   Multidisciplinary Problems       Physical Therapy Goals          Problem: Physical Therapy    Goal Priority Disciplines Outcome Interventions   Physical Therapy Goal     PT, PT/OT Progressing    Description: Goals to be met by: 25     Patient will increase functional independence with mobility by performin. Supine to sit with Supervision  2. Sit to supine with Supervision  3. Rolling to Left and Right with Supervision  4. Sit to stand transfer with Supervision  5. Bed to chair transfer with Supervision using Rolling Walker  6. Gait  x 150 feet with Stand-by Assistance using Rolling Walker  7. Wheelchair propulsion x 50 feet with Supervision using bilateral upper extremities  8. Ascend/descend 4 stairs with bilateral Handrail and Contact Guard Assistance using No Assistive Device.  9.  Ascend/Descend 4 inch curb step with Contact Guard Assistance using Rolling Walker                         Time Tracking:     PT Received On: 03/11/25  PT Start Time: 0840  PT Stop Time: 0934  PT Total Time (min): 54 min    Billable Minutes: Gait Training 16, Therapeutic Activity 23, and Therapeutic Exercise 15    Treatment Type: Treatment  PT/PTA: PTA     Number of PTA visits since last PT visit: 1 03/11/2025

## 2025-03-11 NOTE — PLAN OF CARE
Problem: Occupational Therapy  Goal: Occupational Therapy Goal  Description: Goals to be met by: 3/29/25     Patient will increase functional independence with ADLs by performing:    Feeding with [place].  UE Dressing with [place].  LE Dressing with [place].  Grooming while standing with Modified [place].  Toileting from toilet with Modified [place] for hygiene and clothing management.   Bathing from  shower chair/bench with Supervision.  Sitting at edge of bed x10 minutes with [place].  Step transfer with Modified [place]  Toilet transfer to toilet with Modified [place].    Outcome: Progressing

## 2025-03-11 NOTE — PLAN OF CARE
Problem: Fall Injury Risk  Goal: Absence of Fall and Fall-Related Injury  Outcome: Progressing  Intervention: Promote Injury-Free Environment  Flowsheets (Taken 3/11/2025 1650)  Safety Promotion/Fall Prevention: assistive device/personal item within reach

## 2025-03-11 NOTE — PLAN OF CARE
Skilled Nursing IDT Note  Date: 3/10/25       Patient Name:  Isabel Hernandez       Medical Record Number: [id number]   YOB: 1935  Sex: Female          Room/Bed:  [id number]/HGWL619 A  Payor Info:  Payor: [employer] MEDICARE / Plan: [employer] MEDICARE HMO / Product Type: Capitation /      Admitting Diagnosis:   Acute cystitis without hematuria [N30.00]  Acute cystitis without hematuria [N30.00]   Admit Date/Time:  3/7/2025  6:33 PM    Primary Diagnosis:  Acute cystitis without hematuria  Principal Problem: Acute cystitis without hematuria    Patient Active Problem List    Diagnosis Date Noted    Hyponatremia 03/11/2025    Mild malnutrition 03/10/2025    Dizziness 03/07/2025    LBBB (left bundle branch block) 03/06/2025    Generalized weakness 03/06/2025    Hypokalemia 03/05/2025    Acute cystitis without hematuria 03/05/2025    Nausea and vomiting 03/05/2025    Dementia without behavioral disturbance, psychotic disturbance, mood disturbance, or anxiety, unspecified dementia severity, unspecified dementia type 08/22/2023    Stage 3 chronic kidney disease, unspecified whether stage 3a or 3b CKD 08/22/2023    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    Macular hole, full thickness, left 06/26/2018    Stage 2 chronic kidney disease 07/26/2017    Nephrolithiasis 09/23/2015    Aortic atherosclerosis 09/23/2015    Age-related osteoporosis without current pathological fracture 09/23/2015    Hypothyroid 03/05/2013       Team Members Present: Latosha Alford LPN, , Selam Turk, OT, Rehab, Xochitl Puri, Activities, and Merari Hartmann, Dietician    Patient/Family Present: Patient and patient's daughter                                                Issues/Consults: Wants more time    Caregiver at Discharge: Daughter    Living Setting at Discharge:  Patient resides home alone in [facility] with 2 CAROL. Daughter resides next door. Patient has a WIS and tub/shower. Daughter reports patient  prefers tub/shower.     Anticipated Discharge Date:  4/1/25    Supervision Recommended at Discharge: 24 hours light assistance    Follow-up Services:   Home Health  physical therapy and occupational therapy     Preferred Home Health: [facility]    Preferred Pharmacy: [facility]

## 2025-03-11 NOTE — PT/OT/SLP PROGRESS
"Occupational Therapy   Treatment    Name: Isabel Hernandez  MRN: [id number]  Admit Date: 3/7/2025  Admitting Diagnosis:  Acute cystitis without hematuria    General Precautions: Standard, fall   Orthopedic Precautions: N/A   Braces: N/A    Recommendations:     Discharge Recommendations:     Level of Assistance Recommended at Discharge: 24 hours physical assistance for all ADL's and home management tasks  Discharge Equipment Recommendations: none  Barriers to discharge:  Other (Comment) (increased assist needed for self care and mobility)    Assessment:     Isabel Hernandez is a 89 y.o. female with a medical diagnosis of Acute cystitis without hematuria .  She presents with performance deficits affecting function are weakness, impaired endurance, impaired self care skills, impaired functional mobility, gait instability, impaired cognition, decreased safety awareness, impaired cardiopulmonary response to activity. Pt participated well during today's session. Pt tolerated tx session without incident and is making progress, however, continues to demonstrate deficits with self care skills, balance, functional mobility, UB strength and endurance. Pt will benefit from continued OT services to progress towards goals.     Rehab Potential is good    Activity tolerance:  Good    Plan:     Patient to be seen 5 x/week to address the above listed problems via self-care/home management, therapeutic activities, therapeutic exercises    Plan of Care Expires: 03/29/25  Plan of Care Reviewed with: patient, family    Subjective   "I'm a little tired"  Communicated with: Missy prior to session.     Pain/Comfort:  Pain Rating 1: 7/10  Location - Side 1: Left  Location - Orientation 1: lower  Location 1: back  Pain Addressed 1: Reposition, Distraction, Cessation of Activity  Pain Rating Post-Intervention 1: 7/10    Patient's cultural, spiritual, Latter-day conflicts given the current situation:  no    Objective:     Patient found  in bedside chair  " with family present upon OT entry to room.    Functional Mobility/Transfers:  Patient completed Sit <> Stand Transfer with contact guard assistance  with  rolling walker   Patient completed Bedside chair <> Wheelchair Transfer using Step Transfer technique with contact guard assistance with rolling walker    [facility] 6 Click ADL: 18    OT Exercises: UE Ergometer 10 min with min resistance   AROM x 2 sets of 15 with 2 lb dowel. Pt perform shoulder flex/ext, forward flex motion and bicep curls.   Therex performed to improve overall endurance, ROM and UB strength required for functional transfers, ADL's and w/c propulsion.     Treatment & Education:  - Pt seated at table to match various sized washers to dowel sticks with focus on functional reaching, fine motor skills and visual scanning. Pt required cueing to match correct washers to sticks.     Pt educated on:  - role of OT  - level of assistance  - energy conservation and task modification to maximize independence with ADL's and mobility   -  safety while performing functional transfers and self care tasks  - orthopedic precautions.   - progress towards OT goals     Patient left up in chair with call button in reach    GOALS:   Multidisciplinary Problems       Occupational Therapy Goals          Problem: Occupational Therapy    Goal Priority Disciplines Outcome Interventions   Occupational Therapy Goal     OT, PT/OT Progressing    Description: Goals to be met by: 3/29/25     Patient will increase functional independence with ADLs by performing:    Feeding with [place].  UE Dressing with [place].  LE Dressing with [place].  Grooming while standing with Modified [place].  Toileting from toilet with Modified [place] for hygiene and clothing management.   Bathing from  shower chair/bench with Supervision.  Sitting at edge of bed x10 minutes with [place].  Step transfer with Modified [place]  Toilet transfer to toilet with Modified  [employer].                         Time Tracking:     OT Date of Treatment: 03/11/25  OT Start Time: 1328    OT Stop Time: 1410  OT Total Time (min): 42 min    Billable Minutes:Therapeutic Activity 26  Therapeutic Exercise 16    3/11/2025  A client care conference was performed between the LOTR and TRUONG, prior to treatment by TRUONG, to discuss the patient's status, treatment plan and established goals.

## 2025-03-11 NOTE — PLAN OF CARE
Problem: Adult Inpatient Plan of Care  Goal: Plan of Care Review  Outcome: Progressing  Goal: Patient-Specific Goal (Individualized)  Outcome: Progressing  Goal: Absence of Hospital-Acquired Illness or Injury  Outcome: Progressing  Goal: Optimal Comfort and Wellbeing  Outcome: Progressing  Goal: Readiness for Transition of Care  Outcome: Progressing     Problem: Fall Injury Risk  Goal: Absence of Fall and Fall-Related Injury  Outcome: Progressing     Problem: Skin Injury Risk Increased  Goal: Skin Health and Integrity  Outcome: Progressing     Problem: Malnutrition  Goal: Improved Nutritional Intake  Outcome: Progressing

## 2025-03-11 NOTE — DISCHARGE SUMMARY
[facility] [address] [place] [address] Medicine  Discharge Summary      Patient Name: Isabel Hernandez  MRN: [id number]  PAPO: [id number]  Patient Class: IP- Inpatient  Admission Date: 3/5/2025  Hospital Length of Stay: 1 days  Discharge Date and Time: 3/7/2025  6:06 PM  Attending Physician: Marisela att. providers found   Discharging Provider: Valentino Graves DO  Primary Care Provider: Kristian Shabazz MD  [facility] Medicine Team[place] [facility] [place] [facility] [place] Valentino Graves DO  [place] Care Team: [facility] [place] [facility]    HPI:   Isabel Hernandez is a 89 y.o. female with PMHx of thyroid disease, stage II CKD, history of compression fractures admitted to  with nausea/vomiting, UTI and weakness. Hisotry is obtained with assistance from daughter at bedside. Daughter states pt lives next door on her own, normally ambulates independently. On Monday, daughter found patient on the ground. It looked as though she had fallen because a shoe was out of place and she had vomited on herself. Daughter checks her for injuries and pt has reported just slipping. Tuesday pt had very poor appetite, still felt nauseated. Today, patient was again unable to get herself up at home and was complaining of persistent nausea, generalized weakness, lightheadedness,  belching. They had gone to get her a walker the day prior since she was so weak and they attempted to have her use it but she was too weak to hold on and take a few steps which is very far from her baseline. No known sick contacts. Patient reports feeling confused, mild headache, weakness, abdominal cramping, belching. Also reports left sided low back/flank pain. Denies urinary complaint, diarrhea, or more episodes of emesis. Denies chest pain, SOB, focal weakness or dizziness/vertigo.     In the ED: Afebrile and HDS. UA with 3+ LE with >100 WBC. WBC 10. K 3.0. TB 1.2, . TSH 4.342 and normal free T4. Lipase 24. Flu negative. CT head no acute intracranial abnormality. CT cervical w/ no acute fracture  or subluxation of the cervical spine. CT A/P w/ no acute abnormality of the abdomen or pelvis, remote compression fractures of the T8, T10, T11, and T12 vertebral bodies, colonic diverticulosis without acute diverticulitis.  Given IV Ceftriaxone 1g, 1L IVF, Zofran x 2. Was unable to tolerate oral meds or potassium.     * No surgery found *      Hospital Course:   Admitted for dizziness, falls, weakness.  Noted to have UTI.  CTH negative, CT c spine negative, CT A/P w/ no acute abnormality of the abdomen or pelvis, remote compression fractures of the T8, T10, T11, and T12 vertebral bodies, colonic diverticulosis without acute diverticulitis.  She was started on rocephin in the ED which has been continued.  She denies dizziness to me but daughter says that she was complaining of some earlier while she was working with PT.  Urine culture is pending with mixed organisms.  PT/OT recommending SNF; placement found at [facility].  Discharging on oral abx.  Also c/o severe dizziness that started on day of presentation and was intermittent.  Given concern for posterior circulation CVA that could be the etiology of her dizziness we did offer MR brain, but she declined.  Instead she wanted to trial meclizine and follow up outpatient.  Pt deemed appropriate for discharge; seen and examined prior to departure.  Plan discussed with pt, who was agreeable and amenable; medications were discussed and reviewed, outpatient follow-up scheduled, ER precautions were given, all questions were answered to the pt's satisfaction, and was subsequently discharged.     Goals of Care Treatment Preferences:  Code Status: DNR    Living Will: Yes                 Consults:     Assessment & Plan  Acute cystitis without hematuria  Presenting with nausea/generalized weakness/lightheadedness and suprapubic discomfort and confusion all of which may be related to UTI as the rest of her work up thus far is unremarkable besides hypokalemia     CT AP without acute  findings   0/4 SIRS. No fever, no leukocytosis   UA infectious  Urine culture with mixed growth; no predominant organism  IV ceftriaxone empirically; continue  PRN antiemetics    Hypokalemia  Resolved  Monitor    Hypothyroid  TSH slightly elevated with normal free T4  Continue home synthroid    Nausea and vomiting  Suspected related to UTI or possibly viral illness   CT AP without acute findings  TBili 1.2 but LFTs otherwise normal, lipase normal, no leukocytosis   Flu/Covid negative   PRN antiemetics ordered (EKG to monitor Qtc)  IV ceftriaxone for UTI  Electrolyte replacement as needed  IVF if not tolerating PO. CLD advance as tolerated    Generalized weakness  Normally ambulates without assistance and lives alone (with daughter next door)  Now acutely weak and unable to even use walker daughter just purchased  Possibly from acute UTI  Flu/covid negative. CPK 200s.   CTH without acute findings.   CT AP with remote thoracic compression fractures only.   No hip/pelvic or other injury noted  PT/OT consulted  IVF/electrolyte replacement  UTI tx  Working on SNF placement    Dizziness  Episodes sound like vertigo  Some days worse than others  CTH negative  Trial meclizine    Stage 3 chronic kidney disease, unspecified whether stage 3a or 3b CKD  Creatine stable for now. BMP reviewed- noted Estimated Creatinine Clearance: 43.6 mL/min (based on SCr of 0.7 mg/dL). according to latest data. Based on current GFR, CKD stage is stage 2 - GFR 60-89.  Monitor UOP and serial BMP and adjust therapy as needed. Renally dose meds. Avoid nephrotoxic medications and procedures    Final Active Diagnoses:    Diagnosis Date Noted POA    PRINCIPAL PROBLEM:  Acute cystitis without hematuria [N30.00] 03/05/2025 Yes    Dizziness [R42] 03/07/2025 Yes    Generalized weakness [R53.1] 03/06/2025 Yes    Hypokalemia [E87.6] 03/05/2025 Yes    Nausea and vomiting [R11.2] 03/05/2025 Yes    Stage 3 chronic kidney disease, unspecified whether stage 3a  or 3b CKD [N18.30] 08/22/2023 Yes    Hypothyroid [E03.9] 03/05/2013 Yes      Problems Resolved During this Admission:       Discharged Condition: good    Disposition: Skilled Nursing Facility    Follow Up:    Patient Instructions:   No discharge procedures on file.    Significant Diagnostic Studies: N/A    Pending Diagnostic Studies:       None           Medications:  Reconciled Home Medications:      Medication List        START taking these medications      cefpodoxime 100 MG tablet  Commonly known as: VANTIN  Take 1 tablet (100 mg total) by mouth 2 (two) times daily. for 6 days     meclizine 12.5 mg tablet  Commonly known as: ANTIVERT  Take 1 tablet (12.5 mg total) by mouth 2 (two) times daily as needed for Dizziness.            CONTINUE taking these medications      levothyroxine 50 MCG tablet  Commonly known as: SYNTHROID  TAKE 1 TABLET EVERY DAY              Indwelling Lines/Drains at time of discharge:   Lines/Drains/Airways       None                   Time spent on the discharge of patient: >35 minutes         Valentino Graves DO  Department of Hospital Medicine  [facility]

## 2025-03-12 PROCEDURE — 97116 GAIT TRAINING THERAPY: CPT | Mod: HCNC,CQ

## 2025-03-12 PROCEDURE — 63600175 PHARM REV CODE 636 W HCPCS: Mod: HCNC | Performed by: FAMILY MEDICINE

## 2025-03-12 PROCEDURE — 11000004 HC SNF PRIVATE: Mod: HCNC

## 2025-03-12 PROCEDURE — 25000242 PHARM REV CODE 250 ALT 637 W/ HCPCS: Mod: HCNC | Performed by: NURSE PRACTITIONER

## 2025-03-12 PROCEDURE — 97530 THERAPEUTIC ACTIVITIES: CPT | Mod: HCNC,CQ

## 2025-03-12 PROCEDURE — 25000003 PHARM REV CODE 250: Mod: HCNC | Performed by: HOSPITALIST

## 2025-03-12 PROCEDURE — 97110 THERAPEUTIC EXERCISES: CPT | Mod: HCNC,CQ

## 2025-03-12 PROCEDURE — 25000003 PHARM REV CODE 250: Mod: HCNC | Performed by: FAMILY MEDICINE

## 2025-03-12 PROCEDURE — 25000003 PHARM REV CODE 250: Mod: HCNC | Performed by: NURSE PRACTITIONER

## 2025-03-12 RX ORDER — ACETAMINOPHEN 325 MG/1
650 TABLET ORAL 3 TIMES DAILY
Status: DISCONTINUED | OUTPATIENT
Start: 2025-03-12 | End: 2025-04-04 | Stop reason: HOSPADM

## 2025-03-12 RX ORDER — LIDOCAINE 50 MG/G
1 PATCH TOPICAL
Status: DISCONTINUED | OUTPATIENT
Start: 2025-03-12 | End: 2025-04-04 | Stop reason: HOSPADM

## 2025-03-12 RX ADMIN — LIDOCAINE 1 PATCH: 50 PATCH CUTANEOUS at 09:03

## 2025-03-12 RX ADMIN — CEFPODOXIME PROXETIL 100 MG: 100 TABLET, FILM COATED ORAL at 09:03

## 2025-03-12 RX ADMIN — LEVOTHYROXINE SODIUM 50 MCG: 0.05 TABLET ORAL at 05:03

## 2025-03-12 RX ADMIN — MECLIZINE 12.5 MG: 12.5 TABLET ORAL at 05:03

## 2025-03-12 RX ADMIN — MECLIZINE 12.5 MG: 12.5 TABLET ORAL at 01:03

## 2025-03-12 RX ADMIN — FAMOTIDINE 20 MG: 20 TABLET, FILM COATED ORAL at 09:03

## 2025-03-12 RX ADMIN — ACETAMINOPHEN 650 MG: 325 TABLET ORAL at 09:03

## 2025-03-12 RX ADMIN — ACETAMINOPHEN 650 MG: 325 TABLET ORAL at 05:03

## 2025-03-12 RX ADMIN — CALCIUM CARBONATE (ANTACID) CHEW TAB 500 MG 500 MG: 500 CHEW TAB at 08:03

## 2025-03-12 RX ADMIN — ONDANSETRON 4 MG: 4 TABLET, ORALLY DISINTEGRATING ORAL at 09:03

## 2025-03-12 RX ADMIN — ENOXAPARIN SODIUM 40 MG: 40 INJECTION SUBCUTANEOUS at 05:03

## 2025-03-12 NOTE — PT/OT/SLP PROGRESS
"Physical Therapy Treatment    Patient Name:  Isabel Hernandez   MRN:  [id number]  Admit Date: 3/7/2025  Admitting Diagnosis: Acute cystitis without hematuria  Recent Surgeries:     General Precautions: Standard, fall  Orthopedic Precautions: N/A  Braces: N/A    Recommendations:     Discharge Recommendations: home health PT  Level of Assistance Recommended at Discharge: 24 hours light assistance  Discharge Equipment Recommendations: walker, rolling, wheelchair, bath bench  Barriers to discharge: Inaccessible home environment    Assessment:     Isabel Hernandez is a 89 y.o. female admitted with a medical diagnosis of Acute cystitis without hematuria .Pt agreeable for therapy, reports having increased dizziness this PM, /65 in sitting, 110/63 in standing w/reports of dizziness, RN administers medicine, performed seated therex and was able to ambulate w/reduced dizziness.  Remained up in chair at end of session    Performance deficits affecting function: weakness, impaired endurance, impaired self care skills, impaired functional mobility, gait instability, impaired balance, decreased lower extremity function, decreased safety awareness, impaired cognition, impaired cardiopulmonary response to activity.    Rehab Potential is fair    Activity Tolerance: Fair    Plan:     Patient to be seen 5 x/week to address the above listed problems via gait training, therapeutic activities, therapeutic exercises, neuromuscular re-education, wheelchair management/training    Plan of Care Expires: 04/07/25  Plan of Care Reviewed with: patient    Subjective     "I'm feeling dizzy".     Pain/Comfort:  Pain Rating 1: 8/10  Location - Side 1: Bilateral  Location - Orientation 1: upper  Location 1: back  Pain Addressed 1: Reposition, Distraction, Nurse notified  Pain Rating Post-Intervention 1: 8/10    Patient's cultural, spiritual, Anabaptism conflicts given the current situation:  no    Objective:     Patient found up in chair with  (no " active lines) upon PT entry to room.     Therapeutic Activities and Exercises: Ankle pumps, LAQ, marching x 10 in sitting  Mini elliptical x 10 mins    Functional Mobility:  Transfers:     Sit to Stand:  stand by assistance with rolling walker; performed sit <> stand x 3  Gait: 50' w/RW and SBA, increased unsteadiness towards end of gait, fatigued after activity    AM-PAC 6 CLICK MOBILITY  17    Patient left up in chair with all lines intact and call button in reach.    GOALS:   Multidisciplinary Problems       Physical Therapy Goals          Problem: Physical Therapy    Goal Priority Disciplines Outcome Interventions   Physical Therapy Goal     PT, PT/OT Progressing    Description: Goals to be met by: 25     Patient will increase functional independence with mobility by performin. Supine to sit with Supervision  2. Sit to supine with Supervision  3. Rolling to Left and Right with Supervision  4. Sit to stand transfer with Supervision  5. Bed to chair transfer with Supervision using Rolling Walker  6. Gait  x 150 feet with Stand-by Assistance using Rolling Walker  7. Wheelchair propulsion x 50 feet with Supervision using bilateral upper extremities  8. Ascend/descend 4 stairs with bilateral Handrail and Contact Guard Assistance using No Assistive Device.  9. Ascend/Descend 4 inch curb step with Contact Guard Assistance using Rolling Walker                         Time Tracking:     PT Received On: 25  PT Start Time: 1305  PT Stop Time: 1344  PT Total Time (min): 39 min    Billable Minutes: Gait Training 15, Therapeutic Activity 8, and Therapeutic Exercise 16    Treatment Type: Treatment  PT/PTA: PTA     Number of PTA visits since last PT visit: 2     2025

## 2025-03-12 NOTE — NURSING
"Patient  complained of right flank pain with rest and activity. Patient stated pain is a 10/10 on pain scale. Patient received prn dose of Acetaminophen with no relief. Heat packs were also offered and placed with no relief.  Patient stated," this pain is new, starting this afternoon. Nurse notified Irving MCCLOUD. One time dose of Norco given. Plan of care ongoing. Safety maintained.   "

## 2025-03-12 NOTE — NURSING
Patient complaining of dizziness after PCT assisted patient into wheelchair. Vital signs taken as documented: T-97.7. 150/61, 83, 97%..PRN dose of Meclizine given.

## 2025-03-12 NOTE — PROGRESS NOTES
[facility]                                  Skilled Nursing Facility                   Progress Note     Admit Date: 3/7/2025  MURPHY 2025  [id number]  Principal Problem:  Acute cystitis without hematuria   HPI obtained from patient interview and chart review     Chief Complaint: Re-evaluation of medical treatment and therapy status, dizziness    HPI:   Isabel Hernandez is an 89 year old female with PMHx of thyroid disease, stage II CKD, history of compression fractures who presents to SNF following hospitalization for acute cystitis.  Admission to SNF for secondary weakness and debility.    Interval history: 24 hr vital sign ranges reviewed and listed below.  Patient continues to have intermittent dizziness.  Patient has been sitting up in a wheelchair for most of the day today, will perform Epley maneuver next time patient is in bed.  Patient denies shortness of breath, abdominal discomfort, nausea, or vomiting.  Patient reports an adequate appetite.  Patient denies dysuria.  Patient reports having regular bowel movements.  Patient progessing with PT/OT-Patient ambulated 40 feet and 46 feet using RW with CGA. Seated rest break in W/C between trials. No LOB. Steady gait speed. W/C in tow per PT assist. Continuing to follow and treat all acute and chronic conditions.     Past Medical History: Patient has a past medical history of Arthritis, Cataract, Hypothyroidism, Mild malnutrition (3/10/2025), and Osteoporosis (2015).    Past Surgical History: Patient has a past surgical history that includes Thyroidectomy, partial; Hysterectomy; Cholecystectomy; Tonsillectomy; Adenoidectomy; Breast surgery (Bilateral);  section; Vitrectomy by pars plana approach (Left, 2018); and Appendectomy.    Social History: Patient reports that she has never smoked. She has never used smokeless tobacco. She reports that she does not drink  "alcohol and does not use drugs.    Family History: family history includes Cancer in her brother, father, and sister; Cataracts in her father; Glaucoma in her father; Hypertension in her mother; Kidney disease in her mother; Miscarriages / Stillbirths in her mother; No Known Problems in her brother and daughter; Thyroid disease in her mother.    Allergies: Patient is allergic to codeine, fosamax [alendronate], and iron analogues.    ROS  Constitutional: Negative for fever   Respiratory: Negative for cough, shortness of breath   Cardiovascular: Negative for chest pain, palpitations  Gastrointestinal: Negative for abdominal pain, constipation, diarrhea, nausea, vomiting.   Genitourinary: Negative for dysuria, frequency   Musculoskeletal:  + generalized weakness. Negative for back pain and myalgias.   Neurological: Negative for  headaches.  +dizziness  Psychiatric/Behavioral: Negative for depression. The patient is nervous/anxious.      24 hour Vital Sign Range   Temp:  [97.5 °F (36.4 °C)-98 °F (36.7 °C)]   Pulse:  [67-83]   Resp:  [17-18]   BP: (130-150)/(59-61)   SpO2:  [96 %-98 %]     Current BMI: Body mass index is 24.13 kg/m².    PEx  Constitutional: Patient appears debilitated.  No distress noted  Cardiovascular: Normal rate, regular rhythm and normal heart sounds.    Pulmonary/Chest: Effort normal and breath sounds are clear  Abdominal: Soft. Bowel sounds are normal.   Musculoskeletal: Normal range of motion.   Neurological: Alert and oriented to person, place.  Disoriented to time.  Impaired higher level thinking  Psychiatric: Normal mood and affect. Behavior is normal.   Skin: Skin is warm and dry.     No results for input(s): "GLUCOSE", "NA", "K", "CL", "CO2", "BUN", "CREATININE", "CALCIUM", "MG" in the last 24 hours.    No results for input(s): "WBC", "RBC", "HGB", "HCT", "PLT", "MCV", "MCH", "MCHC" in the last 24 hours.    Recent Labs   Lab 03/06/25  0143 03/06/25  0718   POCTGLUCOSE 95 85        Assessment " and Plan:    Acute cystitis without hematuria  - Presenting with nausea/ generalized weakness/ lightheadedness and suprapubic discomfort and confusion   - CT AP without acute findings   - UA infectious, Urine culture with mixed growth; no predominant organism  - treated with IV ceftriaxone empirically  - continue cefpodoxime 100 mg BID x6 days, ending 3/13    Nausea and vomiting  - Suspected related to UTI or possibly viral illness   - CT AP without acute findings  - Flu/Covid negative   - continue Zofran PRN  - resolved     Dizziness  - possible vertigo  - CTH negative  - possible ENT follow-up  - perform Epley maneuver when patient is back in bed  - persisting, continue meclizine 12.5 mg TID PRN    Dementia without behavioral disturbance, psychotic disturbance, mood disturbance, or anxiety, unspecified dementia severity, unspecified dementia type   - per past PCP notes patient has opted out of medication for dementia  Delirium precautions:  - Avoid antihistamines, anticholinergics, hypnotics, and minimize opiates while controlling for pain as these medications may exacerbate delirium. Cues for day/night will assist with keeping patient calm and oriented - during daytime, please keep adequate light in room (open windows, lights on) and please keep room dim at night-time to encourage normal sleep-wake cycles. Continuing to have nursing and family reorient the patient and encourage family to visit  - per daughter patient has not yet at her mental baseline patient is typically more oriented.      CKD Stage 3   - stable, continue monitor twice weekly BMPs, avoid nephrotoxic agents, renally dose medications as appropriate    Hyponatremia  - mild, asymptomatic, encourage proper nutrition  - continue monitor twice weekly BMPs    Hypothyroid  - TSH slightly elevated with normal free T4  - stable, Continue home levothyroxine 50 mcg daily    Age-related osteoporosis without current pathological fracture   - per PCP notes,  patient is supposed to be on Prolia.  Do not see on dispensed report    Advance care planning  - ACP discussion held with patient and her daughter on 03/10, per daughter patient had living will that states patient is a DNR    Debility   - Continue with PT/OT for gait training and strengthening and restoration of ADL's   - Encourage mobility, OOB in chair, and early ambulation as appropriate  - Fall precautions   - Monitor for bowel and bladder dysfunction  - Monitor for and prevent skin breakdown and pressure ulcers  - Continue DVT prophylaxis with       Anticipate disposition:  Home with home health    IP [facility] RISK OF UNPLANNED READMISSION Model: MODERATE     Follow-up needed during SNF stay-    Follow-up needed after discharge from SNF: PCP     Future Appointments   Date Time Provider Department Center   4/7/2025  2:45 PM Colten Oleary DPM [facility]   5/13/2025 12:00 PM Danielle Vaughan FNP [facility]   6/2/2025  2:45 PM Kristian Shabazz MD [facility]   7/21/2025  2:45 PM Kristian Shabazz MD [facility]     I certify that SNF services are required to be given on an inpatient basis because Isabel Hernandez needs for skilled nursing care and/or skilled rehabilitation are required on a daily basis and such services can only practically be provided in a skilled nursing facility setting and are for an ongoing condition for which she received inpatient care in the hospital.     I spent 102 minutes reviewing patient records, examining, and counseling the patient/ patient's family with greater than 50% of the time spent in direct patient care and coordination.  Outpatient clinic records reviewed.  Family updated at bedside.      Radha Spencer NP  Department of Hospital Medicine   [facility]     DOS: 3/11/2025       Patient note was created using MModal Dictation.  Any errors in syntax or even information may not have  been identified and edited on initial review prior to signing this note.

## 2025-03-12 NOTE — PROGRESS NOTES
[facility]                                  Skilled Nursing Facility                   Progress Note     Admit Date: 3/7/2025  MURPHY 2025  [id number] A  Principal Problem:  Acute cystitis without hematuria   HPI obtained from patient interview and chart review     Chief Complaint: Re-evaluation of medical treatment and therapy status, dizziness    HPI:   Iasbel Hernandez is an 89 year old female with PMHx of thyroid disease, stage II CKD, history of compression fractures who presents to SNF following hospitalization for acute cystitis.  Admission to SNF for secondary weakness and debility.    Interval history: 24 hr vital sign ranges reviewed and listed below.  Patient continues to have intermittent dizziness.  Patient denies shortness of breath, abdominal discomfort, nausea, or vomiting.  Patient reports an adequate appetite.  Patient denies dysuria.  Patient reports having regular bowel movements.  Patient progessing with PT/OT-with RW CGA 52 ft and 38 ft seated rest break wc follow. Continuing to follow and treat all acute and chronic conditions.     Past Medical History: Patient has a past medical history of Arthritis, Cataract, Hypothyroidism, Mild malnutrition (3/10/2025), and Osteoporosis (2015).    Past Surgical History: Patient has a past surgical history that includes Thyroidectomy, partial; Hysterectomy; Cholecystectomy; Tonsillectomy; Adenoidectomy; Breast surgery (Bilateral);  section; Vitrectomy by pars plana approach (Left, 2018); and Appendectomy.    Social History: Patient reports that she has never smoked. She has never used smokeless tobacco. She reports that she does not drink alcohol and does not use drugs.    Family History: family history includes Cancer in her brother, father, and sister; Cataracts in her father; Glaucoma in her father; Hypertension in her mother; Kidney disease in her mother;  "Miscarriages / Stillbirths in her mother; No Known Problems in her brother and daughter; Thyroid disease in her mother.    Allergies: Patient is allergic to codeine, fosamax [alendronate], and iron analogues.    ROS  Constitutional: Negative for fever   Respiratory: Negative for cough, shortness of breath   Cardiovascular: Negative for chest pain, palpitations  Gastrointestinal: Negative for abdominal pain, constipation, diarrhea, nausea, vomiting.   Genitourinary: Negative for dysuria, frequency   Musculoskeletal:  + generalized weakness.  +back pain  Neurological: Negative for  headaches.  +dizziness  Psychiatric/Behavioral: Negative for depression. The patient is nervous/anxious.      24 hour Vital Sign Range   Temp:  [97.5 °F (36.4 °C)-98 °F (36.7 °C)]   Pulse:  [67-83]   Resp:  [17-18]   BP: (130-150)/(59-61)   SpO2:  [96 %-98 %]     Current BMI: Body mass index is 24.13 kg/m².    PEx  Constitutional: Patient appears debilitated.  No distress noted  Cardiovascular: Normal rate, regular rhythm and normal heart sounds.    Pulmonary/Chest: Effort normal and breath sounds are clear  Abdominal: Soft. Bowel sounds are normal.   Musculoskeletal: Normal range of motion.   Neurological: Alert and oriented to person, place.  Disoriented to time.  Impaired higher level thinking  Psychiatric: Normal mood and affect. Behavior is normal.   Skin: Skin is warm and dry.     No results for input(s): "GLUCOSE", "NA", "K", "CL", "CO2", "BUN", "CREATININE", "CALCIUM", "MG" in the last 24 hours.    No results for input(s): "WBC", "RBC", "HGB", "HCT", "PLT", "MCV", "MCH", "MCHC" in the last 24 hours.    Recent Labs   Lab 03/06/25  0143 03/06/25  0718   POCTGLUCOSE 95 85        Assessment and Plan:    Acute cystitis without hematuria  - Presenting with nausea/ generalized weakness/ lightheadedness and suprapubic discomfort and confusion   - CT AP without acute findings   - UA infectious, Urine culture with mixed growth; no predominant " "organism  - treated with IV ceftriaxone empirically  - continue cefpodoxime 100 mg BID x6 days, ending 3/13    Nausea and vomiting  - Suspected related to UTI or possibly viral illness   - CT AP without acute findings  - Flu/Covid negative   - continue Zofran PRN  - resolved     Dizziness  - possible vertigo  - CTH negative  - Per [facility], "Given concern for posterior circulation CVA that could be the etiology of her dizziness we did offer MR brain, but she declined." Per daughter, MRI was ordered and scheduled for day of discharge and was not able to be completed prior to transportation showing up for them to transferred to SNF, will have rescheduled  - possible ENT follow-up  - persisting, continue meclizine 12.5 mg TID PRN    Dementia without behavioral disturbance, psychotic disturbance, mood disturbance, or anxiety, unspecified dementia severity, unspecified dementia type   - per past PCP notes patient has opted out of medication for dementia  Delirium precautions:  - Avoid antihistamines, anticholinergics, hypnotics, and minimize opiates while controlling for pain as these medications may exacerbate delirium. Cues for day/night will assist with keeping patient calm and oriented - during daytime, please keep adequate light in room (open windows, lights on) and please keep room dim at night-time to encourage normal sleep-wake cycles. Continuing to have nursing and family reorient the patient and encourage family to visit  - per daughter patient has not yet at her mental baseline patient is typically more oriented.      CKD Stage 3   - stable, continue monitor twice weekly BMPs, avoid nephrotoxic agents, renally dose medications as appropriate    Back pain  - ordered for acetaminophen 650 mg q.8 hours, lidocaine patch qHS.    Hyponatremia  - mild, asymptomatic, encourage proper nutrition  - continue monitor twice weekly BMPs    Hypothyroid  - TSH slightly elevated with normal free T4  - stable, Continue home " levothyroxine 50 mcg daily    Age-related osteoporosis without current pathological fracture   - per PCP notes, patient is supposed to be on Prolia.  Do not see on dispensed report    Advance care planning  - ACP discussion held with patient and her daughter on 03/10, per daughter patient had living will that states patient is a DNR    Debility   - Continue with PT/OT for gait training and strengthening and restoration of ADL's   - Encourage mobility, OOB in chair, and early ambulation as appropriate  - Fall precautions   - Monitor for bowel and bladder dysfunction  - Monitor for and prevent skin breakdown and pressure ulcers  - Continue DVT prophylaxis with       Anticipate disposition:  Home with home health    IP [facility] RISK OF UNPLANNED READMISSION Model: MODERATE     Follow-up needed during SNF stay-    Follow-up needed after discharge from SNF: PCP     Future Appointments   Date Time Provider Department Center   4/7/2025  2:45 PM Colten Oleary DPM [facility]   5/13/2025 12:00 PM Danielle Vaughan FNP [facility]   6/2/2025  2:45 PM Kristian Shabazz MD [facility] [place]   7/21/2025  2:45 PM Kristian Shabazz MD [facility] [place]     I certify that SNF services are required to be given on an inpatient basis because Isabel Hernandez needs for skilled nursing care and/or skilled rehabilitation are required on a daily basis and such services can only practically be provided in a skilled nursing facility setting and are for an ongoing condition for which she received inpatient care in the hospital.     I spent 47 minutes reviewing patient records, examining, and counseling the patient/ patient's family with greater than 50% of the time spent in direct patient care and coordination.  Care update provided to daughter at bedside.  Care coordination with staff.    Radha Spencer NP  Department of Hospital Medicine   [facility]      DOS:3/12/2025      Patient note was created using MModal Dictation.  Any errors in syntax or even information may not have been identified and edited on initial review prior to signing this note.

## 2025-03-12 NOTE — PROGRESS NOTES
Patient sitting on recliner at bedside , verbalized feeling better. This morning patient was c/o feeling nauseated and dizzines, and feeling something in her throat,, pt denies any throat discomfort at this time. Pt's daughter Catarina verbalized that patient has said that she feels something on her throat for a while and was taken per family to the hospital to be checked and nothing found wrong. Pt instructed to call prn. Call light in reach.

## 2025-03-13 LAB
ANION GAP SERPL CALC-SCNC: 10 MMOL/L (ref 8–16)
BASOPHILS # BLD AUTO: 0.09 K/UL (ref 0–0.2)
BASOPHILS NFR BLD: 1.2 % (ref 0–1.9)
BUN SERPL-MCNC: 18 MG/DL (ref 8–23)
CALCIUM SERPL-MCNC: 8 MG/DL (ref 8.7–10.5)
CHLORIDE SERPL-SCNC: 104 MMOL/L (ref 95–110)
CO2 SERPL-SCNC: 25 MMOL/L (ref 23–29)
CREAT SERPL-MCNC: 0.7 MG/DL (ref 0.5–1.4)
DIFFERENTIAL METHOD BLD: ABNORMAL
EOSINOPHIL # BLD AUTO: 0.1 K/UL (ref 0–0.5)
EOSINOPHIL NFR BLD: 1.6 % (ref 0–8)
ERYTHROCYTE [DISTWIDTH] IN BLOOD BY AUTOMATED COUNT: 13.2 % (ref 11.5–14.5)
EST. GFR  (NO RACE VARIABLE): >60 ML/MIN/1.73 M^2
GLUCOSE SERPL-MCNC: 86 MG/DL (ref 70–110)
HCT VFR BLD AUTO: 35.6 % (ref 37–48.5)
HGB BLD-MCNC: 11.5 G/DL (ref 12–16)
IMM GRANULOCYTES # BLD AUTO: 0.04 K/UL (ref 0–0.04)
IMM GRANULOCYTES NFR BLD AUTO: 0.5 % (ref 0–0.5)
LYMPHOCYTES # BLD AUTO: 3 K/UL (ref 1–4.8)
LYMPHOCYTES NFR BLD: 39.9 % (ref 18–48)
MAGNESIUM SERPL-MCNC: 2.2 MG/DL (ref 1.6–2.6)
MCH RBC QN AUTO: 31 PG (ref 27–31)
MCHC RBC AUTO-ENTMCNC: 32.3 G/DL (ref 32–36)
MCV RBC AUTO: 96 FL (ref 82–98)
MONOCYTES # BLD AUTO: 0.6 K/UL (ref 0.3–1)
MONOCYTES NFR BLD: 8.3 % (ref 4–15)
NEUTROPHILS # BLD AUTO: 3.6 K/UL (ref 1.8–7.7)
NEUTROPHILS NFR BLD: 48.5 % (ref 38–73)
NRBC BLD-RTO: 0 /100 WBC
PHOSPHATE SERPL-MCNC: 3.9 MG/DL (ref 2.7–4.5)
PLATELET # BLD AUTO: 259 K/UL (ref 150–450)
PMV BLD AUTO: 10.6 FL (ref 9.2–12.9)
POTASSIUM SERPL-SCNC: 3.6 MMOL/L (ref 3.5–5.1)
RBC # BLD AUTO: 3.71 M/UL (ref 4–5.4)
SODIUM SERPL-SCNC: 139 MMOL/L (ref 136–145)
WBC # BLD AUTO: 7.45 K/UL (ref 3.9–12.7)

## 2025-03-13 PROCEDURE — 97530 THERAPEUTIC ACTIVITIES: CPT | Mod: HCNC,CO

## 2025-03-13 PROCEDURE — 97110 THERAPEUTIC EXERCISES: CPT | Mod: HCNC,CQ

## 2025-03-13 PROCEDURE — 85025 COMPLETE CBC W/AUTO DIFF WBC: CPT | Mod: HCNC | Performed by: HOSPITALIST

## 2025-03-13 PROCEDURE — 80048 BASIC METABOLIC PNL TOTAL CA: CPT | Mod: HCNC | Performed by: HOSPITALIST

## 2025-03-13 PROCEDURE — 63600175 PHARM REV CODE 636 W HCPCS: Mod: HCNC | Performed by: FAMILY MEDICINE

## 2025-03-13 PROCEDURE — 83735 ASSAY OF MAGNESIUM: CPT | Mod: HCNC | Performed by: HOSPITALIST

## 2025-03-13 PROCEDURE — 25000003 PHARM REV CODE 250: Mod: HCNC | Performed by: NURSE PRACTITIONER

## 2025-03-13 PROCEDURE — 97110 THERAPEUTIC EXERCISES: CPT | Mod: HCNC,CO

## 2025-03-13 PROCEDURE — 25000003 PHARM REV CODE 250: Mod: HCNC | Performed by: HOSPITALIST

## 2025-03-13 PROCEDURE — 11000004 HC SNF PRIVATE: Mod: HCNC

## 2025-03-13 PROCEDURE — 25000242 PHARM REV CODE 250 ALT 637 W/ HCPCS: Mod: HCNC | Performed by: NURSE PRACTITIONER

## 2025-03-13 PROCEDURE — 36415 COLL VENOUS BLD VENIPUNCTURE: CPT | Mod: HCNC | Performed by: HOSPITALIST

## 2025-03-13 PROCEDURE — 25000003 PHARM REV CODE 250: Mod: HCNC | Performed by: FAMILY MEDICINE

## 2025-03-13 PROCEDURE — 97116 GAIT TRAINING THERAPY: CPT | Mod: HCNC,CQ

## 2025-03-13 PROCEDURE — 84100 ASSAY OF PHOSPHORUS: CPT | Mod: HCNC | Performed by: HOSPITALIST

## 2025-03-13 PROCEDURE — 97530 THERAPEUTIC ACTIVITIES: CPT | Mod: HCNC,CQ

## 2025-03-13 RX ORDER — TRAMADOL HYDROCHLORIDE 50 MG/1
50 TABLET ORAL EVERY 6 HOURS PRN
Status: DISCONTINUED | OUTPATIENT
Start: 2025-03-13 | End: 2025-04-04 | Stop reason: HOSPADM

## 2025-03-13 RX ADMIN — FAMOTIDINE 20 MG: 20 TABLET, FILM COATED ORAL at 08:03

## 2025-03-13 RX ADMIN — ENOXAPARIN SODIUM 40 MG: 40 INJECTION SUBCUTANEOUS at 05:03

## 2025-03-13 RX ADMIN — ACETAMINOPHEN 650 MG: 325 TABLET ORAL at 02:03

## 2025-03-13 RX ADMIN — ACETAMINOPHEN 650 MG: 325 TABLET ORAL at 08:03

## 2025-03-13 RX ADMIN — MECLIZINE 12.5 MG: 12.5 TABLET ORAL at 08:03

## 2025-03-13 RX ADMIN — LEVOTHYROXINE SODIUM 50 MCG: 0.05 TABLET ORAL at 05:03

## 2025-03-13 RX ADMIN — CEFPODOXIME PROXETIL 100 MG: 100 TABLET, FILM COATED ORAL at 08:03

## 2025-03-13 RX ADMIN — TRAMADOL HYDROCHLORIDE 50 MG: 50 TABLET, COATED ORAL at 03:03

## 2025-03-13 RX ADMIN — LIDOCAINE 1 PATCH: 50 PATCH CUTANEOUS at 08:03

## 2025-03-13 NOTE — PLAN OF CARE
Problem: Fall Injury Risk  Goal: Absence of Fall and Fall-Related Injury  Outcome: Progressing  Intervention: Promote Injury-Free Environment  Flowsheets (Taken 3/13/2025 1816)  Safety Promotion/Fall Prevention: assistive device/personal item within reach

## 2025-03-13 NOTE — PT/OT/SLP PROGRESS
Occupational Therapy   Treatment    Name: Isabel Hernandez  MRN: [id number]  Admit Date: 3/7/2025  Admitting Diagnosis:  Acute cystitis without hematuria    General Precautions: Standard, fall   Orthopedic Precautions: N/A   Braces: N/A    Recommendations:     Discharge Recommendations:     Level of Assistance Recommended at Discharge: 24 hours physical assistance for all ADL's and home management tasks  Discharge Equipment Recommendations: none  Barriers to discharge:  Other (Comment) (increased assist needed for self care and mobility)    Assessment:     Isabel Hernandez is a 89 y.o. female with a medical diagnosis of Acute cystitis without hematuria .  She presents with performance deficits affecting function are weakness, impaired endurance, impaired self care skills, impaired functional mobility, gait instability, impaired cognition, decreased safety awareness, impaired cardiopulmonary response to activity.   Pt participated well during today's session. Pt tolerated tx session without incident and is making progress, however, continues to demonstrate deficits with self care skills, balance, functional mobility, UB strength and endurance. Pt will benefit from continued OT services to progress towards goals.     Rehab Potential is good    Activity tolerance:  Good    Plan:     Patient to be seen 5 x/week to address the above listed problems via self-care/home management, therapeutic activities, therapeutic exercises    Plan of Care Expires: 03/29/25  Plan of Care Reviewed with: patient    Subjective   Pt agreeable to session.   Communicated with: Missy prior to session.     Pain/Comfort:  Pain Rating 1:  (not rated)  Location - Side 1: Bilateral  Location - Orientation 1: lower  Location 1: back  Pain Addressed 1: Reposition, Cessation of Activity  Pain Rating Post-Intervention 1:  (not rated)    Patient's cultural, spiritual, Latter day conflicts given the current situation:  no    Objective:     Patient found up in chair  with PTA present in rehab gym.     Bed Mobility:    Patient completed Rolling/Turning to Right with stand by assistance and with side rail  Patient completed Scooting/Bridging with stand by assistance and with side rail  Patient completed Sit to Supine with stand by assistance and with side rail     Functional Mobility/Transfers:  Patient completed Sit <> Stand Transfer with contact guard assistance  with  rolling walker   Patient completed Bed <> Chair Transfer using Step Transfer technique with contact guard assistance with rolling walker  Functional Mobility: Pt ambulated 18 ft in room with SBA/CGA and RW.     AMPAC 6 Click ADL: 18    OT Exercises: UE Ergometer 10 min with min resistance   AROM x 2 sets of 10 with 2 lb dowel. Pt perform shoulder flex/ext, forward flex motion and bicep curls  Therex performed to improve overall endurance, ROM and UB strength required for functional transfers, ADL's and w/c propulsion.     Treatment & Education:  Pt educated on:  - role of OT  - level of assistance  - energy conservation and task modification to maximize independence with ADL's and mobility   -  safety while performing functional transfers and self care tasks  - orthopedic precautions.   - progress towards OT goals     Patient left HOB elevated with call button in reach and PCT and NP notified.    GOALS:   Multidisciplinary Problems       Occupational Therapy Goals          Problem: Occupational Therapy    Goal Priority Disciplines Outcome Interventions   Occupational Therapy Goal     OT, PT/OT Progressing    Description: Goals to be met by: 3/29/25     Patient will increase functional independence with ADLs by performing:    Feeding with [place].  UE Dressing with [place].  LE Dressing with [place].  Grooming while standing with Modified [place].  Toileting from toilet with Modified [place] for hygiene and clothing management.   Bathing from  shower chair/bench with Supervision.  Sitting  at edge of bed x10 minutes with [place].  Step transfer with Modified [place]  Toilet transfer to toilet with Modified [place].                         Time Tracking:     OT Date of Treatment: 03/13/25  OT Start Time: 1355    OT Stop Time: 1433  OT Total Time (min): 38 min    Billable Minutes:Therapeutic Activity 23  Therapeutic Exercise 15    3/13/2025

## 2025-03-13 NOTE — PROGRESS NOTES
[facility]                                  Skilled Nursing Facility                   Progress Note     Admit Date: 3/7/2025  MURPHY 2025  [id number] A  Principal Problem:  Acute cystitis without hematuria   HPI obtained from patient interview and chart review     Chief Complaint: Re-evaluation of medical treatment and therapy status, lab review, dizziness    HPI:   Isabel Hernandez is an 89 year old female with PMHx of thyroid disease, stage II CKD, history of compression fractures who presents to SNF following hospitalization for acute cystitis.  Admission to SNF for secondary weakness and debility.    Interval history:All of today's labs reviewed and are listed below.  24 hr vital sign ranges reviewed and listed below.  Epley maneuver performed today.  Back pain with improvement today.  Patient denies shortness of breath, abdominal discomfort, nausea, or vomiting.  Patient reports an adequate appetite.  Patient denies dysuria.  Patient reports having regular bowel movements.  Patient progressing with PT/OT- patient ambulated 50' w/RW and SBA, increased unsteadiness towards end of gait, fatigued after activity. Continuing to follow and treat all acute and chronic conditions.    Past Medical History: Patient has a past medical history of Arthritis, Cataract, Hypothyroidism, Mild malnutrition (3/10/2025), and Osteoporosis (2015).    Past Surgical History: Patient has a past surgical history that includes Thyroidectomy, partial; Hysterectomy; Cholecystectomy; Tonsillectomy; Adenoidectomy; Breast surgery (Bilateral);  section; Vitrectomy by pars plana approach (Left, 2018); and Appendectomy.    Social History: Patient reports that she has never smoked. She has never used smokeless tobacco. She reports that she does not drink alcohol and does not use drugs.    Family History: family history includes Cancer in her brother,  "father, and sister; Cataracts in her father; Glaucoma in her father; Hypertension in her mother; Kidney disease in her mother; Miscarriages / Stillbirths in her mother; No Known Problems in her brother and daughter; Thyroid disease in her mother.    Allergies: Patient is allergic to codeine, fosamax [alendronate], and iron analogues.    ROS  Constitutional: Negative for fever   Respiratory: Negative for cough, shortness of breath   Cardiovascular: Negative for chest pain, palpitations  Gastrointestinal: Negative for abdominal pain, constipation, diarrhea, nausea, vomiting.   Genitourinary: Negative for dysuria, frequency   Musculoskeletal:  + generalized weakness.  +back pain  Neurological: Negative for  headaches.  +dizziness  Psychiatric/Behavioral: Negative for depression. The patient is nervous/anxious.      24 hour Vital Sign Range   Temp:  [97.8 °F (36.6 °C)-98.4 °F (36.9 °C)]   Pulse:  [71-82]   Resp:  [18]   BP: (128-137)/(58-61)   SpO2:  [93 %-96 %]     Current BMI: Body mass index is 24.13 kg/m².    PEx  Constitutional: Patient appears debilitated.  No distress noted  Cardiovascular: Normal rate, regular rhythm and normal heart sounds.    Pulmonary/Chest: Effort normal and breath sounds are clear  Abdominal: Soft. Bowel sounds are normal.   Musculoskeletal: Normal range of motion.   Neurological: Alert and oriented to person, place.  Disoriented to time.  Impaired higher level thinking  Psychiatric: Normal mood and affect. Behavior is normal.   Skin: Skin is warm and dry.     Recent Labs   Lab 03/13/25  0403      K 3.6      CO2 25   BUN 18   CREATININE 0.7   CALCIUM 8.0*   MG 2.2       Recent Labs   Lab 03/13/25  0403   WBC 7.45   RBC 3.71*   HGB 11.5*   HCT 35.6*      MCV 96   MCH 31.0   MCHC 32.3       No results for input(s): "POCTGLUCOSE" in the last 168 hours.       Assessment and Plan:    Acute cystitis without hematuria  - Presenting with nausea/ generalized weakness/ " "lightheadedness and suprapubic discomfort and confusion   - CT AP without acute findings   - UA infectious, Urine culture with mixed growth; no predominant organism  - treated with IV ceftriaxone empirically  - continue cefpodoxime 100 mg BID x6 days, ending 3/13    Nausea and vomiting  - Suspected related to UTI or possibly viral illness   - CT AP without acute findings  - Flu/Covid negative   - continue Zofran PRN  - resolved     Dizziness  - possible vertigo  - CTH negative  - Per [facility], "Given concern for posterior circulation CVA that could be the etiology of her dizziness we did offer MR brain, but she declined." Per daughter, MRI was ordered and scheduled for day of discharge and was not able to be completed prior to transportation showing up for them to transferred to SNF, will have rescheduled  - possible ENT follow-up  - persisting, continue meclizine 12.5 mg TID PRN    Dementia without behavioral disturbance, psychotic disturbance, mood disturbance, or anxiety, unspecified dementia severity, unspecified dementia type   - per past PCP notes patient has opted out of medication for dementia  Delirium precautions:  - Avoid antihistamines, anticholinergics, hypnotics, and minimize opiates while controlling for pain as these medications may exacerbate delirium. Cues for day/night will assist with keeping patient calm and oriented - during daytime, please keep adequate light in room (open windows, lights on) and please keep room dim at night-time to encourage normal sleep-wake cycles. Continuing to have nursing and family reorient the patient and encourage family to visit  - per daughter patient has not yet at her mental baseline patient is typically more oriented.      CKD Stage 3   - stable, continue monitor twice weekly BMPs, avoid nephrotoxic agents, renally dose medications as appropriate    Back pain  - ordered for acetaminophen 650 mg q.8 hours, lidocaine patch qHS.    Hyponatremia  - mild, asymptomatic, " encourage proper nutrition  - continue monitor twice weekly BMPs    Hypothyroid  - TSH slightly elevated with normal free T4  - stable, Continue home levothyroxine 50 mcg daily    Age-related osteoporosis without current pathological fracture   - per PCP notes, patient is supposed to be on Prolia.  Do not see on dispensed report    Advance care planning  - ACP discussion held with patient and her daughter on 03/10, per daughter patient had living will that states patient is a DNR    Debility   - Continue with PT/OT for gait training and strengthening and restoration of ADL's   - Encourage mobility, OOB in chair, and early ambulation as appropriate  - Fall precautions   - Monitor for bowel and bladder dysfunction  - Monitor for and prevent skin breakdown and pressure ulcers  - Continue DVT prophylaxis with       Anticipate disposition:  Home with home health    IP [facility] RISK OF UNPLANNED READMISSION Model: MODERATE     Follow-up needed during SNF stay-    Follow-up needed after discharge from SNF: PCP     Future Appointments   Date Time Provider Department Center   4/7/2025  2:45 PM Colten Oleary DPM [facility]   5/13/2025 12:00 PM Danielle Vaughan FNP [facility]   6/2/2025  2:45 PM Kristian Shabazz MD [facility] [place]   7/21/2025  2:45 PM Kristian Shabazz MD [facility] [place]     I certify that SNF services are required to be given on an inpatient basis because Isabel Hernandez needs for skilled nursing care and/or skilled rehabilitation are required on a daily basis and such services can only practically be provided in a skilled nursing facility setting and are for an ongoing condition for which she received inpatient care in the hospital.     I spent 46 minutes reviewing patient records, examining, and counseling the patient/ patient's family with greater than 50% of the time spent in direct patient care and coordination.  Epley maneuver performed    Radha FINLEY  YAHIR Spencer  Department of Hospital Medicine   [facility]     DOS:3/13/2025      Patient note was created using MModal Dictation.  Any errors in syntax or even information may not have been identified and edited on initial review prior to signing this note.

## 2025-03-13 NOTE — PT/OT/SLP PROGRESS
"Physical Therapy Treatment    Patient Name:  Isabel Hernandez   MRN:  [id number]  Admit Date: 3/7/2025  Admitting Diagnosis: Acute cystitis without hematuria  Recent Surgeries:     General Precautions: Standard, fall  Orthopedic Precautions: N/A  Braces: N/A    Recommendations:     Discharge Recommendations: home health PT  Level of Assistance Recommended at Discharge: 24 hours light assistance  Discharge Equipment Recommendations: walker, rolling, wheelchair, bath bench  Barriers to discharge: Inaccessible home environment    Assessment:     Isabel Hernandez is a 89 y.o. female admitted with a medical diagnosis of Acute cystitis without hematuria . Pt tolerated well, pt deinitely feels better in PM than AM, pt would continue to benefit from skilled PT services to improve overall functional mobility, strength and endurance.  .      Performance deficits affecting function: weakness, impaired endurance, impaired self care skills, impaired functional mobility, gait instability, impaired balance, decreased lower extremity function, decreased safety awareness, impaired cognition, impaired cardiopulmonary response to activity.    Rehab Potential is good    Activity Tolerance: Fair    Plan:     Patient to be seen 5 x/week to address the above listed problems via gait training, therapeutic activities, therapeutic exercises, neuromuscular re-education, wheelchair management/training    Plan of Care Expires: 04/07/25  Plan of Care Reviewed with: patient    Subjective     "Better" than this morning, pt agreeable to therapy.     Pain/Comfort:  Pain Rating 1: 5/10  Location - Orientation 1: upper (upper to mid)  Location 1: back  Pain Addressed 1: Reposition, Distraction, Cessation of Activity (unsure if pre meds)  Pain Rating Post-Intervention 1: 5/10 (no further mentioned)    Patient's cultural, spiritual, Methodist conflicts given the current situation:  no    Objective:       Patient found  with  (in BSC, daughter present) upon PT " entry to room.     Therapeutic Activities and Exercises: 2x10 reps AP,GS,LAQ,hip flex,abd/add mini elliptical x 10 minutes    Functional Mobility:  Transfers:     Sit to Stand:  stand by assistance with rolling walker and vcs for hand placemnet  Bed to Chair: stand by assistance and contact guard assistance with  rolling walker  using  Stand Pivot and BSC to WC  Gait: amb with RW CGA 73 ft no LOB vcs for directional guidance    AM-PAC 6 CLICK MOBILITY  17    Patient left up in chair with  with OT .    GOALS:   Multidisciplinary Problems       Physical Therapy Goals          Problem: Physical Therapy    Goal Priority Disciplines Outcome Interventions   Physical Therapy Goal     PT, PT/OT Progressing    Description: Goals to be met by: 25     Patient will increase functional independence with mobility by performin. Supine to sit with Supervision  2. Sit to supine with Supervision  3. Rolling to Left and Right with Supervision  4. Sit to stand transfer with Supervision  5. Bed to chair transfer with Supervision using Rolling Walker  6. Gait  x 150 feet with Stand-by Assistance using Rolling Walker  7. Wheelchair propulsion x 50 feet with Supervision using bilateral upper extremities  8. Ascend/descend 4 stairs with bilateral Handrail and Contact Guard Assistance using No Assistive Device.  9. Ascend/Descend 4 inch curb step with Contact Guard Assistance using Rolling Walker                         Time Tracking:     PT Received On: 25  PT Start Time: 1313  PT Stop Time: 1354  PT Total Time (min): 41 min    Billable Minutes: Gait Training 10, Therapeutic Activity 8, and Therapeutic Exercise 23    Treatment Type: Treatment  PT/PTA: PTA     Number of PTA visits since last PT visit: 3     2025

## 2025-03-14 PROCEDURE — 63600175 PHARM REV CODE 636 W HCPCS: Mod: HCNC | Performed by: FAMILY MEDICINE

## 2025-03-14 PROCEDURE — 99306 1ST NF CARE HIGH MDM 50: CPT | Mod: AI,HCNC,, | Performed by: HOSPITALIST

## 2025-03-14 PROCEDURE — 11000004 HC SNF PRIVATE: Mod: HCNC

## 2025-03-14 PROCEDURE — 97535 SELF CARE MNGMENT TRAINING: CPT | Mod: HCNC,CO

## 2025-03-14 PROCEDURE — 25000003 PHARM REV CODE 250: Mod: HCNC | Performed by: NURSE PRACTITIONER

## 2025-03-14 PROCEDURE — 25000003 PHARM REV CODE 250: Mod: HCNC | Performed by: HOSPITALIST

## 2025-03-14 PROCEDURE — 97116 GAIT TRAINING THERAPY: CPT | Mod: HCNC,CQ

## 2025-03-14 RX ORDER — PANTOPRAZOLE SODIUM 40 MG/1
40 TABLET, DELAYED RELEASE ORAL DAILY
Status: DISCONTINUED | OUTPATIENT
Start: 2025-03-14 | End: 2025-03-20

## 2025-03-14 RX ADMIN — ENOXAPARIN SODIUM 40 MG: 40 INJECTION SUBCUTANEOUS at 05:03

## 2025-03-14 RX ADMIN — LIDOCAINE 1 PATCH: 50 PATCH CUTANEOUS at 08:03

## 2025-03-14 RX ADMIN — PANTOPRAZOLE SODIUM 40 MG: 40 TABLET, DELAYED RELEASE ORAL at 12:03

## 2025-03-14 RX ADMIN — FAMOTIDINE 20 MG: 20 TABLET, FILM COATED ORAL at 08:03

## 2025-03-14 RX ADMIN — ACETAMINOPHEN 650 MG: 325 TABLET ORAL at 08:03

## 2025-03-14 RX ADMIN — TRAMADOL HYDROCHLORIDE 50 MG: 50 TABLET, COATED ORAL at 07:03

## 2025-03-14 RX ADMIN — LEVOTHYROXINE SODIUM 50 MCG: 0.05 TABLET ORAL at 05:03

## 2025-03-14 RX ADMIN — ACETAMINOPHEN 650 MG: 325 TABLET ORAL at 03:03

## 2025-03-14 NOTE — PLAN OF CARE
Problem: Adult Inpatient Plan of Care  Goal: Plan of Care Review  Flowsheets (Taken 3/14/2025 7498)  Plan of Care Reviewed With: child  Goal: Patient-Specific Goal (Individualized)  Outcome: Progressing  Goal: Absence of Hospital-Acquired Illness or Injury  Outcome: Progressing  Goal: Optimal Comfort and Wellbeing  Outcome: Progressing

## 2025-03-14 NOTE — PT/OT/SLP PROGRESS
"Physical Therapy Treatment    Patient Name:  Isabel Hernandez   MRN:  [id number]  Admit Date: 3/7/2025  Admitting Diagnosis: Acute cystitis without hematuria  Recent Surgeries:     General Precautions: Standard, fall  Orthopedic Precautions: N/A  Braces: N/A    Recommendations:     Discharge Recommendations: home health PT  Level of Assistance Recommended at Discharge: 24 hours light assistance  Discharge Equipment Recommendations: walker, rolling, wheelchair, bath bench  Barriers to discharge: Inaccessible home environment    Assessment:     Isabel Hernandez is a 89 y.o. female admitted with a medical diagnosis of Acute cystitis without hematuria . Pt with limited session today, transport came for xray,  pt would continue to benefit from skilled PT services to improve overall functional mobility, strength and endurance.  .      Performance deficits affecting function: weakness, impaired endurance, impaired self care skills, impaired functional mobility, gait instability, impaired balance, decreased lower extremity function, decreased safety awareness, impaired cognition, impaired cardiopulmonary response to activity.    Rehab Potential is good    Activity Tolerance: Fair    Plan:     Patient to be seen 5 x/week to address the above listed problems via gait training, therapeutic activities, therapeutic exercises, neuromuscular re-education, wheelchair management/training    Plan of Care Expires: 04/07/25  Plan of Care Reviewed with: patient    Subjective     Pt agreeable to therapy "I'll try" .     Pain/Comfort:  Pain Rating 1: 8/10 (at rest none, inconsistant with mobility 4-7-8, nsg notified, giving meds)  Location - Orientation 1: lower  Location 1: back  Pain Addressed 1: Reposition, Pre-medicate for activity, Distraction, Cessation of Activity, Nurse notified (meds given during session)  Pain Rating Post-Intervention 1:  (appears to inc with mobility, dec with rest)    Patient's cultural, spiritual, Mu-ism " conflicts given the current situation:  no    Objective:     Communicated with nsg prior to session. Per daughter pt going for xray today, cleared with nsg for therapy/gait  Patient found  with  (in BSC daughter present) upon PT entry to room.     Therapeutic Activities and Exercises: unable leaving for xray    Functional Mobility:  Transfers:     Sit to Stand:  stand by assistance and contact guard assistance with rolling walker  Gait: amb with RW CGA 32 ft daughter followed with WC, limited by pain and some dizziness, nsg notified/present    AM-PAC 6 CLICK MOBILITY  17    Patient left up in chair with  in wc with daughter/nsg/transport .    GOALS:   Multidisciplinary Problems       Physical Therapy Goals          Problem: Physical Therapy    Goal Priority Disciplines Outcome Interventions   Physical Therapy Goal     PT, PT/OT Progressing    Description: Goals to be met by: 25     Patient will increase functional independence with mobility by performin. Supine to sit with Supervision  2. Sit to supine with Supervision  3. Rolling to Left and Right with Supervision  4. Sit to stand transfer with Supervision  5. Bed to chair transfer with Supervision using Rolling Walker  6. Gait  x 150 feet with Stand-by Assistance using Rolling Walker  7. Wheelchair propulsion x 50 feet with Supervision using bilateral upper extremities  8. Ascend/descend 4 stairs with bilateral Handrail and Contact Guard Assistance using No Assistive Device.  9. Ascend/Descend 4 inch curb step with Contact Guard Assistance using Rolling Walker                         Time Tracking:     PT Received On: 25  PT Start Time:   PT Stop Time: 1548  PT Total Time (min): 16 min    Billable Minutes: Gait Training 16    Treatment Type: Treatment  PT/PTA: PTA     Number of PTA visits since last PT visit: 2025

## 2025-03-14 NOTE — PLAN OF CARE
Problem: Fall Injury Risk  Goal: Absence of Fall and Fall-Related Injury  Outcome: Progressing  Intervention: Promote Injury-Free Environment  Flowsheets (Taken 3/14/2025 1602)  Safety Promotion/Fall Prevention: assistive device/personal item within reach

## 2025-03-14 NOTE — PT/OT/SLP PROGRESS
"Occupational Therapy   Treatment    Name: Isabel Hernandez  MRN: [id number]  Admit Date: 3/7/2025  Admitting Diagnosis:  Acute cystitis without hematuria    General Precautions: Standard, fall   Orthopedic Precautions: N/A   Braces: N/A    Recommendations:     Discharge Recommendations:     Level of Assistance Recommended at Discharge: 24 hours physical assistance for all ADL's and home management tasks  Discharge Equipment Recommendations: none  Barriers to discharge:  Other (Comment) (increased assist needed for self care and mobility)    Assessment:     Isabel Hernandez is a 89 y.o. female with a medical diagnosis of Acute cystitis without hematuria .  She presents with performance deficits affecting function are weakness, impaired endurance, impaired self care skills, impaired functional mobility, gait instability, impaired cognition, decreased safety awareness, impaired cardiopulmonary response to activity. Pt participated well during today's session. Pt tolerated tx session without incident and is making progress, however, continues to demonstrate deficits with self care skills, balance, functional mobility, UB strength and endurance. Pt will benefit from continued OT services to progress towards goals.     Rehab Potential is good    Activity tolerance:  Good    Plan:     Patient to be seen 5 x/week to address the above listed problems via self-care/home management, therapeutic activities, therapeutic exercises    Plan of Care Expires: 03/29/25  Plan of Care Reviewed with: patient, daughter    Subjective   "I think I need to use the bathroom"  Communicated with: Missy prior to session.     Pain/Comfort:  Pain Rating 1: 4/10  Location - Side 1: Bilateral  Location - Orientation 1: lower  Location 1: back  Pain Addressed 1: Nurse notified, Reposition, Cessation of Activity  Pain Rating Post-Intervention 1: 4/10    Patient's cultural, spiritual, Denominational conflicts given the current situation:  no    Objective: "     Patient found  bedside chair  with daughter present upon OT entry to room.    Functional Mobility/Transfers:  Patient completed Sit <> Stand Transfer x 2 with stand by assistance  with  rolling walker   Patient completed Bed <> Chair Transfer x 2 using Step Transfer technique with contact guard assistance with rolling walker  Patient completed Toilet Transfer Step Transfer technique with contact guard assistance with  rolling walker    Activities of Daily Living:  Upper Body Dressing: stand by assistance to doff/don pullover shirt while seated   Lower Body Dressing: minimum assistance to thread B LE and manage pants over hips in stance  Toileting: moderate assistance to perform doug hygiene in stance. Pt use grab bars for support.   Footwear: minimal assistance to doff/don B socks while seated     AMPA 6 Click ADL: 18    Treatment & Education:  Pt educated on:  - role of OT  - level of assistance  - energy conservation and task modification to maximize independence with ADL's and mobility   -  safety while performing functional transfers and self care tasks  - progress towards OT goals     Patient left  bedside chair  with call button in reach, nsg notified, and daughter present    GOALS:   Multidisciplinary Problems       Occupational Therapy Goals          Problem: Occupational Therapy    Goal Priority Disciplines Outcome Interventions   Occupational Therapy Goal     OT, PT/OT Progressing    Description: Goals to be met by: 3/29/25     Patient will increase functional independence with ADLs by performing:    Feeding with [place].  UE Dressing with [place].  LE Dressing with [place].  Grooming while standing with Modified [place].  Toileting from toilet with Modified [place] for hygiene and clothing management.   Bathing from  shower chair/bench with Supervision.  Sitting at edge of bed x10 minutes with [place].  Step transfer with Modified [place]  Toilet transfer to toilet  with Modified [place].                         Time Tracking:     OT Date of Treatment: 03/14/25  OT Start Time: 1400    OT Stop Time: 1431  OT Total Time (min): 31 min    Billable Minutes:Self Care/Home Management 31    3/14/2025

## 2025-03-14 NOTE — H&P
"Hospital Medicine  Skilled Nursing Facility   History and Physical Exam      Date of Service: 03/14/2025      Patient Name: Isabel Hernandez  MRN: [id number]  Admission Date: 3/7/2025  Attending Physician: Sonny Fields MD  Primary Care Provider: Kristian Shabazz MD  Code Status: DNR (Do Not Resuscitate)      Principal problem:  Acute cystitis without hematuria      Chief Complaint:   Chief Complaint   Patient presents with    Urinary Tract Infection     Admitted to [facility] for rehabilitation following hospital stay for acute cystitis.           HPI:   "Isabel Hernandez is an 89 year old female with PMHx of thyroid disease, stage II CKD, history of compression fractures who presents to SNF following hospitalization for acute cystitis.  Admission to SNF for secondary weakness and debility.     Patient originally presented to [facility] ED on 3/5 reporting increased fatigue x2 days, diarrhea, nausea/vomiting.  Per hospital notes, Hisotry is obtained with assistance from daughter at bedside. Daughter states pt lives next door on her own, normally ambulates independently. On Monday, daughter found patient on the ground. It looked as though she had fallen because a shoe was out of place and she had vomited on herself. Daughter checks her for injuries and pt has reported just slipping. Tuesday pt had very poor appetite, still felt nauseated. Today, patient was again unable to get herself up at home and was complaining of persistent nausea, generalized weakness, lightheadedness,  belching. They had gone to get her a walker the day prior since she was so weak and they attempted to have her use it but she was too weak to hold on and take a few steps which is very far from her baseline. Patient reports feeling confused, mild headache, weakness, abdominal cramping, belching. Also reports left sided low back/flank pain.  In the ED: Afebrile and HDS. UA with 3+ LE with >100 WBC. WBC 10. K 3.0. TB 1.2, . TSH 4.342 and normal " "free T4. Lipase 24. Flu negative. CT head no acute intracranial abnormality. CT cervical w/ no acute fracture or subluxation of the cervical spine. CT A/P w/ no acute abnormality of the abdomen or pelvis, remote compression fractures of the T8, T10, T11, and T12 vertebral bodies, colonic diverticulosis without acute diverticulitis. Admitted for dizziness, falls, weakness.  Noted to have UTI.  She was started on rocephin in the ED which has been continued.  She denies dizziness to me but daughter says that she was complaining of some earlier while she was working with PT."  Urine culture with multiple organisms isolated but none in predominance.  PT/OT evaluated patient in his recommending for moderate intensity therapy, patient transferred to SNF.     Today, patient is continuing to report dizziness.  Daughter at bedside states that she is not at her mental baseline.  Patient states her age at 89 which is close to her correct age of 89.  She is oriented to place.  She does not know the year.  Patient's daughter reports that she has been having a lot of belching and acid reflux/GERD symptoms.  Will start patient on Pepcid.  We discussed that tizanidine that is on patient's orders, I feel that this would possibly make her dizziness worse, patient's daughter was not familiar with her ever taking that medication, will discontinue." Per Radha Spencer NP on 3/10/25.     She is having increased back pain recently and is heading to get xray this afternoon. Her daughter was with her. She is working well with therapy and walked 32 feet with RW and CGA. She is eating okay and is having regular BMs. She denies any nausea. Daughter says that she seems more confused lately.     Patient admitted with skilled services with PT and OT to improve functional status and ability to perform ADLs.       Past Medical History:   Past Medical History:   Diagnosis Date    Arthritis     Cataract     Hypothyroidism     Mild malnutrition " 3/10/2025    Osteoporosis 2015       Past Surgical History:   Past Surgical History:   Procedure Laterality Date    ADENOIDECTOMY      APPENDECTOMY      BREAST SURGERY Bilateral     cyst removal     SECTION      CHOLECYSTECTOMY      HYSTERECTOMY      THYROIDECTOMY, PARTIAL      TONSILLECTOMY      VITRECTOMY BY PARS PLANA APPROACH Left 2018    Procedure: VITRECTOMY, PARS PLANA APPROch   internal limitng membrane peel;  Surgeon: Johnathan Christopher MD;  Location: [facility] OR [address] [place] [address];  Service: Ophthalmology;  Laterality: Left;       Social History:   Tobacco Use    Smoking status: Never    Smokeless tobacco: Never   Substance and Sexual Activity    Alcohol use: No    Drug use: No    Sexual activity: Not Currently     Partners: Male     Birth control/protection: None       Family History:   Family History       Problem Relation (Age of Onset)    Cancer Father, Sister, Brother    Cataracts Father    Glaucoma Father    Hypertension Mother    Kidney disease Mother    Miscarriages / Stillbirths Mother    No Known Problems Brother, Daughter    Thyroid disease Mother            No current facility-administered medications on file prior to encounter.     Current Outpatient Medications on File Prior to Encounter   Medication Sig    [] cefpodoxime (VANTIN) 100 MG tablet Take 1 tablet (100 mg total) by mouth 2 (two) times daily. for 6 days    levothyroxine (SYNTHROID) 50 MCG tablet TAKE 1 TABLET EVERY DAY    meclizine (ANTIVERT) 12.5 mg tablet Take 1 tablet (12.5 mg total) by mouth 2 (two) times daily as needed for Dizziness.       Allergies:   Review of patient's allergies indicates:   Allergen Reactions    Codeine Nausea And Vomiting    Fosamax [alendronate] Nausea Only    Iron analogues Nausea And Vomiting       ROS:  Review of Systems   Unable to perform ROS: Dementia   Constitutional:  Negative for fever.   Cardiovascular:  Negative for chest pain.   Gastrointestinal:  Negative for constipation,  nausea and vomiting.   Musculoskeletal:  Positive for back pain.   Psychiatric/Behavioral:  Positive for confusion. The patient is nervous/anxious.      Objective:  Temp:  [98.1 °F (36.7 °C)-98.5 °F (36.9 °C)]   Pulse:  [78-86]   Resp:  [16-18]   BP: (110-138)/(59-60)   SpO2:  [94 %-95 %]     Body mass index is 24.13 kg/m².    Physical Exam  Vitals and nursing note reviewed.   Constitutional:       General: She is not in acute distress.     Appearance: She is well-developed.   Cardiovascular:      Rate and Rhythm: Normal rate and regular rhythm.      Heart sounds: S1 normal and S2 normal. No murmur heard.  Pulmonary:      Effort: Pulmonary effort is normal. No respiratory distress.      Breath sounds: Normal breath sounds. No wheezing or rales.   Abdominal:      General: Bowel sounds are normal. There is no distension.      Palpations: Abdomen is soft.      Tenderness: There is no abdominal tenderness.   Musculoskeletal:         General: No tenderness.      Right lower leg: No edema.      Left lower leg: No edema.   Skin:     General: Skin is warm and dry.      Findings: Bruising present.   Neurological:      Mental Status: She is alert. She is confused.       Significant Labs:   TSH:   Recent Labs   Lab 03/05/25  1757   TSH 4.342*     CBC:  Recent Labs   Lab 03/13/25  0403   WBC 7.45   HGB 11.5*   HCT 35.6*      MCV 96     BMP:  Recent Labs   Lab 03/13/25  0403   GLU 86      K 3.6      CO2 25   BUN 18   CREATININE 0.7   CALCIUM 8.0*   MG 2.2   PHOS 3.9       Significant Imaging: I have reviewed all pertinent imaging results/findings completed during prior hospitalization.      Assessment and Plan:  Active Diagnoses:    Diagnosis Date Noted POA    PRINCIPAL PROBLEM:  Acute cystitis without hematuria [N30.00] 03/05/2025 Yes    Hyponatremia [E87.1] 03/11/2025 Yes    Mild malnutrition [E44.1] 03/10/2025 Yes    Dizziness [R42] 03/07/2025 Yes    LBBB (left bundle branch block) [I44.7] 03/06/2025 Yes     "Generalized weakness [R53.1] 03/06/2025 Yes    Nausea and vomiting [R11.2] 03/05/2025 Yes    Dementia without behavioral disturbance, psychotic disturbance, mood disturbance, or anxiety, unspecified dementia severity, unspecified dementia type [F03.90] 08/22/2023 Yes    Stage 2 chronic kidney disease [N18.2] 07/26/2017 Yes    Age-related osteoporosis without current pathological fracture [M81.0] 09/23/2015 Yes    Hypothyroid [E03.9] 03/05/2013 Yes      Problems Resolved During this Admission:       Acute cystitis without hematuria  - Presenting with nausea/ generalized weakness/ lightheadedness and suprapubic discomfort and confusion   - CT AP without acute findings   - UA infectious and culture with mixed growth; no predominant organism  - treated with IV ceftriaxone empirically  - Completed cefpodoxime 100 mg BID on 3/13     Nausea and vomiting  - Suspected related to UTI or possibly viral illness   - CT AP without acute findings  - Flu/Covid negative   - continue Zofran PRN  - resolved     Dizziness  - possible vertigo  - CTH negative  - Per [facility], "Given concern for posterior circulation CVA that could be the etiology of her dizziness we did offer MR brain, but she declined." Per daughter, MRI was ordered and scheduled for day of discharge and was not able to be completed prior to transportation showing up for them to transferred to SNF, will have rescheduled  - possible ENT follow-up  - Continue meclizine 12.5 mg TID PRN     Dementia without behavioral disturbance, psychotic disturbance, mood disturbance, or anxiety, unspecified dementia severity, unspecified dementia type   - per past PCP notes patient has opted out of medication for dementia  Delirium precautions:  - Avoid antihistamines, anticholinergics, hypnotics, and minimize opiates while controlling for pain as these medications may exacerbate delirium. Cues for day/night will assist with keeping patient calm and oriented - during daytime, please keep " adequate light in room (open windows, lights on) and please keep room dim at night-time to encourage normal sleep-wake cycles. Continuing to have nursing and family reorient the patient and encourage family to visit  - per daughter patient has not yet at her mental baseline patient is typically more oriented.      CKD Stage 3   - Continue monitor twice weekly BMPs  - avoid nephrotoxic agents  - renally dose medications as appropriate     Back pain  - Continue acetaminophen 650 mg q.8 hours, lidocaine patch qHS.  - Getting xrays today.     Hyponatremia  - encourage proper nutrition  - continue monitor twice weekly BMPs     Hypothyroid  Lab Results   Component Value Date    TSH 4.342 (H) 03/05/2025   - Continue home levothyroxine 50 mcg daily     Age-related osteoporosis without current pathological fracture   - per PCP notes, patient is supposed to be on Prolia.  Do not see on dispensed report     Advance care planning  - ACP discussion held with patient and her daughter on 03/10, per daughter patient had living will that states patient is a DNR     Debility   - Continue with PT/OT for gait training and strengthening and restoration of ADL's   - Encourage mobility, OOB in chair, and early ambulation as appropriate  - Fall precautions   - Monitor for bowel and bladder dysfunction  - Monitor for and prevent skin breakdown and pressure ulcers  - Continue DVT prophylaxis with enoxaparin      IP [facility] RISK OF UNPLANNED READMISSION Model: Low      Anticipated Disposition:  Home with home health      Future Appointments   Date Time Provider Department Center   4/7/2025  2:45 PM Colten Oleary DPM [facility]   5/13/2025 12:00 PM Danielle Vaughan FNP [facility]   6/2/2025  2:45 PM Kristian Shabazz MD [facility] [place]   7/21/2025  2:45 PM Kristian Shabazz MD [facility] [place]       I certify that SNF services are required to be given on an inpatient basis because Isabel  B David needs for skilled nursing care and/or skilled rehabilitation are required on a daily basis and such services can only practically be provided in a skilled nursing facility setting and are for an ongoing condition for which she received inpatient care in the hospital.       Sonny Fields MD  Department of Hospital Medicine   [facility] - Skilled Nursing

## 2025-03-14 NOTE — PLAN OF CARE
Problem: Adult Inpatient Plan of Care  Goal: Plan of Care Review  Flowsheets (Taken 3/14/2025 4719)  Plan of Care Reviewed With: child  Goal: Patient-Specific Goal (Individualized)  Outcome: Progressing  Goal: Absence of Hospital-Acquired Illness or Injury  Outcome: Progressing  Goal: Optimal Comfort and Wellbeing  Outcome: Progressing

## 2025-03-15 PROCEDURE — 97116 GAIT TRAINING THERAPY: CPT | Mod: HCNC

## 2025-03-15 PROCEDURE — 97110 THERAPEUTIC EXERCISES: CPT | Mod: HCNC

## 2025-03-15 PROCEDURE — 25000003 PHARM REV CODE 250: Mod: HCNC | Performed by: HOSPITALIST

## 2025-03-15 PROCEDURE — 11000004 HC SNF PRIVATE: Mod: HCNC

## 2025-03-15 PROCEDURE — 97530 THERAPEUTIC ACTIVITIES: CPT | Mod: HCNC

## 2025-03-15 PROCEDURE — 25000003 PHARM REV CODE 250: Mod: HCNC | Performed by: FAMILY MEDICINE

## 2025-03-15 PROCEDURE — 25000003 PHARM REV CODE 250: Mod: HCNC | Performed by: NURSE PRACTITIONER

## 2025-03-15 PROCEDURE — 63600175 PHARM REV CODE 636 W HCPCS: Mod: HCNC | Performed by: FAMILY MEDICINE

## 2025-03-15 RX ADMIN — ACETAMINOPHEN 650 MG: 325 TABLET ORAL at 08:03

## 2025-03-15 RX ADMIN — TRAMADOL HYDROCHLORIDE 50 MG: 50 TABLET, COATED ORAL at 06:03

## 2025-03-15 RX ADMIN — ONDANSETRON 4 MG: 4 TABLET, ORALLY DISINTEGRATING ORAL at 07:03

## 2025-03-15 RX ADMIN — ONDANSETRON 4 MG: 4 TABLET, ORALLY DISINTEGRATING ORAL at 10:03

## 2025-03-15 RX ADMIN — ACETAMINOPHEN 650 MG: 325 TABLET ORAL at 03:03

## 2025-03-15 RX ADMIN — CALCIUM CARBONATE (ANTACID) CHEW TAB 500 MG 500 MG: 500 CHEW TAB at 08:03

## 2025-03-15 RX ADMIN — ENOXAPARIN SODIUM 40 MG: 40 INJECTION SUBCUTANEOUS at 05:03

## 2025-03-15 RX ADMIN — LEVOTHYROXINE SODIUM 50 MCG: 0.05 TABLET ORAL at 05:03

## 2025-03-15 RX ADMIN — PANTOPRAZOLE SODIUM 40 MG: 40 TABLET, DELAYED RELEASE ORAL at 08:03

## 2025-03-15 RX ADMIN — LIDOCAINE 1 PATCH: 50 PATCH CUTANEOUS at 08:03

## 2025-03-15 NOTE — PLAN OF CARE
Problem: Adult Inpatient Plan of Care  Goal: Plan of Care Review  Outcome: Progressing  Goal: Patient-Specific Goal (Individualized)  Outcome: Progressing  Goal: Absence of Hospital-Acquired Illness or Injury  Outcome: Progressing  Goal: Optimal Comfort and Wellbeing  Outcome: Progressing  Goal: Readiness for Transition of Care  Outcome: Progressing     Problem: Fall Injury Risk  Goal: Absence of Fall and Fall-Related Injury  Outcome: Progressing  Intervention: Identify and Manage Contributors  Flowsheets (Taken 3/15/2025 5108)  Self-Care Promotion:   BADL personal objects within reach   BADL personal routines maintained  Medication Review/Management:   medications reviewed   high-risk medications identified  Intervention: Promote Injury-Free Environment  Flowsheets (Taken 3/15/2025 0506)  Safety Promotion/Fall Prevention:   assistive device/personal item within reach   Fall Risk reviewed with patient/family   instructed to call staff for mobility   nonskid shoes/socks when out of bed   side rails raised x 2   lighting adjusted   medications reviewed   Fall Risk signage in place     Problem: Skin Injury Risk Increased  Goal: Skin Health and Integrity  Outcome: Progressing     Problem: Malnutrition  Goal: Improved Nutritional Intake  Outcome: Progressing

## 2025-03-15 NOTE — PT/OT/SLP PROGRESS
"Occupational Therapy   Treatment    Name: Isabel Hernandez  MRN: [id number]  Admit Date: 3/7/2025  Admitting Diagnosis:  Acute cystitis without hematuria    General Precautions: Standard, fall   Orthopedic Precautions: N/A   Braces: N/A    Recommendations:     Discharge Recommendations:  home health OT  Level of Assistance Recommended at Discharge: 24 hours supervision for safety in performing ADL's and home management tasks  Discharge Equipment Recommendations: walker, rolling, wheelchair, bath bench  Barriers to discharge:  Decreased caregiver support    Assessment:     Isabel Hernandez is a 89 y.o. female with a medical diagnosis of Acute cystitis without hematuria .  She presents with c/o of dizziness, lump in her throat, and confusion. Performance deficits affecting function are weakness, impaired endurance, impaired self care skills, impaired functional mobility, gait instability, impaired cognition, impaired balance, pain, decreased safety awareness.     Rehab Potential is fair    Activity tolerance:  Fair    Plan:     Patient to be seen 5 x/week to address the above listed problems via self-care/home management, therapeutic activities, therapeutic exercises    Plan of Care Expires: 03/29/25  Plan of Care Reviewed with: patient, daughter    Subjective     Communicated with: RN prior to session. Pt stated, " I don't remember" when OT asked it she ate breakfast. RN reported that she ate and had morning medications prior to the start of treatment session.     Pain/Comfort:  Pain Rating 1:  (did not rate)  Location 1: throat  Pain Addressed 1: Nurse notified  Pain Rating Post-Intervention 1: 0/10    Patient's cultural, spiritual, Jain conflicts given the current situation:  no    Objective:     Patient found up in chair with [employer] upon OT entry to room.        Functional Mobility/Transfers:  Patient completed Sit <> Stand Transfer with contact guard assistance  with  rolling walker   Patient completed recliner " <> Wheel Chair Transfer using Step Transfer technique with contact guard assistance with rolling walker  Functional Mobility: Pt stood at counter top with SBA, unilateral UE support reaching with R UE for cards. She completed card activity reaching across midline, visually scanning board to place cards on matches, with occasional cues for scanning, good balance.    Activities of Daily Living:  Pt dressed prior to Ot's arrival    [facility] 6 Click ADL: 18    OT Exercises: UE Ergometer 10 min with frequent breaks, needed frequent vc's to continue exercises.    Treatment & Education:  -Education on energy conservation and task modification to maximize safety and (I) during ADLs and mobility  -Education on importance of OOB activity to improve overall activity tolerance and promote recovery  -Pt educated to call for assistance and to transfer with hospital staff only  -Provided education regarding role of OT, POC, & discharge recommendations with pt  verbalizing understanding.  Pt had no further questions & when asked whether there were any concerns pt reported none.    Patient left up in chair with call button in reach and Rn notified    GOALS:   Multidisciplinary Problems       Occupational Therapy Goals          Problem: Occupational Therapy    Goal Priority Disciplines Outcome Interventions   Occupational Therapy Goal     OT, PT/OT Progressing    Description: Goals to be met by: 3/29/25     Patient will increase functional independence with ADLs by performing:    Feeding with [place].  UE Dressing with [place].  LE Dressing with [place].  Grooming while standing with Modified [place].  Toileting from toilet with Modified [place] for hygiene and clothing management.   Bathing from  shower chair/bench with Supervision.  Sitting at edge of bed x10 minutes with [place].  Step transfer with Modified [place]  Toilet transfer to toilet with Modified [place].                         Time  Tracking:     OT Date of Treatment: 03/15/25  OT Start Time: 0904    OT Stop Time: 0949  OT Total Time (min): 45 min    Billable Minutes:Therapeutic Activity 15  Therapeutic Exercise 30    3/15/2025

## 2025-03-15 NOTE — PT/OT/SLP PROGRESS
"Physical Therapy Treatment    Patient Name:  Isabel Hernandez   MRN:  [id number]  Admit Date: 3/7/2025  Admitting Diagnosis: Acute cystitis without hematuria  Recent Surgeries: N/A    General Precautions: Standard, fall  Orthopedic Precautions: N/A  Braces: N/A    Recommendations:     Discharge Recommendations: home health PT  Level of Assistance Recommended at Discharge: 24 hours light assistance  Discharge Equipment Recommendations: walker, rolling, wheelchair, bath bench  Barriers to discharge: Inaccessible home environment    Assessment:     Isabel Hernandez is a 89 y.o. female admitted with a medical diagnosis of Acute cystitis without hematuria.     Pt reporting dizziness at start of session, vitals assessed and WNL Pt did not report dizziness when ambulating and was able to ambulate 124' with RW and CGA-SBA. Pt was also able to negotiate 4" curb step today with CGA however reported increased back pain during activity. Overall pt tolerated session well and is progressing towards goals. Pt remains appropriate for skilled rehab services to optimize independence upon discharge.        Performance deficits affecting function: weakness, impaired endurance, impaired self care skills, impaired functional mobility, gait instability, impaired balance, decreased lower extremity function, decreased safety awareness, impaired cognition, impaired cardiopulmonary response to activity.    Rehab Potential is good    Activity Tolerance: Good    Plan:     Patient to be seen 5 x/week to address the above listed problems via gait training, therapeutic activities, therapeutic exercises, neuromuscular re-education, wheelchair management/training    Plan of Care Expires: 04/07/25  Plan of Care Reviewed with: patient    Subjective     "I'm just so dizzy." BP: 121/58, SpO2 94% HR: 80    Pain/Comfort:  Pain Rating 1: 8/10  Location - Orientation 1: lower  Location 1: back  Pain Addressed 1: Distraction, Reposition, Other (see comments) (hot " "pack)  Pain Rating Post-Intervention 1: 8/10    Patient's cultural, spiritual, Adventist conflicts given the current situation:  no    Objective:     Patient found up in chair with  (no active lines) upon PT entry to room.     Therapeutic Activities and Exercises:   -seated LE ergometer x 10 min with BLE to improve cardiovascular endurance and strength     Functional Mobility:  Transfers:     Sit to Stand:  stand by assistance with rolling walker from w/c, multiple trials   Recliner chair <> wheelchair: contact guard assistance with  no AD  using  Step Transfer  Gait: pt ambulates 124' with RW and SBA-CGA (therapist audrey w/c behind). Reciprocal gait pattern, 3 standing rest breaks, no LOB.   Stairs:  Pt ascended/descended 4" curb step x 2 trials with Rolling Walker with no handrails with Contact Guard Assistance. Cues for sequencing    AM-PAC 6 CLICK MOBILITY  17    Patient left up in chair with call button in reach.    GOALS:   Multidisciplinary Problems       Physical Therapy Goals          Problem: Physical Therapy    Goal Priority Disciplines Outcome Interventions   Physical Therapy Goal     PT, PT/OT Progressing    Description: Goals to be met by: 25     Patient will increase functional independence with mobility by performin. Supine to sit with Supervision  2. Sit to supine with Supervision  3. Rolling to Left and Right with Supervision  4. Sit to stand transfer with Supervision  5. Bed to chair transfer with Supervision using Rolling Walker  6. Gait  x 150 feet with Stand-by Assistance using Rolling Walker  7. Wheelchair propulsion x 50 feet with Supervision using bilateral upper extremities  8. Ascend/descend 4 stairs with bilateral Handrail and Contact Guard Assistance using No Assistive Device.  9. Ascend/Descend 4 inch curb step with Contact Guard Assistance using Rolling Walker                         Time Tracking:     PT Received On: 03/15/25  PT Start Time: 1327  PT Stop Time: 1412  PT " Total Time (min): 45 min    Billable Minutes: Gait Training 21 min, Therapeutic Activity 12 min, and Therapeutic Exercise 12 min    Treatment Type: Treatment  PT/PTA: PT     Number of PTA visits since last PT visit: 0     03/15/2025

## 2025-03-16 PROCEDURE — 25000003 PHARM REV CODE 250: Mod: HCNC | Performed by: FAMILY MEDICINE

## 2025-03-16 PROCEDURE — 25000003 PHARM REV CODE 250: Mod: HCNC | Performed by: NURSE PRACTITIONER

## 2025-03-16 PROCEDURE — 63600175 PHARM REV CODE 636 W HCPCS: Mod: HCNC | Performed by: FAMILY MEDICINE

## 2025-03-16 PROCEDURE — 25000003 PHARM REV CODE 250: Mod: HCNC | Performed by: HOSPITALIST

## 2025-03-16 PROCEDURE — 11000004 HC SNF PRIVATE: Mod: HCNC

## 2025-03-16 RX ADMIN — ONDANSETRON 4 MG: 4 TABLET, ORALLY DISINTEGRATING ORAL at 07:03

## 2025-03-16 RX ADMIN — LEVOTHYROXINE SODIUM 50 MCG: 0.05 TABLET ORAL at 05:03

## 2025-03-16 RX ADMIN — LIDOCAINE 1 PATCH: 50 PATCH CUTANEOUS at 08:03

## 2025-03-16 RX ADMIN — ENOXAPARIN SODIUM 40 MG: 40 INJECTION SUBCUTANEOUS at 05:03

## 2025-03-16 RX ADMIN — ACETAMINOPHEN 650 MG: 325 TABLET ORAL at 08:03

## 2025-03-16 RX ADMIN — CALCIUM CARBONATE (ANTACID) CHEW TAB 500 MG 500 MG: 500 CHEW TAB at 10:03

## 2025-03-16 RX ADMIN — ACETAMINOPHEN 650 MG: 325 TABLET ORAL at 03:03

## 2025-03-16 RX ADMIN — PANTOPRAZOLE SODIUM 40 MG: 40 TABLET, DELAYED RELEASE ORAL at 09:03

## 2025-03-16 RX ADMIN — TRAMADOL HYDROCHLORIDE 50 MG: 50 TABLET, COATED ORAL at 08:03

## 2025-03-16 RX ADMIN — ONDANSETRON 4 MG: 4 TABLET, ORALLY DISINTEGRATING ORAL at 10:03

## 2025-03-16 RX ADMIN — MECLIZINE 12.5 MG: 12.5 TABLET ORAL at 05:03

## 2025-03-16 RX ADMIN — ACETAMINOPHEN 650 MG: 325 TABLET ORAL at 09:03

## 2025-03-16 NOTE — PLAN OF CARE
Problem: Adult Inpatient Plan of Care  Goal: Plan of Care Review  Outcome: Progressing  Goal: Patient-Specific Goal (Individualized)  Outcome: Progressing  Goal: Absence of Hospital-Acquired Illness or Injury  Outcome: Progressing  Goal: Optimal Comfort and Wellbeing  Outcome: Progressing  Goal: Readiness for Transition of Care  Outcome: Progressing     Problem: Fall Injury Risk  Goal: Absence of Fall and Fall-Related Injury  Outcome: Progressing  Intervention: Identify and Manage Contributors  Flowsheets (Taken 3/16/2025 0515)  Self-Care Promotion:   BADL personal objects within reach   BADL personal routines maintained  Medication Review/Management:   medications reviewed   high-risk medications identified  Intervention: Promote Injury-Free Environment  Flowsheets (Taken 3/16/2025 0515)  Safety Promotion/Fall Prevention:   assistive device/personal item within reach   Fall Risk reviewed with patient/family   instructed to call staff for mobility   nonskid shoes/socks when out of bed   side rails raised x 2   lighting adjusted   medications reviewed   Fall Risk signage in place     Problem: Skin Injury Risk Increased  Goal: Skin Health and Integrity  Outcome: Progressing     Problem: Malnutrition  Goal: Improved Nutritional Intake  Outcome: Progressing

## 2025-03-16 NOTE — PLAN OF CARE
Problem: Fall Injury Risk  Goal: Absence of Fall and Fall-Related Injury  Outcome: Progressing  Intervention: Promote Injury-Free Environment  Flowsheets (Taken 3/15/2025 1905)  Safety Promotion/Fall Prevention: assistive device/personal item within reach     Problem: Fall Injury Risk  Goal: Absence of Fall and Fall-Related Injury  Outcome: Progressing  Intervention: Promote Injury-Free Environment  Flowsheets (Taken 3/15/2025 1905)  Safety Promotion/Fall Prevention: assistive device/personal item within reach     Problem: Fall Injury Risk  Goal: Absence of Fall and Fall-Related Injury  Outcome: Progressing  Intervention: Promote Injury-Free Environment  Flowsheets (Taken 3/15/2025 1905)  Safety Promotion/Fall Prevention: assistive device/personal item within reach     Problem: Fall Injury Risk  Goal: Absence of Fall and Fall-Related Injury  Outcome: Progressing  Intervention: Promote Injury-Free Environment  Flowsheets (Taken 3/15/2025 1905)  Safety Promotion/Fall Prevention: assistive device/personal item within reach     Problem: Fall Injury Risk  Goal: Absence of Fall and Fall-Related Injury  Outcome: Progressing  Intervention: Promote Injury-Free Environment  Flowsheets (Taken 3/15/2025 1905)  Safety Promotion/Fall Prevention: assistive device/personal item within reach

## 2025-03-17 LAB
ANION GAP SERPL CALC-SCNC: 8 MMOL/L (ref 8–16)
BASOPHILS # BLD AUTO: 0.04 K/UL (ref 0–0.2)
BASOPHILS NFR BLD: 0.5 % (ref 0–1.9)
BUN SERPL-MCNC: 18 MG/DL (ref 8–23)
CALCIUM SERPL-MCNC: 8.2 MG/DL (ref 8.7–10.5)
CHLORIDE SERPL-SCNC: 103 MMOL/L (ref 95–110)
CO2 SERPL-SCNC: 25 MMOL/L (ref 23–29)
CREAT SERPL-MCNC: 0.8 MG/DL (ref 0.5–1.4)
DIFFERENTIAL METHOD BLD: ABNORMAL
EOSINOPHIL # BLD AUTO: 0.1 K/UL (ref 0–0.5)
EOSINOPHIL NFR BLD: 1.5 % (ref 0–8)
ERYTHROCYTE [DISTWIDTH] IN BLOOD BY AUTOMATED COUNT: 13.2 % (ref 11.5–14.5)
EST. GFR  (NO RACE VARIABLE): >60 ML/MIN/1.73 M^2
GLUCOSE SERPL-MCNC: 81 MG/DL (ref 70–110)
HCT VFR BLD AUTO: 34.8 % (ref 37–48.5)
HGB BLD-MCNC: 11.2 G/DL (ref 12–16)
IMM GRANULOCYTES # BLD AUTO: 0.03 K/UL (ref 0–0.04)
IMM GRANULOCYTES NFR BLD AUTO: 0.4 % (ref 0–0.5)
LYMPHOCYTES # BLD AUTO: 3.1 K/UL (ref 1–4.8)
LYMPHOCYTES NFR BLD: 40.5 % (ref 18–48)
MAGNESIUM SERPL-MCNC: 2.2 MG/DL (ref 1.6–2.6)
MCH RBC QN AUTO: 30.9 PG (ref 27–31)
MCHC RBC AUTO-ENTMCNC: 32.2 G/DL (ref 32–36)
MCV RBC AUTO: 96 FL (ref 82–98)
MONOCYTES # BLD AUTO: 0.7 K/UL (ref 0.3–1)
MONOCYTES NFR BLD: 9.5 % (ref 4–15)
NEUTROPHILS # BLD AUTO: 3.6 K/UL (ref 1.8–7.7)
NEUTROPHILS NFR BLD: 47.6 % (ref 38–73)
NRBC BLD-RTO: 0 /100 WBC
PHOSPHATE SERPL-MCNC: 4.2 MG/DL (ref 2.7–4.5)
PLATELET # BLD AUTO: 276 K/UL (ref 150–450)
PMV BLD AUTO: 10.2 FL (ref 9.2–12.9)
POTASSIUM SERPL-SCNC: 4.6 MMOL/L (ref 3.5–5.1)
RBC # BLD AUTO: 3.63 M/UL (ref 4–5.4)
SODIUM SERPL-SCNC: 136 MMOL/L (ref 136–145)
WBC # BLD AUTO: 7.58 K/UL (ref 3.9–12.7)

## 2025-03-17 PROCEDURE — 63600175 PHARM REV CODE 636 W HCPCS: Mod: HCNC | Performed by: FAMILY MEDICINE

## 2025-03-17 PROCEDURE — 97530 THERAPEUTIC ACTIVITIES: CPT | Mod: HCNC,CQ

## 2025-03-17 PROCEDURE — 25000003 PHARM REV CODE 250: Mod: HCNC | Performed by: HOSPITALIST

## 2025-03-17 PROCEDURE — 80048 BASIC METABOLIC PNL TOTAL CA: CPT | Mod: HCNC | Performed by: HOSPITALIST

## 2025-03-17 PROCEDURE — 97110 THERAPEUTIC EXERCISES: CPT | Mod: HCNC,CQ

## 2025-03-17 PROCEDURE — 92610 EVALUATE SWALLOWING FUNCTION: CPT | Mod: HCNC

## 2025-03-17 PROCEDURE — 97535 SELF CARE MNGMENT TRAINING: CPT | Mod: HCNC,CO

## 2025-03-17 PROCEDURE — 84100 ASSAY OF PHOSPHORUS: CPT | Mod: HCNC | Performed by: HOSPITALIST

## 2025-03-17 PROCEDURE — 97116 GAIT TRAINING THERAPY: CPT | Mod: HCNC,CQ

## 2025-03-17 PROCEDURE — 85025 COMPLETE CBC W/AUTO DIFF WBC: CPT | Mod: HCNC | Performed by: HOSPITALIST

## 2025-03-17 PROCEDURE — 36415 COLL VENOUS BLD VENIPUNCTURE: CPT | Mod: HCNC | Performed by: HOSPITALIST

## 2025-03-17 PROCEDURE — 25000003 PHARM REV CODE 250: Mod: HCNC | Performed by: NURSE PRACTITIONER

## 2025-03-17 PROCEDURE — 11000004 HC SNF PRIVATE: Mod: HCNC

## 2025-03-17 PROCEDURE — 25000003 PHARM REV CODE 250: Mod: HCNC | Performed by: FAMILY MEDICINE

## 2025-03-17 PROCEDURE — 83735 ASSAY OF MAGNESIUM: CPT | Mod: HCNC | Performed by: HOSPITALIST

## 2025-03-17 RX ORDER — MECLIZINE HCL 12.5 MG 12.5 MG/1
12.5 TABLET ORAL 3 TIMES DAILY
Status: DISCONTINUED | OUTPATIENT
Start: 2025-03-17 | End: 2025-03-20

## 2025-03-17 RX ADMIN — PANTOPRAZOLE SODIUM 40 MG: 40 TABLET, DELAYED RELEASE ORAL at 08:03

## 2025-03-17 RX ADMIN — ACETAMINOPHEN 650 MG: 325 TABLET ORAL at 08:03

## 2025-03-17 RX ADMIN — LEVOTHYROXINE SODIUM 50 MCG: 0.05 TABLET ORAL at 05:03

## 2025-03-17 RX ADMIN — MECLIZINE 12.5 MG: 12.5 TABLET ORAL at 02:03

## 2025-03-17 RX ADMIN — ACETAMINOPHEN 650 MG: 325 TABLET ORAL at 03:03

## 2025-03-17 RX ADMIN — MECLIZINE HYDROCHLORIDE 12.5 MG: 12.5 TABLET ORAL at 08:03

## 2025-03-17 RX ADMIN — LIDOCAINE 1 PATCH: 50 PATCH CUTANEOUS at 08:03

## 2025-03-17 RX ADMIN — MECLIZINE 12.5 MG: 12.5 TABLET ORAL at 08:03

## 2025-03-17 RX ADMIN — ENOXAPARIN SODIUM 40 MG: 40 INJECTION SUBCUTANEOUS at 05:03

## 2025-03-17 NOTE — PT/OT/SLP EVAL
Speech Language Pathology  Bedside Swallow Evaluation/Discharge    Isabel Hernandez   MRN: [id number]   Admitting Diagnosis: Acute cystitis without hematuria    Diet recommendations: Solid Diet Level: Regular Diet - IDDSI Level 7  Liquid Diet Level: Thin liquids - IDDSI Level 0 1 bite/sip at a time, Alternating bites/sips, Avoid talking while eating, Feed only when awake/alert, Frequent oral care, HOB to 90 degrees, Monitor for s/s of aspiration, Small bites/sips, and Standard aspiration precautions    SLP Treatment Date: 25  Speech Start Time: 959     Speech Stop Time: 100     Speech Total (min): 7 min       TREATMENT BILLABLE MINUTES:  Eval Swallow and Oral Function 7    Diagnosis: Acute cystitis without hematuria      Past Medical History:   Diagnosis Date    Arthritis     Cataract     Hypothyroidism     Mild malnutrition 3/10/2025    Osteoporosis 2015     Past Surgical History:   Procedure Laterality Date    ADENOIDECTOMY      APPENDECTOMY      BREAST SURGERY Bilateral     cyst removal     SECTION      CHOLECYSTECTOMY      HYSTERECTOMY      THYROIDECTOMY, PARTIAL      TONSILLECTOMY      VITRECTOMY BY PARS PLANA APPROACH Left 2018    Procedure: VITRECTOMY, PARS PLANA APPROch   internal limitng membrane peel;  Surgeon: Johnathan Christopher MD;  Location: [facility] OR [address] [place] [address];  Service: Ophthalmology;  Laterality: Left;       Has the patient been evaluated by SLP for swallowing? : Yes  Keep patient NPO?: No General Precautions: Standard, aspiration, fall        HPI: Isabel Hernandez is a 89 y.o. female with PMHx of thyroid disease, stage II CKD, history of compression fractures admitted to  with nausea/vomiting, UTI and weakness. Hisotry is obtained with assistance from daughter at bedside. Daughter states pt lives next door on her own, normally ambulates independently. On Monday, daughter found patient on the ground. It looked as though she had fallen because a shoe was out of place and she had  "vomited on herself. Daughter checks her for injuries and pt has reported just slipping. Tuesday pt had very poor appetite, still felt nauseated. Today, patient was again unable to get herself up at home and was complaining of persistent nausea, generalized weakness, lightheadedness,  belching. They had gone to get her a walker the day prior since she was so weak and they attempted to have her use it but she was too weak to hold on and take a few steps which is very far from her baseline. No known sick contacts. Patient reports feeling confused, mild headache, weakness, abdominal cramping, belching. Also reports left sided low back/flank pain. Denies urinary complaint, diarrhea, or more episodes of emesis. Denies chest pain, SOB, focal weakness or dizziness/vertigo.       Prior diet: regular solids/thins    Subjective:  "I eat fine."  Pt denies swallowing difficulty, decreased appetite, or globus sensation.      Objective:              Bedside Swallow Eval:   Consistencies Assessed: Thin liquids cup sips x 2, straw sips x 2  Solids 1/4 cracker x 1  Oral Phase: WFL  Pharyngeal Phase: no overt clinical signs/symptoms of aspiration  no overt clinical signs/symptoms of pharyngeal dysphagia    Additional Treatment:  Pt was able to accept and tolerate trials of thin liquids and regular solids without difficulty.  Oral and pharyngeal management both appeared WFL.  No further skilled SLP services warranted at this time. Pt denies globus sensation, coughing/choking during PO intake, and decreased appetite. Pt denies any difficulty swallowing pills.  SLP recommends continuing current diet while following standard aspiration precautions.  No further skilled SLP services warranted at this time.     Assessment:  Isabel Hernandez is a 89 y.o. female with a medical diagnosis of Acute cystitis without hematuria and presents with functional swallowing abilities.  SLP recommends continuing current diet while following standard " aspiration precautions.  No further skilled SLP services warranted at this time.        Goals:   Multidisciplinary Problems       SLP Goals       Not on file                     Plan:   Patient to be seen    Planned Interventions:    Plan of Care expires:    Plan of Care reviewed with:    SLP Follow-up?: No         03/17/2025

## 2025-03-17 NOTE — PT/OT/SLP PROGRESS
"Occupational Therapy   Treatment    Name: Isabel Hernandez  MRN: [id number]  Admit Date: 3/7/2025  Admitting Diagnosis:  Acute cystitis without hematuria    General Precautions: Standard, aspiration, fall   Orthopedic Precautions: N/A   Braces: N/A    Recommendations:     Discharge Recommendations:  home health OT  Level of Assistance Recommended at Discharge: 24 hours light assistance for ADL's and homemaking tasks  Discharge Equipment Recommendations: walker, rolling, wheelchair, bath bench  Barriers to discharge:  Decreased caregiver support    Assessment:     Isabel Hernandez is a 89 y.o. female with a medical diagnosis of Acute cystitis without hematuria .  She presents with performance deficits affecting function are weakness, impaired endurance, impaired self care skills, impaired functional mobility, gait instability, impaired cognition, impaired balance, pain, decreased safety awareness. Pt participated fairly during today's session. Pt c/o dizziness, b/p  checked - 117/60. Pt is making progress, however, continues to demonstrate deficits with self care skills, balance, functional mobility, UB strength and endurance. Pt will benefit from continued OT services to progress towards goals.     Rehab Potential is good    Activity tolerance:  Fair    Plan:     Patient to be seen 5 x/week to address the above listed problems via self-care/home management, therapeutic activities, therapeutic exercises    Plan of Care Expires: 03/29/25  Plan of Care Reviewed with: patient    Subjective   "I just feel dizzy"  Communicated with: Missy prior to session.     Pain/Comfort:  Pain Rating 1:  (not rated)  Location - Side 1: Bilateral  Location - Orientation 1: lower  Location 1: back  Pain Addressed 1: Reposition, Cessation of Activity  Pain Rating Post-Intervention 1:  (not rated)    Patient's cultural, spiritual, Episcopalian conflicts given the current situation:  no    Objective:     Patient found up in chair upon OT entry to " room.    Functional Mobility/Transfers:  Patient completed Sit <> Stand Transfer with contact guard assistance  with  rolling walker   Patient completed Wheelchair <> Bedside Chair Transfer using Step Transfer technique with contact guard assistance with rolling walker  Patient completed  Shower Transfer Stand Pivot technique with contact guard assistance with grab bars  Functional Mobility: Pt ambulated 14 ft in room with CGA and RW    Activities of Daily Living:  Bathing: minimum assistance to perform cleansing seated on shower bench.   Upper Body Dressing: minimum assistance to doff/don pullover shirt while seated   Lower Body Dressing: minimum assistance to thread B LE and manage pants over hips in stance with use of RW  Footwear: total assistance to doff/don B socks while seated.     [facility] 6 Click ADL: 18    Treatment & Education:  Pt educated on:  - role of OT  - level of assistance  - energy conservation and task modification to maximize independence with ADL's and mobility   -  safety while performing functional transfers and self care tasks  - progress towards OT goals     Patient left up in bedside chair with call button in reach    GOALS:   Multidisciplinary Problems       Occupational Therapy Goals          Problem: Occupational Therapy    Goal Priority Disciplines Outcome Interventions   Occupational Therapy Goal     OT, PT/OT Progressing    Description: Goals to be met by: 3/29/25     Patient will increase functional independence with ADLs by performing:    Feeding with [place].  UE Dressing with [place].  LE Dressing with [place].  Grooming while standing with Modified [place].  Toileting from toilet with Modified [place] for hygiene and clothing management.   Bathing from  shower chair/bench with Supervision.  Sitting at edge of bed x10 minutes with [place].  Step transfer with Modified [place]  Toilet transfer to toilet with Modified [place].                          Time Tracking:     OT Date of Treatment: 03/17/25  OT Start Time: 0847    OT Stop Time: 0916  OT Total Time (min): 29 min    Billable Minutes:Self Care/Home Management 29    3/17/2025

## 2025-03-17 NOTE — PROGRESS NOTES
"                                                        [facility]                                  Skilled Nursing Facility                   Progress Note     Admit Date: 3/7/2025  MURPHY 4/1/2025  [id number]  Principal Problem:  Acute cystitis without hematuria   HPI obtained from patient interview and chart review     Chief Complaint: Re-evaluation of medical treatment and therapy status, lab review, dizziness    HPI:   Isabel Hernandez is an 89 year old female with PMHx of thyroid disease, stage II CKD, history of compression fractures who presents to SNF following hospitalization for acute cystitis.  Admission to SNF for secondary weakness and debility.    Interval history: All of today's labs reviewed and are listed below.  24 hr vital sign ranges reviewed and listed below.  Patient continues to report back pain.  Dizziness remains severe.  Updated x-ray obtained and shows, "Five non rib-bearing lumbar type vertebral bodies are present. There is widening of the intervertebral joint space between L4 and L5, unchanged when compared to 03/05/2025. Multiple compression deformities in the lower thoracic spine are unchanged when compared to prior imaging. Degenerative changes are seen throughout the spine, as before. Facet arthropathy of the lower lumbar spine. The abdominal aorta is heavily calcified." Patient with continued dizziness.  MRI scheduled for 3/19 that was suggested to do [place] [facility] but not able to be done prior to discharge.  Patient denies shortness of breath, abdominal discomfort, nausea, or vomiting.  Patient displays an adequate appetite.  Patient denies dysuria.  Patient reports having regular bowel movements.  Patient progressing with PT/OT- Sit to Stand:  stand by assistance with rolling walker from w/c, multiple trials, Recliner chair <> wheelchair: contact guard assistance with  no AD  using  Step Transfer, Gait: pt ambulates 124' with RW and SBA-CGA (therapist audrey w/c " "behind). Reciprocal gait pattern, 3 standing rest breaks, no LOB. Stairs:  Pt ascended/descended 4" curb step x 2 trials with Rolling Walker with no handrails with Contact Guard Assistance. Cues for sequencing. Continuing to follow and treat all acute and chronic conditions.    Past Medical History: Patient has a past medical history of Arthritis, Cataract, Hypothyroidism, Mild malnutrition (3/10/2025), and Osteoporosis (2015).    Past Surgical History: Patient has a past surgical history that includes Thyroidectomy, partial; Hysterectomy; Cholecystectomy; Tonsillectomy; Adenoidectomy; Breast surgery (Bilateral);  section; Vitrectomy by pars plana approach (Left, 2018); and Appendectomy.    Social History: Patient reports that she has never smoked. She has never used smokeless tobacco. She reports that she does not drink alcohol and does not use drugs.    Family History: family history includes Cancer in her brother, father, and sister; Cataracts in her father; Glaucoma in her father; Hypertension in her mother; Kidney disease in her mother; Miscarriages / Stillbirths in her mother; No Known Problems in her brother and daughter; Thyroid disease in her mother.    Allergies: Patient is allergic to codeine, fosamax [alendronate], and iron analogues.    ROS  Constitutional: Negative for fever   Respiratory: Negative for cough, shortness of breath   Cardiovascular: Negative for chest pain, palpitations  Gastrointestinal: Negative for abdominal pain, constipation, diarrhea, nausea, vomiting.   Genitourinary: Negative for dysuria, frequency   Musculoskeletal:  + generalized weakness.  +back pain  Neurological: Negative for  headaches.  +dizziness  Psychiatric/Behavioral: Negative for depression. The patient is nervous/anxious.      24 hour Vital Sign Range   Temp:  [97.7 °F (36.5 °C)-98.1 °F (36.7 °C)]   Pulse:  [60-67]   Resp:  [16-18]   BP: (116-130)/(57-60)   SpO2:  [97 %]     Current BMI: Body mass " "index is 25.51 kg/m².    PEx  Constitutional: Patient appears debilitated.  No distress noted  Cardiovascular: Normal rate, regular rhythm and normal heart sounds.    Pulmonary/Chest: Effort normal and breath sounds are clear  Abdominal: Soft. Bowel sounds are normal.   Musculoskeletal: Normal range of motion.   Neurological: Alert and oriented to person, place.  Disoriented to time.  Impaired higher level thinking  Psychiatric: Normal mood and affect. Behavior is normal.   Skin: Skin is warm and dry.     Recent Labs   Lab 03/17/25  0547      K 4.6      CO2 25   BUN 18   CREATININE 0.8   CALCIUM 8.2*   MG 2.2       Recent Labs   Lab 03/17/25  0547   WBC 7.58   RBC 3.63*   HGB 11.2*   HCT 34.8*      MCV 96   MCH 30.9   MCHC 32.2       No results for input(s): "POCTGLUCOSE" in the last 168 hours.       Assessment and Plan:    Acute cystitis without hematuria  - Presenting with nausea/ generalized weakness/ lightheadedness and suprapubic discomfort and confusion   - CT AP without acute findings   - UA infectious, Urine culture with mixed growth; no predominant organism  - treated with IV ceftriaxone empirically  - completed cefpodoxime 100 mg BID, ended 3/13    Nausea and vomiting  - Suspected related to UTI or possibly viral illness   - CT AP without acute findings  - Flu/Covid negative   - continue Zofran PRN  - resolved     Dizziness  - possible vertigo  - CTH negative  - Per [facility], "Given concern for posterior circulation CVA that could be the etiology of her dizziness we did offer MR brain, but she declined." Per daughter, MRI was ordered and scheduled for day of discharge and was not able to be completed prior to transportation showing up for them to transferred to SNF, will have rescheduled  - possible ENT follow-up  - persisting, initiate meclizine 12.5 mg TID   - MRI scheduled for 3/19    Dementia without behavioral disturbance, psychotic disturbance, mood disturbance, or anxiety, unspecified " dementia severity, unspecified dementia type   - per past PCP notes patient has opted out of medication for dementia  Delirium precautions:  - Avoid antihistamines, anticholinergics, hypnotics, and minimize opiates while controlling for pain as these medications may exacerbate delirium. Cues for day/night will assist with keeping patient calm and oriented - during daytime, please keep adequate light in room (open windows, lights on) and please keep room dim at night-time to encourage normal sleep-wake cycles. Continuing to have nursing and family reorient the patient and encourage family to visit  - per daughter patient has not yet at her mental baseline patient is typically more oriented.      CKD Stage 3   - stable, continue monitor twice weekly BMPs, avoid nephrotoxic agents, renally dose medications as appropriate    Back pain  - XR (3/15) Five non rib-bearing lumbar type vertebral bodies are present. There is widening of the intervertebral joint space between L4 and L5, unchanged when compared to 03/05/2025. Multiple compression deformities in the lower thoracic spine are unchanged when compared to prior imaging. Degenerative changes are seen throughout the spine, as before. Facet arthropathy of the lower lumbar spine. The abdominal aorta is heavily calcified.   - continue acetaminophen 650 mg q.8 hours, lidocaine patch qHS, prn tramadol    Hypothyroid  - TSH slightly elevated with normal free T4  - stable, Continue home levothyroxine 50 mcg daily    Age-related osteoporosis without current pathological fracture   - per PCP notes, patient is supposed to be on Prolia.  Do not see on dispensed report    Advance care planning  - ACP discussion held with patient and her daughter on 03/10, per daughter patient had living will that states patient is a DNR    Debility   - Continue with PT/OT for gait training and strengthening and restoration of ADL's   - Encourage mobility, OOB in chair, and early ambulation as  appropriate  - Fall precautions   - Monitor for bowel and bladder dysfunction  - Monitor for and prevent skin breakdown and pressure ulcers  - Continue DVT prophylaxis with       Anticipate disposition:  Home with home health    IP [facility] RISK OF UNPLANNED READMISSION Model: MODERATE     Follow-up needed during SNF stay-    Follow-up needed after discharge from SNF: PCP     Future Appointments   Date Time Provider Department Center   3/19/2025  2:30 PM [facility] MRI HOSP1 [facility] [address]   4/7/2025  2:45 PM Colten Oleary, DPM [facility] P[facility] [address] [facility]   5/13/2025 12:00 PM Danielle Vaughan FNP [facility] IM [address] [facility]   6/2/2025  2:45 PM Kristian Shabazz MD [facility] [place]   7/21/2025  2:45 PM Kristian Shabazz MD [facility] [place]     I certify that SNF services are required to be given on an inpatient basis because Isabel Hernandez needs for skilled nursing care and/or skilled rehabilitation are required on a daily basis and such services can only practically be provided in a skilled nursing facility setting and are for an ongoing condition for which she received inpatient care in the hospital.     I spent 47 minutes reviewing patient records, examining, and counseling the patient/ patient's family with greater than 50% of the time spent in direct patient care and coordination.  Care coordination with staff, phone conversation held with patient's daughter.    Radha Spencer NP  Department of Hospital Medicine   [facility]     DOS:3/17/2025      Patient note was created using MModal Dictation.  Any errors in syntax or even information may not have been identified and edited on initial review prior to signing this note.

## 2025-03-17 NOTE — PLAN OF CARE
Problem: Adult Inpatient Plan of Care  Goal: Plan of Care Review  Outcome: Progressing  Goal: Patient-Specific Goal (Individualized)  Outcome: Progressing  Goal: Absence of Hospital-Acquired Illness or Injury  Outcome: Progressing  Goal: Optimal Comfort and Wellbeing  Outcome: Progressing  Goal: Readiness for Transition of Care  Outcome: Progressing     Problem: Fall Injury Risk  Goal: Absence of Fall and Fall-Related Injury  Outcome: Progressing  Intervention: Identify and Manage Contributors  Flowsheets (Taken 3/17/2025 0527)  Self-Care Promotion:   BADL personal objects within reach   BADL personal routines maintained  Medication Review/Management:   medications reviewed   high-risk medications identified  Intervention: Promote Injury-Free Environment  Flowsheets (Taken 3/17/2025 0527)  Safety Promotion/Fall Prevention:   assistive device/personal item within reach   instructed to call staff for mobility   nonskid shoes/socks when out of bed   side rails raised x 2   lighting adjusted   medications reviewed   Fall Risk signage in place   Fall Risk reviewed with patient/family     Problem: Skin Injury Risk Increased  Goal: Skin Health and Integrity  Outcome: Progressing     Problem: Malnutrition  Goal: Improved Nutritional Intake  Outcome: Progressing

## 2025-03-17 NOTE — PT/OT/SLP PROGRESS
"Physical Therapy Treatment    Patient Name:  Isabel Hernandez   MRN:  [id number]  Admit Date: 3/7/2025  Admitting Diagnosis: Acute cystitis without hematuria  Recent Surgeries:     General Precautions: Standard, fall  Orthopedic Precautions: N/A  Braces: N/A    Recommendations:     Discharge Recommendations: home health PT  Level of Assistance Recommended at Discharge: 24 hours light assistance  Discharge Equipment Recommendations: walker, rolling, wheelchair, bath bench  Barriers to discharge: Inaccessible home environment    Assessment:     Isabel Hernandez is a 89 y.o. female admitted with a medical diagnosis of Acute cystitis without hematuria . Pt tolerated fair,  remains limited 2* to reports of back pain and dizziness, nsg notified, pt would continue to benefit from skilled PT services to improve overall functional mobility, strength and endurance.  .      Performance deficits affecting function: weakness, impaired endurance, impaired self care skills, impaired functional mobility, gait instability, impaired balance, decreased lower extremity function, decreased safety awareness, impaired cognition, impaired cardiopulmonary response to activity.    Rehab Potential is good    Activity Tolerance: Fair    Plan:     Patient to be seen 5 x/week to address the above listed problems via gait training, therapeutic activities, therapeutic exercises, neuromuscular re-education, wheelchair management/training    Plan of Care Expires: 04/07/25  Plan of Care Reviewed with: patient    Subjective     "What you want me to do?".     Pain/Comfort:  Pain Rating 1:  (did not rate, guarded trfs)  Location - Orientation 1: lower  Location 1: back  Pain Addressed 1: Reposition, Distraction, Cessation of Activity, Nurse notified, Pre-medicate for activity (pre meds, scheduled tylenol)  Pain Rating Post-Intervention 1:  (throughout)    Patient's cultural, spiritual, Restorationism conflicts given the current situation:  no    Objective: "     Communicated with nsg about pts dizziness and BP readings/pain  Patient found  with  (in [place], daughter present) upon PT entry to room.     Therapeutic Activities and Exercises: 2x10 reps AP,LAQ,hip flex,abd/add mini elliptical x 2 minutes, getting dizzy/HA  Per pts daug going Wed for MRI for the dizziness    Functional Mobility:  Transfers:     Sit to Stand:  stand by assistance with rolling walker  Bed to Chair: stand by assistance with  no AD and with RW using  Stand Pivot and vcs for sequencing  Gait: amb with RW CGA 71 ft no LOB wc follow close  Bp after trfs 138/62, after gait 129/61 nsg notified      AM-PAC 6 CLICK MOBILITY  17    Patient left up in chair with call button in reach, daughter present, and belonging sin reach .    GOALS:   Multidisciplinary Problems       Physical Therapy Goals          Problem: Physical Therapy    Goal Priority Disciplines Outcome Interventions   Physical Therapy Goal     PT, PT/OT Progressing    Description: Goals to be met by: 25     Patient will increase functional independence with mobility by performin. Supine to sit with Supervision  2. Sit to supine with Supervision  3. Rolling to Left and Right with Supervision  4. Sit to stand transfer with Supervision  5. Bed to chair transfer with Supervision using Rolling Walker  6. Gait  x 150 feet with Stand-by Assistance using Rolling Walker  7. Wheelchair propulsion x 50 feet with Supervision using bilateral upper extremities  8. Ascend/descend 4 stairs with bilateral Handrail and Contact Guard Assistance using No Assistive Device.  9. Ascend/Descend 4 inch curb step with Contact Guard Assistance using Rolling Walker                         Time Tracking:     PT Received On: 25  PT Start Time: 1303  PT Stop Time: 1345  PT Total Time (min): 42 min    Billable Minutes: Gait Training 10, Therapeutic Activity 15, and Therapeutic Exercise 17    Treatment Type: Treatment  PT/PTA: PTA     Number of PTA visits  since last PT visit: 1 03/17/2025

## 2025-03-18 ENCOUNTER — HOSPITAL ENCOUNTER (EMERGENCY)
Facility: HOSPITAL | Age: 89
Discharge: HOME | End: 2025-03-18
Attending: EMERGENCY MEDICINE
Payer: MEDICARE

## 2025-03-18 VITALS
DIASTOLIC BLOOD PRESSURE: 65 MMHG | HEART RATE: 74 BPM | SYSTOLIC BLOOD PRESSURE: 114 MMHG | OXYGEN SATURATION: 96 % | WEIGHT: 126.31 LBS | BODY MASS INDEX: 23.24 KG/M2 | RESPIRATION RATE: 18 BRPM | HEIGHT: 62 IN | TEMPERATURE: 98 F

## 2025-03-18 DIAGNOSIS — R10.30 LOWER ABDOMINAL PAIN: ICD-10-CM

## 2025-03-18 DIAGNOSIS — Z13.6 SCREENING FOR CARDIOVASCULAR CONDITION: ICD-10-CM

## 2025-03-18 DIAGNOSIS — M54.50 ACUTE LEFT-SIDED LOW BACK PAIN WITHOUT SCIATICA: Primary | ICD-10-CM

## 2025-03-18 DIAGNOSIS — R53.1 WEAKNESS: ICD-10-CM

## 2025-03-18 LAB
ALBUMIN SERPL BCP-MCNC: 2.6 G/DL (ref 3.5–5.2)
ALP SERPL-CCNC: 101 U/L (ref 40–150)
ALT SERPL W/O P-5'-P-CCNC: 22 U/L (ref 10–44)
ANION GAP SERPL CALC-SCNC: 8 MMOL/L (ref 8–16)
AST SERPL-CCNC: 26 U/L (ref 10–40)
BASOPHILS # BLD AUTO: 0.05 K/UL (ref 0–0.2)
BASOPHILS NFR BLD: 0.7 % (ref 0–1.9)
BILIRUB SERPL-MCNC: 0.3 MG/DL (ref 0.1–1)
BILIRUB UR QL STRIP: NEGATIVE
BIPAP: 0
BUN SERPL-MCNC: 20 MG/DL (ref 8–23)
CALCIUM SERPL-MCNC: 7.9 MG/DL (ref 8.7–10.5)
CHLORIDE SERPL-SCNC: 106 MMOL/L (ref 95–110)
CLARITY UR REFRACT.AUTO: CLEAR
CO2 SERPL-SCNC: 22 MMOL/L (ref 23–29)
COLOR UR AUTO: COLORLESS
CORRECTED TEMPERATURE (PCO2): 30.8 MMHG
CORRECTED TEMPERATURE (PH): 7.54
CORRECTED TEMPERATURE (PO2): 26.9 MMHG
CREAT SERPL-MCNC: 0.7 MG/DL (ref 0.5–1.4)
DIFFERENTIAL METHOD BLD: ABNORMAL
EOSINOPHIL # BLD AUTO: 0.1 K/UL (ref 0–0.5)
EOSINOPHIL NFR BLD: 1.6 % (ref 0–8)
ERYTHROCYTE [DISTWIDTH] IN BLOOD BY AUTOMATED COUNT: 13 % (ref 11.5–14.5)
EST. GFR  (NO RACE VARIABLE): >60 ML/MIN/1.73 M^2
FIO2: 21 %
GLUCOSE SERPL-MCNC: 83 MG/DL (ref 70–110)
GLUCOSE UR QL STRIP: NEGATIVE
HCT VFR BLD AUTO: 35 % (ref 37–48.5)
HCT VFR BLD CALC: 34.2 %
HGB BLD-MCNC: 10.9 G/DL (ref 12–16)
HGB UR QL STRIP: NEGATIVE
IMM GRANULOCYTES # BLD AUTO: 0.02 K/UL (ref 0–0.04)
IMM GRANULOCYTES NFR BLD AUTO: 0.3 % (ref 0–0.5)
KETONES UR QL STRIP: NEGATIVE
LDH SERPL L TO P-CCNC: 1.2 MMOL/L (ref 0.5–2.2)
LEUKOCYTE ESTERASE UR QL STRIP: NEGATIVE
LYMPHOCYTES # BLD AUTO: 2.5 K/UL (ref 1–4.8)
LYMPHOCYTES NFR BLD: 35.6 % (ref 18–48)
MCH RBC QN AUTO: 30.4 PG (ref 27–31)
MCHC RBC AUTO-ENTMCNC: 31.1 G/DL (ref 32–36)
MCV RBC AUTO: 98 FL (ref 82–98)
MONOCYTES # BLD AUTO: 0.5 K/UL (ref 0.3–1)
MONOCYTES NFR BLD: 7.6 % (ref 4–15)
NEUTROPHILS # BLD AUTO: 3.8 K/UL (ref 1.8–7.7)
NEUTROPHILS NFR BLD: 54.2 % (ref 38–73)
NITRITE UR QL STRIP: NEGATIVE
NRBC BLD-RTO: 0 /100 WBC
OHS QRS DURATION: 84 MS
OHS QRS DURATION: 88 MS
OHS QTC CALCULATION: 426 MS
OHS QTC CALCULATION: 429 MS
PCO2 BLDA: 30.8 MMHG
PH SMN: 7.54 [PH]
PH UR STRIP: 8 [PH] (ref 5–8)
PLATELET # BLD AUTO: 293 K/UL (ref 150–450)
PMV BLD AUTO: 10.1 FL (ref 9.2–12.9)
PO2 BLDA: 26.9 MMHG
POC BASE DEFICIT: 4.1 MMOL/L
POC HCO3: 27.3 MMOL/L
POC PERFORMED BY: NORMAL
POC TEMPERATURE: 37 C
POTASSIUM SERPL-SCNC: 4.4 MMOL/L (ref 3.5–5.1)
PROT SERPL-MCNC: 5.5 G/DL (ref 6–8.4)
PROT UR QL STRIP: NEGATIVE
RBC # BLD AUTO: 3.59 M/UL (ref 4–5.4)
SODIUM SERPL-SCNC: 136 MMOL/L (ref 136–145)
SP GR UR STRIP: 1.01 (ref 1–1.03)
SPECIMEN SOURCE: NORMAL
URN SPEC COLLECT METH UR: ABNORMAL
WBC # BLD AUTO: 7.07 K/UL (ref 3.9–12.7)

## 2025-03-18 PROCEDURE — 25500020 PHARM REV CODE 255: Mod: HCNC | Performed by: EMERGENCY MEDICINE

## 2025-03-18 PROCEDURE — 99285 EMERGENCY DEPT VISIT HI MDM: CPT | Mod: 25,HCNC

## 2025-03-18 PROCEDURE — 85025 COMPLETE CBC W/AUTO DIFF WBC: CPT | Mod: HCNC | Performed by: EMERGENCY MEDICINE

## 2025-03-18 PROCEDURE — 11000004 HC SNF PRIVATE: Mod: HCNC

## 2025-03-18 PROCEDURE — 63600175 PHARM REV CODE 636 W HCPCS: Mod: HCNC | Performed by: FAMILY MEDICINE

## 2025-03-18 PROCEDURE — 93005 ELECTROCARDIOGRAM TRACING: CPT | Mod: HCNC

## 2025-03-18 PROCEDURE — 25000003 PHARM REV CODE 250: Mod: HCNC | Performed by: EMERGENCY MEDICINE

## 2025-03-18 PROCEDURE — 82803 BLOOD GASES ANY COMBINATION: CPT | Mod: HCNC

## 2025-03-18 PROCEDURE — 25000003 PHARM REV CODE 250: Mod: HCNC | Performed by: HOSPITALIST

## 2025-03-18 PROCEDURE — 83605 ASSAY OF LACTIC ACID: CPT | Mod: HCNC

## 2025-03-18 PROCEDURE — 80053 COMPREHEN METABOLIC PANEL: CPT | Mod: HCNC | Performed by: EMERGENCY MEDICINE

## 2025-03-18 PROCEDURE — 25000003 PHARM REV CODE 250: Mod: HCNC | Performed by: NURSE PRACTITIONER

## 2025-03-18 PROCEDURE — 99900035 HC TECH TIME PER 15 MIN (STAT): Mod: HCNC

## 2025-03-18 PROCEDURE — 93010 ELECTROCARDIOGRAM REPORT: CPT | Mod: HCNC,,, | Performed by: INTERNAL MEDICINE

## 2025-03-18 PROCEDURE — 87040 BLOOD CULTURE FOR BACTERIA: CPT | Mod: 59,HCNC | Performed by: EMERGENCY MEDICINE

## 2025-03-18 PROCEDURE — 93010 ELECTROCARDIOGRAM REPORT: CPT | Mod: HCNC,76,, | Performed by: INTERNAL MEDICINE

## 2025-03-18 PROCEDURE — 81003 URINALYSIS AUTO W/O SCOPE: CPT | Mod: HCNC | Performed by: NURSE PRACTITIONER

## 2025-03-18 RX ORDER — ENOXAPARIN SODIUM 100 MG/ML
40 INJECTION SUBCUTANEOUS EVERY 24 HOURS
Status: DISCONTINUED | OUTPATIENT
Start: 2025-03-18 | End: 2025-03-20

## 2025-03-18 RX ORDER — LIDOCAINE 50 MG/G
1 PATCH TOPICAL
Status: DISCONTINUED | OUTPATIENT
Start: 2025-03-18 | End: 2025-03-18 | Stop reason: HOSPADM

## 2025-03-18 RX ORDER — ACETAMINOPHEN 500 MG
1000 TABLET ORAL
Status: COMPLETED | OUTPATIENT
Start: 2025-03-18 | End: 2025-03-18

## 2025-03-18 RX ORDER — ONDANSETRON HYDROCHLORIDE 2 MG/ML
4 INJECTION, SOLUTION INTRAVENOUS ONCE
Status: DISCONTINUED | OUTPATIENT
Start: 2025-03-18 | End: 2025-03-18 | Stop reason: HOSPADM

## 2025-03-18 RX ADMIN — LIDOCAINE 1 PATCH: 50 PATCH CUTANEOUS at 07:03

## 2025-03-18 RX ADMIN — ACETAMINOPHEN 650 MG: 325 TABLET ORAL at 08:03

## 2025-03-18 RX ADMIN — MECLIZINE HYDROCHLORIDE 12.5 MG: 12.5 TABLET ORAL at 11:03

## 2025-03-18 RX ADMIN — MECLIZINE HYDROCHLORIDE 12.5 MG: 12.5 TABLET ORAL at 08:03

## 2025-03-18 RX ADMIN — LEVOTHYROXINE SODIUM 50 MCG: 0.05 TABLET ORAL at 06:03

## 2025-03-18 RX ADMIN — PANTOPRAZOLE SODIUM 40 MG: 40 TABLET, DELAYED RELEASE ORAL at 08:03

## 2025-03-18 RX ADMIN — ENOXAPARIN SODIUM 40 MG: 40 INJECTION SUBCUTANEOUS at 11:03

## 2025-03-18 RX ADMIN — IOHEXOL 75 ML: 350 INJECTION, SOLUTION INTRAVENOUS at 04:03

## 2025-03-18 RX ADMIN — ACETAMINOPHEN 1000 MG: 500 TABLET ORAL at 06:03

## 2025-03-18 NOTE — PROGRESS NOTES
[facility]                                  Skilled Nursing Facility                   Progress Note     Admit Date: 3/7/2025  MURPHY 2025  [id number]  Principal Problem:  Acute cystitis without hematuria   HPI obtained from patient interview and chart review     Chief Complaint: ED send out     HPI:   Isabel Hernandez is an 89 year old female with PMHx of thyroid disease, stage II CKD, history of compression fractures who presents to SNF following hospitalization for acute cystitis.  Admission to SNF for secondary weakness and debility.    Interval history: Today, pt is reporting worsening in her dizziness and nausea despite receiving meclizine this morning. She is not able to work with with therapy, yesterday she was able to walk 71 ft. [facility] had concern symptoms may be from stroke, MRI not completed at [facility] and was rescheduled for tomorrow. Pt confusion has worsended over the last 2 days. She was looking better at the end of last week but is now declining. (Abx for UIT ended 3/13, repeat UA ordered today). Pt overall does not look good and needs re-evaluation in the acute setting.     Past Medical History: Patient has a past medical history of Arthritis, Cataract, Hypothyroidism, Mild malnutrition (3/10/2025), and Osteoporosis (2015).    Past Surgical History: Patient has a past surgical history that includes Thyroidectomy, partial; Hysterectomy; Cholecystectomy; Tonsillectomy; Adenoidectomy; Breast surgery (Bilateral);  section; Vitrectomy by pars plana approach (Left, 2018); and Appendectomy.    Social History: Patient reports that she has never smoked. She has never used smokeless tobacco. She reports that she does not drink alcohol and does not use drugs.    Family History: family history includes Cancer in her brother, father, and sister; Cataracts in her father; Glaucoma in her father; Hypertension in her  "mother; Kidney disease in her mother; Miscarriages / Stillbirths in her mother; No Known Problems in her brother and daughter; Thyroid disease in her mother.    Allergies: Patient is allergic to codeine, fosamax [alendronate], and iron analogues.    ROS  Constitutional: Negative for fever   Respiratory: Negative for cough, shortness of breath   Cardiovascular: Negative for chest pain, palpitations  Gastrointestinal: Negative for abdominal pain, constipation, diarrhea, vomiting. + nausea  Genitourinary: Negative for dysuria, frequency   Musculoskeletal:  + generalized weakness.  +back pain  Neurological: Negative for  headaches.  +dizziness  Psychiatric/Behavioral: Negative for depression. The patient is nervous/anxious.      24 hour Vital Sign Range   Temp:  [98 °F (36.7 °C)-98.4 °F (36.9 °C)]   Pulse:  [79-81]   Resp:  [16-17]   BP: (104-148)/(58-70)   SpO2:  [95 %-98 %]     Current BMI: Body mass index is 25.51 kg/m².    PEx  Constitutional: Patient appears debilitated.  mild distress noted  Cardiovascular: Normal rate, regular rhythm and normal heart sounds.    Pulmonary/Chest: Effort normal and breath sounds are clear  Abdominal: Soft. Bowel sounds are normal.   Musculoskeletal: Normal range of motion.   Neurological: Alert and oriented to person, place.  Disoriented to time.  Impaired higher level thinking  Psychiatric: confused, anxious   Skin: Skin is warm and dry.     No results for input(s): "GLUCOSE", "NA", "K", "CL", "CO2", "BUN", "CREATININE", "CALCIUM", "MG" in the last 24 hours.      No results for input(s): "WBC", "RBC", "HGB", "HCT", "PLT", "MCV", "MCH", "MCHC" in the last 24 hours.      No results for input(s): "POCTGLUCOSE" in the last 168 hours.       Assessment and Plan:    Confusion   - sending to ED  - pt is reporting worsening in her dizziness and nausea despite receiving meclizine this morning. She is not able to work with with therapy, yesterday she was able to walk 71 ft. [facility] had concern " "symptoms may be from stroke, [place] [facility] [place] was rescheduled for tomorrow. Pt confusion has worsended over the last 2 days. She was looking better at the end of last week but is now declining. (Abx for UIT ended 3/13, repeat UA ordered today). Pt overall does not look good and needs re-evaluation in the acute setting.     Acute cystitis without hematuria  - Presenting with nausea/ generalized weakness/ lightheadedness and suprapubic discomfort and confusion   - CT AP without acute findings   - UA infectious, Urine culture with mixed growth; no predominant organism  - treated with IV ceftriaxone empirically  - completed cefpodoxime 100 mg BID, ended 3/13    Nausea and vomiting  - Suspected related to UTI or possibly viral illness   - CT AP without acute findings  - Flu/Covid negative   - continue Zofran PRN     Dizziness  - possible vertigo  - CTH negative  - Per [facility], "Given concern for posterior circulation CVA that could be the etiology of her dizziness we did offer MR brain, but she declined." Per daughter, MRI was ordered and scheduled for day of discharge and was not able to be completed prior to transportation showing up for them to transferred to SNF, will have rescheduled  - possible ENT follow-up  - persisting, meclizine 12.5 mg TID   - MRI scheduled for 3/19    Dementia without behavioral disturbance, psychotic disturbance, mood disturbance, or anxiety, unspecified dementia severity, unspecified dementia type   - per past PCP notes patient has opted out of medication for dementia  Delirium precautions:  - Avoid antihistamines, anticholinergics, hypnotics, and minimize opiates while controlling for pain as these medications may exacerbate delirium. Cues for day/night will assist with keeping patient calm and oriented - during daytime, please keep adequate light in room (open windows, lights on) and please keep room dim at night-time to encourage normal sleep-wake cycles. Continuing to have " nursing and family reorient the patient and encourage family to visit  - per daughter patient has not yet at her mental baseline patient is typically more oriented.      CKD Stage 3   - stable, continue monitor twice weekly BMPs, avoid nephrotoxic agents, renally dose medications as appropriate    Back pain  - XR (3/15) Five non rib-bearing lumbar type vertebral bodies are present. There is widening of the intervertebral joint space between L4 and L5, unchanged when compared to 03/05/2025. Multiple compression deformities in the lower thoracic spine are unchanged when compared to prior imaging. Degenerative changes are seen throughout the spine, as before. Facet arthropathy of the lower lumbar spine. The abdominal aorta is heavily calcified.   - continue acetaminophen 650 mg q.8 hours, lidocaine patch qHS, prn tramadol    Hypothyroid  - TSH slightly elevated with normal free T4  - stable, Continue home levothyroxine 50 mcg daily    Age-related osteoporosis without current pathological fracture   - per PCP notes, patient is supposed to be on Prolia.  Do not see on dispensed report    Advance care planning  - ACP discussion held with patient and her daughter on 03/10, per daughter patient had living will that states patient is a DNR    Debility   - Continue with PT/OT for gait training and strengthening and restoration of ADL's   - Encourage mobility, OOB in chair, and early ambulation as appropriate  - Fall precautions   - Monitor for bowel and bladder dysfunction  - Monitor for and prevent skin breakdown and pressure ulcers  - Continue DVT prophylaxis with       Anticipate disposition:  Home with home health    IP [facility] RISK OF UNPLANNED READMISSION Model: MODERATE     Follow-up needed during SNF stay-    Follow-up needed after discharge from SNF: PCP     Future Appointments   Date Time Provider Department Center   3/19/2025  2:30 PM [facility]   4/7/2025  2:45 PM Colten Oleary DPM [facility] POD  [facility]   5/13/2025 12:00 PM Danielle Vaughan, [facility] PCW   6/2/2025  2:45 PM Kristian Shabazz MD [facility] [place]   7/21/2025  2:45 PM Kristian Shabazz MD [facility] [place]     I certify that SNF services are required to be given on an inpatient basis because Isabel Hernandez needs for skilled nursing care and/or skilled rehabilitation are required on a daily basis and such services can only practically be provided in a skilled nursing facility setting and are for an ongoing condition for which she received inpatient care in the hospital.     I spent 48 minutes reviewing patient records, examining, and counseling the patient/ patient's family with greater than 50% of the time spent in direct patient care and coordination.  ED send out, Care coordination with staff, phone conversation held with patient's daughter.    Radha Spencer NP  Department of Hospital Medicine   [facility]     DOS:3/18/2025      Patient note was created using MModal Dictation.  Any errors in syntax or even information may not have been identified and edited on initial review prior to signing this note.

## 2025-03-18 NOTE — FIRST PROVIDER EVALUATION
"Medical screening examination initiated.  I have conducted a focused provider triage encounter, findings are as follows:    Brief history of present illness:  89 YO F from [facility] re[facility] for weakness. Recent admit for UTI, fall, remote compression fx    Vitals:    03/18/25 1319   BP: (!) 121/52   Pulse: 60   Resp: 18   Temp: 98.1 °F (36.7 °C)   SpO2: 100%   Weight: 57.3 kg (126 lb 5.2 oz)   Height: 5' 2" (1.575 m)       Pertinent physical exam:  nontoxic    Brief workup plan:  sepsis workup    Preliminary workup initiated; this workup will be continued and followed by the physician or advanced practice provider that is assigned to the patient when roomed.  "

## 2025-03-18 NOTE — NURSING
[facility] ambulance here to transport pt.via stretcher to ED for evaluation.report called to ED charge nurse Radha.

## 2025-03-18 NOTE — ED PROVIDER NOTES
"Encounter Date: 3/18/2025       History     Chief Complaint   Patient presents with    Fatigue     From [facility] for increased confusion and weakness since this morning. CBG 96. Aox3 on arrival.     Isabel Hernandez is a 89 F hx of PMHx of thyroid disease, stage II CKD, history of compression fractures, [facility] after admission for acute cystitis w/ AMS here for abdominal pain and confusion.  Daughter at bedside provides history, states patient was doing well at rehab until 3-4 days ago.  She noticed that she started to get more confused and "talking out of her head".  Today she woke up and started to complain of left flank pain and lower abdominal pain.  She feels like she has to has to urinate.  Patient also states she has been belching and feels nauseated.  Daughter said that she started to have a change once she finished cefpodoxime on 3/13.  No fever or chills.  She asked for repeat urine today as she thought she might not have cleared the infection which was ordered.  She appears more weak than she has in the past couple of days.           Review of patient's allergies indicates:   Allergen Reactions    Codeine Nausea And Vomiting    Fosamax [alendronate] Nausea Only    Iron analogues Nausea And Vomiting     Past Medical History:   Diagnosis Date    Arthritis     Cataract     Hypothyroidism     Mild malnutrition 3/10/2025    Osteoporosis 2015     Past Surgical History:   Procedure Laterality Date    ADENOIDECTOMY      APPENDECTOMY      BREAST SURGERY Bilateral     cyst removal     SECTION      CHOLECYSTECTOMY      HYSTERECTOMY      THYROIDECTOMY, PARTIAL      TONSILLECTOMY      VITRECTOMY BY PARS PLANA APPROACH Left 2018    Procedure: VITRECTOMY, PARS PLANA APPROch   internal limitng membrane peel;  Surgeon: Johnathan Christopher MD;  Location: [facility] OR [address] [place] [address];  Service: Ophthalmology;  Laterality: Left;     Family History   Problem Relation Name Age of Onset    Hypertension Mother Isabel" Merary Langford         Heart stopped at age 90 possible dementia    Thyroid disease Mother Isabel Langford     Kidney disease Mother Isabel Langford         Born with one kidney    Miscarriages / Stillbirths Mother Isabel Langford         One miscarriage    Cancer Father Fernando Langford         Lung cancer - he smoked    Cataracts Father Fernando Langford     Glaucoma Father Fernando Langford     Cancer Sister Karen Berrios         Stomach cancer    Cancer Brother tristen         prostate    No Known Problems Brother clide     No Known Problems Daughter Catarina     Amblyopia Neg Hx      Blindness Neg Hx      Diabetes Neg Hx      Macular degeneration Neg Hx      Retinal detachment Neg Hx      Strabismus Neg Hx      Stroke Neg Hx      Melanoma Neg Hx       Social History[1]  Review of Systems    Physical Exam     Initial Vitals [03/18/25 1319]   BP Pulse Resp Temp SpO2   (!) 121/52 60 18 98.1 °F (36.7 °C) 100 %      MAP       --         Physical Exam    Nursing note and vitals reviewed.  Constitutional: She appears distressed.   Elderly, appears weak     HENT:   + dry mucous membrane   Eyes: Conjunctivae are normal.   Neck: No JVD present.   Cardiovascular:  Normal rate and regular rhythm.           Pulmonary/Chest: Breath sounds normal. She has no wheezes. She has no rales.   Abdominal: Bowel sounds are normal. There is abdominal tenderness (Diffuse lower abdominal tenderness).   Musculoskeletal:      Comments: - midline C,T, L tenderness  + L mid/lower back tenderness       Neurological: She is alert and oriented to person, place, and time.   Follows commands  CN II-XII intact  Moves all 4 extremities   Skin: No rash noted.         ED Course   Procedures  Labs Reviewed   CBC W/ AUTO DIFFERENTIAL - Abnormal       Result Value    WBC 7.07      RBC 3.59 (*)     Hemoglobin 10.9 (*)     Hematocrit 35.0 (*)     MCV 98      MCH 30.4      MCHC 31.1 (*)     RDW 13.0      Platelets 293      MPV 10.1      Immature  Granulocytes 0.3      Gran # (ANC) 3.8      Immature Grans (Abs) 0.02      Lymph # 2.5      Mono # 0.5      Eos # 0.1      Baso # 0.05      nRBC 0      Gran % 54.2      Lymph % 35.6      Mono % 7.6      Eosinophil % 1.6      Basophil % 0.7      Differential Method Automated     COMPREHENSIVE METABOLIC PANEL - Abnormal    Sodium 136      Potassium 4.4      Chloride 106      CO2 22 (*)     Glucose 83      BUN 20      Creatinine 0.7      Calcium 7.9 (*)     Total Protein 5.5 (*)     Albumin 2.6 (*)     Total Bilirubin 0.3      Alkaline Phosphatase 101      AST 26      ALT 22      eGFR >60.0      Anion Gap 8     CULTURE, BLOOD   CULTURE, BLOOD     EKG Readings: (Independently Interpreted)   EKG: NSR at 64, nl intervals, nl axis, no ischemic change       ECG Results              EKG 12-lead (Final result)        Collection Time Result Time QRS Duration [facility]HS QTC Calculation    03/18/25 14:31:21 03/18/25 15:11:36 84 429                     Final result by Interface, Lab In [facility] (03/18/25 15:11:40)                   Narrative:    Test Reason : Z13.6,    Vent. Rate :  64 BPM     Atrial Rate :  64 BPM     P-R Int : 168 ms          QRS Dur :  84 ms      QT Int : 416 ms       P-R-T Axes :  49  -6  -7 degrees    QTcB Int : 429 ms    Normal sinus rhythm  Nonspecific T wave abnormality  Abnormal ECG  When compared with ECG of 18-Mar-2025 13:49,  No significant change was found  Confirmed by Dwayne Hill ([id number]) on 3/18/2025 3:11:35 PM    Referred By: AAAREFERRAL SELF           Confirmed By: Dwayne Hill                                     EKG 12-lead (Final result)        Collection Time Result Time QRS Duration OHS QTC Calculation    03/18/25 13:49:06 03/18/25 15:15:06 88 426                     Final result by Interface, Lab In [facility] (03/18/25 15:15:13)                   Narrative:    Test Reason : R53.1,    Vent. Rate :  65 BPM     Atrial Rate :  65 BPM     P-R Int : 150 ms          QRS Dur :  88 ms      QT Int : 410 ms        P-R-T Axes :  31  -3  -3 degrees    QTcB Int : 426 ms    Normal sinus rhythm  Nonspecific T wave abnormality  Abnormal ECG  When compared with ECG of 07-Mar-2025 06:02,  Premature ventricular complexes are no longer Present  Criteria for Anterolateral infarct are no longer Present  Nonspecific T wave abnormality, improved in Anterior-lateral leads  Confirmed by Dwayne Hill ([id number]) on 3/18/2025 3:15:04 PM    Referred By: AAAREFERRAL SELF           Confirmed By: Dwayne Hill                                  Imaging Results              CT Abdomen Pelvis With IV Contrast NO Oral Contrast (Final result)  Result time 03/18/25 17:53:38      Final result by Naren Mccormick MD (03/18/25 17:53:38)                   Impression:      No acute abnormality in the abdomen or pelvis.    Small bowel feces sign suggesting delayed transit.  No bowel obstruction.    Diverticulosis without evidence of diverticulitis.    Redemonstration of compression fractures of T10-T12, stable.    Additional incidental findings above.    Electronically signed by resident: Brendan Clayton  Date:    03/18/2025  Time:    17:20    Electronically signed by: Naren Mccormick MD  Date:    03/18/2025  Time:    17:53               Narrative:    EXAMINATION:  CT ABDOMEN PELVIS WITH IV CONTRAST    CLINICAL HISTORY:  Abdominal abscess/infection suspected;    TECHNIQUE:  Low dose axial images, sagittal and coronal reformations were obtained from the lung bases to the pubic symphysis following the IV administration of 75 mL of Omnipaque 350 intravenous contrast. Oral contrast was not administered.    COMPARISON:  CT abdomen pelvis 03/05/2025    FINDINGS:  Lungs: Bandlike atelectasis within the right lower lobe.  No consolidation or pleural effusion in the visualized lung bases.    Heart: Normal size. No pericardial effusion.    Liver: Normal size.  No focal abnormality. .    Gallbladder: Cholecystectomy.    Bile ducts: No intrahepatic or extrahepatic biliary ductal  dilatation.    Spleen: Normal size. Few subcentimeter hypodensities, nonspecific.  Small parenchymal calcified granuloma noted.    Pancreas: No mass. No peripancreatic fat stranding.    Adrenals: No significant abnormality    Renal/Ureters: The kidneys are normal in size . No stones.  Multiple peripelvic cysts in the right kidney.  Additional subcentimeter hypodensities too small to fully characterize..  No hydroureteronephrosis. The urinary bladder is unremarkable.    Reproductive: Hysterectomy.    Stomach/Bowel: Small hiatal hernia.  Stomach is unremarkable.  Duodenal diverticulum, unchanged.  Small bowel feces sign suggesting delayed transit, unchanged.  No evidence of bowel obstruction or inflammation.  Appendix is not identified..  Diverticulosis without evidence of diverticulitis.  No bowel pneumatosis or portal venous gas.    Peritoneum: No free fluid. No intraperitoneal free air.    Lymph Nodes: No pathologic hollie enlargement in the abdomen or pelvis.    Vasculature: Abdominal aorta tapers normally.  Severe calcific atherosclerosis of the abdominal aorta and its branches. Portal vasculature, SMV, and splenic vein are patent.    Bones: Diffuse osteopenia.  Stable compression fractures of T10-T12.  Advanced degenerative changes.  No new fractures.  No osseous destructive lesions.    Soft Tissues: Left lower quadrant soft tissue nodule and right lower quadrant subcutaneous gas presumably related to injections.  Tiny fat containing umbilical hernia.                                       X-Ray Chest AP Portable (Final result)  Result time 03/18/25 15:07:39      Final result by Santhosh Parkinson MD (03/18/25 15:07:39)                   Impression:      . see above      Electronically signed by: Santhosh Parkinson MD  Date:    03/18/2025  Time:    15:07               Narrative:    EXAMINATION:  XR CHEST AP PORTABLE    CLINICAL HISTORY:  Sepsis;    TECHNIQUE:  Single frontal view of the chest was  performed.    COMPARISON:  None    FINDINGS:  Cardiac size is normal.  Aorta is elongated and tortuous.  Lungs are clear without infiltrate or pleural effusion                                       Medications   ondansetron injection 4 mg (0 mg Intravenous Hold 3/18/25 1545)   LIDOcaine 5 % patch 1 patch (1 patch Transdermal Patch Applied 3/18/25 1900)   iohexoL (OMNIPAQUE 350) injection 75 mL (75 mLs Intravenous Given 3/18/25 1638)   acetaminophen tablet 1,000 mg (1,000 mg Oral Given 3/18/25 1859)     Medical Decision Making  Emergent evaluation of a 89-year-old female transferred from rehab for increased confusion, weakness for several days now with abdominal pain.    Vital signs stable, appears uncomfortable but nontoxic.    Differential includes but not limited to acute urinary retention, nephrolithiasis, pancreatitis, diverticulitis, ileus, obstruction, metabolic encephalopathy    Plan for labs, CT abdomen pelvis, bladder scan.      UA from rehab today negative for infectious process    Amount and/or Complexity of Data Reviewed  Labs: ordered. Decision-making details documented in ED Course.  Radiology: ordered and independent interpretation performed.  ECG/medicine tests: ordered and independent interpretation performed.    Risk  Prescription drug management.            [unfilled]   ED Course as of 03/18/25 2056   Tue Mar 18, 2025   1517 Bedside ultrasound performed, a proximally 250 cc in bladder, no hydro.  Patient was able to urinate a proximally 250 cc with pure wick [GM]   1517 Chest x-ray independently reviewed by me, no focal consolidation or effusion. [GM]   1810 Impression:     No acute abnormality in the abdomen or pelvis.     Small bowel feces sign suggesting delayed transit.  No bowel obstruction.     Diverticulosis without evidence of diverticulitis.     Redemonstration of compression fractures of T10-T12, stable.     Additional incidental findings above.     Electronically signed by resident:  Brendan Clayton  Date:                                            03/18/2025  Time:                                           17:20     Electronically signed by:Naren Mccormick MD  Date:                                            03/18/2025  Time:                                           17:53   [GM]      ED Course User Index  [GM] Elizabeth Marin MD          Patient's daughter asked if we could possibly do the MRI today since her appointments tomorrow at 2:30 a.m.  the nurse spoke to MRI who states there 16 patients in the ED waiting for MRI.     Daughter comfortable with plan for discharge to follow up at her appointment tomorrow at 2:30 for MRI                 Clinical Impression:  Final diagnoses:  [R53.1] Weakness  [Z13.6] Screening for cardiovascular condition  [M54.50] Acute left-sided low back pain without sciatica (Primary)  [R10.30] Lower abdominal pain          ED Disposition Condition    Discharge Stable          ED Prescriptions    None       Follow-up Information       Follow up With Specialties Details Why Contact Info    Kristian Shabazz MD Internal Medicine Schedule an appointment as soon as possible for a visit   [address]  [address]  [facility] [address]  [phone]                   [1]   Social History  Tobacco Use    Smoking status: Never    Smokeless tobacco: Never   Substance Use Topics    Alcohol use: No    Drug use: No        Elizabeth Marin MD  03/18/25 [id number]

## 2025-03-18 NOTE — NURSING
Patient continues to try to get out of bed unassisted and without calling. Patient educated on fall precautions and patient being a fall risk.

## 2025-03-18 NOTE — PROGRESS NOTES
[facility] - Skilled Nursing  Adult Nutrition  Progress Note    SUMMARY     Recommendation/Intervention:   1) Continue current regular diet as tolerated, honor food preferences, assist with set up, encourage PO intake  2) RD following  Goals: PO to meet 85% of EEN/EPN by next RD follow up  Nutrition Goal Status: progressing towards goal  Communication of RD Recs: other (comment) (POC)    Nutrition Discharge Planning    Nutrition Discharge Planning: General healthy diet    Assessment and Plan    Endocrine  Mild malnutrition  Malnutrition Type:  Context: acute illness or injury  Level: mild     Related to (etiology):   Inadequate oral intake, nausea     Signs and Symptoms (as evidenced by):   Decreased appetite      Malnutrition Characteristic Summary:  Energy Intake (Malnutrition): less than 75% for greater than 7 days  Subcutaneous Fat (Malnutrition): mild depletion  Muscle Mass (Malnutrition): moderate depletion        Interventions/Recommendations (treatment strategy):  Continue regular diet, honor food preferences, assist with set up,RD following     Nutrition Diagnosis Status:   Continues    Malnutrition Assessment 3/10    Malnutrition Context: acute illness or injury  Malnutrition Level: mild  Skin (Micronutrient): pallor, bruised, other (see comments), thinned (skin tears)  Hair/Scalp (Micronutrient): dry  Eyes (Micronutrient): conjunctiva dull  Teeth (Micronutrient): none  Neck/Chest (Micronutrient): muscle wasting  Musculoskeletal/Lower Extremities: muscle wasting, subcutaneous fat loss       Energy Intake (Malnutrition): less than 75% for greater than 7 days  Subcutaneous Fat (Malnutrition): mild depletion  Muscle Mass (Malnutrition): moderate depletion   Orbital Region (Subcutaneous Fat Loss): mild depletion (mild buccal)  Upper Arm Region (Subcutaneous Fat Loss): mild depletion  Thoracic and Lumbar Region: well nourished   [place] Region (Muscle Loss): mild depletion  Clavicle and Acromion Bone Region  "(Muscle Loss): moderate depletion  Dorsal Hand (Muscle Loss): moderate depletion  Patellar Region (Muscle Loss): mild depletion  Anterior Thigh Region (Muscle Loss): moderate depletion  Posterior Calf Region (Muscle Loss): mild depletion     Reason for Assessment    Reason For Assessment: RD follow-up  Diagnosis:  (Acute Cystitiis, N/V)  General Information Comments: Pt followed by RD team. Remains on regular diet, food preferences added to diet order (likes sandwiches for lunch). Refuses ONS. Consuming % at recent meals. Denies N/V/D/C per NP note. 7 lb weight gain since admit , 5%    Nutrition/Diet History    Nutrition Intake History: Patient has never been a breakfast eater, she eats about 11 am, she is eating breakfast here, today she ate well, one morning she was too nauseated to eat. She will not drink ONS that daughter purchases for her, so will hold off on that for now.  Food Preferences: She likes ghramn crackers and ice cream. milkshakes but not ONS  Spiritual, Cultural Beliefs, Jehovah's witness Practices, Values that Affect Care: no  Food Allergies: NKFA  Factors Affecting Nutritional Intake: nausea/vomiting    Anthropometrics    Height: 4' 11" (149.9 cm)  Height (inches): 59 in  Weight: 57.3 kg (126 lb 5.2 oz)  Weight (lb): 126.32 lb  Weight Method: Standard Scale  Ideal Body Weight (IBW), Female: 95 lb  % Ideal Body Weight, Female (lb): 125.78 %  BMI (Calculated): 25.5  BMI Grade: 25 - 29.9 - overweight  Usual Body Weight (UBW), k.7 kg  Weight Change Amount:  (patient weight has been stable all year until this admission)  % Usual Body Weight: 95.79  % Weight Change From Usual Weight: -4.41 %  7 lb weight gain since admit, 5%    Lab/Procedures/Meds    Pertinent Labs Reviewed: reviewed  Pertinent Labs Comments: Hgb: 11.2, Hct: 34.8, Calcium: 8.2  Pertinent Medications Reviewed: reviewed   acetaminophen  650 mg Oral TID    enoxparin  40 mg Subcutaneous Q24H (prophylaxis, 1700)    levothyroxine  50 mcg " Oral Before breakfast    LIDOcaine  1 patch Transdermal Q24H    meclizine  12.5 mg Oral TID    pantoprazole  40 mg Oral Daily       Estimated/Assessed Needs    Weight Used For Calorie Calculations: 54.2 kg (119 lb 7.8 oz)  Energy Calorie Requirements (kcal): 1235  Energy Need Method: Calhoun-St Jeor (x 1.4(PAL))  Protein Requirements: 65-70  Weight Used For Protein Calculations: 54.2 kg (119 lb 7.8 oz) (1.2-1.3g/kg)  Fluid Requirements (mL): 1235 or per MD  Estimated Fluid Requirement Method: RDA Method  RDA Method (mL): 1235  CHO Requirement: -    Nutrition Prescription Ordered    Current Diet Order: Regular  Nutrition Order Comments: Sandwiches for lunch  Oral Nutrition Supplement: refuses    Evaluation of Received Nutrient/Fluid Intake    I/O: + 8.6 L since admit  Energy Calories Required: meeting needs  Protein Required: not meeting needs  Fluid Required: other (see comments) (As per MD)  Comments: LBM 3/16  Tolerance: tolerating  % Intake of Estimated Energy Needs: 50 - 75 %  % Meal Intake: Other: %    Nutrition Risk    Level of Risk/Frequency of Follow-up: low (one time per week)     Monitor and Evaluation    Monitor and Evaluation: Food and beverage intake, Weight, Electrolyte and renal panel, Gastrointestinal profile     Nutrition Follow-Up    RD Follow-up?: Yes

## 2025-03-18 NOTE — PLAN OF CARE
Skilled Nursing IDT Note  Date: 3/17/25       Patient Name:  Isabel Hernandez       Medical Record Number: [id number]   YOB: 1935  Sex: Female          Room/Bed:  CLCY307/VGDA010 A  Payor Info:  Payor: [employer] MEDICARE / Plan: [employer] MEDICARE HMO / Product Type: Capitation /      Admitting Diagnosis:   Acute cystitis without hematuria [N30.00]  Acute cystitis without hematuria [N30.00]   Admit Date/Time:  3/7/2025  6:33 PM    Primary Diagnosis:  Acute cystitis without hematuria  Principal Problem: Acute cystitis without hematuria    Patient Active Problem List    Diagnosis Date Noted    Hyponatremia 03/11/2025    Mild malnutrition 03/10/2025    Dizziness 03/07/2025    LBBB (left bundle branch block) 03/06/2025    Generalized weakness 03/06/2025    Hypokalemia 03/05/2025    Acute cystitis without hematuria 03/05/2025    Nausea and vomiting 03/05/2025    Dementia without behavioral disturbance, psychotic disturbance, mood disturbance, or anxiety, unspecified dementia severity, unspecified dementia type 08/22/2023    Stage 3 chronic kidney disease, unspecified whether stage 3a or 3b CKD 08/22/2023    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    Macular hole, full thickness, left 06/26/2018    Stage 2 chronic kidney disease 07/26/2017    Nephrolithiasis 09/23/2015    Aortic atherosclerosis 09/23/2015    Age-related osteoporosis without current pathological fracture 09/23/2015    Hypothyroid 03/05/2013       Team Members Present: Barb Suero, RN Charge Nurse, Latosha Alford LPN, , Zari Pryor, PT, Rehab, Xochitl Puri, Activities, and Merari Hartmann, Dietician    Patient/Family Present: Patient and daughterCatarina                                                Issues/Consults: C/o drowsiness, planning for assisted living at discharge    Caregiver at Discharge: Daughter    Living Setting at Discharge: Assisted living/nursing home    Anticipated Discharge Date:   4/1/25    Supervision Recommended at Discharge: 24 hours light assistance    Follow-up Services:   Home Health  physical therapy and occupational therapy     Preferred Home Health: [facility]    [facility]

## 2025-03-18 NOTE — PLAN OF CARE
Recommendation/Intervention:   1) Continue current regular diet as tolerated, honor food preferences, assist with set up, encourage PO intake  2) RD following  Goals: PO to meet 85% of EEN/EPN by next RD follow up  Nutrition Goal Status: progressing towards goal  Communication of RD Recs: other (comment) (POC)

## 2025-03-18 NOTE — NURSING
Patient educated on fall precautions and told not to get out of bed by herself.  Patient was caught multiple times getting up by self without calling for assistance and without her walker.  Patient's room left open. Calling light in reach. Plan of care ongoing.

## 2025-03-18 NOTE — ED TRIAGE NOTES
Isabel Hernandez, a 89 y.o. female presents to the ED w/ complaint of confusion and pain.     Triage note:  Chief Complaint   Patient presents with    Fatigue     From [facility] for increased confusion and weakness since this morning. CBG 96. Aox3 on arrival.     Review of patient's allergies indicates:   Allergen Reactions    Codeine Nausea And Vomiting    Fosamax [alendronate] Nausea Only    Iron analogues Nausea And Vomiting     Past Medical History:   Diagnosis Date    Arthritis     Cataract     Hypothyroidism     Mild malnutrition 3/10/2025    Osteoporosis 9/23/2015       No

## 2025-03-18 NOTE — PT/OT/SLP PROGRESS
Physical Therapy      Patient Name:  Isabel Hernandez   MRN:  [id number]    Patient not seen today secondary to sent to ED . Will follow-up next PT session.

## 2025-03-18 NOTE — PT/OT/SLP PROGRESS
"Occupational Therapy      Patient Name:  Isabel Hernandez   MRN:  [id number]    Patient not seen today secondary to medical concerns. Upon OT arrival, pt right side lying in bed with family member present. Pt stated "I am so sick", "I'm just so nauseated and dizzy". OT notified NP. Patient sent to ED in PM. Will follow-up POC.    3/18/2025  "

## 2025-03-19 ENCOUNTER — PATIENT OUTREACH (OUTPATIENT)
Facility: OTHER | Age: 89
End: 2025-03-19
Payer: MEDICARE

## 2025-03-19 PROCEDURE — 25000003 PHARM REV CODE 250: Mod: HCNC | Performed by: NURSE PRACTITIONER

## 2025-03-19 PROCEDURE — 97116 GAIT TRAINING THERAPY: CPT | Mod: HCNC,CQ

## 2025-03-19 PROCEDURE — 97530 THERAPEUTIC ACTIVITIES: CPT | Mod: HCNC,CQ

## 2025-03-19 PROCEDURE — 63600175 PHARM REV CODE 636 W HCPCS: Mod: HCNC | Performed by: FAMILY MEDICINE

## 2025-03-19 PROCEDURE — 25000003 PHARM REV CODE 250: Mod: HCNC | Performed by: HOSPITALIST

## 2025-03-19 PROCEDURE — 11000004 HC SNF PRIVATE: Mod: HCNC

## 2025-03-19 RX ADMIN — MECLIZINE HYDROCHLORIDE 12.5 MG: 12.5 TABLET ORAL at 08:03

## 2025-03-19 RX ADMIN — LIDOCAINE 1 PATCH: 50 PATCH CUTANEOUS at 09:03

## 2025-03-19 RX ADMIN — MECLIZINE HYDROCHLORIDE 12.5 MG: 12.5 TABLET ORAL at 09:03

## 2025-03-19 RX ADMIN — PANTOPRAZOLE SODIUM 40 MG: 40 TABLET, DELAYED RELEASE ORAL at 08:03

## 2025-03-19 RX ADMIN — ENOXAPARIN SODIUM 40 MG: 40 INJECTION SUBCUTANEOUS at 05:03

## 2025-03-19 RX ADMIN — ACETAMINOPHEN 650 MG: 325 TABLET ORAL at 08:03

## 2025-03-19 RX ADMIN — ACETAMINOPHEN 650 MG: 325 TABLET ORAL at 03:03

## 2025-03-19 RX ADMIN — SENNOSIDES AND DOCUSATE SODIUM 1 TABLET: 50; 8.6 TABLET ORAL at 09:03

## 2025-03-19 RX ADMIN — MECLIZINE HYDROCHLORIDE 12.5 MG: 12.5 TABLET ORAL at 03:03

## 2025-03-19 RX ADMIN — ACETAMINOPHEN 650 MG: 325 TABLET ORAL at 09:03

## 2025-03-19 RX ADMIN — LEVOTHYROXINE SODIUM 50 MCG: 0.05 TABLET ORAL at 06:03

## 2025-03-19 NOTE — NURSING
Patient returned to SNF unit from ED visit today. Patient is AAOX2 and not in any distress.  Patient's daughter is at the bed side. Vital signs were taken as follows and documented in flowsheets: 128/60, 68, 18, 94%, 98.5.  Patient has a 1430 MRI scheduled appointment for tomorrow 3/19. Patient's missed medications were given. Patient denied dizziness and nausea at this time.  Patient educated on fall precautions, call light in reach, side rails x 3 up. Plan of care ongoing.

## 2025-03-19 NOTE — PLAN OF CARE
Problem: Fall Injury Risk  Goal: Absence of Fall and Fall-Related Injury  Outcome: Progressing  Intervention: Promote Injury-Free Environment  Flowsheets (Taken 3/19/2025 8708)  Safety Promotion/Fall Prevention: assistive device/personal item within reach

## 2025-03-19 NOTE — PROGRESS NOTES
Raiza Denton  ED Navigator  Emergency Department    [place] [facility] [place]  Role: Community Health Worker    Date: 03/19/2025  Patient Name: Isabel Hernandez  MRN: [id number]  PCP: Kristian Shabazz MD    Assessment:     Isabel Hernandez is a 89 y.o. female who has presented to ED for Weakness; Screening for cardiovascular condition; Acute left-sided low back pain without sciatica; Lower abdominal pain. Patient has visited the ED 1 times in the past 3 months. Patient did not contact PCP.     ED Navigator Initial Assessment    ED Navigator Enrollment Documentation  Consent to Services  Does patient consent to completing the assessment?: Yes  Contact  Method of Initial Contact: Phone  Transportation  Insurance Coverage  Do you have coverage/adequate coverage?: Yes  Specialist Appointment  Did the patient come to the ED to see a specialist?: No  Does the patient have a pending specialist referral?: No  Does the patient have a specialist appointment made?: No  PCP Follow Up Appointment  Medications  Is patient able to afford medication?: Yes  Psychological  Food  Communication/Education  Other Financial Concerns  Other Social Barriers/Concerns  Primary Barrier  Plan: Provided information for [facility] 24/7 Nurse triage line, [phone] or [phone] ([phone])         Social History     Socioeconomic History    Marital status:    Occupational History    Occupation: Retired   Tobacco Use    Smoking status: Never    Smokeless tobacco: Never   Substance and Sexual Activity    Alcohol use: No    Drug use: No    Sexual activity: Not Currently     Partners: Male     Birth control/protection: None   Other Topics Concern    Are you pregnant or think you may be? No    Breast-feeding No     Social Drivers of Health     Financial Resource Strain: Low Risk  (3/19/2025)    Overall Financial Resource Strain (CARDIA)     Difficulty of Paying Living Expenses: Not hard at all   Food Insecurity: No Food  Insecurity (3/19/2025)    Hunger Vital Sign     Worried About Running Out of Food in the Last Year: Never true     Ran Out of Food in the Last Year: Never true   Transportation Needs: No Transportation Needs (3/19/2025)    PRAPARE - Transportation     Lack of Transportation (Medical): No     Lack of Transportation (Non-Medical): No   Physical Activity: Inactive (3/19/2025)    Exercise Vital Sign     Days of Exercise per Week: 0 days     Minutes of Exercise per Session: 0 min   Stress: No Stress Concern Present (3/19/2025)    [employer] [place] [employer] - Occupational Stress Questionnaire     Feeling of Stress : Not at all   Housing Stability: Low Risk  (3/19/2025)    Housing Stability Vital Sign     Unable to Pay for Housing in the Last Year: No     Homeless in the Last Year: No       Plan:   Pt was seen in the ED on 3/18/25 for Weakness; Screening for cardiovascular condition; Acute left-sided low back pain without sciatica; Lower abdominal pain. ED Navigator spoke with pt's daughter for Post ED visit follow up navigation to assist with scheduling a 7-day Post ED visit follow up appt. Pt states that the pt lives in a SNF and she had not contacted pcp to schedule a 7-day Post ED visit follow up appt and declined assistance. Pt's daughter states that he does not feel a follow up appt is needed as the SNF will handle all scheduling of appts. Pt does not have transportation issues and has no additional needs at this time. Closing Encounter.    Raiza Denton

## 2025-03-19 NOTE — DISCHARGE INSTRUCTIONS
Labs overall reassuring.  CT shows some mild constipation, known compression fractures of the spine.  No kidney stone.    Tylenol/lidocaine patch as needed.    Until it fortunately, there is 20 people waiting for an MRI at this time, it may take several hours in order to get your skin done.  It may be better for you to come back tomorrow at your scheduled MRI appointment time.   Improved

## 2025-03-19 NOTE — PROGRESS NOTES
[facility]                                  Skilled Nursing Facility                   Progress Note     Admit Date: 3/7/2025  MURPHY 2025  [id number] A  Principal Problem:  Acute cystitis without hematuria   HPI obtained from patient interview and chart review     Chief Complaint: Re-evaluation of medical treatment and therapy status, SP ED visit    HPI:   Isabel Hernandez is an 89 year old female with PMHx of thyroid disease, stage II CKD, history of compression fractures who presents to SNF following hospitalization for acute cystitis.  Admission to SNF for secondary weakness and debility.    Interval history:  24 hr vital sign ranges reviewed and listed below.  Patient looks much better today.  She is awake and alert, denies dizziness at this time.  States that her back pain is controlled at this moment.  Patient denies shortness of breath, abdominal discomfort, nausea, or vomiting.  Patient reports an ok appetite.  Patient denies dysuria.  Patient reports having regular bowel movements.  Patient progressing with PT/OT- ~100 ft using RW with CGA; WC follow provided. Pt taking single standing rest break mid-trial. No LOB noted. Pt reporting mild dizziness towards end of trial. Pt then returned to sitting position in WC. RN present. Continuing to follow and treat all acute and chronic conditions.    Past Medical History: Patient has a past medical history of Arthritis, Cataract, Hypothyroidism, Mild malnutrition (3/10/2025), and Osteoporosis (2015).    Past Surgical History: Patient has a past surgical history that includes Thyroidectomy, partial; Hysterectomy; Cholecystectomy; Tonsillectomy; Adenoidectomy; Breast surgery (Bilateral);  section; Vitrectomy by pars plana approach (Left, 2018); and Appendectomy.    Social History: Patient reports that she has never smoked. She has never used smokeless tobacco. She reports  that she does not drink alcohol and does not use drugs.    Family History: family history includes Cancer in her brother, father, and sister; Cataracts in her father; Glaucoma in her father; Hypertension in her mother; Kidney disease in her mother; Miscarriages / Stillbirths in her mother; No Known Problems in her brother and daughter; Thyroid disease in her mother.    Allergies: Patient is allergic to codeine, fosamax [alendronate], and iron analogues.    ROS  Constitutional: Negative for fever   Respiratory: Negative for cough, shortness of breath   Cardiovascular: Negative for chest pain, palpitations  Gastrointestinal: Negative for abdominal pain, constipation, diarrhea, vomiting. + intermittent nausea  Genitourinary: Negative for dysuria, frequency   Musculoskeletal:  + generalized weakness.  +back pain  Neurological: Negative for  headaches.  + intermittent dizziness  Psychiatric/Behavioral: Negative for depression. The patient is nervous/anxious.      24 hour Vital Sign Range   Temp:  [98 °F (36.7 °C)-98.5 °F (36.9 °C)]   Pulse:  [60-85]   Resp:  [1-18]   BP: (114-142)/(52-65)   SpO2:  [94 %-100 %]     Current BMI: Body mass index is 25.51 kg/m².    PEx  Constitutional: Patient appears debilitated.  mild distress noted  Cardiovascular: Normal rate, regular rhythm and normal heart sounds.    Pulmonary/Chest: Effort normal and breath sounds are clear  Abdominal: Soft. Bowel sounds are normal.   Musculoskeletal: Normal range of motion.   Neurological: Alert and oriented to person, place.  Disoriented to time.  Impaired higher level thinking  Psychiatric: confused, anxious   Skin: Skin is warm and dry.     Recent Labs   Lab 03/18/25  1424      K 4.4      CO2 22*   BUN 20   CREATININE 0.7   CALCIUM 7.9*         Recent Labs   Lab 03/18/25  1424 03/18/25  1446   WBC 7.07  --    RBC 3.59*  --    HGB 10.9*  --    HCT 35.0* 34.2     --    MCV 98  --    MCH 30.4  --    MCHC 31.1*  --          No  "results for input(s): "POCTGLUCOSE" in the last 168 hours.       Assessment and Plan:    Acute cystitis without hematuria  - Presenting with nausea/ generalized weakness/ lightheadedness and suprapubic discomfort and confusion   - CT AP without acute findings   - UA infectious, Urine culture with mixed growth; no predominant organism  - treated with IV ceftriaxone empirically  - completed cefpodoxime 100 mg BID, ended 3/13    Nausea and vomiting  - Suspected related to UTI or possibly viral illness   - CT AP without acute findings  - Flu/Covid negative   - continue Zofran PRN     Dizziness  - possible vertigo  - CTH negative  - Per [facility], "Given concern for posterior circulation CVA that could be the etiology of her dizziness we did offer MR brain, but she declined." Per daughter, MRI was ordered and scheduled for day of discharge and was not able to be completed prior to transportation showing up for them to transferred to SNF, will have rescheduled  - possible ENT follow-up  - persisting, meclizine 12.5 mg TID   - MRI rescheduled for later this afternoon for eval    Dementia without behavioral disturbance, psychotic disturbance, mood disturbance, or anxiety, unspecified dementia severity, unspecified dementia type   - per past PCP notes patient has opted out of medication for dementia  Delirium precautions:  - Avoid antihistamines, anticholinergics, hypnotics, and minimize opiates while controlling for pain as these medications may exacerbate delirium. Cues for day/night will assist with keeping patient calm and oriented - during daytime, please keep adequate light in room (open windows, lights on) and please keep room dim at night-time to encourage normal sleep-wake cycles. Continuing to have nursing and family reorient the patient and encourage family to visit  - per daughter patient has not yet at her mental baseline patient is typically more oriented.      CKD Stage 3   - stable, continue monitor twice weekly " BMPs, avoid nephrotoxic agents, renally dose medications as appropriate    Back pain  - XR (3/15) Five non rib-bearing lumbar type vertebral bodies are present. There is widening of the intervertebral joint space between L4 and L5, unchanged when compared to 03/05/2025. Multiple compression deformities in the lower thoracic spine are unchanged when compared to prior imaging. Degenerative changes are seen throughout the spine, as before. Facet arthropathy of the lower lumbar spine. The abdominal aorta is heavily calcified.   - continue acetaminophen 650 mg q.8 hours, lidocaine patch qHS, prn tramadol    Hypothyroid  - TSH slightly elevated with normal free T4  - stable, Continue home levothyroxine 50 mcg daily    Age-related osteoporosis without current pathological fracture   - per PCP notes, patient is supposed to be on Prolia.  Do not see on dispensed report    Advance care planning  - ACP discussion held with patient and her daughter on 03/10, per daughter patient had living will that states patient is a DNR    Debility   - Continue with PT/OT for gait training and strengthening and restoration of ADL's   - Encourage mobility, OOB in chair, and early ambulation as appropriate  - Fall precautions   - Monitor for bowel and bladder dysfunction  - Monitor for and prevent skin breakdown and pressure ulcers  - Continue DVT prophylaxis with       Anticipate disposition:  Home with home health    IP [facility] RISK OF UNPLANNED READMISSION Model: MODERATE     Follow-up needed during SNF stay-    Follow-up needed after discharge from SNF: PCP     Future Appointments   Date Time Provider Department Center   4/7/2025  2:45 PM Colten Oleary DPM [facility] P[facility]   5/13/2025 12:00 PM Danielle Vaughan FNP [facility] IM [facility]   6/2/2025  2:45 PM Kristian Shabazz MD [facility] [place]   7/21/2025  2:45 PM Kristian Shabazz MD [facility] [place]     I certify that SNF services are required to be  given on an inpatient basis because Isabel Hernandez needs for skilled nursing care and/or skilled rehabilitation are required on a daily basis and such services can only practically be provided in a skilled nursing facility setting and are for an ongoing condition for which she received inpatient care in the hospital.     I spent  46 minutes reviewing patient records, examining, and counseling the patient/ patient's family with greater than 50% of the time spent in direct patient care and coordination.  Care coordination with staff, , imaging department      Radha Spencer NP  Department of Hospital Medicine   [facility]     DOS:3/19/2025      Patient note was created using MModal Dictation.  Any errors in syntax or even information may not have been identified and edited on initial review prior to signing this note.      Post-Care Instructions: I reviewed with the patient in detail post-care instructions. Patient is not to engage in any heavy lifting, exercise, or swimming for the next 14 days. Should the patient develop any fevers, chills, bleeding, severe pain patient will contact the office immediately.

## 2025-03-19 NOTE — PT/OT/SLP PROGRESS
Physical Therapy Treatment    Patient Name:  Isabel Hernandez   MRN:  [id number]  Admit Date: 3/7/2025  Admitting Diagnosis: Acute cystitis without hematuria  Recent Surgeries: N/A    General Precautions: Standard, fall  Orthopedic Precautions: N/A  Braces: N/A    Recommendations:     Discharge Recommendations: home health PT  Level of Assistance Recommended at Discharge: 24 hours light assistance  Discharge Equipment Recommendations: walker, rolling, wheelchair, bath bench  Barriers to discharge: Inaccessible home environment    Assessment:     Isabel Hernandez is a 89 y.o. female admitted with a medical diagnosis of Acute cystitis without hematuria. Pt tolerated treatment well and without incident. Pt reporting mild dizziness at start of session (mid-transfer to EOB). Pt requiring increased time for symptom cessation while seated EOB. Pt then requested to use restroom, hygiene needs addressed by PT as needed. Pt making proper strides towards stated PT goals as evidenced by increased total ambulation distance this date; CGA maintained. Pt will continue to benefit from skilled PT services in order to further promote functional mobility, endurance, and BLE strengthening. Pt remains appropriate for PT treatment at this time.    Performance deficits affecting function: weakness, impaired endurance, impaired self care skills, impaired functional mobility, gait instability, impaired balance, decreased lower extremity function, decreased safety awareness, impaired cognition, impaired cardiopulmonary response to activity.    Rehab Potential is good    Activity Tolerance: Good    Plan:     Patient to be seen 5 x/week to address the above listed problems via gait training, therapeutic activities, therapeutic exercises, neuromuscular re-education, wheelchair management/training    Plan of Care Expires: 04/07/25  Plan of Care Reviewed with: patient    Subjective     Pt agreeable to treatment.     Pain/Comfort:  Pain Rating 1:  8/10  Location - Side 1: Bilateral  Location - Orientation 1: generalized  Location 1: back  Pain Addressed 1: Reposition, Distraction, Nurse notified  Pain Rating Post-Intervention 1: 8/10    Patient's cultural, spiritual, Zoroastrianism conflicts given the current situation:  no    Objective:     Communicated with nursing prior to session.  Patient found HOB elevated with PureWick upon PT entry to room.     Therapeutic Activities and Exercises: Pt able to perform seated exercises consisting of B ankle DF/PF, B knee extension, B hip flexion, B hip abd/adduction, and HS curls (2x10). All exercises incorporated to promote BLE strengthening, tissue extensibility, endurance and functional mobility.    Patient educated on role of therapy, goals of session, and benefits of out of mobility.  Instructed on use of call button and importance of calling nursing staff for assistance with mobility.   RN present to administer medications just prior to end of treatment.  Questions/concerns addressed within PTA scope of practice.  Pt verbalized understanding.    Functional Mobility:  Bed Mobility:     Scooting: stand by assistance  Supine to Sit: contact guard assistance, HOB elevated, side rail  Transfers:     Sit to Stand:  contact guard assistance with rolling walker  Bed to Chair: contact guard assistance with  rolling walker  using  Stand Pivot  Toilet Transfer (WC<>toilet): contact guard assistance with  grab bars  using  Stand Pivot  Gait: ~100 ft using RW with CGA; WC follow provided. Pt taking single standing rest break mid-trial. No LOB noted. Pt reporting mild dizziness towards end of trial. Pt then returned to sitting position in WC. RN present.  Balance: static/dynamic standing at toilet grab bar with CGA/close SBA. PTA replacing soiled brief and removing Purewick pad. Pt able to complete self pericare tasks with CGA maintained.     AM-PAC 6 CLICK MOBILITY  17    Patient left up in chair with all needs met, call button in  reach and PCT and RN notified.    GOALS:   Multidisciplinary Problems       Physical Therapy Goals          Problem: Physical Therapy    Goal Priority Disciplines Outcome Interventions   Physical Therapy Goal     PT, PT/OT Progressing    Description: Goals to be met by: 25     Patient will increase functional independence with mobility by performin. Supine to sit with Supervision  2. Sit to supine with Supervision  3. Rolling to Left and Right with Supervision  4. Sit to stand transfer with Supervision  5. Bed to chair transfer with Supervision using Rolling Walker  6. Gait  x 150 feet with Stand-by Assistance using Rolling Walker  7. Wheelchair propulsion x 50 feet with Supervision using bilateral upper extremities  8. Ascend/descend 4 stairs with bilateral Handrail and Contact Guard Assistance using No Assistive Device.  9. Ascend/Descend 4 inch curb step with Contact Guard Assistance using Rolling Walker                         Time Tracking:     PT Received On: 25  PT Start Time: 1536  PT Stop Time: 1603  PT Total Time (min): 27 min    Billable Minutes: Gait Training 12 and Therapeutic Activity 15    Treatment Type: Treatment  PT/PTA: PTA     Number of PTA visits since last PT visit: 2     2025

## 2025-03-19 NOTE — PLAN OF CARE
SS fax NH placement referrals to [facility], [facility], [facility], [facility], [facility], and [facility].

## 2025-03-20 LAB
ANION GAP SERPL CALC-SCNC: 10 MMOL/L (ref 8–16)
BASOPHILS # BLD AUTO: 0.06 K/UL (ref 0–0.2)
BASOPHILS NFR BLD: 0.9 % (ref 0–1.9)
BUN SERPL-MCNC: 21 MG/DL (ref 8–23)
CALCIUM SERPL-MCNC: 8.3 MG/DL (ref 8.7–10.5)
CHLORIDE SERPL-SCNC: 101 MMOL/L (ref 95–110)
CO2 SERPL-SCNC: 24 MMOL/L (ref 23–29)
CREAT SERPL-MCNC: 0.8 MG/DL (ref 0.5–1.4)
DIFFERENTIAL METHOD BLD: ABNORMAL
EOSINOPHIL # BLD AUTO: 0.1 K/UL (ref 0–0.5)
EOSINOPHIL NFR BLD: 1.9 % (ref 0–8)
ERYTHROCYTE [DISTWIDTH] IN BLOOD BY AUTOMATED COUNT: 13 % (ref 11.5–14.5)
EST. GFR  (NO RACE VARIABLE): >60 ML/MIN/1.73 M^2
GLUCOSE SERPL-MCNC: 92 MG/DL (ref 70–110)
HCT VFR BLD AUTO: 37.3 % (ref 37–48.5)
HGB BLD-MCNC: 11.9 G/DL (ref 12–16)
IMM GRANULOCYTES # BLD AUTO: 0.04 K/UL (ref 0–0.04)
IMM GRANULOCYTES NFR BLD AUTO: 0.6 % (ref 0–0.5)
LYMPHOCYTES # BLD AUTO: 2.7 K/UL (ref 1–4.8)
LYMPHOCYTES NFR BLD: 39.2 % (ref 18–48)
MAGNESIUM SERPL-MCNC: 2.3 MG/DL (ref 1.6–2.6)
MCH RBC QN AUTO: 30.1 PG (ref 27–31)
MCHC RBC AUTO-ENTMCNC: 31.9 G/DL (ref 32–36)
MCV RBC AUTO: 94 FL (ref 82–98)
MONOCYTES # BLD AUTO: 0.6 K/UL (ref 0.3–1)
MONOCYTES NFR BLD: 8.1 % (ref 4–15)
NEUTROPHILS # BLD AUTO: 3.4 K/UL (ref 1.8–7.7)
NEUTROPHILS NFR BLD: 49.3 % (ref 38–73)
NRBC BLD-RTO: 0 /100 WBC
PHOSPHATE SERPL-MCNC: 4.2 MG/DL (ref 2.7–4.5)
PLATELET # BLD AUTO: 333 K/UL (ref 150–450)
PMV BLD AUTO: 10.3 FL (ref 9.2–12.9)
POTASSIUM SERPL-SCNC: 4.3 MMOL/L (ref 3.5–5.1)
RBC # BLD AUTO: 3.95 M/UL (ref 4–5.4)
SODIUM SERPL-SCNC: 135 MMOL/L (ref 136–145)
WBC # BLD AUTO: 6.91 K/UL (ref 3.9–12.7)

## 2025-03-20 PROCEDURE — 11000004 HC SNF PRIVATE: Mod: HCNC

## 2025-03-20 PROCEDURE — 83735 ASSAY OF MAGNESIUM: CPT | Mod: HCNC | Performed by: HOSPITALIST

## 2025-03-20 PROCEDURE — 25000003 PHARM REV CODE 250: Mod: HCNC | Performed by: HOSPITALIST

## 2025-03-20 PROCEDURE — 97116 GAIT TRAINING THERAPY: CPT | Mod: HCNC,CQ

## 2025-03-20 PROCEDURE — 25000003 PHARM REV CODE 250: Mod: HCNC | Performed by: NURSE PRACTITIONER

## 2025-03-20 PROCEDURE — 97535 SELF CARE MNGMENT TRAINING: CPT | Mod: HCNC,CO

## 2025-03-20 PROCEDURE — 97530 THERAPEUTIC ACTIVITIES: CPT | Mod: HCNC,CO

## 2025-03-20 PROCEDURE — 25000003 PHARM REV CODE 250: Mod: HCNC | Performed by: FAMILY MEDICINE

## 2025-03-20 PROCEDURE — 97530 THERAPEUTIC ACTIVITIES: CPT | Mod: HCNC,CQ

## 2025-03-20 PROCEDURE — 36415 COLL VENOUS BLD VENIPUNCTURE: CPT | Mod: HCNC | Performed by: HOSPITALIST

## 2025-03-20 PROCEDURE — 84100 ASSAY OF PHOSPHORUS: CPT | Mod: HCNC | Performed by: HOSPITALIST

## 2025-03-20 PROCEDURE — 85025 COMPLETE CBC W/AUTO DIFF WBC: CPT | Mod: HCNC | Performed by: HOSPITALIST

## 2025-03-20 PROCEDURE — 80048 BASIC METABOLIC PNL TOTAL CA: CPT | Mod: HCNC | Performed by: HOSPITALIST

## 2025-03-20 RX ADMIN — LEVOTHYROXINE SODIUM 50 MCG: 0.05 TABLET ORAL at 05:03

## 2025-03-20 RX ADMIN — ONDANSETRON 4 MG: 4 TABLET, ORALLY DISINTEGRATING ORAL at 02:03

## 2025-03-20 RX ADMIN — SENNOSIDES AND DOCUSATE SODIUM 1 TABLET: 50; 8.6 TABLET ORAL at 11:03

## 2025-03-20 RX ADMIN — MECLIZINE HYDROCHLORIDE 12.5 MG: 12.5 TABLET ORAL at 02:03

## 2025-03-20 RX ADMIN — LIDOCAINE 1 PATCH: 50 PATCH CUTANEOUS at 08:03

## 2025-03-20 RX ADMIN — ACETAMINOPHEN 650 MG: 325 TABLET ORAL at 08:03

## 2025-03-20 RX ADMIN — PANTOPRAZOLE SODIUM 40 MG: 40 TABLET, DELAYED RELEASE ORAL at 08:03

## 2025-03-20 RX ADMIN — ACETAMINOPHEN 650 MG: 325 TABLET ORAL at 02:03

## 2025-03-20 RX ADMIN — MECLIZINE HYDROCHLORIDE 12.5 MG: 12.5 TABLET ORAL at 08:03

## 2025-03-20 NOTE — PT/OT/SLP PROGRESS
"Occupational Therapy   Treatment    Name: Isabel Hernandez  MRN: [id number]  Admit Date: 3/7/2025  Admitting Diagnosis:  Acute cystitis without hematuria    General Precautions: Standard, aspiration, fall   Orthopedic Precautions: N/A   Braces: N/A    Recommendations:     Discharge Recommendations:  home health OT  Level of Assistance Recommended at Discharge: 24 hours light assistance for ADL's and homemaking tasks  Discharge Equipment Recommendations: walker, rolling, wheelchair, bath bench  Barriers to discharge:  Decreased caregiver support    Assessment:     Isabel Hernandez is a 89 y.o. female with a medical diagnosis of Acute cystitis without hematuria .  She presents with performance deficits affecting function are weakness, impaired endurance, impaired self care skills, impaired functional mobility, gait instability, impaired cognition, impaired balance, pain, decreased safety awareness. Pt c/o dizziness and and nausea during session in which daughter expressed concerns about, OT escalated concerns to NP.  Pt is making progress, however, continues to demonstrate deficits with self care skills, balance, functional mobility, UB strength and endurance. Pt will benefit from continued OT services to progress towards goals.     Rehab Potential is good    Activity tolerance:  Fair    Plan:     Patient to be seen 5 x/week to address the above listed problems via self-care/home management, therapeutic activities, therapeutic exercises    Plan of Care Expires: 03/29/25  Plan of Care Reviewed with: patient, daughter    Subjective   "I just feel so sick" "I have to use the bathroom"  Communicated with: Missy prior to session.    Pain/Comfort:  Pain Rating 1:  (not rated)  Location - Side 1: Bilateral  Location - Orientation 1: generalized  Location 1: back  Pain Addressed 1: Pre-medicate for activity, Reposition, Cessation of Activity  Pain Rating Post-Intervention 1:  (not rated)    Patient's cultural, spiritual, Samaritan " conflicts given the current situation:  no    Objective:     Patient found  in bedside chair upon OT entry to room.    Functional Mobility/Transfers:  Patient completed Sit <> Stand Transfer with contact guard assistance  with  rolling walker   Patient completed Toilet Transfer Step Transfer technique with contact guard assistance with  rolling walker  Functional Mobility: Pt ambulated 28 ft in room with CGA and RW.     Activities of Daily Living:  Grooming: contact guard assistance to perform hand hygiene in stance with RW  Upper Body Dressing: contact guard assistance to don pullover shirt in stance with use of RW  Lower Body Dressing: minimum assistance to thread B LE and manage pants over hips in stance with use of RW  Toileting: contact guard assistance to perform doug hygiene and brief management in stance with use of RW.     [facility] 6 Click ADL: 18    Treatment & Education:  Pt educated on:  - role of OT  - level of assistance  - energy conservation and task modification to maximize independence with ADL's and mobility   -  safety while performing functional transfers and self care tasks  - orthopedic precautions.   - progress towards OT goals     Patient left  in bedside chair  with call button in reach    GOALS:   Multidisciplinary Problems       Occupational Therapy Goals          Problem: Occupational Therapy    Goal Priority Disciplines Outcome Interventions   Occupational Therapy Goal     OT, PT/OT Progressing    Description: Goals to be met by: 3/29/25     Patient will increase functional independence with ADLs by performing:    Feeding with [place].  UE Dressing with [place].  LE Dressing with [place].  Grooming while standing with Modified [place].  Toileting from toilet with Modified [place] for hygiene and clothing management.   Bathing from  shower chair/bench with Supervision.  Sitting at edge of bed x10 minutes with [place].  Step transfer with Modified  [place]  Toilet transfer to toilet with Modified [place].                         Time Tracking:     OT Date of Treatment: 03/20/25  OT Start Time: 1401    OT Stop Time: 1427  OT Total Time (min): 26 min    Billable Minutes:Self Care/Home Management 13  Therapeutic Activity 13    3/20/2025

## 2025-03-20 NOTE — PT/OT/SLP PROGRESS
"Physical Therapy Treatment    Patient Name:  Isabel Hernandez   MRN:  [id number]  Admit Date: 3/7/2025  Admitting Diagnosis: Acute cystitis without hematuria  Recent Surgeries: N/A    General Precautions: Standard, fall  Orthopedic Precautions: N/A  Braces: N/A    Recommendations:     Discharge Recommendations: home health PT  Level of Assistance Recommended at Discharge: 24 hours light assistance  Discharge Equipment Recommendations: walker, rolling, wheelchair, bath bench  Barriers to discharge: Inaccessible home environment    Assessment:     Isabel Hernandez is a 89 y.o. female admitted with a medical diagnosis of Acute cystitis without hematuria. Pt tolerated session fairly well however required moderate encouragement to promote participation with skilled PT.  Pt with complaints of feeling "sick to my sick," "I'm so dizzy," and "oh, my back" throughout entirety of treatment. RN made aware; BP assessed twice and found to be WNL. Pt agreeable to complete two gait trials though pt limited by fatigue. Pt with mild-mod LOB at end of first gait trial requiring CGA-Min A for steadiness prior to sit. Increased gait and steadiness noted with second trial. Pt responds well to task-oriented commands. PCT present at end of session to situate pt's tray table for lunch. Pt will continue to benefit from skilled PT services in order to further promote functional mobility, endurance, and BLE strengthening. Pt remains appropriate for PT treatment at this time.    Performance deficits affecting function: weakness, impaired endurance, impaired self care skills, impaired functional mobility, gait instability, impaired balance, decreased lower extremity function, decreased safety awareness, impaired cognition, impaired cardiopulmonary response to activity.    Rehab Potential is good    Activity Tolerance: Good    Plan:     Patient to be seen 5 x/week to address the above listed problems via gait training, therapeutic activities, " "therapeutic exercises, neuromuscular re-education, wheelchair management/training    Plan of Care Expires: 04/07/25  Plan of Care Reviewed with: patient    Subjective     "Oh, my back."  "Oh, I'm so dizzy."  Pt agreeable to treatment.     Pain/Comfort:  Pain Rating 1: 9/10  Location - Side 1: Bilateral  Location - Orientation 1: generalized  Location 1: back  Pain Addressed 1: Pre-medicate for activity, Reposition, Distraction, Cessation of Activity, Nurse notified  Pain Rating Post-Intervention 1: 9/10  Location - Orientation 2: generalized  Location 2: abdomen    Patient's cultural, spiritual, Confucianist conflicts given the current situation:  no    Objective:     Communicated with nursing prior to session.  Patient found HOB elevated with  (no active lines) upon PT entry to room.     Therapeutic Activities and Exercises:   Patient educated on role of therapy, goals of session, and benefits of out of mobility.  Instructed on use of call button and importance of calling nursing staff for assistance with mobility.   Increased time required at beginning of session to encourage pt participation in therapy session.  Questions/concerns addressed within PTA scope of practice.  Pt verbalized understanding.    Functional Mobility:  Bed Mobility:     Scooting: stand by assistance, increased time, cues (to EOB and in WC)  Supine to Sit: stand by assistance, HOB elevated, side rail, increased time, cues  Transfers:     Sit to Stand:  contact guard assistance with rolling walker  Bed to Chair: contact guard assistance with  no AD  using  Stand Pivot. Vitals assessed, pt complaining of dizziness throughout transfer (/59, HR 83 bpm, MAP 85 mmHg)  Gait: 19 ft + 31 ft using RW with CGA-Min A progressing to CGA; WC follow provided by PCT. Pt with improved tech/safety/awareness when given task to complete. "Mrs. DD, let's walk back to your room." One mild-mod LOB noted at end of first gait trial due to fatigue, pt complaining " of dizziness. Vitals assessed: /60, HR 87 bpm, MAP 87 mmHg. RN notified  Balance: static standing at RW with CGA. Pt with mild postural sway due to fatigue/dizziness/global mm weakness. No LOB noted.    AM-PAC 6 CLICK MOBILITY  17    Patient left up in chair with call button in reach and PCT present .    GOALS:   Multidisciplinary Problems       Physical Therapy Goals          Problem: Physical Therapy    Goal Priority Disciplines Outcome Interventions   Physical Therapy Goal     PT, PT/OT Progressing    Description: Goals to be met by: 25     Patient will increase functional independence with mobility by performin. Supine to sit with Supervision  2. Sit to supine with Supervision  3. Rolling to Left and Right with Supervision  4. Sit to stand transfer with Supervision  5. Bed to chair transfer with Supervision using Rolling Walker  6. Gait  x 150 feet with Stand-by Assistance using Rolling Walker  7. Wheelchair propulsion x 50 feet with Supervision using bilateral upper extremities  8. Ascend/descend 4 stairs with bilateral Handrail and Contact Guard Assistance using No Assistive Device.  9. Ascend/Descend 4 inch curb step with Contact Guard Assistance using Rolling Walker                         Time Tracking:     PT Received On: 25  PT Start Time: 1207  PT Stop Time: 1239  PT Total Time (min): 32 min    Billable Minutes: Gait Training 14 and Therapeutic Activity 18    Treatment Type: Treatment  PT/PTA: PTA     Number of PTA visits since last PT visit: 3     2025

## 2025-03-20 NOTE — NURSING
Pt continues to be disoriented to place, time and situation. Pt also continue to ambulate without assistance.  Reeducated pt frequently and increased rounding for safety.

## 2025-03-20 NOTE — PROGRESS NOTES
"                                                        [facility]                                  Skilled Nursing Facility                   Progress Note     Admit Date: 3/7/2025  MURPHY 4/1/2025  [id number]  Principal Problem:  Acute cystitis without hematuria   HPI obtained from patient interview and chart review     Chief Complaint:Re-evaluation of medical treatment and therapy status: Lab review, MRI reviewed    HPI:   Isabel Hernandez is an 89 year old female with PMHx of thyroid disease, stage II CKD, history of compression fractures who presents to SNF following hospitalization for acute cystitis.  Admission to SNF for secondary weakness and debility.    Interval history:  All of today's labs reviewed and are listed below.  24 hr vital sign ranges reviewed and listed below.  Patient does not look well again today.  She seems to wax and wane with her symptoms.  She is experiencing profound dizziness and nausea today.  MRI results reviewed and is negative for any acute findings.  Ambulatory referral placed for ENT for dizziness evaluation.  Ambulatory referral placed for pain management for back pain.  Phone conversation held with patient's daughter to provide care update, she states that she placed a deposit at an assisted living for post SNF discharge.  Patient denies shortness of breath, abdominal discomfort, nausea, or vomiting.  Patient reports an ok appetite.  Patient denies dysuria.  Patient reports having regular bowel movements.  Patient progressing with PT/OT-  19 ft + 31 ft using RW with CGA-Min A progressing to CGA; WC follow provided by PCT. Pt with improved tech/safety/awareness when given task to complete. "Mrs. DD, let's walk back to your room." One mild-mod LOB noted at end of first gait trial due to fatigue, pt complaining of dizziness. Continuing to follow and treat all acute and chronic conditions.    Past Medical History: Patient has a past medical history of " Arthritis, Cataract, Hypothyroidism, Mild malnutrition (3/10/2025), and Osteoporosis (2015).    Past Surgical History: Patient has a past surgical history that includes Thyroidectomy, partial; Hysterectomy; Cholecystectomy; Tonsillectomy; Adenoidectomy; Breast surgery (Bilateral);  section; Vitrectomy by pars plana approach (Left, 2018); and Appendectomy.    Social History: Patient reports that she has never smoked. She has never used smokeless tobacco. She reports that she does not drink alcohol and does not use drugs.    Family History: family history includes Cancer in her brother, father, and sister; Cataracts in her father; Glaucoma in her father; Hypertension in her mother; Kidney disease in her mother; Miscarriages / Stillbirths in her mother; No Known Problems in her brother and daughter; Thyroid disease in her mother.    Allergies: Patient is allergic to codeine, fosamax [alendronate], and iron analogues.    ROS  Constitutional: Negative for fever   Respiratory: Negative for cough, shortness of breath   Cardiovascular: Negative for chest pain, palpitations  Gastrointestinal: Negative for abdominal pain, constipation, diarrhea, vomiting. + intermittent nausea  Genitourinary: Negative for dysuria, frequency   Musculoskeletal:  + generalized weakness.  +back pain  Neurological: Negative for  headaches.  + intermittent dizziness  Psychiatric/Behavioral: Negative for depression. The patient is nervous/anxious.      24 hour Vital Sign Range   Temp:  [97.6 °F (36.4 °C)-98 °F (36.7 °C)]   Pulse:  [68-82]   Resp:  [18]   BP: (118-129)/(56-57)   SpO2:  [94 %-95 %]     Current BMI: Body mass index is 25.51 kg/m².    PEx  Constitutional: Patient appears debilitated.  mild distress noted  Cardiovascular: Normal rate, regular rhythm and normal heart sounds.    Pulmonary/Chest: Effort normal and breath sounds are clear  Abdominal: Soft. Bowel sounds are normal.   Musculoskeletal: Normal range of motion.  "  Neurological: Alert and oriented to person, place.  Disoriented to time.  Impaired higher level thinking  Psychiatric: confused, anxious   Skin: Skin is warm and dry.     Recent Labs   Lab 03/20/25  0428   *   K 4.3      CO2 24   BUN 21   CREATININE 0.8   CALCIUM 8.3*   MG 2.3         Recent Labs   Lab 03/20/25  0428   WBC 6.91   RBC 3.95*   HGB 11.9*   HCT 37.3      MCV 94   MCH 30.1   MCHC 31.9*         No results for input(s): "POCTGLUCOSE" in the last 168 hours.       Assessment and Plan:    Acute cystitis without hematuria  - Presenting with nausea/ generalized weakness/ lightheadedness and suprapubic discomfort and confusion   - CT AP without acute findings   - UA infectious, Urine culture with mixed growth; no predominant organism  - treated with IV ceftriaxone empirically  - completed cefpodoxime 100 mg BID, ended 3/13    Nausea and vomiting  - Suspected related to UTI or possibly viral illness   - CT AP without acute findings  - Flu/Covid negative   - continue Zofran PRN     Dizziness  - possible vertigo  - CTH negative  - Per [facility], "Given concern for posterior circulation CVA that could be the etiology of her dizziness we did offer MR brain, but she declined." Per daughter, MRI was ordered and scheduled for day of discharge and was not able to be completed prior to transportation showing up for them to transferred to SNF, will have rescheduled  - possible ENT follow-up  - persisting, meclizine 12.5 mg TID   - MRI completed, no acute findings   - ENT referral placed, message sent to  to schedule      Dementia without behavioral disturbance, psychotic disturbance, mood disturbance, or anxiety, unspecified dementia severity, unspecified dementia type   - per past PCP notes patient has opted out of medication for dementia  Delirium precautions:  - Avoid antihistamines, anticholinergics, hypnotics, and minimize opiates while controlling for pain as these medications may exacerbate " delirium. Cues for day/night will assist with keeping patient calm and oriented - during daytime, please keep adequate light in room (open windows, lights on) and please keep room dim at night-time to encourage normal sleep-wake cycles. Continuing to have nursing and family reorient the patient and encourage family to visit  - per daughter patient has not yet at her mental baseline patient is typically more oriented.      CKD Stage 3   - stable, continue monitor twice weekly BMPs, avoid nephrotoxic agents, renally dose medications as appropriate    Back pain  - XR (3/15) Five non rib-bearing lumbar type vertebral bodies are present. There is widening of the intervertebral joint space between L4 and L5, unchanged when compared to 03/05/2025. Multiple compression deformities in the lower thoracic spine are unchanged when compared to prior imaging. Degenerative changes are seen throughout the spine, as before. Facet arthropathy of the lower lumbar spine. The abdominal aorta is heavily calcified.   - continue acetaminophen 650 mg q.8 hours, lidocaine patch qHS, prn tramadol  - pain management referral placed, message sent to  to schedule      Hypothyroid  - TSH slightly elevated with normal free T4  - stable, Continue home levothyroxine 50 mcg daily    Age-related osteoporosis without current pathological fracture   - per PCP notes, patient is supposed to be on Prolia.  Do not see on dispensed report    Advance care planning  - ACP discussion held with patient and her daughter on 03/10, per daughter patient had living will that states patient is a DNR    Debility   - Continue with PT/OT for gait training and strengthening and restoration of ADL's   - Encourage mobility, OOB in chair, and early ambulation as appropriate  - Fall precautions   - Monitor for bowel and bladder dysfunction  - Monitor for and prevent skin breakdown and pressure ulcers  - Continue DVT prophylaxis with       Anticipate disposition:   Home with home health    IP [facility] RISK OF UNPLANNED READMISSION Model: MODERATE     Follow-up needed during SNF stay-    Follow-up needed after discharge from SNF: PCP     Future Appointments   Date Time Provider Department Center   4/3/2025 10:20 AM Sierra Gibson DO [facility] PAINMA [place]   4/7/2025  2:45 PM Colten Oleary, DPM [facility] POD [address] [facility]   5/13/2025 12:00 PM Danielle Vaughan FNP [facility] IM [address] [facility]   5/15/2025 11:00 AM Lucila Paez Au.D, CCC-A [facility] AUDIO [address]   5/15/2025 11:30 AM Karlos Muniz MD [facility] ENT [address]   6/2/2025  2:45 PM Kristian Shabazz MD [facility] PRICRE [place]   7/21/2025  2:45 PM Kristian Shabazz MD [facility] PRICRE [place]     I certify that SNF services are required to be given on an inpatient basis because Isabel Hernandez needs for skilled nursing care and/or skilled rehabilitation are required on a daily basis and such services can only practically be provided in a skilled nursing facility setting and are for an ongoing condition for which she received inpatient care in the hospital.     I spent 87 minutes reviewing patient records, examining, and counseling the patient/ patient's family with greater than 50% of the time spent in direct patient care and coordination.  Care coordination with staff, , therapy.  MRI results interpreted.  Phone conversation held with patient's daughter      Radha M YAHIR Spencer  Department of Hospital Medicine   [facility]     DOS:3/20/2025      Patient note was created using MModal Dictation.  Any errors in syntax or even information may not have been identified and edited on initial review prior to signing this note.

## 2025-03-20 NOTE — PLAN OF CARE
Problem: Adult Inpatient Plan of Care  Goal: Plan of Care Review  Outcome: Progressing  Goal: Patient-Specific Goal (Individualized)  Outcome: Progressing  Goal: Absence of Hospital-Acquired Illness or Injury  Outcome: Progressing  Goal: Optimal Comfort and Wellbeing  Outcome: Progressing  Goal: Readiness for Transition of Care  Outcome: Progressing     Problem: Fall Injury Risk  Goal: Absence of Fall and Fall-Related Injury  Outcome: Progressing  Intervention: Identify and Manage Contributors  Flowsheets (Taken 3/20/2025 2887)  Self-Care Promotion:   BADL personal objects within reach   BADL personal routines maintained  Medication Review/Management:   medications reviewed   high-risk medications identified  Intervention: Promote Injury-Free Environment  Flowsheets (Taken 3/20/2025 5314)  Safety Promotion/Fall Prevention:   assistive device/personal item within reach   Fall Risk reviewed with patient/family   instructed to call staff for mobility   nonskid shoes/socks when out of bed   side rails raised x 2   lighting adjusted   medications reviewed   Fall Risk signage in place   bed alarm set     Problem: Skin Injury Risk Increased  Goal: Skin Health and Integrity  Outcome: Progressing     Problem: Malnutrition  Goal: Improved Nutritional Intake  Outcome: Progressing

## 2025-03-20 NOTE — NURSING
Pt returnd to unit via ems from main campus MRI. Vital signs stable. Pt ambulated to restroom, scheduled meds give. No current signs of distress or discomfort.

## 2025-03-20 NOTE — NURSING
Pt left unit via ems to [facility] main for mri. VS stable no visible signs of distress. Daughter left with patient

## 2025-03-20 NOTE — PLAN OF CARE
Problem: Fall Injury Risk  Goal: Absence of Fall and Fall-Related Injury  Outcome: Progressing  Intervention: Promote Injury-Free Environment  Flowsheets (Taken 3/20/2025 7874)  Safety Promotion/Fall Prevention: assistive device/personal item within reach

## 2025-03-21 PROCEDURE — 97116 GAIT TRAINING THERAPY: CPT | Mod: HCNC,CQ

## 2025-03-21 PROCEDURE — 11000004 HC SNF PRIVATE: Mod: HCNC

## 2025-03-21 PROCEDURE — 25000003 PHARM REV CODE 250: Mod: HCNC | Performed by: HOSPITALIST

## 2025-03-21 PROCEDURE — 25000003 PHARM REV CODE 250: Mod: HCNC | Performed by: NURSE PRACTITIONER

## 2025-03-21 PROCEDURE — 97110 THERAPEUTIC EXERCISES: CPT | Mod: HCNC,CO

## 2025-03-21 PROCEDURE — 97530 THERAPEUTIC ACTIVITIES: CPT | Mod: HCNC,CO

## 2025-03-21 PROCEDURE — 97530 THERAPEUTIC ACTIVITIES: CPT | Mod: HCNC,CQ

## 2025-03-21 RX ADMIN — ACETAMINOPHEN 650 MG: 325 TABLET ORAL at 02:03

## 2025-03-21 RX ADMIN — ACETAMINOPHEN 650 MG: 325 TABLET ORAL at 08:03

## 2025-03-21 RX ADMIN — LIDOCAINE 1 PATCH: 50 PATCH CUTANEOUS at 08:03

## 2025-03-21 RX ADMIN — LEVOTHYROXINE SODIUM 50 MCG: 0.05 TABLET ORAL at 05:03

## 2025-03-21 NOTE — PLAN OF CARE
Problem: Adult Inpatient Plan of Care  Goal: Plan of Care Review  Outcome: Progressing  Goal: Patient-Specific Goal (Individualized)  Outcome: Progressing  Goal: Absence of Hospital-Acquired Illness or Injury  Outcome: Progressing  Goal: Optimal Comfort and Wellbeing  Outcome: Progressing  Goal: Readiness for Transition of Care  Outcome: Progressing     Problem: Fall Injury Risk  Goal: Absence of Fall and Fall-Related Injury  Outcome: Progressing  Intervention: Identify and Manage Contributors  Flowsheets (Taken 3/21/2025 7598)  Self-Care Promotion:   BADL personal objects within reach   BADL personal routines maintained  Medication Review/Management: medications reviewed  Intervention: Promote Injury-Free Environment  Flowsheets (Taken 3/21/2025 0502)  Safety Promotion/Fall Prevention:   assistive device/personal item within reach   side rails raised x 3   instructed to call staff for mobility   lighting adjusted   medications reviewed   Fall Risk reviewed with patient/family   family to remain at bedside     Problem: Skin Injury Risk Increased  Goal: Skin Health and Integrity  Outcome: Progressing     Problem: Malnutrition  Goal: Improved Nutritional Intake  Outcome: Progressing

## 2025-03-21 NOTE — PT/OT/SLP PROGRESS
Physical Therapy Treatment    Patient Name:  Isabel Hernandez   MRN:  [id number]  Admit Date: 3/7/2025  Admitting Diagnosis: Acute cystitis without hematuria  Recent Surgeries: N/A    General Precautions: Standard, fall  Orthopedic Precautions: N/A  Braces: N/A    Recommendations:     Discharge Recommendations: home health PT  Level of Assistance Recommended at Discharge: 24 hours light assistance  Discharge Equipment Recommendations: walker, rolling, wheelchair, bath bench  Barriers to discharge: Inaccessible home environment    Assessment:     Isabel Hernandez is a 89 y.o. female admitted with a medical diagnosis of Acute cystitis without hematuria. Pt tolerated session fairly well and without incident. Pt notably more alert this session, however continues to report chronic back pain and dizziness before, during and after treatment. Pt agreeable to complete PT/OT back-to-back in PM. Vitals assessed during PT to OT handoff and found to be WNL (/69, HR 96 bpm, MAP 95 mmHg). Pt will continue to benefit from skilled PT services in order to further promote functional mobility, endurance, and BLE strengthening. Pt remains appropriate for PT treatment at this time.     Performance deficits affecting function: weakness, impaired endurance, impaired self care skills, impaired functional mobility, gait instability, impaired balance, decreased lower extremity function, decreased safety awareness, impaired cognition, impaired cardiopulmonary response to activity.    Rehab Potential is good    Activity Tolerance: Good    Plan:     Patient to be seen 5 x/week to address the above listed problems via gait training, therapeutic activities, therapeutic exercises, neuromuscular re-education, wheelchair management/training    Plan of Care Expires: 04/07/25  Plan of Care Reviewed with: patient, daughter    Subjective     Pt agreeable to treatment.     Pain/Comfort:  Pain Rating 1: other (see comments) (unspecified when  prompted)  Location - Side 1: Bilateral  Location - Orientation 1: generalized  Location 1: back  Pain Addressed 1: Reposition, Distraction, Cessation of Activity  Pain Rating Post-Intervention 1: 8/10    Patient's cultural, spiritual, Restoration conflicts given the current situation:  no    Objective:     Communicated with nursing prior to session.  Patient found up in chair with  (no active lines) upon PT entry to room.     Therapeutic Activities and Exercises:   Patient educated on role of therapy, goals of session, and benefits of out of mobility.  Increased time required at beginning of session to discuss medical disposition/PT progress with pt's daughter.  Instructed on use of call button and importance of calling nursing staff for assistance with mobility.   Questions/concerns addressed within PTA scope of practice.  Pt verbalized understanding.    Functional Mobility:  Transfers:     Scooting: SBA, ant/posteriorly on exercise mat, in WC  Sit to Stand:  stand by assistance and contact guard assistance with rolling walker  Bedside Chair to WC: CGA with RW using ~4-5 ft step transfer  Chair <> mat: close stand by assistance with  no AD  using  Stand Pivot  Gait: 27 ft + 79 ft using RW with CGA; WC follow provided. Vc/tc for RW mgmt, steadiness, and forward gaze/upright posture. No LOB noted, however mild postural sway present. Seated rest break taken between trials.  Balance: static sitting on exercise mat with SBA; RUE support maintained on WC armrest  Balance: static standing at RW with CGA/close SBA    AM-PAC 6 CLICK MOBILITY  17    Patient left up in chair with  TRUONG present in therapy gym.    GOALS:   Multidisciplinary Problems       Physical Therapy Goals          Problem: Physical Therapy    Goal Priority Disciplines Outcome Interventions   Physical Therapy Goal     PT, PT/OT Progressing    Description: Goals to be met by: 4/7/25     Patient will increase functional independence with mobility by  performin. Supine to sit with Supervision  2. Sit to supine with Supervision  3. Rolling to Left and Right with Supervision  4. Sit to stand transfer with Supervision  5. Bed to chair transfer with Supervision using Rolling Walker  6. Gait  x 150 feet with Stand-by Assistance using Rolling Walker  7. Wheelchair propulsion x 50 feet with Supervision using bilateral upper extremities  8. Ascend/descend 4 stairs with bilateral Handrail and Contact Guard Assistance using No Assistive Device.  9. Ascend/Descend 4 inch curb step with Contact Guard Assistance using Rolling Walker                         Time Tracking:     PT Received On: 25  PT Start Time: 1309  PT Stop Time: 1348  PT Total Time (min): 39 min    Billable Minutes: Gait Training 23 and Therapeutic Activity 16    Treatment Type: Treatment  PT/PTA: PTA     Number of PTA visits since last PT visit: 2025

## 2025-03-21 NOTE — PROGRESS NOTES
[facility]                                  Skilled Nursing Facility                   Progress Note     Admit Date: 3/7/2025  MURPHY 2025  [id number]  Principal Problem:  Acute cystitis without hematuria   HPI obtained from patient interview and chart review     Chief Complaint:Re-evaluation of medical treatment and therapy status, dizziness, back pain    HPI:   Isabel Hernandez is an 89 year old female with PMHx of thyroid disease, stage II CKD, history of compression fractures who presents to SNF following hospitalization for acute cystitis.  Admission to SNF for secondary weakness and debility.    Interval history:  24 hr vital sign ranges reviewed and listed below.  Back pain is persisting, patient was set up with pain management clinic as outpatient.  Dizziness also continues to persist, patient was set up with soonest ENT appointment.  Also encourage patient to drink water, informed patient's daughter as well.  Patient denies shortness of breath, abdominal discomfort, nausea, or vomiting.  Patient reports an ok appetite.  Patient denies dysuria.  Patient reports having regular bowel movements.  Patient progressing with PT/OT. Continuing to follow and treat all acute and chronic conditions.    Past Medical History: Patient has a past medical history of Arthritis, Cataract, Hypothyroidism, Mild malnutrition (3/10/2025), and Osteoporosis (2015).    Past Surgical History: Patient has a past surgical history that includes Thyroidectomy, partial; Hysterectomy; Cholecystectomy; Tonsillectomy; Adenoidectomy; Breast surgery (Bilateral);  section; Vitrectomy by pars plana approach (Left, 2018); and Appendectomy.    Social History: Patient reports that she has never smoked. She has never used smokeless tobacco. She reports that she does not drink alcohol and does not use drugs.    Family History: family history includes  "Cancer in her brother, father, and sister; Cataracts in her father; Glaucoma in her father; Hypertension in her mother; Kidney disease in her mother; Miscarriages / Stillbirths in her mother; No Known Problems in her brother and daughter; Thyroid disease in her mother.    Allergies: Patient is allergic to codeine, fosamax [alendronate], and iron analogues.    ROS  Constitutional: Negative for fever   Respiratory: Negative for cough, shortness of breath   Cardiovascular: Negative for chest pain, palpitations  Gastrointestinal: Negative for abdominal pain, constipation, diarrhea, vomiting. + intermittent nausea  Genitourinary: Negative for dysuria, frequency   Musculoskeletal:  + generalized weakness.  +back pain  Neurological: Negative for  headaches.  + intermittent dizziness  Psychiatric/Behavioral: Negative for depression. The patient is nervous/anxious.      24 hour Vital Sign Range   Temp:  [98 °F (36.7 °C)-98.2 °F (36.8 °C)]   Pulse:  [70-79]   Resp:  [18]   BP: (107-135)/(54-60)   SpO2:  [94 %-95 %]     Current BMI: Body mass index is 25.51 kg/m².    PEx  Constitutional: Patient appears debilitated.  mild distress noted  Cardiovascular: Normal rate, regular rhythm and normal heart sounds.    Pulmonary/Chest: Effort normal and breath sounds are clear  Abdominal: Soft. Bowel sounds are normal.   Musculoskeletal: Normal range of motion.   Neurological: Alert and oriented to person, place.  Disoriented to time.  Impaired higher level thinking  Psychiatric: confused, anxious   Skin: Skin is warm and dry.     No results for input(s): "GLUCOSE", "NA", "K", "CL", "CO2", "BUN", "CREATININE", "CALCIUM", "MG" in the last 24 hours.        No results for input(s): "WBC", "RBC", "HGB", "HCT", "PLT", "MCV", "MCH", "MCHC" in the last 24 hours.        No results for input(s): "POCTGLUCOSE" in the last 168 hours.       Assessment and Plan:    Acute cystitis without hematuria  - Presenting with nausea/ generalized weakness/ " "lightheadedness and suprapubic discomfort and confusion   - CT AP without acute findings   - UA infectious, Urine culture with mixed growth; no predominant organism  - treated with IV ceftriaxone empirically  - completed cefpodoxime 100 mg BID, ended 3/13    Nausea and vomiting  - Suspected related to UTI or possibly viral illness   - CT AP without acute findings  - Flu/Covid negative   - continue Zofran PRN     Dizziness  - possible vertigo  - CTH negative  - Per [facility], "Given concern for posterior circulation CVA that could be the etiology of her dizziness we did offer MR brain, but she declined." Per daughter, MRI was ordered and scheduled for day of discharge and was not able to be completed prior to transportation showing up for them to transferred to SNF, will have rescheduled  - possible ENT follow-up  - persisting, meclizine 12.5 mg TID   - MRI completed, no acute findings   - ENT referral placed, soonest appointment was set for 5/15    Dementia without behavioral disturbance, psychotic disturbance, mood disturbance, or anxiety, unspecified dementia severity, unspecified dementia type   - per past PCP notes patient has opted out of medication for dementia  Delirium precautions:  - Avoid antihistamines, anticholinergics, hypnotics, and minimize opiates while controlling for pain as these medications may exacerbate delirium. Cues for day/night will assist with keeping patient calm and oriented - during daytime, please keep adequate light in room (open windows, lights on) and please keep room dim at night-time to encourage normal sleep-wake cycles. Continuing to have nursing and family reorient the patient and encourage family to visit  - per daughter patient has not yet at her mental baseline patient is typically more oriented.      CKD Stage 3   - stable, continue monitor twice weekly BMPs, avoid nephrotoxic agents, renally dose medications as appropriate    Back pain  - XR (3/15) Five non rib-bearing lumbar " type vertebral bodies are present. There is widening of the intervertebral joint space between L4 and L5, unchanged when compared to 03/05/2025. Multiple compression deformities in the lower thoracic spine are unchanged when compared to prior imaging. Degenerative changes are seen throughout the spine, as before. Facet arthropathy of the lower lumbar spine. The abdominal aorta is heavily calcified.   - continue acetaminophen 650 mg q.8 hours, lidocaine patch qHS, prn tramadol  - pain management referral placed, soonest appointment set was 4 4/3    Hypothyroid  - TSH slightly elevated with normal free T4  - stable, Continue home levothyroxine 50 mcg daily    Age-related osteoporosis without current pathological fracture   - per PCP notes, patient is supposed to be on Prolia.  Do not see on dispensed report    Advance care planning  - ACP discussion held with patient and her daughter on 03/10, per daughter patient had living will that states patient is a DNR    Debility   - Continue with PT/OT for gait training and strengthening and restoration of ADL's   - Encourage mobility, OOB in chair, and early ambulation as appropriate  - Fall precautions   - Monitor for bowel and bladder dysfunction  - Monitor for and prevent skin breakdown and pressure ulcers  - Continue DVT prophylaxis with       Anticipate disposition:  Patient's daughter states she will be discharging to an [facility]    IP [facility] RISK OF UNPLANNED READMISSION Model: MODERATE     Follow-up needed during SNF stay-    Follow-up needed after discharge from SNF: PCP     Future Appointments   Date Time Provider Department Center   4/3/2025 10:20 AM Sierra Gibson DO [facility] [place]   4/7/2025  2:45 PM Colten Oleary DPM [facility] POD [facility]   5/13/2025 12:00 PM Danielle Vaughan FNP [facility] IM [facility]   5/15/2025 11:00 AM Lucila Paez Au.D, CCC-A [facility] AUDIO [facility]   5/15/2025 11:30 AM Karlos Muniz MD [facility] ENT [facility]   6/2/2025  2:45  PM Kristian Shabazz MD [facility] [place]   7/21/2025  2:45 PM Kristian Shabazz MD [facility] [place]     I certify that SNF services are required to be given on an inpatient basis because Isabel Hernandez needs for skilled nursing care and/or skilled rehabilitation are required on a daily basis and such services can only practically be provided in a skilled nursing facility setting and are for an ongoing condition for which she received inpatient care in the hospital.     I spent 47 minutes reviewing patient records, examining, and counseling the patient/ patient's family with greater than 50% of the time spent in direct patient care and coordination.  Care coordination with SNF staff, .  Daughter updated at bedside.      Radha Spencer NP  Department of Hospital Medicine   [facility]     DOS:3/21/2025      Patient note was created using MModal Dictation.  Any errors in syntax or even information may not have been identified and edited on initial review prior to signing this note.

## 2025-03-21 NOTE — PLAN OF CARE
Patient daughter stated that she is interested in [facility]. She has a tour set up for 3/22/2025. NH placement referral faxed at [phone].

## 2025-03-21 NOTE — PT/OT/SLP PROGRESS
"Occupational Therapy   Treatment    Name: Isabel Hernandez  MRN: [id number]  Admit Date: 3/7/2025  Admitting Diagnosis:  Acute cystitis without hematuria    General Precautions: Standard, aspiration, fall   Orthopedic Precautions: N/A   Braces: N/A    Recommendations:     Discharge Recommendations:  home health OT  Level of Assistance Recommended at Discharge: 24 hours light assistance for ADL's and homemaking tasks  Discharge Equipment Recommendations: walker, rolling, wheelchair, bath bench  Barriers to discharge:  Decreased caregiver support    Assessment:     Isabel Hernandez is a 89 y.o. female with a medical diagnosis of Acute cystitis without hematuria .  She presents with performance deficits affecting function are weakness, impaired endurance, impaired self care skills, impaired functional mobility, gait instability, impaired cognition, impaired balance, pain, decreased safety awareness. Pt participated fairly during today's session. Pt continues to demonstrate deficits with self care skills, balance, functional mobility, UB strength and endurance. Pt will benefit from continued OT services to progress towards goals. Pt with limited participation on today 2/2 c/o nausea, dizziness and pain, nsg notified.     Rehab Potential is good    Activity tolerance:  Fair    Plan:     Patient to be seen 5 x/week to address the above listed problems via self-care/home management, therapeutic activities, therapeutic exercises    Plan of Care Expires: 03/29/25  Plan of Care Reviewed with: patient, daughter    Subjective   "I can't do anymore"  Communicated with: Missy prior to session.     Pain/Comfort:  Pain Rating 1: 5/10  Location - Side 1: Bilateral  Location - Orientation 1: generalized  Location 1: back  Pain Addressed 1: Pre-medicate for activity, Reposition, Cessation of Activity  Pain Rating Post-Intervention 1: 5/10    Patient's cultural, spiritual, Alevism conflicts given the current situation:  no    Objective: "     Patient found up in chair with PTA present in rehab gym.     Functional Mobility/Transfers:  Patient completed Sit <> Stand Transfer with contact guard assistance  with  rolling walker   Patient completed Wheelchair <> Bedside Chair Transfer using Step Transfer technique with contact guard assistance with rolling walker  Functional Mobility: Pt ambulated 14 ft in room with CGA and RW.     AMPAC 6 Click ADL: 18    OT Exercises: UE Ergometer 10 min with minimum resistance   Therex performed to improve overall endurance, ROM and UB strength required for functional transfers, ADL's and w/c propulsion.   Pt with frequent rest breaks throughout.     Treatment & Education:  Pt and daughter educated on:  - role of OT  - level of assistance  - DME recommendations   - adaptive equipment  -  safety while performing functional transfers and self care tasks,   - progress towards OT goals.     Patient left  in bedside chair  with call button in reach, nsg notified, and PCT present    GOALS:   Multidisciplinary Problems       Occupational Therapy Goals          Problem: Occupational Therapy    Goal Priority Disciplines Outcome Interventions   Occupational Therapy Goal     OT, PT/OT Progressing    Description: Goals to be met by: 3/29/25     Patient will increase functional independence with ADLs by performing:    Feeding with [place].  UE Dressing with [place].  LE Dressing with [place].  Grooming while standing with Modified [place].  Toileting from toilet with Modified [place] for hygiene and clothing management.   Bathing from  shower chair/bench with Supervision.  Sitting at edge of bed x10 minutes with [place].  Step transfer with Modified [place]  Toilet transfer to toilet with Modified [place].                         Time Tracking:     OT Date of Treatment: 03/21/25  OT Start Time: 1349    OT Stop Time: 1412  OT Total Time (min): 23 min    Billable Minutes:Therapeutic Activity  11  Therapeutic Exercise 12    3/21/2025

## 2025-03-22 PROCEDURE — 25000003 PHARM REV CODE 250: Mod: HCNC | Performed by: HOSPITALIST

## 2025-03-22 PROCEDURE — 25000003 PHARM REV CODE 250: Mod: HCNC | Performed by: NURSE PRACTITIONER

## 2025-03-22 PROCEDURE — 25000003 PHARM REV CODE 250: Mod: HCNC | Performed by: FAMILY MEDICINE

## 2025-03-22 PROCEDURE — 97535 SELF CARE MNGMENT TRAINING: CPT | Mod: HCNC,CO

## 2025-03-22 PROCEDURE — 11000004 HC SNF PRIVATE: Mod: HCNC

## 2025-03-22 RX ADMIN — ONDANSETRON 4 MG: 4 TABLET, ORALLY DISINTEGRATING ORAL at 09:03

## 2025-03-22 RX ADMIN — CALCIUM CARBONATE (ANTACID) CHEW TAB 500 MG 500 MG: 500 CHEW TAB at 03:03

## 2025-03-22 RX ADMIN — LEVOTHYROXINE SODIUM 50 MCG: 0.05 TABLET ORAL at 05:03

## 2025-03-22 RX ADMIN — ACETAMINOPHEN 650 MG: 325 TABLET ORAL at 02:03

## 2025-03-22 RX ADMIN — ACETAMINOPHEN 650 MG: 325 TABLET ORAL at 08:03

## 2025-03-22 RX ADMIN — ALUMINUM HYDROXIDE, MAGNESIUM HYDROXIDE, AND DIMETHICONE 30 ML: 400; 400; 40 SUSPENSION ORAL at 10:03

## 2025-03-22 NOTE — PLAN OF CARE
Problem: Adult Inpatient Plan of Care  Goal: Plan of Care Review  Outcome: Progressing  Goal: Patient-Specific Goal (Individualized)  Outcome: Progressing  Goal: Absence of Hospital-Acquired Illness or Injury  Outcome: Progressing  Goal: Optimal Comfort and Wellbeing  Outcome: Progressing  Goal: Readiness for Transition of Care  Outcome: Progressing     Problem: Fall Injury Risk  Goal: Absence of Fall and Fall-Related Injury  Outcome: Progressing  Intervention: Identify and Manage Contributors  Flowsheets (Taken 3/22/2025 1258)  Self-Care Promotion:   BADL personal objects within reach   BADL personal routines maintained  Medication Review/Management:   medications reviewed   high-risk medications identified  Intervention: Promote Injury-Free Environment  Flowsheets (Taken 3/22/2025 8582)  Safety Promotion/Fall Prevention:   assistive device/personal item within reach   side rails raised x 2   nonskid shoes/socks when out of bed   lighting adjusted   instructed to call staff for mobility   Fall Risk reviewed with patient/family   Fall Risk signage in place     Problem: Skin Injury Risk Increased  Goal: Skin Health and Integrity  Outcome: Progressing     Problem: Malnutrition  Goal: Improved Nutritional Intake  Outcome: Progressing      PROCEDURE:   Ultrasound guided paracentesis

 

CLINICAL INDICATION:   Ascites 

 

TECHNIQUE:   The risks benefits and alternatives of the procedure were explained to the patient.  In
formed written consent was obtained.  The patient understood the risks benefits and alternatives and
 wished to proceed with the procedure.   A time out was performed.  The overlying skin of the right 
lower quadrant of the abdomen was prepped and draped in the usual sterile fashion. Approximately 10 
cc of lidocaine was injected locally for pain control.  Utilizing ultrasound guidance, an 6-Dominican p
aracentesis catheter was placed into the  peritoneal cavity without difficulty.  Fluid was drained i
nto vacuum bottles.  After completion of draining fluid, the catheter was removed and direct pressur
e was applied to the puncture site.  A compression bandage was then placed at the puncture site.  Th
e patient tolerated the procedure well without complication.    The fluid was not sent to the lab fo
r further analysis.

 

COMPARISON:   01/25/2017 

 

FINDINGS:

 

Surgeon:  Paulina ALVA.

Preprocedural diagnosis:  Ascites.

Postprocedural diagnosis:  Ascites.

Samples removed:  5500 cc of clear yellow fluid was obtained.

Complications:  None.

Estimated blood loss:  0 cc.

Condition:  Stable and unchanged.

 

IMPRESSION:

 

1.  Successful ultrasound-guided paracentesis.

 

RPTAT: QQ

_____________________________________________ 

.Cleveland Gallardo MD, MD           Date    Time 

Electronically viewed and signed by .Cleveland Gallardo MD, MD on 01/28/2017 12:28 

 

D:  01/28/2017 12:28  T:  01/28/2017 12:28

.JOYCE/

## 2025-03-22 NOTE — PT/OT/SLP PROGRESS
Occupational Therapy   Treatment    Name: Isabel Hernandez  MRN: [id number]  Admit Date: 3/7/2025  Admitting Diagnosis:  Acute cystitis without hematuria    General Precautions: Standard, aspiration, fall   Orthopedic Precautions: N/A   Braces: N/A    Recommendations:     Discharge Recommendations:  home with home health  Level of Assistance Recommended at Discharge: 24 hours light assistance for ADL's and homemaking tasks  Discharge Equipment Recommendations: walker, rolling, wheelchair, bath bench  Barriers to discharge:  Decreased caregiver support    Assessment:     Isabel Hernandez is a 89 y.o. female with a medical diagnosis of Acute cystitis without hematuria.  She presents with performance deficits affecting function are weakness, impaired endurance, impaired functional mobility, gait instability, impaired balance, impaired cognition, decreased safety awareness.     Rehab Potential is good    Activity tolerance:  Good    Plan:     Patient to be seen 5 x/week to address the above listed problems via self-care/home management, therapeutic activities, therapeutic exercises    Plan of Care Expires: 03/29/25  Plan of Care Reviewed with: patient, daughter    Subjective   Pt agreeable to session.      Pain/Comfort:  Pain Rating 1:  (pt reports continuous abdominal cramping and nausea)    Patient's cultural, spiritual, Yazdanism conflicts given the current situation:  no    Objective:     Patient found up in chair with  (no active lines) upon OT entry to room.    Functional Mobility/Transfers:  Patient completed Sit <> Stand Transfer with contact guard assistance  with  rolling walker   Patient completed Toilet Transfer Stand Pivot technique with contact guard assistance with  rolling walker  Chair T/F with CGA using RW    Activities of Daily Living:  Upper Body Dressing: pt donned shirt with contact guard assistance from seated position  Lower Body Dressing: pt donned brief and pants with minimum assistance   Toileting:  CGA for clothing mgt and hygiene mgt from seated position     [facility] 6 Click ADL: 18    Treatment & Education:  Self care and functional mobility    Patient left up in chair with all lines intact and call button in reach    GOALS:   Multidisciplinary Problems       Occupational Therapy Goals          Problem: Occupational Therapy    Goal Priority Disciplines Outcome Interventions   Occupational Therapy Goal     OT, PT/OT Progressing    Description: Goals to be met by: 3/29/25     Patient will increase functional independence with ADLs by performing:    Feeding with [place].  UE Dressing with [place].  LE Dressing with [place].  Grooming while standing with Modified [place].  Toileting from toilet with Modified [place] for hygiene and clothing management.   Bathing from  shower chair/bench with Supervision.  Sitting at edge of bed x10 minutes with [place].  Step transfer with Modified [place]  Toilet transfer to toilet with Modified [place].                         Time Tracking:     OT Date of Treatment: 03/22/25  OT Start Time: 1024    OT Stop Time: 1050  OT Total Time (min): 26 min    Billable Minutes:Self Care/Home Management 26    3/22/2025

## 2025-03-22 NOTE — TREATMENT PLAN
Family Training     Patient Name:  Isabel Hernandez   MRN:  [id number]  Admit Date: 3/7/2025            Rehab member called to schedule family training this date. Pt's family/caregiver agreeable to attend training Thurs 3/27@1pm with daughter.  3/22/2025

## 2025-03-23 LAB
BACTERIA BLD CULT: NORMAL
BACTERIA BLD CULT: NORMAL

## 2025-03-23 PROCEDURE — 25000003 PHARM REV CODE 250: Mod: HCNC | Performed by: NURSE PRACTITIONER

## 2025-03-23 PROCEDURE — 11000004 HC SNF PRIVATE: Mod: HCNC

## 2025-03-23 PROCEDURE — 25000003 PHARM REV CODE 250: Mod: HCNC | Performed by: HOSPITALIST

## 2025-03-23 RX ADMIN — ALUMINUM HYDROXIDE, MAGNESIUM HYDROXIDE, AND DIMETHICONE 30 ML: 400; 400; 40 SUSPENSION ORAL at 09:03

## 2025-03-23 RX ADMIN — LEVOTHYROXINE SODIUM 50 MCG: 0.05 TABLET ORAL at 06:03

## 2025-03-23 RX ADMIN — CALCIUM CARBONATE (ANTACID) CHEW TAB 500 MG 500 MG: 500 CHEW TAB at 06:03

## 2025-03-23 RX ADMIN — ACETAMINOPHEN 650 MG: 325 TABLET ORAL at 09:03

## 2025-03-24 LAB
ABSOLUTE EOSINOPHIL (OHS): 0.11 K/UL
ABSOLUTE MONOCYTE (OHS): 0.55 K/UL (ref 0.3–1)
ABSOLUTE NEUTROPHIL COUNT (OHS): 5.23 K/UL (ref 1.8–7.7)
ANION GAP (OHS): 10 MMOL/L (ref 8–16)
BASOPHILS # BLD AUTO: 0.05 K/UL
BASOPHILS NFR BLD AUTO: 0.6 %
BUN SERPL-MCNC: 21 MG/DL (ref 8–23)
CALCIUM SERPL-MCNC: 8.7 MG/DL (ref 8.7–10.5)
CHLORIDE SERPL-SCNC: 103 MMOL/L (ref 95–110)
CO2 SERPL-SCNC: 21 MMOL/L (ref 23–29)
CREAT SERPL-MCNC: 0.8 MG/DL (ref 0.5–1.4)
ERYTHROCYTE [DISTWIDTH] IN BLOOD BY AUTOMATED COUNT: 13.1 % (ref 11.5–14.5)
GFR SERPLBLD CREATININE-BSD FMLA CKD-EPI: >60 ML/MIN/1.73/M2
GLUCOSE SERPL-MCNC: 110 MG/DL (ref 70–110)
HCT VFR BLD AUTO: 41.3 % (ref 37–48.5)
HGB BLD-MCNC: 13 GM/DL (ref 12–16)
IMM GRANULOCYTES # BLD AUTO: 0.05 K/UL (ref 0–0.04)
IMM GRANULOCYTES NFR BLD AUTO: 0.6 % (ref 0–0.5)
LYMPHOCYTES # BLD AUTO: 2.81 K/UL (ref 1–4.8)
MAGNESIUM SERPL-MCNC: 2.5 MG/DL (ref 1.6–2.6)
MCH RBC QN AUTO: 30.4 PG (ref 27–50)
MCHC RBC AUTO-ENTMCNC: 31.5 G/DL (ref 32–36)
MCV RBC AUTO: 97 FL (ref 82–98)
NUCLEATED RBC (/100WBC) (OHS): 0 /100 WBC
PHOSPHATE SERPL-MCNC: 3.8 MG/DL (ref 2.7–4.5)
PLATELET # BLD AUTO: 332 K/UL (ref 150–450)
PMV BLD AUTO: 10.4 FL (ref 9.2–12.9)
POTASSIUM SERPL-SCNC: 4.5 MMOL/L (ref 3.5–5.1)
RBC # BLD AUTO: 4.28 M/UL (ref 4–5.4)
RELATIVE EOSINOPHIL (OHS): 1.3 %
RELATIVE LYMPHOCYTE (OHS): 31.9 % (ref 18–48)
RELATIVE MONOCYTE (OHS): 6.3 % (ref 4–15)
RELATIVE NEUTROPHIL (OHS): 59.3 % (ref 38–73)
SODIUM SERPL-SCNC: 134 MMOL/L (ref 136–145)
WBC # BLD AUTO: 8.8 K/UL (ref 3.9–12.7)

## 2025-03-24 PROCEDURE — 97535 SELF CARE MNGMENT TRAINING: CPT | Mod: HCNC

## 2025-03-24 PROCEDURE — 97110 THERAPEUTIC EXERCISES: CPT | Mod: HCNC

## 2025-03-24 PROCEDURE — 84100 ASSAY OF PHOSPHORUS: CPT | Mod: HCNC | Performed by: HOSPITALIST

## 2025-03-24 PROCEDURE — 80048 BASIC METABOLIC PNL TOTAL CA: CPT | Mod: HCNC | Performed by: HOSPITALIST

## 2025-03-24 PROCEDURE — 25000003 PHARM REV CODE 250: Mod: HCNC | Performed by: NURSE PRACTITIONER

## 2025-03-24 PROCEDURE — 25000003 PHARM REV CODE 250: Mod: HCNC | Performed by: HOSPITALIST

## 2025-03-24 PROCEDURE — 97530 THERAPEUTIC ACTIVITIES: CPT | Mod: HCNC

## 2025-03-24 PROCEDURE — 85025 COMPLETE CBC W/AUTO DIFF WBC: CPT | Mod: HCNC | Performed by: HOSPITALIST

## 2025-03-24 PROCEDURE — 25000003 PHARM REV CODE 250: Mod: HCNC | Performed by: FAMILY MEDICINE

## 2025-03-24 PROCEDURE — 83735 ASSAY OF MAGNESIUM: CPT | Mod: HCNC | Performed by: HOSPITALIST

## 2025-03-24 PROCEDURE — 11000004 HC SNF PRIVATE: Mod: HCNC

## 2025-03-24 PROCEDURE — 36415 COLL VENOUS BLD VENIPUNCTURE: CPT | Mod: HCNC | Performed by: HOSPITALIST

## 2025-03-24 PROCEDURE — 97116 GAIT TRAINING THERAPY: CPT | Mod: HCNC

## 2025-03-24 RX ADMIN — ALUMINUM HYDROXIDE, MAGNESIUM HYDROXIDE, AND DIMETHICONE 30 ML: 400; 400; 40 SUSPENSION ORAL at 10:03

## 2025-03-24 RX ADMIN — ONDANSETRON 4 MG: 4 TABLET, ORALLY DISINTEGRATING ORAL at 06:03

## 2025-03-24 RX ADMIN — CALCIUM CARBONATE (ANTACID) CHEW TAB 500 MG 500 MG: 500 CHEW TAB at 08:03

## 2025-03-24 RX ADMIN — LEVOTHYROXINE SODIUM 50 MCG: 0.05 TABLET ORAL at 07:03

## 2025-03-24 RX ADMIN — ACETAMINOPHEN 650 MG: 325 TABLET ORAL at 08:03

## 2025-03-24 NOTE — PROGRESS NOTES
[facility] - Skilled Nursing  Adult Nutrition  Progress Note    SUMMARY   Recommendations  Continue regular diet, assist with set up,RD following  Goals: PO to meet 85% of EEN/EPN by next RD follow up  Nutrition Goal Status: progressing towards goal  Communication of RD Recs: other (comment) (POC)    Nutrition Discharge Planning    Nutrition Discharge Planning: General healthy diet    Assessment and Plan  Endocrine  Mild malnutrition  Malnutrition Type:  Context: acute illness or injury  Level: mild     Related to (etiology):   Inadequate oral intake, nausea     Signs and Symptoms (as evidenced by):   Decreased appetite      Malnutrition Characteristic Summary:  Energy Intake (Malnutrition): less than 75% for greater than 7 days  Subcutaneous Fat (Malnutrition): mild depletion  Muscle Mass (Malnutrition): moderate depletion        Interventions/Recommendations (treatment strategy):  Continue regular diet, honor food preferences, assist with set up,RD following     Nutrition Diagnosis Status:   Continues    Malnutrition Assessment  3/10  Malnutrition Context: acute illness or injury  Malnutrition Level: mild  Skin (Micronutrient): pallor, bruised, other (see comments), thinned (skin tears)  Hair/Scalp (Micronutrient): dry  Eyes (Micronutrient): conjunctiva dull  Teeth (Micronutrient): none  Neck/Chest (Micronutrient): muscle wasting  Musculoskeletal/Lower Extremities: muscle wasting, subcutaneous fat loss       Energy Intake (Malnutrition): less than 75% for greater than 7 days  Subcutaneous Fat (Malnutrition): mild depletion  Muscle Mass (Malnutrition): moderate depletion   Orbital Region (Subcutaneous Fat Loss): mild depletion (mild buccal)  Upper Arm Region (Subcutaneous Fat Loss): mild depletion  Thoracic and Lumbar Region: well nourished   Sikhism Region (Muscle Loss): mild depletion  Clavicle and Acromion Bone Region (Muscle Loss): moderate depletion  Dorsal Hand (Muscle Loss): moderate depletion  Patellar  "Region (Muscle Loss): mild depletion  Anterior Thigh Region (Muscle Loss): moderate depletion  Posterior Calf Region (Muscle Loss): mild depletion                 Reason for Assessment    Reason For Assessment: RD follow-up  Diagnosis:  (Acute Cystitiis, N/V)  General Information Comments: Patient to dc to nursing home, Push water as beverage, Question weights of 126, pt weight is stable since admit at 117-119 #    Nutrition/Diet History    Nutrition Intake History: Patient has never been a breakfast eater, she eats about 11 am, she is eating breakfast here, today she ate well, one morning she was too nauseated to eat. She will not drink ONS that daughter purchases for her, so will hold off on that for now.  Food Preferences: She likes ghramn crackers and ice cream. milkshakes but not ONS  Spiritual, Cultural Beliefs, Sikhism Practices, Values that Affect Care: no  Food Allergies: NKFA  Factors Affecting Nutritional Intake: nausea/vomiting    Anthropometrics    Height: 4' 11" (149.9 cm)  Height (inches): 59 in  Weight: 54.1 kg (119 lb 4.3 oz)  Weight (lb): 119.27 lb  Weight Method: Standard Scale  Ideal Body Weight (IBW), Female: 95 lb  % Ideal Body Weight, Female (lb): 125.78 %  BMI (Calculated): 21.8  BMI Grade: 25 - 29.9 - overweight  Usual Body Weight (UBW), k.7 kg  Weight Change Amount:  (patient weight has been stable all year until this admission)  % Usual Body Weight: 95.79  % Weight Change From Usual Weight: -4.41 %       Lab/Procedures/Meds    Pertinent Labs Reviewed: reviewed  Pertinent Labs Comments: Na 134,  Pertinent Medications Reviewed: reviewed  Pertinent Medications Comments: Levothyroxine, Abx, Lovenox    Estimated/Assessed Needs    Weight Used For Calorie Calculations: 54.2 kg (119 lb 7.8 oz)  Energy Calorie Requirements (kcal): 1235  Energy Need Method: [place] (x 1.4(PAL))  Protein Requirements: 65-70  Weight Used For Protein Calculations: 54.2 kg (119 lb 7.8 oz) " (1.2-1.3g/kg)  Fluid Requirements (mL): 1235 or per MD  Estimated Fluid Requirement Method: RDA Method  RDA Method (mL): 1235  CHO Requirement: -      Nutrition Prescription Ordered    Current Diet Order: Regular  Nutrition Order Comments: PO 75%  Oral Nutrition Supplement: refuses    Evaluation of Received Nutrient/Fluid Intake    I/O: no data  Energy Calories Required: meeting needs  Protein Required: not meeting needs  Fluid Required: other (see comments) (As per MD)  Comments: LBM 3/24  Tolerance: tolerating  % Intake of Estimated Energy Needs: 75 - 100 %  % Meal Intake: 75 - 100 %    Nutrition Risk    Level of Risk/Frequency of Follow-up: low (one time per week)     Monitor and Evaluation    Monitor and Evaluation: Food and beverage intake, Weight, Electrolyte and renal panel, Gastrointestinal profile     Nutrition Follow-Up    RD Follow-up?: Yes

## 2025-03-24 NOTE — PLAN OF CARE
SS spoke with Ms. Díaza at [facility] regarding NH placement. She stated that the is approved and to update them when the family is ready.

## 2025-03-24 NOTE — PLAN OF CARE
SS called and left a message to [place] at [facility] regarding NH placement.

## 2025-03-24 NOTE — PT/OT/SLP PROGRESS
Occupational Therapy   Treatment    Name: Isabel Hernandez  MRN: [id number]  Admit Date: 3/7/2025  Admitting Diagnosis:  Acute cystitis without hematuria    General Precautions: Standard, aspiration, fall   Orthopedic Precautions: N/A   Braces: N/A    Recommendations:     Discharge Recommendations:  home with home health  Level of Assistance Recommended at Discharge: 24 hours light assistance for ADL's and homemaking tasks  Discharge Equipment Recommendations: walker, rolling, wheelchair, bath bench  Barriers to discharge:  Decreased caregiver support    Assessment:     Isabel Hernandez is a 89 y.o. female with a medical diagnosis of Acute cystitis without hematuria .  She presents with performance deficits affecting function are weakness, impaired endurance, impaired functional mobility, gait instability, impaired balance, impaired cognition, decreased safety awareness.   Pt tolerated Tx without incident and is making progress but continues to require assist to perform self care tasks, functional mobility and functional transfers .  She would continue to benefit from OT intervention to further her functional (I)ce and safety.     Rehab Potential is good    Activity tolerance:  Good    Plan:     Patient to be seen 5 x/week to address the above listed problems via self-care/home management, therapeutic activities, therapeutic exercises    Plan of Care Expires: 03/29/25  Plan of Care Reviewed with: patient, daughter    Subjective     Communicated with: nurse prior to session. .    Pain/Comfort:  Pain Rating 1: 0/10  Pain Rating Post-Intervention 1: 0/10    Patient's cultural, spiritual, Yazidi conflicts given the current situation:  no    Objective:     Patient found up in bedside chair with  (no active lines) upon OT entry to room.    Bed Mobility:    Pt seated in bedside chair at onset of therapy session.      Functional Mobility/Transfers:  Patient completed Sit <> Stand Transfer with contact guard assistance  with   rolling walker   Patient completed Bed <> Chair Transfer using Step Transfer technique with contact guard assistance with rolling walker    Functional Standing: Pt worked on functional standing activity consisting of standing with RW while reaching in all planes and crossing of midline to facilitate (B) wt shifting and stability in standing in prep for performance of self care tasks and functional ADLS in standing.  Pt tolerated up to 7 Min. in standing with SBA and RW to steady.     Pt ambulated with RW from bedside chair to W/C positioned 6 ft away in her room using RW with CGA provided to steady. No loss of balance observed but V/Cs required for safety.      Activities of Daily Living:  Lower Body Dressing: contact guard assistance Pt doffing/donning pants and socks with CGA when standing with RW as she managed pants over her bottom.    [facility] 6 Click ADL: 18    OT Exercises: Pt performed UBE exercise for 10 minutes with  Mod resistance.  UE exercises performed to increase functional endurance and strength in order increase independence when performing self care tasks, functional ambulation, W/C propulsion, and functional standing activities .     Treatment & Education:  Pt edu on POC, safety when performing self care tasks,  safety when performing functional transfers and mobility.  - White board updated  - Self care tasks completed-- as noted above      Patient left up in chair with  PT to begin treatment.    GOALS:   Multidisciplinary Problems       Occupational Therapy Goals          Problem: Occupational Therapy    Goal Priority Disciplines Outcome Interventions   Occupational Therapy Goal     OT, PT/OT Progressing    Description: Goals to be met by: 3/29/25     Patient will increase functional independence with ADLs by performing:    Feeding with [place]. -Ongoing  UE Dressing with [place].--Ongoing  LE Dressing with [place].--Ongoing  Grooming while standing with Modified  [place].--Ongoing  Toileting from toilet with Modified Indep. for hygiene and clothing management.--Ongoing   Bathing from shower chair/bench with Supervision.--Ongoing  Sitting at edge of bed x10 minutes with [place].  Step transfer with Modified [place]--Ongoing  Toilet transfer to toilet with Modified [place].---Ongoing                         Time Tracking:     OT Date of Treatment: 03/24/25  OT Start Time: 1305    OT Stop Time: 1346  OT Total Time (min): 41 min    Billable Minutes:Self Care/Home Management 15  Therapeutic Activity 16  Therapeutic Exercise 10    3/24/2025

## 2025-03-24 NOTE — PROGRESS NOTES
[facility]                                  Skilled Nursing Facility                   Progress Note     Admit Date: 3/7/2025  MURPHY 2025  [id number]  Principal Problem:  Acute cystitis without hematuria   HPI obtained from patient interview and chart review     Chief Complaint:Re-evaluation of medical treatment and therapy status, dizziness, back pain and Lab review    HPI:   Isabel Hernandez is an 89 year old female with PMHx of thyroid disease, stage II CKD, history of compression fractures who presents to SNF following hospitalization for acute cystitis.  Admission to SNF for secondary weakness and debility.    Interval history:  24 hr vital sign ranges reviewed and listed below.  Patient stated today that both back pain and dizziness are improved today. All of today's labs reviewed and stable, Na trending down to 134 - continue to trend.  Patient denies shortness of breath, abdominal discomfort, nausea, or vomiting.  Patient reports an ok appetite.  Patient denies dysuria.  Patient reports having regular bowel movements (LBM 3/24).  Patient progressing with PT/OT. Continuing to follow and treat all acute and chronic conditions.    Past Medical History: Patient has a past medical history of Arthritis, Cataract, Hypothyroidism, Mild malnutrition (3/10/2025), and Osteoporosis (2015).    Past Surgical History: Patient has a past surgical history that includes Thyroidectomy, partial; Hysterectomy; Cholecystectomy; Tonsillectomy; Adenoidectomy; Breast surgery (Bilateral);  section; Vitrectomy by pars plana approach (Left, 2018); and Appendectomy.    Social History: Patient reports that she has never smoked. She has never used smokeless tobacco. She reports that she does not drink alcohol and does not use drugs.    Family History: family history includes Cancer in her brother, father, and sister; Cataracts in her  "father; Glaucoma in her father; Hypertension in her mother; Kidney disease in her mother; Miscarriages / Stillbirths in her mother; No Known Problems in her brother and daughter; Thyroid disease in her mother.    Allergies: Patient is allergic to codeine, fosamax [alendronate], and iron analogues.    ROS  Constitutional: Negative for fever   Respiratory: Negative for cough, shortness of breath   Cardiovascular: Negative for chest pain, palpitations  Gastrointestinal: Negative for abdominal pain, constipation, diarrhea, vomiting. + intermittent nausea  Genitourinary: Negative for dysuria, frequency   Musculoskeletal:  + generalized weakness.  +back pain improving  Neurological: Negative for  headaches.  + intermittent dizziness  Psychiatric/Behavioral: Negative for depression. The patient is nervous/anxious.      24 hour Vital Sign Range   Temp:  [97.4 °F (36.3 °C)-98 °F (36.7 °C)]   Pulse:  [82-85]   Resp:  [18]   BP: (100-126)/(55-58)   SpO2:  [94 %-98 %]     Current BMI: Body mass index is 21.81 kg/m².    PEx  Constitutional: Patient appears debilitated.  mild distress noted  Cardiovascular: Normal rate, regular rhythm and normal heart sounds.    Pulmonary/Chest: Effort normal and breath sounds are clear  Abdominal: Soft. Bowel sounds are normal.   Musculoskeletal: Normal range of motion.   Neurological: Alert and oriented to person, place.  Disoriented to time.  Impaired higher level thinking  Psychiatric: confused, anxious   Skin: Skin is warm and dry.     No results for input(s): "GLUCOSE", "NA", "K", "CL", "CO2", "BUN", "CREATININE", "CALCIUM", "MG" in the last 24 hours.        Recent Labs   Lab 03/24/25  0827   WBC 8.80   RBC 4.28   HGB 13.0   HCT 41.3      MCV 97   MCH 30.4   MCHC 31.5*           No results for input(s): "POCTGLUCOSE" in the last 168 hours.       Assessment and Plan:    Acute cystitis without hematuria  - Presenting with nausea/ generalized weakness/ lightheadedness and suprapubic " "discomfort and confusion   - CT AP without acute findings   - UA infectious, Urine culture with mixed growth; no predominant organism  - treated with IV ceftriaxone empirically  - completed cefpodoxime 100 mg BID, ended 3/13    Nausea and vomiting  - Suspected related to UTI or possibly viral illness   - CT AP without acute findings  - Flu/Covid negative   - continue Zofran PRN     Dizziness  - possible vertigo  - CTH negative  - Per [facility], "Given concern for posterior circulation CVA that could be the etiology of her dizziness we did offer MR brain, but she declined." Per daughter, MRI was ordered and scheduled for day of discharge and was not able to be completed prior to transportation showing up for them to transferred to SNF, will have rescheduled  - possible ENT follow-up  - persisting, meclizine 12.5 mg TID   - MRI completed, no acute findings   - ENT referral placed, soonest appointment was set for 5/15    Dementia without behavioral disturbance, psychotic disturbance, mood disturbance, or anxiety, unspecified dementia severity, unspecified dementia type   - per past PCP notes patient has opted out of medication for dementia  Delirium precautions:  - Avoid antihistamines, anticholinergics, hypnotics, and minimize opiates while controlling for pain as these medications may exacerbate delirium. Cues for day/night will assist with keeping patient calm and oriented - during daytime, please keep adequate light in room (open windows, lights on) and please keep room dim at night-time to encourage normal sleep-wake cycles. Continuing to have nursing and family reorient the patient and encourage family to visit  - per daughter patient has not yet at her mental baseline patient is typically more oriented.      CKD Stage 3   - stable, continue monitor twice weekly BMPs, avoid nephrotoxic agents, renally dose medications as appropriate    Back pain  - XR (3/15) Five non rib-bearing lumbar type vertebral bodies are " present. There is widening of the intervertebral joint space between L4 and L5, unchanged when compared to 03/05/2025. Multiple compression deformities in the lower thoracic spine are unchanged when compared to prior imaging. Degenerative changes are seen throughout the spine, as before. Facet arthropathy of the lower lumbar spine. The abdominal aorta is heavily calcified.   - continue acetaminophen 650 mg q.8 hours, lidocaine patch qHS, prn tramadol  - pain management referral placed, soonest appointment set was 4 4/3    Hypothyroid  - TSH slightly elevated with normal free T4  - stable, Continue home levothyroxine 50 mcg daily    Age-related osteoporosis without current pathological fracture   - per PCP notes, patient is supposed to be on Prolia.  Do not see on dispensed report    Advance care planning  - ACP discussion held with patient and her daughter on 03/10, per daughter patient had living will that states patient is a DNR    Debility   - Continue with PT/OT for gait training and strengthening and restoration of ADL's   - Encourage mobility, OOB in chair, and early ambulation as appropriate  - Fall precautions   - Monitor for bowel and bladder dysfunction  - Monitor for and prevent skin breakdown and pressure ulcers  - Continue DVT prophylaxis with       Anticipate disposition:  Patient's daughter states she will be discharging to an [facility]    IP [facility] RISK OF UNPLANNED READMISSION Model: MODERATE     Follow-up needed during SNF stay-    Follow-up needed after discharge from SNF: PCP     Future Appointments   Date Time Provider Department Center   4/3/2025 10:20 AM Sierra Gibson,  [facility] [place]   5/13/2025 12:00 PM Danielle Vaughan FNP [facility] IM [address] [facility]   5/15/2025 11:00 AM Lucila Paez Au.D, CCC-A [facility] AUDIO [address]   5/15/2025 11:30 AM Karlos Muniz MD [facility] ENT [address]   5/28/2025  2:15 PM Colten Oleary DPM [facility] POD [address]   6/2/2025  2:45 PM Kristian Shabazz  MD [place]   7/21/2025  2:45 PM Kristian Shabazz MD [facility] PRICRE [place]     I certify that SNF services are required to be given on an inpatient basis because Isabel Hernandez needs for skilled nursing care and/or skilled rehabilitation are required on a daily basis and such services can only practically be provided in a skilled nursing facility setting and are for an ongoing condition for which she received inpatient care in the hospital.     I spent 47 minutes reviewing patient records, examining, and counseling the patient/ patient's family with greater than 50% of the time spent in direct patient care and coordination. Daughter updated at bedside.      Arpita Hubbard NP  Department of Hospital Medicine   [facility]- Skilled [facility]     DOS:3/24/2025      Patient note was created using MModal Dictation.  Any errors in syntax or even information may not have been identified and edited on initial review prior to signing this note.

## 2025-03-24 NOTE — PLAN OF CARE
SS spoke to Ms Lawrence at [facility] regarding placement. She requested to refax updated referral. Referral was faxed.

## 2025-03-24 NOTE — PLAN OF CARE
SS spoke with Ms. Yanelis at [facility] regarding NH placement. She mentioned that she hasn't heard anything from the central office yet and advised to call back tomorrow morning.

## 2025-03-24 NOTE — PLAN OF CARE
Problem: Occupational Therapy  Goal: Occupational Therapy Goal  Description: Goals to be met by: 3/29/25     Patient will increase functional independence with ADLs by performing:    Feeding with [place]. -Ongoing  UE Dressing with [place].--Ongoing  LE Dressing with [place].--Ongoing  Grooming while standing with Modified [place].--Ongoing  Toileting from toilet with Modified [place]. for hygiene and clothing management.--Ongoing   Bathing from shower chair/bench with Supervision.--Ongoing  Sitting at edge of bed x10 minutes with [place].  Step transfer with Modified [place]--Ongoing  Toilet transfer to toilet with Modified [place].---Ongoing    Outcome: Progressing

## 2025-03-24 NOTE — PLAN OF CARE
SS called and left a message to Radha at [facility] regarding placement.

## 2025-03-24 NOTE — PT/OT/SLP PROGRESS
Physical Therapy Treatment    Patient Name:  Isabel Hernadnez   MRN:  [id number]  Admit Date: 3/7/2025  Admitting Diagnosis: Acute cystitis without hematuria  Recent Surgeries: N/A    General Precautions: Standard, fall  Orthopedic Precautions: N/A  Braces: N/A    Recommendations:     Discharge Recommendations: home health PT  Level of Assistance Recommended at Discharge: 24 hours light assistance  Discharge Equipment Recommendations: walker, rolling, wheelchair, bath bench  Barriers to discharge: Inaccessible home environment    Assessment:     Isabel Hernandez is a 89 y.o. female admitted with a medical diagnosis of Acute cystitis without hematuria . Pt with improved  Gt distance with CGA and did not complain of any dizziness today.      Performance deficits affecting function: weakness, impaired endurance, impaired self care skills, impaired functional mobility, gait instability, impaired balance, decreased upper extremity function, decreased lower extremity function, impaired cardiopulmonary response to activity.    Rehab Potential is good    Activity Tolerance: Good    Plan:     Patient to be seen 5 x/week to address the above listed problems via gait training, therapeutic activities, therapeutic exercises, neuromuscular re-education, wheelchair management/training    Plan of Care Expires: 04/07/25  Plan of Care Reviewed with: patient    Subjective     Pt agreeable to work with PT.     Pain/Comfort:  Pain Rating 1: 0/10  Pain Rating Post-Intervention 1: 0/10    Patient's cultural, spiritual, Restorationism conflicts given the current situation:  no    Objective:     Communicated with pt prior to session.  Patient found up in chair with   upon PT entry to room.     Therapeutic Activities and Exercises: LBEx 10mins to improve MMT and endurance.    Functional Mobility:  Transfers:     Sit to Stand:  contact guard assistance with rolling walker  Gait: 131ft +66ft with RW and CGA for safety  Stairs:  Pt ascended/descended 4  "stair(s) and 4" curb step with Rolling Walker with bilateral handrails with Contact Guard Assistance.     AM-PAC 6 CLICK MOBILITY  18    Patient left up in chair with call button in reach.    GOALS:   Multidisciplinary Problems       Physical Therapy Goals          Problem: Physical Therapy    Goal Priority Disciplines Outcome Interventions   Physical Therapy Goal     PT, PT/OT Progressing    Description: Goals to be met by: 25     Patient will increase functional independence with mobility by performin. Supine to sit with Supervision  2. Sit to supine with Supervision  3. Rolling to Left and Right with Supervision  4. Sit to stand transfer with Supervision  5. Bed to chair transfer with Supervision using Rolling Walker  6. Gait  x 150 feet with Stand-by Assistance using Rolling Walker  7. Wheelchair propulsion x 50 feet with Supervision using bilateral upper extremities  8. Ascend/descend 4 stairs with bilateral Handrail and Contact Guard Assistance using No Assistive Device.  9. Ascend/Descend 4 inch curb step with Contact Guard Assistance using Rolling Walker                         Time Tracking:     PT Received On: 25  PT Start Time: 1358  PT Stop Time: 1426  PT Total Time (min): 28 min    Billable Minutes: Gait Training 18 and Therapeutic Exercise 10    Treatment Type: Treatment  PT/PTA: PT     Number of PTA visits since last PT visit: 4     2025  "

## 2025-03-25 PROCEDURE — 11000004 HC SNF PRIVATE: Mod: HCNC

## 2025-03-25 PROCEDURE — 25000003 PHARM REV CODE 250: Mod: HCNC | Performed by: FAMILY MEDICINE

## 2025-03-25 PROCEDURE — 97530 THERAPEUTIC ACTIVITIES: CPT | Mod: HCNC,CQ

## 2025-03-25 PROCEDURE — 25000003 PHARM REV CODE 250: Mod: HCNC | Performed by: NURSE PRACTITIONER

## 2025-03-25 PROCEDURE — 25000003 PHARM REV CODE 250: Mod: HCNC | Performed by: HOSPITALIST

## 2025-03-25 PROCEDURE — 97110 THERAPEUTIC EXERCISES: CPT | Mod: HCNC,CQ

## 2025-03-25 PROCEDURE — 97535 SELF CARE MNGMENT TRAINING: CPT | Mod: HCNC,CO

## 2025-03-25 PROCEDURE — 97110 THERAPEUTIC EXERCISES: CPT | Mod: HCNC,CO

## 2025-03-25 PROCEDURE — 97530 THERAPEUTIC ACTIVITIES: CPT | Mod: HCNC,CO

## 2025-03-25 PROCEDURE — 97116 GAIT TRAINING THERAPY: CPT | Mod: HCNC,CQ

## 2025-03-25 RX ADMIN — LEVOTHYROXINE SODIUM 50 MCG: 0.05 TABLET ORAL at 06:03

## 2025-03-25 RX ADMIN — ACETAMINOPHEN 650 MG: 325 TABLET ORAL at 08:03

## 2025-03-25 RX ADMIN — ONDANSETRON 4 MG: 4 TABLET, ORALLY DISINTEGRATING ORAL at 08:03

## 2025-03-25 RX ADMIN — ACETAMINOPHEN 650 MG: 325 TABLET ORAL at 09:03

## 2025-03-25 RX ADMIN — ACETAMINOPHEN 650 MG: 325 TABLET ORAL at 02:03

## 2025-03-25 RX ADMIN — CALCIUM CARBONATE (ANTACID) CHEW TAB 500 MG 500 MG: 500 CHEW TAB at 08:03

## 2025-03-25 NOTE — PT/OT/SLP PROGRESS
"Physical Therapy Treatment    Patient Name:  Isabel Hernandez   MRN:  [id number]  Admit Date: 3/7/2025  Admitting Diagnosis: Acute cystitis without hematuria  Recent Surgeries:     General Precautions: Standard, fall  Orthopedic Precautions: N/A  Braces: N/A    Recommendations:     Discharge Recommendations: home health PT  Level of Assistance Recommended at Discharge: 24 hours light assistance  Discharge Equipment Recommendations: walker, rolling, wheelchair, bath bench  Barriers to discharge: Inaccessible home environment    Assessment:     Isabel Hernandez is a 89 y.o. female admitted with a medical diagnosis of Acute cystitis without hematuria . Pt tolerated well, however pt continues to report some dizziness and nausea off/on BP taken 134/62 HR 76, appeared to subside with rest, pt would continue to benefit from skilled PT services to improve overall functional mobility, strength and endurance.  .      Performance deficits affecting function: weakness, impaired endurance, impaired self care skills, impaired functional mobility, gait instability, impaired balance, decreased upper extremity function, decreased lower extremity function, impaired cardiopulmonary response to activity.    Rehab Potential is good    Activity Tolerance: Fair    Plan:     Patient to be seen 5 x/week to address the above listed problems via gait training, therapeutic activities, therapeutic exercises, neuromuscular re-education, wheelchair management/training    Plan of Care Expires: 04/07/25  Plan of Care Reviewed with: patient    Subjective     "Alright" "little nauseated" pt agreeable to therapy.       Pain/Comfort:  Pain Rating 1: 0/10    Patient's cultural, spiritual, Sikh conflicts given the current situation:  no    Objective:       Patient found  with  (in wc) upon PT entry to room.     Therapeutic Activities and Exercises:  mini elliptical x 15 minutes    Functional Mobility:  Transfers:     Sit to Stand:  stand by assistance and " "contact guard assistance with rolling walker  Bed to Chair:  stand pivot WC to BSC CGA no AD vcs for safety/tech  Gait: amb with RW CGA 94 ft slow spring, vcs for directional guidance , limited by reports of dizziness  134/62 HR 76 nsg notified  Curbs asc/david 4" curb with RW CGA vcs for safety/tech    AM-PAC 6 CLICK MOBILITY  18    Patient left up in chair with call button in reach, camera notified, family present, and belonging in reach .    GOALS:   Multidisciplinary Problems       Physical Therapy Goals          Problem: Physical Therapy    Goal Priority Disciplines Outcome Interventions   Physical Therapy Goal     PT, PT/OT Progressing    Description: Goals to be met by: 25     Patient will increase functional independence with mobility by performin. Supine to sit with Supervision  2. Sit to supine with Supervision  3. Rolling to Left and Right with Supervision  4. Sit to stand transfer with Supervision  5. Bed to chair transfer with Supervision using Rolling Walker  6. Gait  x 150 feet with Stand-by Assistance using Rolling Walker  7. Wheelchair propulsion x 50 feet with Supervision using bilateral upper extremities  8. Ascend/descend 4 stairs with bilateral Handrail and Contact Guard Assistance using No Assistive Device.  9. Ascend/Descend 4 inch curb step with Contact Guard Assistance using Rolling Walker                         Time Tracking:     PT Received On: 25  PT Start Time: 1306  PT Stop Time: 1344  PT Total Time (min): 38 min    Billable Minutes: Gait Training 13, Therapeutic Activity 10, and Therapeutic Exercise 15    Treatment Type: Treatment  PT/PTA: PTA     Number of PTA visits since last PT visit: 2025  "

## 2025-03-25 NOTE — PROGRESS NOTES
[facility]                                  Skilled Nursing Facility                   Progress Note     Admit Date: 3/7/2025  MURPHY 2025  [id number] A  Principal Problem:  Acute cystitis without hematuria   HPI obtained from patient interview and chart review     Chief Complaint:Re-evaluation of medical treatment and therapy status, dizziness, back pain    HPI:   Isabel Hernandez is an 89 year old female with PMHx of thyroid disease, stage II CKD, history of compression fractures who presents to SNF following hospitalization for acute cystitis.  Admission to SNF for secondary weakness and debility.    Interval history:  All of the yesterday's labs reviewed.  24 hr vital sign ranges reviewed and listed below.  BP soft.  Patient looks a little bit better today compared to last week.  Back pain is persisting, patient was set up with pain management clinic as outpatient.  Dizziness also continues to persist, patient was set up with soonest ENT appointment.  Also encourage patient to drink water.  Patient denies shortness of breath, abdominal discomfort, nausea, or vomiting.  Patient reports an ok appetite.  Patient denies dysuria.  Patient reports having regular bowel movements.  Patient progressing with PT/OT- patient ambulated 131ft +66ft with RW and CGA for safety. Continuing to follow and treat all acute and chronic conditions.  Patient is being worked up for nursing home placement    Past Medical History: Patient has a past medical history of Arthritis, Cataract, Hypothyroidism, Mild malnutrition (3/10/2025), and Osteoporosis (2015).    Past Surgical History: Patient has a past surgical history that includes Thyroidectomy, partial; Hysterectomy; Cholecystectomy; Tonsillectomy; Adenoidectomy; Breast surgery (Bilateral);  section; Vitrectomy by pars plana approach (Left, 2018); and Appendectomy.    Social History:  "Patient reports that she has never smoked. She has never used smokeless tobacco. She reports that she does not drink alcohol and does not use drugs.    Family History: family history includes Cancer in her brother, father, and sister; Cataracts in her father; Glaucoma in her father; Hypertension in her mother; Kidney disease in her mother; Miscarriages / Stillbirths in her mother; No Known Problems in her brother and daughter; Thyroid disease in her mother.    Allergies: Patient is allergic to codeine, fosamax [alendronate], and iron analogues.    ROS  Constitutional: Negative for fever   Respiratory: Negative for cough, shortness of breath   Cardiovascular: Negative for chest pain, palpitations  Gastrointestinal: Negative for abdominal pain, constipation, diarrhea, vomiting. + intermittent nausea  Genitourinary: Negative for dysuria, frequency   Musculoskeletal:  + generalized weakness.  +back pain  Neurological: Negative for  headaches.  + intermittent dizziness  Psychiatric/Behavioral: Negative for depression. The patient is nervous/anxious.      24 hour Vital Sign Range   Temp:  [98.1 °F (36.7 °C)-98.5 °F (36.9 °C)]   Pulse:  [76-79]   Resp:  [18]   BP: (107-119)/(55-59)   SpO2:  [95 %-97 %]     Current BMI: Body mass index is 21.81 kg/m².    PEx  Constitutional: Patient appears debilitated.  mild distress noted  Cardiovascular: Normal rate, regular rhythm and normal heart sounds.    Pulmonary/Chest: Effort normal and breath sounds are clear  Abdominal: Soft. Bowel sounds are normal.   Musculoskeletal: Normal range of motion.   Neurological: Alert and oriented to person, place.  Disoriented to time.  Impaired higher level thinking  Psychiatric: confused, anxious   Skin: Skin is warm and dry.     No results for input(s): "GLUCOSE", "NA", "K", "CL", "CO2", "BUN", "CREATININE", "CALCIUM", "MG" in the last 24 hours.        No results for input(s): "WBC", "RBC", "HGB", "HCT", "PLT", "MCV", "MCH", "MCHC" in the last " "24 hours.        No results for input(s): "POCTGLUCOSE" in the last 168 hours.       Assessment and Plan:    Acute cystitis without hematuria  - Presenting with nausea/ generalized weakness/ lightheadedness and suprapubic discomfort and confusion   - CT AP without acute findings   - UA infectious, Urine culture with mixed growth; no predominant organism  - treated with IV ceftriaxone empirically  - completed cefpodoxime 100 mg BID, ended 3/13    Nausea and vomiting  - Suspected related to UTI or possibly viral illness   - CT AP without acute findings  - Flu/Covid negative   - continue Zofran PRN     Dizziness  - possible vertigo  - CTH negative  - Per [facility], "Given concern for posterior circulation CVA that could be the etiology of her dizziness we did offer MR brain, but she declined." Per daughter, MRI was ordered and scheduled for day of discharge and was not able to be completed prior to transportation showing up for them to transferred to SNF, will have rescheduled  - possible ENT follow-up  - persisting, meclizine 12.5 mg TID DC'ed per daughters request   - MRI completed, no acute findings   - ENT referral placed, soonest appointment was set for 5/15    Dementia without behavioral disturbance, psychotic disturbance, mood disturbance, or anxiety, unspecified dementia severity, unspecified dementia type   - per past PCP notes patient has opted out of medication for dementia  Delirium precautions:  - Avoid antihistamines, anticholinergics, hypnotics, and minimize opiates while controlling for pain as these medications may exacerbate delirium. Cues for day/night will assist with keeping patient calm and oriented - during daytime, please keep adequate light in room (open windows, lights on) and please keep room dim at night-time to encourage normal sleep-wake cycles. Continuing to have nursing and family reorient the patient and encourage family to visit  - per daughter patient has not yet at her mental baseline " patient is typically more oriented.      CKD Stage 3   - stable, continue monitor twice weekly BMPs, avoid nephrotoxic agents, renally dose medications as appropriate    Back pain  - XR (3/15) Five non rib-bearing lumbar type vertebral bodies are present. There is widening of the intervertebral joint space between L4 and L5, unchanged when compared to 03/05/2025. Multiple compression deformities in the lower thoracic spine are unchanged when compared to prior imaging. Degenerative changes are seen throughout the spine, as before. Facet arthropathy of the lower lumbar spine. The abdominal aorta is heavily calcified.   - continue acetaminophen 650 mg q.8 hours, lidocaine patch qHS, prn tramadol  - pain management referral placed, soonest appointment set was 4 4/3    Hypothyroid  - TSH slightly elevated with normal free T4  - stable, Continue home levothyroxine 50 mcg daily    Age-related osteoporosis without current pathological fracture   - per PCP notes, patient is supposed to be on Prolia.  Do not see on dispensed report    Advance care planning  - ACP discussion held with patient and her daughter on 03/10, per daughter patient had living will that states patient is a DNR    Debility   - Continue with PT/OT for gait training and strengthening and restoration of ADL's   - Encourage mobility, OOB in chair, and early ambulation as appropriate  - Fall precautions   - Monitor for bowel and bladder dysfunction  - Monitor for and prevent skin breakdown and pressure ulcers  - Continue DVT prophylaxis with       Anticipate disposition:  Patient's daughter states she will be discharging to an [facility]    IP [facility] RISK OF UNPLANNED READMISSION Model: MODERATE     Follow-up needed during SNF stay-    Follow-up needed after discharge from SNF: PCP     Future Appointments   Date Time Provider Department Center   4/3/2025 10:20 AM Sierra Gibson DO [facility] PAINMA [place]   5/13/2025 12:00 PM [facility]  [address] [facility]   5/15/2025 11:00 AM Lucila Paez Au.D, CCC-A [facility] AUDIO [address]   5/15/2025 11:30 AM Karlos Muniz MD [facility] ENT [address]   5/28/2025  2:15 PM Colten Oleary DPM [facility] POD [address] [facility]   6/2/2025  2:45 PM Kristian Shabazz MD [facility] [place]   7/21/2025  2:45 PM Kristian Shabazz MD [facility] [place]     I certify that SNF services are required to be given on an inpatient basis because Isabel Hernandez needs for skilled nursing care and/or skilled rehabilitation are required on a daily basis and such services can only practically be provided in a skilled nursing facility setting and are for an ongoing condition for which she received inpatient care in the hospital.     I spent 48 minutes reviewing patient records, examining, and counseling the patient/ patient's family with greater than 50% of the time spent in direct patient care and coordination.   family updated at bedside.      Radha Spencer NP  Department of Hospital Medicine   [facility]     DOS:3/25/2025      Patient note was created using MModal Dictation.  Any errors in syntax or even information may not have been identified and edited on initial review prior to signing this note.

## 2025-03-25 NOTE — PLAN OF CARE
Skilled Nursing IDT Note  Date: 3/24/25       Patient Name:  Isabel Hernandez       Medical Record Number: [id number]   YOB: 1935  Sex: Female          Room/Bed:  [id number] A  Payor Info:  Payor: [employer] MEDICARE / Plan: [employer] MEDICARE HMO / Product Type: Capitation /      Admitting Diagnosis:   Acute cystitis without hematuria [N30.00]  Acute cystitis without hematuria [N30.00]   Admit Date/Time:  3/7/2025  6:33 PM    Primary Diagnosis:  Acute cystitis without hematuria  Principal Problem: Acute cystitis without hematuria    Patient Active Problem List    Diagnosis Date Noted    Hyponatremia 03/11/2025    Mild malnutrition 03/10/2025    Dizziness 03/07/2025    LBBB (left bundle branch block) 03/06/2025    Generalized weakness 03/06/2025    Hypokalemia 03/05/2025    Acute cystitis without hematuria 03/05/2025    Nausea and vomiting 03/05/2025    Dementia without behavioral disturbance, psychotic disturbance, mood disturbance, or anxiety, unspecified dementia severity, unspecified dementia type 08/22/2023    Stage 3 chronic kidney disease, unspecified whether stage 3a or 3b CKD 08/22/2023    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    Macular hole, full thickness, left 06/26/2018    Stage 2 chronic kidney disease 07/26/2017    Nephrolithiasis 09/23/2015    Aortic atherosclerosis 09/23/2015    Age-related osteoporosis without current pathological fracture 09/23/2015    Hypothyroid 03/05/2013       Team Members Present: Dara Christie, RN Nurse Manager, Barb Suero, RN Charge Nurse, Latosha Alford LPN, , Mariel Portillo, Rehab/, Selam Turk OT, Rehab, Xochitl Puri, Activities, and Merari Hartmann, Dietician    Patient/Family Present: Patient and patient's daughter                                                Issues/Consults: Needs placement    Caregiver at Discharge: Staff    Living Setting at Discharge: Nursing home/assisted living  (referrals sent to [facility], [facility], [facility], [facility], [facility], [facility], and [facility])    Anticipated Discharge Date:  4/1/25    Supervision Recommended at Discharge: 24 hours light assistance    Follow-up Services:   Home Health  physical therapy and occupational therapy     Preferred Home Health: [facility]    Preferred Pharmacy: [facility]

## 2025-03-25 NOTE — PT/OT/SLP PROGRESS
"Occupational Therapy   Treatment    Name: Isabel Hernandez  MRN: [id number]  Admit Date: 3/7/2025  Admitting Diagnosis:  Acute cystitis without hematuria    General Precautions: Standard, aspiration, fall   Orthopedic Precautions: N/A   Braces: N/A    Recommendations:     Discharge Recommendations:  home with home health  Level of Assistance Recommended at Discharge: 24 hours light assistance for ADL's and homemaking tasks  Discharge Equipment Recommendations: walker, rolling, wheelchair, bath bench  Barriers to discharge:  Decreased caregiver support    Assessment:     Isabel Hernandez is a 89 y.o. female with a medical diagnosis of Acute cystitis without hematuria .  She presents with performance deficits affecting function are weakness, impaired endurance, impaired functional mobility, gait instability, impaired balance, impaired cognition, decreased safety awareness.   Pt participated well during today's session. Pt tolerated tx session without incident and is making progress, however, continues to demonstrate deficits with self care skills, balance, functional mobility, UB strength and endurance. Pt will benefit from continued OT services to progress towards goals.     Rehab Potential is good    Activity tolerance:  Good    Plan:     Patient to be seen 5 x/week to address the above listed problems via self-care/home management, therapeutic activities, therapeutic exercises    Plan of Care Expires: 03/29/25  Plan of Care Reviewed with: patient, family    Subjective   "I need to use the bathroom"  Communicated with: Missy prior to session.     Pain/Comfort:  Pain Rating 1: 4/10  Location - Side 1: Bilateral  Location - Orientation 1: generalized  Location 1: back  Pain Addressed 1: Nurse notified, Cessation of Activity, Reposition  Pain Rating Post-Intervention 1: 4/10    Patient's cultural, spiritual, Catholic conflicts given the current situation:  no    Objective:     Patient found HOB elevated with family member " present  upon OT entry to room.    Bed Mobility:    Patient completed Rolling/Turning to Right with supervision and with side rail  Patient completed Scooting/Bridging with supervision and with side rail  Patient completed Supine to Sit with supervision and with side rail     Functional Mobility/Transfers:  Patient completed Sit <> Stand Transfer with stand by assistance  with  rolling walker   Patient completed Bed <> Chair Transfer using Stand Pivot technique with contact guard assistance with no assistive device  Patient completed Chair <> Bedside chair Step Transfer technique with contact guard assistance with rolling walker  Patient completed Toilet Transfer Stand Pivot technique with stand by assistance with  grab bars    Activities of Daily Living:  Upper Body Dressing: supervision to doff/don pullover shirt while seated   Lower Body Dressing: minimum assistance to thread B LE and manage pants over hips in stance.   Toileting: minimum assistance to perform doug hygiene and broef managnet in stance with use of grab bars for support     [facility] 6 Click ADL: 18    OT Exercises: UE Ergometer 8 min with min resistance   Bilateral Joel 2 x 10 with 2 lb wrist weight. Pt performed forward dino motion and lateral slides   Therex performed to improve overall endurance, ROM and UB strength required for functional transfers, ADL's and w/c propulsion.     Treatment & Education:  Pt with functional activity on today with CGA and RW. Pt at raised counter to follow peg pattern with focus on standing tolerance, dynamic standing bal, crossing midline, and to promote independence with homemaking and self care tasks.  Pt tolerate stance for - 1:34 min/sec. Cessation of activity 2/2 fatigue.     Pt educated on:  - role of OT  - level of assistance  - energy conservation and task modification to maximize independence with ADL's and mobility   -  safety while performing functional transfers and self care tasks  - progress towards OT  goals     Patient left  bedside chair  with call button in reach and family member present    GOALS:   Multidisciplinary Problems       Occupational Therapy Goals          Problem: Occupational Therapy    Goal Priority Disciplines Outcome Interventions   Occupational Therapy Goal     OT, PT/OT Progressing    Description: Goals to be met by: 3/29/25     Patient will increase functional independence with ADLs by performing:    Feeding with [place]. -Ongoing  UE Dressing with [place].--Ongoing  LE Dressing with [place].--Ongoing  Grooming while standing with Modified [place].--Ongoing  Toileting from toilet with Modified Indep. for hygiene and clothing management.--Ongoing   Bathing from shower chair/bench with Supervision.--Ongoing  Sitting at edge of bed x10 minutes with [place].  Step transfer with Modified [place]--Ongoing  Toilet transfer to toilet with Modified [place].---Ongoing                         Time Tracking:     OT Date of Treatment: 03/25/25  OT Start Time: 1105    OT Stop Time: 1149  OT Total Time (min): 44 min    Billable Minutes:Self Care/Home Management 15  Therapeutic Activity 15  Therapeutic Exercise 14    3/25/2025

## 2025-03-26 PROCEDURE — 25000003 PHARM REV CODE 250: Mod: HCNC | Performed by: HOSPITALIST

## 2025-03-26 PROCEDURE — 25000003 PHARM REV CODE 250: Mod: HCNC | Performed by: NURSE PRACTITIONER

## 2025-03-26 PROCEDURE — 11000004 HC SNF PRIVATE: Mod: HCNC

## 2025-03-26 PROCEDURE — 97110 THERAPEUTIC EXERCISES: CPT | Mod: HCNC

## 2025-03-26 PROCEDURE — 97530 THERAPEUTIC ACTIVITIES: CPT | Mod: HCNC

## 2025-03-26 PROCEDURE — 97116 GAIT TRAINING THERAPY: CPT | Mod: HCNC,CQ

## 2025-03-26 PROCEDURE — 97530 THERAPEUTIC ACTIVITIES: CPT | Mod: HCNC,CQ

## 2025-03-26 PROCEDURE — 97110 THERAPEUTIC EXERCISES: CPT | Mod: HCNC,CQ

## 2025-03-26 RX ADMIN — ACETAMINOPHEN 650 MG: 325 TABLET ORAL at 09:03

## 2025-03-26 RX ADMIN — CALCIUM CARBONATE (ANTACID) CHEW TAB 500 MG 500 MG: 500 CHEW TAB at 09:03

## 2025-03-26 RX ADMIN — ACETAMINOPHEN 650 MG: 325 TABLET ORAL at 02:03

## 2025-03-26 RX ADMIN — LEVOTHYROXINE SODIUM 50 MCG: 0.05 TABLET ORAL at 05:03

## 2025-03-26 NOTE — PLAN OF CARE
Fany at [facility] emailed Physician paperwork to fill out. The paperwork was given to the Np.

## 2025-03-26 NOTE — PLAN OF CARE
Problem: Occupational Therapy  Goal: Occupational Therapy Goal  Description: Goals to be met by: 3/29/25     Patient will increase functional independence with ADLs by performing:    Feeding with [place]. -Ongoing  UE Dressing with [place].--Ongoing  LE Dressing with [place].--Ongoing  Grooming while standing with Modified [place].--Ongoing  Toileting from toilet with Modified Indep. for hygiene and clothing management.--Ongoing   Bathing from shower chair/bench with Supervision.--Ongoing  Sitting at edge of bed x10 minutes with [place].  Step transfer with Modified [place]--Ongoing  Toilet transfer to toilet with Modified [place].---Ongoing    Outcome: Progressing

## 2025-03-26 NOTE — PLAN OF CARE
SS spoke to Ms. Hilario at [facility]. She stated that the is approve tot  facility. Patient's daughter Kadi notified.

## 2025-03-26 NOTE — PROGRESS NOTES
[facility]                                  Skilled Nursing Facility                   Progress Note     Admit Date: 3/7/2025  MURPHY 4/1/2025  [id number]  Principal Problem:  Acute cystitis without hematuria   HPI obtained from patient interview and chart review     Chief Complaint:Re-evaluation of medical treatment and therapy status, dizziness, back pain    HPI:   Isabel Hernandez is an 89 year old female with PMHx of thyroid disease, stage II CKD, history of compression fractures who presents to SNF following hospitalization for acute cystitis.  Admission to SNF for secondary weakness and debility.    Interval history: 24 hr vital sign ranges reviewed and listed below.  BP soft.  Patient states she feels a little bit better today compared to yesterday.  Back pain is persisting, patient was set up with pain management clinic as outpatient.  Dizziness also continues to persist, patient was set up with soonest ENT appointment.  Also encourage patient to drink water.  Patient denies shortness of breath, abdominal discomfort, nausea, or vomiting.  Patient reports an ok appetite.  Patient denies dysuria.  Patient reports having regular bowel movements.  Patient progressing with PT/OT- patient ambulated 93 ft + 83 ft using RW with CGA; WC follow provided. Brief standing rest breaks taken to adjust pants, loosen tension in shoulders. Vc/tc for directional guidance, RW mgmt. No LOB noted. Continuing to follow and treat all acute and chronic conditions.  Patient is being worked up for nursing home placement    Past Medical History: Patient has a past medical history of Arthritis, Cataract, Hypothyroidism, Mild malnutrition (3/10/2025), and Osteoporosis (9/23/2015).    Past Surgical History: Patient has a past surgical history that includes Thyroidectomy, partial; Hysterectomy; Cholecystectomy; Tonsillectomy; Adenoidectomy; Breast surgery  "(Bilateral);  section; Vitrectomy by pars plana approach (Left, 2018); and Appendectomy.    Social History: Patient reports that she has never smoked. She has never used smokeless tobacco. She reports that she does not drink alcohol and does not use drugs.    Family History: family history includes Cancer in her brother, father, and sister; Cataracts in her father; Glaucoma in her father; Hypertension in her mother; Kidney disease in her mother; Miscarriages / Stillbirths in her mother; No Known Problems in her brother and daughter; Thyroid disease in her mother.    Allergies: Patient is allergic to codeine, fosamax [alendronate], and iron analogues.    ROS  Constitutional: Negative for fever   Respiratory: Negative for cough, shortness of breath   Cardiovascular: Negative for chest pain, palpitations  Gastrointestinal: Negative for abdominal pain, constipation, diarrhea, vomiting. + intermittent nausea  Genitourinary: Negative for dysuria, frequency   Musculoskeletal:  + generalized weakness.  +back pain  Neurological: Negative for  headaches.  + intermittent dizziness  Psychiatric/Behavioral: Negative for depression. The patient is nervous/anxious.      24 hour Vital Sign Range   Temp:  [98.1 °F (36.7 °C)-98.6 °F (37 °C)]   Pulse:  [76-78]   Resp:  [16-18]   BP: (112-120)/(56-58)   SpO2:  [94 %-95 %]     Current BMI: Body mass index is 21.81 kg/m².    PEx  Constitutional: Patient appears debilitated.  mild distress noted  Cardiovascular: Normal rate, regular rhythm and normal heart sounds.    Pulmonary/Chest: Effort normal and breath sounds are clear  Abdominal: Soft. Bowel sounds are normal.   Musculoskeletal: Normal range of motion.   Neurological: Alert and oriented to person, place.  Disoriented to time.  Impaired higher level thinking  Psychiatric: confused, anxious   Skin: Skin is warm and dry.     No results for input(s): "GLUCOSE", "NA", "K", "CL", "CO2", "BUN", "CREATININE", "CALCIUM", "MG" " "in the last 24 hours.        No results for input(s): "WBC", "RBC", "HGB", "HCT", "PLT", "MCV", "MCH", "MCHC" in the last 24 hours.        No results for input(s): "POCTGLUCOSE" in the last 168 hours.       Assessment and Plan:    Acute cystitis without hematuria  - Presenting with nausea/ generalized weakness/ lightheadedness and suprapubic discomfort and confusion   - CT AP without acute findings   - UA infectious, Urine culture with mixed growth; no predominant organism  - treated with IV ceftriaxone empirically  - completed cefpodoxime 100 mg BID, ended 3/13    Nausea and vomiting  - Suspected related to UTI or possibly viral illness   - CT AP without acute findings  - Flu/Covid negative   - continue Zofran PRN     Dizziness  - possible vertigo  - CTH negative  - Per [facility], "Given concern for posterior circulation CVA that could be the etiology of her dizziness we did offer MR brain, but she declined." Per daughter, MRI was ordered and scheduled for day of discharge and was not able to be completed prior to transportation showing up for them to transferred to SNF, will have rescheduled  - possible ENT follow-up  - persisting, meclizine 12.5 mg TID DC'ed per daughters request   - MRI completed, no acute findings   - ENT referral placed, soonest appointment was set for 5/15    Dementia without behavioral disturbance, psychotic disturbance, mood disturbance, or anxiety, unspecified dementia severity, unspecified dementia type   - per past PCP notes patient has opted out of medication for dementia  Delirium precautions:  - Avoid antihistamines, anticholinergics, hypnotics, and minimize opiates while controlling for pain as these medications may exacerbate delirium. Cues for day/night will assist with keeping patient calm and oriented - during daytime, please keep adequate light in room (open windows, lights on) and please keep room dim at night-time to encourage normal sleep-wake cycles. Continuing to have nursing and " family reorient the patient and encourage family to visit  - per daughter patient has not yet at her mental baseline patient is typically more oriented.      CKD Stage 3   - stable, continue monitor twice weekly BMPs, avoid nephrotoxic agents, renally dose medications as appropriate    Back pain  - XR (3/15) Five non rib-bearing lumbar type vertebral bodies are present. There is widening of the intervertebral joint space between L4 and L5, unchanged when compared to 03/05/2025. Multiple compression deformities in the lower thoracic spine are unchanged when compared to prior imaging. Degenerative changes are seen throughout the spine, as before. Facet arthropathy of the lower lumbar spine. The abdominal aorta is heavily calcified.   - continue acetaminophen 650 mg q.8 hours, lidocaine patch qHS, prn tramadol  - pain management referral placed, soonest appointment set was 4 4/3    Hypothyroid  - TSH slightly elevated with normal free T4  - stable, Continue home levothyroxine 50 mcg daily    Age-related osteoporosis without current pathological fracture   - per PCP notes, patient is supposed to be on Prolia.  Do not see on dispensed report    Advance care planning  - ACP discussion held with patient and her daughter on 03/10, per daughter patient had living will that states patient is a DNR    Debility   - Continue with PT/OT for gait training and strengthening and restoration of ADL's   - Encourage mobility, OOB in chair, and early ambulation as appropriate  - Fall precautions   - Monitor for bowel and bladder dysfunction  - Monitor for and prevent skin breakdown and pressure ulcers  - Continue DVT prophylaxis with       Anticipate disposition:  Patient's daughter states she will be discharging to an [facility]    IP [facility] RISK OF UNPLANNED READMISSION Model: MODERATE     Follow-up needed during SNF stay-    Follow-up needed after discharge from SNF: PCP     Future Appointments   Date Time Provider Department Center    4/24/2025  2:20 PM Sierra Gibson DO [facility] [place]   5/13/2025 12:00 PM Danielle Vaughan FNP [facility] IM [address] [facility]   5/15/2025 11:00 AM Lucila Paez Au.D CCC-A [facility] AUDIO [address]   5/15/2025 11:30 AM Karlos Muniz MD [facility] ENT [address]   5/28/2025  2:15 PM Colten Oleary DPM [facility] POD [address] [facility]   6/2/2025  2:45 PM Kristian Shabazz MD [facility] [place]   7/21/2025  2:45 PM Kristian Shabazz MD [facility] [place]     I certify that SNF services are required to be given on an inpatient basis because Isabel Hernandez needs for skilled nursing care and/or skilled rehabilitation are required on a daily basis and such services can only practically be provided in a skilled nursing facility setting and are for an ongoing condition for which she received inpatient care in the hospital.     I spent 47 minutes reviewing patient records, examining, and counseling the patient/ patient's family with greater than 50% of the time spent in direct patient care and coordination.  Care coordinated with KAMINI Spencer NP  Department of Hospital Medicine   [facility]     DOS:3/26/2025      Patient note was created using MModal Dictation.  Any errors in syntax or even information may not have been identified and edited on initial review prior to signing this note.

## 2025-03-26 NOTE — PT/OT/SLP PROGRESS
"Physical Therapy Treatment    Patient Name:  Isabel Hernandez   MRN:  [id number]  Admit Date: 3/7/2025  Admitting Diagnosis: Acute cystitis without hematuria  Recent Surgeries: N/A    General Precautions: Standard, fall  Orthopedic Precautions: N/A  Braces: N/A    Recommendations:     Discharge Recommendations: home health PT  Level of Assistance Recommended at Discharge: 24 hours light assistance  Discharge Equipment Recommendations: walker, rolling, wheelchair, bath bench  Barriers to discharge: Inaccessible home environment    Assessment:     Isabel Hernandez is a 89 y.o. female admitted with a medical diagnosis of Acute cystitis without hematuria. Pt with improved tolerance for skilled interventions this date as evidenced by increased total gait distance. Pt reporting mild dizziness at beginning of session, but not with completion of gait trials. Pt then agreeable to complete 4" curb step and propel LBE after gait trials. Representative from [facility] present at end of session to escort pt back to her room for consultation (daughter present). Pt will continue to benefit from skilled PT services in order to further promote functional mobility, endurance, and BLE strengthening. Pt remains appropriate for PT treatment at this time.    Performance deficits affecting function: weakness, impaired endurance, impaired self care skills, impaired functional mobility, gait instability, impaired balance, decreased upper extremity function, decreased lower extremity function, impaired cardiopulmonary response to activity.    Rehab Potential is good    Activity Tolerance: Good    Plan:     Patient to be seen 5 x/week to address the above listed problems via gait training, therapeutic activities, therapeutic exercises, neuromuscular re-education, wheelchair management/training    Plan of Care Expires: 04/07/25  Plan of Care Reviewed with: patient, daughter    Subjective     Pt agreeable to treatment. " "    Pain/Comfort:  Pain Rating 1: other (see comments) (unspecified when prompted)  Location - Side 1: Bilateral  Location - Orientation 1: generalized  Location 1: back  Pain Addressed 1: Reposition, Distraction, Nurse notified  Pain Rating Post-Intervention 1: other (see comments) (did not rate)    Patient's cultural, spiritual, Jain conflicts given the current situation:  no    Objective:     Communicated with nursing prior to session.  Patient found  sitting on toilet with RN present  with  (no active lines) upon PT entry to room.     Therapeutic Activities and Exercises: LE ergometer x 11 minutes set at low level of resistance incorporated to promote BLE strengthening, tissue extensibility, endurance and functional mobility.     Patient educated on role of therapy, goals of session, and benefits of out of mobility.  Instructed on use of call button and importance of calling nursing staff for assistance with mobility.   RN present at beginning of session to assist pt from toilet to sink to complete ADLs in standing prior to short gait trial from sink to WC.  Questions/concerns addressed within PTA scope of practice.  Pt verbalized understanding    Functional Mobility:  Transfers:     Sit to Stand:  stand by assistance with rolling walker  Toilet to Chair: contact guard assistance with  rolling walker and toilet grab bar  using ~8-9 ft Step Transfer  Gait: 93 ft + 83 ft using RW with CGA; WC follow provided. Brief standing rest breaks taken to adjust pants, loosen tension in shoulders. Vc/tc for directional guidance, RW mgmt. No LOB noted.  Balance: static/dynamic standing at sink to complete ADLs with RW situated just next to pt; RN present, SBA maintained. Pt adjusting pants multiple times during gait trials with SBA.  Stairs:  Pt ascended/descended 4" curb step with Rolling Walker with no handrails with Contact Guard Assistance-brief Min A. Increased asst required briefly to set RW from platform onto " floor surface before pt descending step.    Wheelchair Propulsion:  Pt propelled Light weight wheelchair x 45 feet on Level tile with  Bilateral upper extremity with close Stand-by Assistance. Verbal cues given throughout for tech and directional guidance.    AM-PAC 6 CLICK MOBILITY  18    Patient left up in chair with  [facility] rep present in gym to escort pt back to her room via WC transport .    GOALS:   Multidisciplinary Problems       Physical Therapy Goals          Problem: Physical Therapy    Goal Priority Disciplines Outcome Interventions   Physical Therapy Goal     PT, PT/OT Progressing    Description: Goals to be met by: 25     Patient will increase functional independence with mobility by performin. Supine to sit with Supervision  2. Sit to supine with Supervision  3. Rolling to Left and Right with Supervision  4. Sit to stand transfer with Supervision  5. Bed to chair transfer with Supervision using Rolling Walker  6. Gait  x 150 feet with Stand-by Assistance using Rolling Walker  7. Wheelchair propulsion x 50 feet with Supervision using bilateral upper extremities  8. Ascend/descend 4 stairs with bilateral Handrail and Contact Guard Assistance using No Assistive Device.  9. Ascend/Descend 4 inch curb step with Contact Guard Assistance using Rolling Walker                         Time Tracking:     PT Received On: 25  PT Start Time: 1048  PT Stop Time: 1126  PT Total Time (min): 38 min    Billable Minutes: Gait Training 14, Therapeutic Activity 14, and Therapeutic Exercise 12    Treatment Type: Treatment  PT/PTA: PTA     Number of PTA visits since last PT visit: 2     2025

## 2025-03-26 NOTE — PT/OT/SLP PROGRESS
Occupational Therapy   Treatment    Name: Isabel Hernandez  MRN: [id number]  Admit Date: 3/7/2025  Admitting Diagnosis:  Acute cystitis without hematuria    General Precautions: Standard, aspiration, fall   Orthopedic Precautions: N/A   Braces: N/A    Recommendations:     Discharge Recommendations:  home with home health  Level of Assistance Recommended at Discharge: 24 hours physical assistance for all ADL's and home management tasks  Discharge Equipment Recommendations: walker, rolling, wheelchair, bath bench  Barriers to discharge:  Decreased caregiver support    Assessment:     Isabel Hernandez is a 89 y.o. female with a medical diagnosis of Acute cystitis without hematuria .  She presents with performance deficits affecting function are weakness, impaired endurance, impaired functional mobility, gait instability, impaired balance, impaired cognition, decreased safety awareness.   Pt tolerated Tx without incident and is making progress but continues to require assist to perform self care tasks, functional mobility and functional transfers .  She would continue to benefit from OT intervention to further her functional (I)ce and safety.     Rehab Potential is good    Activity tolerance:  Good    Plan:     Patient to be seen 5 x/week to address the above listed problems via self-care/home management, therapeutic activities, therapeutic exercises    Plan of Care Expires: 03/29/25  Plan of Care Reviewed with: patient, family    Subjective     Communicated with: nurse prior to session. .    Pain/Comfort:  Pain Rating 1: 0/10  Pain Rating Post-Intervention 1: 0/10    Patient's cultural, spiritual, Yazidism conflicts given the current situation:  no    Objective:     Patient found up in bedside chair with  (no active lines) upon OT entry to room.    Bed Mobility:    Pt seated in bedside chair at onset of therapy session.      Functional Mobility/Transfers:  Patient completed Sit <> Stand Transfer with contact guard assistance   with  rolling walker   Patient completed Bed <> Chair Transfer using Step Transfer technique with contact guard assistance with rolling walker  Functional Mobility: Pt ambulated with RW from bedside chair to W/C positioned at head of bed a distance of 6 ft with CGA requited. No loss of balance observed but V/Cs required for safety.      Pt worked on functional standing activity consisting of standing with RW while reaching in all planes , crossing of midline and reaching to varying heights to facilitate (B) wt shifting and stability in standing in prep for performance of self care tasks and functional ADLS in standing.  Pt tolerated up to 7 Min. in standing with    close SBA and RW to steady.     [facility] 6 Click ADL: 18    OT Exercises: Pt performed UBE exercise for 10 minutes with  Mod resistance.  UE exercises performed to increase functional endurance and strength in order increase independence when performing self care tasks, functional ambulation, W/C propulsion, and functional standing activities .     Treatment & Education:  Pt edu on POC, safety when performing self care tasks,  safety when performing functional transfers and mobility.  - White board updated  - Self care tasks completed-- as noted above      Patient left up in bedside chair with call button in reach and Camera activated.     GOALS:   Multidisciplinary Problems       Occupational Therapy Goals          Problem: Occupational Therapy    Goal Priority Disciplines Outcome Interventions   Occupational Therapy Goal     OT, PT/OT Progressing    Description: Goals to be met by: 3/29/25     Patient will increase functional independence with ADLs by performing:    Feeding with [place]. -Ongoing  UE Dressing with [place].--Ongoing  LE Dressing with [place].--Ongoing  Grooming while standing with Modified [place].--Ongoing  Toileting from toilet with Modified Indep. for hygiene and clothing management.--Ongoing   Bathing from shower  chair/bench with Supervision.--Ongoing  Sitting at edge of bed x10 minutes with [place].  Step transfer with Modified [place]--Ongoing  Toilet transfer to toilet with Modified [place].---Ongoing                         Time Tracking:     OT Date of Treatment: 03/26/25  OT Start Time: 1315    OT Stop Time: 1356  OT Total Time (min): 41 min    Billable Minutes:Therapeutic Activity 30  Therapeutic Exercise 11    3/26/2025

## 2025-03-26 NOTE — PLAN OF CARE
Problem: Adult Inpatient Plan of Care  Goal: Plan of Care Review  Outcome: Progressing  Goal: Patient-Specific Goal (Individualized)  Outcome: Progressing  Goal: Absence of Hospital-Acquired Illness or Injury  Outcome: Progressing  Goal: Optimal Comfort and Wellbeing  Outcome: Progressing  Goal: Readiness for Transition of Care  Outcome: Progressing     Problem: Fall Injury Risk  Goal: Absence of Fall and Fall-Related Injury  Outcome: Progressing     Problem: Skin Injury Risk Increased  Goal: Skin Health and Integrity  Outcome: Progressing     Problem: Malnutrition  Goal: Improved Nutritional Intake  Outcome: Progressing     Problem: Electrolyte Imbalance  Goal: Electrolyte Balance  Outcome: Progressing     Problem: Infection  Goal: Absence of Infection Signs and Symptoms  Outcome: Progressing     Problem: Dementia Signs/Symptoms  Goal: Optimized Cognitive Function (Dementia Signs/Symptoms)  Outcome: Progressing

## 2025-03-27 LAB
ABSOLUTE EOSINOPHIL (OHS): 0.16 K/UL
ABSOLUTE MONOCYTE (OHS): 0.77 K/UL (ref 0.3–1)
ABSOLUTE NEUTROPHIL COUNT (OHS): 3.87 K/UL (ref 1.8–7.7)
ANION GAP (OHS): 11 MMOL/L (ref 8–16)
BASOPHILS # BLD AUTO: 0.05 K/UL
BASOPHILS NFR BLD AUTO: 0.6 %
BUN SERPL-MCNC: 24 MG/DL (ref 8–23)
CALCIUM SERPL-MCNC: 8.4 MG/DL (ref 8.7–10.5)
CHLORIDE SERPL-SCNC: 102 MMOL/L (ref 95–110)
CO2 SERPL-SCNC: 23 MMOL/L (ref 23–29)
CREAT SERPL-MCNC: 0.8 MG/DL (ref 0.5–1.4)
ERYTHROCYTE [DISTWIDTH] IN BLOOD BY AUTOMATED COUNT: 13 % (ref 11.5–14.5)
GFR SERPLBLD CREATININE-BSD FMLA CKD-EPI: >60 ML/MIN/1.73/M2
GLUCOSE SERPL-MCNC: 89 MG/DL (ref 70–110)
HCT VFR BLD AUTO: 35.8 % (ref 37–48.5)
HGB BLD-MCNC: 11.4 GM/DL (ref 12–16)
IMM GRANULOCYTES # BLD AUTO: 0.04 K/UL (ref 0–0.04)
IMM GRANULOCYTES NFR BLD AUTO: 0.5 % (ref 0–0.5)
LYMPHOCYTES # BLD AUTO: 2.91 K/UL (ref 1–4.8)
MAGNESIUM SERPL-MCNC: 2.2 MG/DL (ref 1.6–2.6)
MCH RBC QN AUTO: 30.3 PG (ref 27–50)
MCHC RBC AUTO-ENTMCNC: 31.8 G/DL (ref 32–36)
MCV RBC AUTO: 95 FL (ref 82–98)
NUCLEATED RBC (/100WBC) (OHS): 0 /100 WBC
PHOSPHATE SERPL-MCNC: 4.1 MG/DL (ref 2.7–4.5)
PLATELET # BLD AUTO: 292 K/UL (ref 150–450)
PMV BLD AUTO: 10.3 FL (ref 9.2–12.9)
POTASSIUM SERPL-SCNC: 4.3 MMOL/L (ref 3.5–5.1)
RBC # BLD AUTO: 3.76 M/UL (ref 4–5.4)
RELATIVE EOSINOPHIL (OHS): 2.1 %
RELATIVE LYMPHOCYTE (OHS): 37.3 % (ref 18–48)
RELATIVE MONOCYTE (OHS): 9.9 % (ref 4–15)
RELATIVE NEUTROPHIL (OHS): 49.6 % (ref 38–73)
SODIUM SERPL-SCNC: 136 MMOL/L (ref 136–145)
WBC # BLD AUTO: 7.8 K/UL (ref 3.9–12.7)

## 2025-03-27 PROCEDURE — 25000003 PHARM REV CODE 250: Mod: HCNC | Performed by: HOSPITALIST

## 2025-03-27 PROCEDURE — 36415 COLL VENOUS BLD VENIPUNCTURE: CPT | Mod: HCNC | Performed by: HOSPITALIST

## 2025-03-27 PROCEDURE — 80048 BASIC METABOLIC PNL TOTAL CA: CPT | Mod: HCNC | Performed by: HOSPITALIST

## 2025-03-27 PROCEDURE — 11000004 HC SNF PRIVATE: Mod: HCNC

## 2025-03-27 PROCEDURE — 85025 COMPLETE CBC W/AUTO DIFF WBC: CPT | Mod: HCNC | Performed by: HOSPITALIST

## 2025-03-27 PROCEDURE — 84100 ASSAY OF PHOSPHORUS: CPT | Mod: HCNC | Performed by: HOSPITALIST

## 2025-03-27 PROCEDURE — 97530 THERAPEUTIC ACTIVITIES: CPT | Mod: HCNC,CQ

## 2025-03-27 PROCEDURE — 25000003 PHARM REV CODE 250: Mod: HCNC | Performed by: NURSE PRACTITIONER

## 2025-03-27 PROCEDURE — 97535 SELF CARE MNGMENT TRAINING: CPT | Mod: HCNC,CO

## 2025-03-27 PROCEDURE — 83735 ASSAY OF MAGNESIUM: CPT | Mod: HCNC | Performed by: HOSPITALIST

## 2025-03-27 RX ADMIN — ACETAMINOPHEN 650 MG: 325 TABLET ORAL at 09:03

## 2025-03-27 RX ADMIN — LEVOTHYROXINE SODIUM 50 MCG: 0.05 TABLET ORAL at 06:03

## 2025-03-27 NOTE — PLAN OF CARE
Problem: Occupational Therapy  Goal: Occupational Therapy Goal  Description: Goals to be met by: 3/29/25     Patient will increase functional independence with ADLs by performing:    Feeding with [place]. -Ongoing  UE Dressing with [place].--Ongoing  LE Dressing with [place].--Ongoing  Grooming while standing with Modified [place].--Ongoing  Toileting from toilet with Modified Indep. for hygiene and clothing management.--Ongoing   Bathing from shower chair/bench with Supervision.--Ongoing  Sitting at edge of bed x10 minutes with [place].  Step transfer with Modified [place]--Ongoing  Toilet transfer to toilet with Modified [place].---Ongoing    Outcome: Progressing

## 2025-03-27 NOTE — PT/OT/SLP PROGRESS
Occupational Therapy   Treatment    Name: Isabel Hernandez  MRN: [id number]  Admit Date: 3/7/2025  Admitting Diagnosis:  Acute cystitis without hematuria    General Precautions: Standard, aspiration, fall   Orthopedic Precautions: N/A   Braces: N/A    Recommendations:     Discharge Recommendations:  home with home health  Level of Assistance Recommended at Discharge: 24 hours physical assistance for all ADL's and home management tasks  Discharge Equipment Recommendations: walker, rolling, wheelchair, bath bench  Barriers to discharge:  Decreased caregiver support    Assessment:     Isabel Hernandez is a 89 y.o. female with a medical diagnosis of Acute cystitis without hematuria.  She presents with limitations in performance of self-care, functional mobility, and ADLs. Performance deficits affecting function are weakness, impaired endurance, impaired functional mobility, gait instability, impaired balance, impaired cognition, decreased safety awareness.     Family training took place this Tx with pt's daughter. The following was demonstrated with pt's daughter performing transfer with therapist SBA for safety. Pt's daughter cleared to perform transfers to toilet with pt. White board updated and nursing notified. Pt's daughter acknowledged and demonstrated understanding of level of assistance required for pt to perform self care and functional transfers safely.     Pt tolerated Tx without incident and is making progress but continues to require touching assist and cueing to perform self care tasks, functional mobility and functional transfers. Pt would continue to benefit from OT intervention to further functional (I)ce and safety.     Rehab Potential is good    Activity tolerance:  Fair    Plan:     Patient to be seen 5 x/week to address the above listed problems via self-care/home management, therapeutic activities, therapeutic exercises    Plan of Care Expires: 03/29/25  Plan of Care Reviewed with: patient,  family    Subjective     Communicated with: Nursing prior to session.     Pain/Comfort:  Pain Rating 1: 0/10  Pain Rating Post-Intervention 1: 0/10    Patient's cultural, spiritual, Restorationist conflicts given the current situation:  no    Objective:     Patient found up in chair with  (no active lines) upon OT entry to room.    Bed Mobility:    Pt seated in chair at onset of treatment session.     Functional Mobility/Transfers:  Patient completed Sit <> Stand Transfer with contact guard assistance  with  rolling walker   Patient completed Toilet Transfer Step Transfer technique with contact guard assistance with  rolling walker and BSC positioned over toilet with pt ambulating ~15 ft and ~7 ft.     Activities of Daily Living:  Personal Hygiene with contact guard assistance while standing at sink level  Upper Body Dressing with  supervision  to doff/chris pull over shirt  Lower Body Dressing with contact guard assistance seated to thread BLE feet and manage over hips with use of RW when standing  Toileting with contact guard assistance from bedside commode over toilet with use of RW when standing to perform front/rear doug care and manage pants over hips. V/c for sequencing.     [facility] 6 Click ADL: 18    Treatment & Education:  Pt and pt's daughter educated on:  - role of OT  - level of assistance  - energy conservation and task modification to maximized independence with ADL's and mobility   -  safety while performing functional transfers and self care tasks  - progress towards OT goals    Patient left up in chair with  PTA and daughter present and nursing notified    GOALS:   Multidisciplinary Problems       Occupational Therapy Goals          Problem: Occupational Therapy    Goal Priority Disciplines Outcome Interventions   Occupational Therapy Goal     OT, PT/OT Progressing    Description: Goals to be met by: 3/29/25     Patient will increase functional independence with ADLs by performing:    Feeding with  Axis. -Ongoing  UE Dressing with Axis.--Ongoing  LE Dressing with Axis.--Ongoing  Grooming while standing with Modified Axis.--Ongoing  Toileting from toilet with Modified Indep. for hygiene and clothing management.--Ongoing   Bathing from shower chair/bench with Supervision.--Ongoing  Sitting at edge of bed x10 minutes with Axis.  Step transfer with Modified Axis--Ongoing  Toilet transfer to toilet with Modified Axis.---Ongoing                         Time Tracking:     OT Date of Treatment: 03/27/25  OT Start Time: 1300    OT Stop Time: 1328  OT Total Time (min): 28 min    Billable Minutes:Self Care/Home Management 28    3/27/2025  A client care conference was performed between the ELKIN and DIONNE, prior to treatment by DIONNE, to discuss the patient's status, treatment plan and established goals.

## 2025-03-27 NOTE — PROGRESS NOTES
[facility]                                  Skilled Nursing Facility                   Progress Note     Admit Date: 3/7/2025  MURPHY 4/1/2025  [id number]  Principal Problem:  Acute cystitis without hematuria   HPI obtained from patient interview and chart review     Chief Complaint:Re-evaluation of medical treatment and therapy status, lab review, dizziness, back pain    HPI:   Isabel Hernandez is an 89 year old female with PMHx of thyroid disease, stage II CKD, history of compression fractures who presents to SNF following hospitalization for acute cystitis.  Admission to SNF for secondary weakness and debility.    Interval history: All of today's labs reviewed and are listed below.  24 hr vital sign ranges reviewed and listed below.  BP soft.   Back pain is persisting but improved, patient was set up with pain management clinic as outpatient.  Dizziness also continues to persist, patient was set up with soonest ENT appointment.  Continue to encourage patient to drink water.  Patient denies shortness of breath, abdominal discomfort, nausea, or vomiting.  Patient reports an ok appetite.  Patient denies dysuria.  Patient reports having regular bowel movements.  Patient progressing with PT/OT- patient ambulated 93 ft + 83 ft using RW with CGA; WC follow provided. Brief standing rest breaks taken to adjust pants, loosen tension in shoulders. Vc/tc for directional guidance, RW mgmt. No LOB noted. Continuing to follow and treat all acute and chronic conditions.  Patient is being worked up for nursing home/skilled nursing placement    Past Medical History: Patient has a past medical history of Arthritis, Cataract, Hypothyroidism, Mild malnutrition (3/10/2025), and Osteoporosis (9/23/2015).    Past Surgical History: Patient has a past surgical history that includes Thyroidectomy, partial; Hysterectomy; Cholecystectomy; Tonsillectomy; Adenoidectomy; Breast  surgery (Bilateral);  section; Vitrectomy by pars plana approach (Left, 2018); and Appendectomy.    Social History: Patient reports that she has never smoked. She has never used smokeless tobacco. She reports that she does not drink alcohol and does not use drugs.    Family History: family history includes Cancer in her brother, father, and sister; Cataracts in her father; Glaucoma in her father; Hypertension in her mother; Kidney disease in her mother; Miscarriages / Stillbirths in her mother; No Known Problems in her brother and daughter; Thyroid disease in her mother.    Allergies: Patient is allergic to codeine, fosamax [alendronate], and iron analogues.    ROS  Constitutional: Negative for fever   Respiratory: Negative for cough, shortness of breath   Cardiovascular: Negative for chest pain, palpitations  Gastrointestinal: Negative for abdominal pain, constipation, diarrhea, vomiting. + intermittent nausea  Genitourinary: Negative for dysuria, frequency   Musculoskeletal:  + generalized weakness.  +back pain  Neurological: Negative for  headaches.  + intermittent dizziness  Psychiatric/Behavioral: Negative for depression. The patient is nervous/anxious.      24 hour Vital Sign Range   Temp:  [97.7 °F (36.5 °C)]   Pulse:  [70]   Resp:  [18]   BP: (105)/(53)   SpO2:  [94 %]     Current BMI: Body mass index is 21.81 kg/m².    PEx  Constitutional: Patient appears debilitated.  mild distress noted  Cardiovascular: Normal rate, regular rhythm and normal heart sounds.    Pulmonary/Chest: Effort normal and breath sounds are clear  Abdominal: Soft. Bowel sounds are normal.   Musculoskeletal: Normal range of motion.   Neurological: Alert and oriented to person, place.  Disoriented to time.  Impaired higher level thinking  Psychiatric: confused, anxious   Skin: Skin is warm and dry.     Recent Labs   Lab 25  0351   GLUCOSE 89      K 4.3      CO2 23   BUN 24*   CREATININE 0.8   CALCIUM 8.4*  "  MG 2.2           Recent Labs   Lab 03/27/25  0351   WBC 7.80   RBC 3.76*   HGB 11.4*   HCT 35.8*      MCV 95   MCH 30.3   MCHC 31.8*           No results for input(s): "POCTGLUCOSE" in the last 168 hours.       Assessment and Plan:    Acute cystitis without hematuria  - Presenting with nausea/ generalized weakness/ lightheadedness and suprapubic discomfort and confusion   - CT AP without acute findings   - UA infectious, Urine culture with mixed growth; no predominant organism  - treated with IV ceftriaxone empirically  - completed cefpodoxime 100 mg BID, ended 3/13    Nausea and vomiting  - Suspected related to UTI or possibly viral illness   - CT AP without acute findings  - Flu/Covid negative   - continue Zofran PRN     Dizziness  - possible vertigo  - CTH negative  - Per [facility], "Given concern for posterior circulation CVA that could be the etiology of her dizziness we did offer MR brain, but she declined." Per daughter, MRI was ordered and scheduled for day of discharge and was not able to be completed prior to transportation showing up for them to transferred to SNF, will have rescheduled  - possible ENT follow-up  - persisting, meclizine 12.5 mg TID DC'ed per daughters request   - MRI completed, no acute findings   - ENT referral placed, soonest appointment was set for 5/15    Dementia without behavioral disturbance, psychotic disturbance, mood disturbance, or anxiety, unspecified dementia severity, unspecified dementia type   - per past PCP notes patient has opted out of medication for dementia  Delirium precautions:  - Avoid antihistamines, anticholinergics, hypnotics, and minimize opiates while controlling for pain as these medications may exacerbate delirium. Cues for day/night will assist with keeping patient calm and oriented - during daytime, please keep adequate light in room (open windows, lights on) and please keep room dim at night-time to encourage normal sleep-wake cycles. Continuing to " have nursing and family reorient the patient and encourage family to visit  - per daughter patient has not yet at her mental baseline patient is typically more oriented.      CKD Stage 3   - stable, continue monitor twice weekly BMPs, avoid nephrotoxic agents, renally dose medications as appropriate    Back pain  - XR (3/15) Five non rib-bearing lumbar type vertebral bodies are present. There is widening of the intervertebral joint space between L4 and L5, unchanged when compared to 03/05/2025. Multiple compression deformities in the lower thoracic spine are unchanged when compared to prior imaging. Degenerative changes are seen throughout the spine, as before. Facet arthropathy of the lower lumbar spine. The abdominal aorta is heavily calcified.   - continue acetaminophen 650 mg q.8 hours, lidocaine patch qHS, prn tramadol  - pain management referral placed, soonest appointment set was 4 4/3    Hypothyroid  - TSH slightly elevated with normal free T4  - stable, Continue home levothyroxine 50 mcg daily    Age-related osteoporosis without current pathological fracture   - per PCP notes, patient is supposed to be on Prolia.  Do not see on dispensed report    Advance care planning  - ACP discussion held with patient and her daughter on 03/10, per daughter patient had living will that states patient is a DNR    Debility   - Continue with PT/OT for gait training and strengthening and restoration of ADL's   - Encourage mobility, OOB in chair, and early ambulation as appropriate  - Fall precautions   - Monitor for bowel and bladder dysfunction  - Monitor for and prevent skin breakdown and pressure ulcers  - Continue DVT prophylaxis with       Anticipate disposition:  Patient's daughter states she will be discharging to an [facility]    IP [facility] RISK OF UNPLANNED READMISSION Model: MODERATE     Follow-up needed during SNF stay-    Follow-up needed after discharge from SNF: PCP     Future Appointments   Date Time Provider Department  Center   4/24/2025  2:20 PM Sierra Gibson DO [facility]INMA [place]   5/13/2025 12:00 PM Danielle Vaughan FNP [facility] IM [address] [facility]   5/15/2025 11:00 AM Lucila Paez Au.D, CCC-A [facility] AUDIO [address]   5/15/2025 11:30 AM Karlos Muniz MD [facility] ENT [address]   5/28/2025  2:15 PM Colten Oleary DPM [facility] POD [address] [facility]   6/2/2025  2:45 PM Kristian Shabazz MD [facility] [place]   7/21/2025  2:45 PM Kristian Shabazz MD [facility] [place]     I certify that SNF services are required to be given on an inpatient basis because Isabel Hernandez needs for skilled nursing care and/or skilled rehabilitation are required on a daily basis and such services can only practically be provided in a skilled nursing facility setting and are for an ongoing condition for which she received inpatient care in the hospital.     I spent 49 minutes reviewing patient records, examining, and counseling the patient/ patient's family with greater than 50% of the time spent in direct patient care and coordination.  Facility paperwork filled out.      Radha Spencer NP  Department of Hospital Medicine   [facility]     DOS:3/27/2025      Patient note was created using MModal Dictation.  Any errors in syntax or even information may not have been identified and edited on initial review prior to signing this note.

## 2025-03-27 NOTE — PT/OT/SLP PROGRESS
"Physical Therapy Treatment    Patient Name:  Isabel Hernandez   MRN:  [id number]  Admit Date: 3/7/2025  Admitting Diagnosis: Acute cystitis without hematuria  Recent Surgeries:     General Precautions: Standard, fall  Orthopedic Precautions: N/A  Braces: N/A    Recommendations:     Discharge Recommendations: home health PT  Level of Assistance Recommended at Discharge: 24 hours light assistance  Discharge Equipment Recommendations: walker, rolling, wheelchair, bath bench  Barriers to discharge: Inaccessible home environment    Assessment:     Isabel Hernandez is a 89 y.o. female admitted with a medical diagnosis of Acute cystitis without hematuria . Pt tolerated well, family trng with daughter today, pt would continue to benefit from skilled PT services to improve overall functional mobility, strength and endurance.  .      Performance deficits affecting function: weakness, impaired endurance, impaired self care skills, impaired functional mobility, gait instability, impaired balance, decreased upper extremity function, decreased lower extremity function, impaired cardiopulmonary response to activity.    Rehab Potential is good    Activity Tolerance: Fair    Plan:     Patient to be seen 5 x/week to address the above listed problems via gait training, therapeutic activities, therapeutic exercises, neuromuscular re-education, wheelchair management/training    Plan of Care Expires: 04/07/25  Plan of Care Reviewed with: patient, daughter    Subjective     "So so" pt agreeable to therapy/trng.     Pain/Comfort:  Pain Rating 1: 0/10  Pain Rating Post-Intervention 1: 0/10    Patient's cultural, spiritual, Adventist conflicts given the current situation:  no    Objective:     Communicated with nsg post session. Daughter requesting to keep HOB elev 2* to inc dizziness when laying flat  Patient found  with  (in wc, daughter present) upon PT entry to room.     Therapeutic Activities and Exercises: ed/demo seated therex AP,GS,LAQ,hip " "flex,abd/add    Patient and family educated on safety/proper tech with overall functional mobility, to include bed mob, trfs, gait, therex, pain free ROM, WC mechanics/mobility,  fall precautions, emphasis on inc fall risk with dizziness, steps,curb, level of A/S required upon D/C for patient care, also level of assistance may fluctuate pending environment/fatigue/pain,alertness, etc.. Discussed DME, HHPT. All questions answered within PTA scope of practice, all verbalized understanding, Deferred discharge questions and medical questions to SW/NP/MD.      Functional Mobility:  Bed Mobility:     Supine to Sit: contact guard assistance and HOB elev and rail inc time 2* to reports of dizziness (nsg/NP aware)  Sit to Supine: stand by assistance and contact guard assistance  Transfers:     Sit to Stand:  stand by assistance with rolling walker and vcs to ensure brakes  Bed to Chair: stand by assistance and contact guard assistance with  no AD  using  Stand Pivot and WC to EOB to BSC  Gait: amb with RW CGA wc follow 50 ft   Stairs:  asc/david 4 steps with BHR CGA vcs for safety/tech  Curb asc/david 4" curb with RW CGA vcs for safety/tech  Wheelchair Propulsion:  ~25 ft with BUE CG/SBA vcs for safety/tech/turns    AM-PAC 6 CLICK MOBILITY  18    Patient left up in chair with call button in reach, camera notified, daughter present, and belongings in reach .    GOALS:   Multidisciplinary Problems       Physical Therapy Goals          Problem: Physical Therapy    Goal Priority Disciplines Outcome Interventions   Physical Therapy Goal     PT, PT/OT Progressing    Description: Goals to be met by: 25     Patient will increase functional independence with mobility by performin. Supine to sit with Supervision  2. Sit to supine with Supervision  3. Rolling to Left and Right with Supervision  4. Sit to stand transfer with Supervision  5. Bed to chair transfer with Supervision using Rolling Walker  6. Gait  x 150 feet with " "Stand-by Assistance using Rolling Walker  7. Wheelchair propulsion x 50 feet with Supervision using bilateral upper extremities  8. Ascend/descend 4 stairs with bilateral Handrail and Contact Guard Assistance using No Assistive Device.  9. Ascend/Descend 4 inch curb step with Contact Guard Assistance using Rolling Walker    Rehab Services' DME recommendations    Walker Adult (5'1"-6"6")    Wheels Yes    Justification for walker: Mobility limitation cannot be sufficiently resolved by use of a cane/patient cannot safely ambulate with a cane, Patient's functional mobility deficit can be sufficiently resolved with the use of a Rolling Walker of Walker, Patient's mobility limitation significantly impairs their ability to participate in one of more activities of daily living, The use of a Rolling Walker or Walker will significantly improve the patient's ability to participate in MRADLS and the patient will use it on a regular basis    Wheelchair  Number of hours up in a wheelchair per day 8      Style Light weight    Justification for light weight w/c: patient cannot propel in a standard wheelchair, patient can and does self-propel in a lightweight wheelchair, patient has impaired ability to participate in MRADLs, mobility limitations cannot be sifficiently resolved with a cane/walker, the home provides adequate access between rooms for a wheelchair, a wheelchair will significantly improve the ability to participate in MRADLs and will be used in the home on a regular basis, the patient is willing to use a wheelchair in the home, the patient has a caregiver who is available, willing, and able to provide assistance with the wheelchair, and the patient requires additional person for safety with mobility with walker  Seat Width 18 (Standard adult)  Seat Depth 18  Back Height Standard  Leg Support Standard, Heel Loops, and Swing Away  Arm Height Full and Swing Away  Lap Belt Velcro  Accessories Anti-tippers and Safety " belt  Cushion Basic  Justification for Cushion maintain skin integrity    Tub bench      Galina Nugent, PTA 3/27/2025                             Time Tracking:     PT Received On: 03/27/25  PT Start Time: 1339  PT Stop Time: 1419  PT Total Time (min): 40 min    Billable Minutes: Therapeutic Activity 40    Treatment Type: Treatment, Family Training  PT/PTA: PTA     Number of PTA visits since last PT visit: 3     03/27/2025

## 2025-03-27 NOTE — PLAN OF CARE
Patient assessed for use of telemonitor. Patient denies any of the following: fear, anxiety or agitation related to alarm sound, no decreased mobility, no sleep disturbances, or infringement on freedom of movement, dignity, or privacy. Appropriate to continue use of telemonitor.  BIMS= 4  Transfer status= SBA/CGA  Patient is not able to safely transfer or maintain standing balance independently.

## 2025-03-28 PROCEDURE — 11000004 HC SNF PRIVATE: Mod: HCNC

## 2025-03-28 PROCEDURE — 97530 THERAPEUTIC ACTIVITIES: CPT | Mod: HCNC

## 2025-03-28 PROCEDURE — 97116 GAIT TRAINING THERAPY: CPT | Mod: HCNC,CQ

## 2025-03-28 PROCEDURE — 97110 THERAPEUTIC EXERCISES: CPT | Mod: HCNC,CQ

## 2025-03-28 PROCEDURE — 97110 THERAPEUTIC EXERCISES: CPT | Mod: HCNC

## 2025-03-28 PROCEDURE — 25000003 PHARM REV CODE 250: Mod: HCNC | Performed by: FAMILY MEDICINE

## 2025-03-28 PROCEDURE — 25000003 PHARM REV CODE 250: Mod: HCNC | Performed by: HOSPITALIST

## 2025-03-28 RX ADMIN — LEVOTHYROXINE SODIUM 50 MCG: 0.05 TABLET ORAL at 06:03

## 2025-03-28 RX ADMIN — CALCIUM CARBONATE (ANTACID) CHEW TAB 500 MG 500 MG: 500 CHEW TAB at 10:03

## 2025-03-28 RX ADMIN — ONDANSETRON 4 MG: 4 TABLET, ORALLY DISINTEGRATING ORAL at 09:03

## 2025-03-28 NOTE — PLAN OF CARE
Problem: Occupational Therapy  Goal: Occupational Therapy Goal  Description: Goals to be met by: 3/29/25     Patient will increase functional independence with ADLs by performing:    Feeding with Venus. -Ongoing  UE Dressing with Venus.-REVISED  -UE Dressing /c SPV  LE Dressing with Venus.--Ongoing  -LE Dressing /c SPV  Grooming while standing with Modified Venus.--REVISED  -Grooming while standing /c SPV  Toileting from toilet with Modified Indep. for hygiene and clothing management.--REVISED  -Toileting from toilet /c SPV- Ongoing  Bathing from shower chair/bench with Supervision.--Ongoing  Sitting at edge of bed x10 minutes with Venus  Step transfer with Modified Venus--REVISED  -Step transfer /c SPV- Ongoing  Toilet transfer to toilet with Modified Venus.---REVISED  -Toilet transfer to toilet /c SPV    Outcome: Progressing    Goals reassessed and edited to reflect current level of function.

## 2025-03-28 NOTE — PROGRESS NOTES
[facility]                                  Skilled Nursing Facility                   Progress Note     Admit Date: 3/7/2025  MURPHY 2025  [id number]  Principal Problem:  Acute cystitis without hematuria   HPI obtained from patient interview and chart review     Chief Complaint:Re-evaluation of medical treatment and therapy status, dizziness, back pain    HPI:   Isabel Hernandez is an 89 year old female with PMHx of thyroid disease, stage II CKD, history of compression fractures who presents to SNF following hospitalization for acute cystitis.  Admission to SNF for secondary weakness and debility.    Interval history: All of today's labs reviewed and are listed below.  24 hr vital sign ranges reviewed and listed below.  BP soft.   Back pain is persisting but improved, patient was set up with pain management clinic as outpatient.  Dizziness also continues to persist, patient was set up with soonest ENT appointment.  Continue to encourage patient to drink water.  Patient denies shortness of breath, abdominal discomfort, nausea, or vomiting.  Patient reports an ok appetite.  Patient denies dysuria.  Patient reports having regular bowel movements.  Patient progressing with PT/OT- patient ambulated with RW CGA wc follow 50 ft. Continuing to follow and treat all acute and chronic conditions.  Patient is being worked up for nursing home/[facility] placement    Past Medical History: Patient has a past medical history of Arthritis, Cataract, Hypothyroidism, Mild malnutrition (3/10/2025), and Osteoporosis (2015).    Past Surgical History: Patient has a past surgical history that includes Thyroidectomy, partial; Hysterectomy; Cholecystectomy; Tonsillectomy; Adenoidectomy; Breast surgery (Bilateral);  section; Vitrectomy by pars plana approach (Left, 2018); and Appendectomy.    Social History: Patient reports that she has never smoked.  "She has never used smokeless tobacco. She reports that she does not drink alcohol and does not use drugs.    Family History: family history includes Cancer in her brother, father, and sister; Cataracts in her father; Glaucoma in her father; Hypertension in her mother; Kidney disease in her mother; Miscarriages / Stillbirths in her mother; No Known Problems in her brother and daughter; Thyroid disease in her mother.    Allergies: Patient is allergic to codeine, fosamax [alendronate], and iron analogues.    ROS  Constitutional: Negative for fever   Respiratory: Negative for cough, shortness of breath   Cardiovascular: Negative for chest pain, palpitations  Gastrointestinal: Negative for abdominal pain, constipation, diarrhea, vomiting. + intermittent nausea  Genitourinary: Negative for dysuria, frequency   Musculoskeletal:  + generalized weakness.  +back pain  Neurological: Negative for  headaches.  + intermittent dizziness  Psychiatric/Behavioral: Negative for depression. The patient is nervous/anxious.      24 hour Vital Sign Range   Temp:  [98.1 °F (36.7 °C)]   Pulse:  [68-94]   Resp:  [17-18]   BP: (125-127)/(59-60)   SpO2:  [95 %-98 %]     Current BMI: Body mass index is 21.81 kg/m².    PEx  Constitutional: Patient appears debilitated.  mild distress noted  Cardiovascular: Normal rate, regular rhythm and normal heart sounds.    Pulmonary/Chest: Effort normal and breath sounds are clear  Abdominal: Soft. Bowel sounds are normal.   Musculoskeletal: Normal range of motion.   Neurological: Alert and oriented to person, place.  Disoriented to time.  Impaired higher level thinking  Psychiatric: confused, anxious   Skin: Skin is warm and dry.     No results for input(s): "GLUCOSE", "NA", "K", "CL", "CO2", "BUN", "CREATININE", "CALCIUM", "MG" in the last 24 hours.          No results for input(s): "WBC", "RBC", "HGB", "HCT", "PLT", "MCV", "MCH", "MCHC" in the last 24 hours.          No results for input(s): "POCTGLUCOSE" " "in the last 168 hours.       Assessment and Plan:    Acute cystitis without hematuria  - Presenting with nausea/ generalized weakness/ lightheadedness and suprapubic discomfort and confusion   - CT AP without acute findings   - UA infectious, Urine culture with mixed growth; no predominant organism  - treated with IV ceftriaxone empirically  - completed cefpodoxime 100 mg BID, ended 3/13    Nausea and vomiting  - Suspected related to UTI or possibly viral illness   - CT AP without acute findings  - Flu/Covid negative   - continue Zofran PRN     Dizziness  - possible vertigo  - CTH negative  - Per [facility], "Given concern for posterior circulation CVA that could be the etiology of her dizziness we did offer MR brain, but she declined." Per daughter, MRI was ordered and scheduled for day of discharge and was not able to be completed prior to transportation showing up for them to transferred to SNF, will have rescheduled  - possible ENT follow-up  - persisting, meclizine 12.5 mg TID DC'ed per daughters request   - MRI completed, no acute findings   - ENT referral placed, soonest appointment was set for 5/15    Dementia without behavioral disturbance, psychotic disturbance, mood disturbance, or anxiety, unspecified dementia severity, unspecified dementia type   - per past PCP notes patient has opted out of medication for dementia  Delirium precautions:  - Avoid antihistamines, anticholinergics, hypnotics, and minimize opiates while controlling for pain as these medications may exacerbate delirium. Cues for day/night will assist with keeping patient calm and oriented - during daytime, please keep adequate light in room (open windows, lights on) and please keep room dim at night-time to encourage normal sleep-wake cycles. Continuing to have nursing and family reorient the patient and encourage family to visit  - per daughter patient has not yet at her mental baseline patient is typically more oriented.      CKD Stage 3   - " stable, continue monitor twice weekly BMPs, avoid nephrotoxic agents, renally dose medications as appropriate    Back pain  - XR (3/15) Five non rib-bearing lumbar type vertebral bodies are present. There is widening of the intervertebral joint space between L4 and L5, unchanged when compared to 03/05/2025. Multiple compression deformities in the lower thoracic spine are unchanged when compared to prior imaging. Degenerative changes are seen throughout the spine, as before. Facet arthropathy of the lower lumbar spine. The abdominal aorta is heavily calcified.   - continue acetaminophen 650 mg q.8 hours, lidocaine patch qHS, prn tramadol  - pain management referral placed, soonest appointment set was 4 4/3    Hypothyroid  - TSH slightly elevated with normal free T4  - stable, Continue home levothyroxine 50 mcg daily    Age-related osteoporosis without current pathological fracture   - per PCP notes, patient is supposed to be on Prolia.  Do not see on dispensed report    Advance care planning  - ACP discussion held with patient and her daughter on 03/10, per daughter patient had living will that states patient is a DNR    Debility   - Continue with PT/OT for gait training and strengthening and restoration of ADL's   - Encourage mobility, OOB in chair, and early ambulation as appropriate  - Fall precautions   - Monitor for bowel and bladder dysfunction  - Monitor for and prevent skin breakdown and pressure ulcers  - Continue DVT prophylaxis with       Anticipate disposition:  Patient's daughter states she will be discharging to an [facility]    IP [facility] RISK OF UNPLANNED READMISSION Model: MODERATE     Follow-up needed during SNF stay-    Follow-up needed after discharge from SNF: PCP, pain management (4/24), ENT (5/15)     Future Appointments   Date Time Provider Department Center   4/24/2025  2:20 PM Sierra Gibson DO [facility] PAINMA [place]   5/13/2025 12:00 PM Danielle Vaughan FNP [facility]  [address]    5/15/2025 11:00 AM Lucila Paez Au.D, CCC-A [facility] AUDIO [address]   5/15/2025 11:30 AM Karlos Muniz MD [facility] ENT [address]   5/28/2025  2:15 PM Colten Oleary DPM [facility] POD [address]   6/2/2025  2:45 PM Kristian Shabazz MD [facility] PRICRE [place]   7/21/2025  2:45 PM Kristian Shabazz MD [facility] [place]     I certify that SNF services are required to be given on an inpatient basis because Isabel Hernandez needs for skilled nursing care and/or skilled rehabilitation are required on a daily basis and such services can only practically be provided in a skilled nursing facility setting and are for an ongoing condition for which she received inpatient care in the hospital.     I spent 46 minutes reviewing patient records, examining, and counseling the patient/ patient's family with greater than 50% of the time spent in direct patient care and coordination.  Care coordinated with staff      Radha Spencer NP  Department of Hospital Medicine   [facility]     DOS:3/28/2025      Patient note was created using MModal Dictation.  Any errors in syntax or even information may not have been identified and edited on initial review prior to signing this note.

## 2025-03-28 NOTE — PT/OT/SLP PROGRESS
"Physical Therapy Treatment    Patient Name:  Isabel Hernandez   MRN:  [id number]  Admit Date: 3/7/2025  Admitting Diagnosis: Acute cystitis without hematuria  Recent Surgeries:     General Precautions: Standard, fall  Orthopedic Precautions: N/A  Braces: N/A    Recommendations:     Discharge Recommendations: home health PT  Level of Assistance Recommended at Discharge: 24 hours light assistance  Discharge Equipment Recommendations: walker, rolling, wheelchair, bath bench  Barriers to discharge: Inaccessible home environment    Assessment:     Isabel Hernandez is a 89 y.o. female admitted with a medical diagnosis of Acute cystitis without hematuria . Pt tolerated fairly well, continues with c/o nausea/dizziness off/on, nsg notified, pt would continue to benefit from skilled PT services to improve overall functional mobility, strength and endurance.  .      Performance deficits affecting function: weakness, impaired endurance, impaired self care skills, impaired functional mobility, gait instability, impaired balance, decreased upper extremity function, decreased lower extremity function, impaired cardiopulmonary response to activity.    Rehab Potential is good    Activity Tolerance: Fair    Plan:     Patient to be seen 5 x/week to address the above listed problems via gait training, therapeutic activities, therapeutic exercises, neuromuscular re-education, wheelchair management/training    Plan of Care Expires: 04/07/25  Plan of Care Reviewed with: patient, daughter    Subjective     "I'm OK'. "Just the nausea" pt agreeable to therapy "what you want me to do?"    Pain/Comfort:  Pain Rating 1: 0/10  Pain Rating Post-Intervention 1: 0/10    Patient's cultural, spiritual, Gnosticism conflicts given the current situation:  no    Objective:     Communicated with nsg post  session.  Patient found  with  (in wc) upon PT entry to room.     Therapeutic Activities and Exercises: 2x10 reps AP,LAQ,hip flex mini elliptical x 5 " "minutes    Functional Mobility:  Transfers:     Sit to Stand:  stand by assistance and contact guard assistance with rolling walker  Toilet Transfer: stand by assistance and contact guard assistance with  no AD and grab bars  using  Stand Pivot and WC to C   CGA while pt managed ant doug care in standing and she manage brief and pants, set up for hand hygiene in sitting  Gait: amb with RW CGA 63 ft no LOB limited by fatigue/nausea    AM-PAC 6 CLICK MOBILITY  18    Patient left up in chair with call button in reach, camera notified, daughter present belonging sin reach .    GOALS:   Multidisciplinary Problems       Physical Therapy Goals          Problem: Physical Therapy    Goal Priority Disciplines Outcome Interventions   Physical Therapy Goal     PT, PT/OT Progressing    Description: Goals to be met by: 25     Patient will increase functional independence with mobility by performin. Supine to sit with Supervision  2. Sit to supine with Supervision  3. Rolling to Left and Right with Supervision  4. Sit to stand transfer with Supervision  5. Bed to chair transfer with Supervision using Rolling Walker  6. Gait  x 150 feet with Stand-by Assistance using Rolling Walker  7. Wheelchair propulsion x 50 feet with Supervision using bilateral upper extremities  8. Ascend/descend 4 stairs with bilateral Handrail and Contact Guard Assistance using No Assistive Device.  9. Ascend/Descend 4 inch curb step with Contact Guard Assistance using Rolling Walker    Rehab Services' DME recommendations    Walker Adult (5'1"-6"6")    Wheels Yes    Justification for walker: Mobility limitation cannot be sufficiently resolved by use of a cane/patient cannot safely ambulate with a cane, Patient's functional mobility deficit can be sufficiently resolved with the use of a Rolling Walker of Walker, Patient's mobility limitation significantly impairs their ability to participate in one of more activities of daily living, The use of " a Rolling Walker or Walker will significantly improve the patient's ability to participate in MRADLS and the patient will use it on a regular basis    Wheelchair  Number of hours up in a wheelchair per day 8      Style Light weight    Justification for light weight w/c: patient cannot propel in a standard wheelchair, patient can and does self-propel in a lightweight wheelchair, patient has impaired ability to participate in MRADLs, mobility limitations cannot be sifficiently resolved with a cane/walker, the home provides adequate access between rooms for a wheelchair, a wheelchair will significantly improve the ability to participate in MRADLs and will be used in the home on a regular basis, the patient is willing to use a wheelchair in the home, the patient has a caregiver who is available, willing, and able to provide assistance with the wheelchair, and the patient requires additional person for safety with mobility with walker  Seat Width 18 (Standard adult)  Seat Depth 18  Back Height Standard  Leg Support Standard, Heel Loops, and Swing Away  Arm Height Full and Swing Away  Lap Belt Velcro  Accessories Anti-tippers and Safety belt  Cushion Basic  Justification for Cushion maintain skin integrity    Tub bench      Galina Nugent, PTA 3/27/2025                             Time Tracking:     PT Received On: 03/28/25  PT Start Time: 1337  PT Stop Time: 1405  PT Total Time (min): 28 min    Billable Minutes: Gait Training 10 and Therapeutic Exercise 18    Treatment Type: Treatment  PT/PTA: PTA     Number of PTA visits since last PT visit: 4     03/28/2025

## 2025-03-28 NOTE — PT/OT/SLP PROGRESS
Occupational Therapy   Treatment    Name: Isabel Hernandez  MRN: [id number]  Admit Date: 3/7/2025  Admitting Diagnosis:  Acute cystitis without hematuria    General Precautions: Standard, aspiration, fall   Orthopedic Precautions: N/A   Braces: N/A    Recommendations:     Discharge Recommendations:  home with home health  Level of Assistance Recommended at Discharge: 24 hours light assistance for ADL's and homemaking tasks  Discharge Equipment Recommendations: walker, rolling, wheelchair, bath bench  Barriers to discharge:  Decreased caregiver support    Assessment:     Isabel Hernandez is a 89 y.o. female with a medical diagnosis of Acute cystitis without hematuria. Pt tolerated session well and without incident and shows excellent motivation and potential to improve, but the pt continues to require assistance to perform self-care tasks and mobility. Pt strengths include Attention to task and Family support, Motivation, and Willingness to participate. Pt initially complaining of nausea, however willing to participate /c encouragement from daughter. However, pt would continue to benefit from cont'd OT services in the SNF setting to improve safety and independence /c functional tasks and ADLs upon discharge. Performance deficits affecting function are weakness, impaired endurance, impaired functional mobility, gait instability, impaired balance, impaired cognition, decreased safety awareness.     Rehab Potential is good    Activity tolerance:  Good    Plan:     Patient to be seen 5 x/week to address the above listed problems via self-care/home management, therapeutic activities, therapeutic exercises    Plan of Care Expires: 03/29/25  Plan of Care Reviewed with: patient    Subjective     Communicated with: BOLA prior to session.      Pain/Comfort:  Pain Rating 1: 0/10  Pain Rating Post-Intervention 1: 0/10    Patient's cultural, spiritual, Christianity conflicts given the current situation:  no    Objective:     Patient found  up in chair with  (No active lines) upon OT entry to room. Pt alert and agreeable to therapy.        Functional Mobility/Transfers:  Patient completed Sit <> Stand Transfer with stand by assistance  with  rolling walker   Patient completed Bed <> Chair Transfer using Step Transfer technique with stand by assistance with rolling walker  Functional Mobility: Pt ambulated about 20 feet /c CGA and RW      AMPAC 6 Click ADL: 18    OT Exercises:   -Pt participated in seated activity to improve functional reach, coordination, visual scanning FMS, and error recognition and correction. Pt asked to place objects on matching targets at requiring various degrees of functional reach. Pt completed /c 3 errors req vcs for recognition and correction. Extended time req.    -Pt participated in 12 minute UBE activity seated in WC at moderate resistance to address endurance and strength of UE to improve performance of ADLs and other functional tasks. Short rest breaks taken throughout.    Treatment & Education:  Pt educated on POC, role of OT, and safety. Pt performed tasks to improve safety and independence in functional tasks and ADLs as mentioned above.       Patient left up in chair with PTA Galina present and all needs met    GOALS:   Multidisciplinary Problems       Occupational Therapy Goals          Problem: Occupational Therapy    Goal Priority Disciplines Outcome Interventions   Occupational Therapy Goal     OT, PT/OT Progressing    Description: Goals to be met by: 3/29/25     Patient will increase functional independence with ADLs by performing:    Feeding with [place]. -Ongoing  UE Dressing with [place].-REVISED  -UE Dressing /c SPV  LE Dressing with [place].--Ongoing  -LE Dressing /c SPV  Grooming while standing with Modified [place].--REVISED  -Grooming while standing /c SPV  Toileting from toilet with Modified Indep. for hygiene and clothing management.--REVISED  -Toileting from toilet /c SPV-  Ongoing  Bathing from shower chair/bench with Supervision.--Ongoing  Sitting at edge of bed x10 minutes with [place]  Step transfer with Modified [place]--REVISED  -Step transfer /c SPV- Ongoing  Toilet transfer to toilet with Modified [place].---REVISED  -Toilet transfer to toilet /c SPV                         Time Tracking:     OT Date of Treatment: 03/28/25  OT Start Time: 1256    OT Stop Time: 1334  OT Total Time (min): 38 min    Billable Minutes:Therapeutic Activity 23  Therapeutic Exercise 15    3/28/2025

## 2025-03-29 PROCEDURE — 11000004 HC SNF PRIVATE: Mod: HCNC

## 2025-03-29 PROCEDURE — 94761 N-INVAS EAR/PLS OXIMETRY MLT: CPT | Mod: HCNC

## 2025-03-29 PROCEDURE — 25000003 PHARM REV CODE 250: Mod: HCNC | Performed by: NURSE PRACTITIONER

## 2025-03-29 PROCEDURE — 25000003 PHARM REV CODE 250: Mod: HCNC | Performed by: HOSPITALIST

## 2025-03-29 RX ADMIN — CALCIUM CARBONATE (ANTACID) CHEW TAB 500 MG 500 MG: 500 CHEW TAB at 08:03

## 2025-03-29 RX ADMIN — ACETAMINOPHEN 650 MG: 325 TABLET ORAL at 08:03

## 2025-03-29 RX ADMIN — ACETAMINOPHEN 650 MG: 325 TABLET ORAL at 09:03

## 2025-03-29 RX ADMIN — LEVOTHYROXINE SODIUM 50 MCG: 0.05 TABLET ORAL at 05:03

## 2025-03-29 NOTE — PLAN OF CARE
Problem: Adult Inpatient Plan of Care  Goal: Plan of Care Review  3/29/2025 1301 by Hyacinth Little RN  Outcome: Progressing  3/29/2025 1301 by Hyacinth Little RN  Outcome: Not Progressing  Goal: Patient-Specific Goal (Individualized)  3/29/2025 1301 by Hyacinth Little RN  Outcome: Progressing  3/29/2025 1301 by Hyacinth Little RN  Outcome: Not Progressing  Goal: Absence of Hospital-Acquired Illness or Injury  3/29/2025 1301 by Hyacinth Little RN  Outcome: Progressing  3/29/2025 1301 by Hyacinth Little RN  Outcome: Not Progressing  Goal: Optimal Comfort and Wellbeing  3/29/2025 1301 by Hyacinth Little RN  Outcome: Progressing  3/29/2025 1301 by Hyacinth Little RN  Outcome: Not Progressing  Goal: Readiness for Transition of Care  3/29/2025 1301 by Hyacinth Little RN  Outcome: Progressing  3/29/2025 1301 by Hyacinth Little RN  Outcome: Not Progressing     Problem: Fall Injury Risk  Goal: Absence of Fall and Fall-Related Injury  3/29/2025 1301 by Hyacinth Little RN  Outcome: Progressing  3/29/2025 1301 by Hyacinth Little RN  Outcome: Not Progressing     Problem: Skin Injury Risk Increased  Goal: Skin Health and Integrity  3/29/2025 1301 by Hyacinth Little RN  Outcome: Progressing  3/29/2025 1301 by Hyacinth Little RN  Outcome: Not Progressing     Problem: Malnutrition  Goal: Improved Nutritional Intake  3/29/2025 1301 by Hyacinth Little RN  Outcome: Progressing  3/29/2025 1301 by Hyacinth Little RN  Outcome: Not Progressing     Problem: Electrolyte Imbalance  Goal: Electrolyte Balance  3/29/2025 1301 by Hyacinth Little RN  Outcome: Progressing  3/29/2025 1301 by Hyacinth Little RN  Outcome: Not Progressing     Problem: Infection  Goal: Absence of Infection Signs and Symptoms  3/29/2025 1301 by Hyacinth Little RN  Outcome: Progressing  3/29/2025 1301 by Hyacinth Little RN  Outcome: Not Progressing     Problem: Dementia Signs/Symptoms  Goal: Optimized Cognitive Function (Dementia Signs/Symptoms)  3/29/2025 1301 by  Hyacinth Little, RN  Outcome: Progressing  3/29/2025 1301 by Hyacinth Little, RN  Outcome: Not Progressing

## 2025-03-30 PROCEDURE — 11000004 HC SNF PRIVATE: Mod: HCNC

## 2025-03-30 PROCEDURE — 25000003 PHARM REV CODE 250: Mod: HCNC | Performed by: NURSE PRACTITIONER

## 2025-03-30 PROCEDURE — 25000003 PHARM REV CODE 250: Mod: HCNC | Performed by: FAMILY MEDICINE

## 2025-03-30 PROCEDURE — 94761 N-INVAS EAR/PLS OXIMETRY MLT: CPT | Mod: HCNC

## 2025-03-30 PROCEDURE — 25000003 PHARM REV CODE 250: Mod: HCNC | Performed by: HOSPITALIST

## 2025-03-30 RX ORDER — FAMOTIDINE 20 MG/1
20 TABLET, FILM COATED ORAL DAILY
Status: DISCONTINUED | OUTPATIENT
Start: 2025-03-30 | End: 2025-04-04 | Stop reason: HOSPADM

## 2025-03-30 RX ORDER — BISACODYL 5 MG
10 TABLET, DELAYED RELEASE (ENTERIC COATED) ORAL NIGHTLY
Status: COMPLETED | OUTPATIENT
Start: 2025-03-30 | End: 2025-03-30

## 2025-03-30 RX ADMIN — ACETAMINOPHEN 650 MG: 325 TABLET ORAL at 02:03

## 2025-03-30 RX ADMIN — FAMOTIDINE 20 MG: 20 TABLET, FILM COATED ORAL at 11:03

## 2025-03-30 RX ADMIN — ACETAMINOPHEN 650 MG: 325 TABLET ORAL at 08:03

## 2025-03-30 RX ADMIN — BISACODYL 10 MG: 5 TABLET, COATED ORAL at 08:03

## 2025-03-30 RX ADMIN — LEVOTHYROXINE SODIUM 50 MCG: 0.05 TABLET ORAL at 06:03

## 2025-03-30 NOTE — PROGRESS NOTES
[facility]                                  Skilled Nursing Facility                   Progress Note     Admit Date: 3/7/2025  MURPHY 4/4/2025  [id number] A  Principal Problem:  Acute cystitis without hematuria   HPI obtained from patient interview and chart review     Chief Complaint:Re-evaluation of medical treatment and therapy status, constipation evaluation, nausea, belching    HPI:   Isabel Hernandez is an 89 year old female with PMHx of thyroid disease, stage II CKD, history of compression fractures who presents to SNF following hospitalization for acute cystitis.  Admission to SNF for secondary weakness and debility.    Interval history:  24 hr vital sign ranges reviewed and listed below.  Stable mild hypotension, SBP ranging from 111-117.  Dizziness ongoing, patient has been scheduled for the soonest ENT appointment.  Continue to encourage fluids.  Back pain is tolerable.  She will be seen in pain management clinic outpatient.  Last Bowel Movement: 03/27/25, 3 days ago. Patient sitting up in bed with emesis bag, belching frequently and complaining of nausea this morning. KUB ordered.  PRN doug colace has not been given since 3/20. Order placed for 1 time dose 10 mg EC Bisacodyl PO tonight. Will adjust per KUB results when av liable.  Recommended to patient and nurse she may benefit from a prn Maalox dose in addition to doug colace. She also has prn Tums. Initiate 20 mg Pepcid daily starting this morning - dosed per Cr Cl. Patient denies shortness of breath, abdominal discomfort, nausea, or vomiting.  Patient reports an ok appetite.  Patient denies dysuria.  Patient progressing with PT/OT- patient ambulated with [facility] wc follow 50 ft. Continuing to follow and treat all acute and chronic conditions.  Patient is being worked up for nursing home/USP placement    Past Medical History: Patient has a past medical history of Arthritis,  Cataract, Hypothyroidism, Mild malnutrition (3/10/2025), and Osteoporosis (2015).    Past Surgical History: Patient has a past surgical history that includes Thyroidectomy, partial; Hysterectomy; Cholecystectomy; Tonsillectomy; Adenoidectomy; Breast surgery (Bilateral);  section; Vitrectomy by pars plana approach (Left, 2018); and Appendectomy.    Social History: Patient reports that she has never smoked. She has never used smokeless tobacco. She reports that she does not drink alcohol and does not use drugs.    Family History: family history includes Cancer in her brother, father, and sister; Cataracts in her father; Glaucoma in her father; Hypertension in her mother; Kidney disease in her mother; Miscarriages / Stillbirths in her mother; No Known Problems in her brother and daughter; Thyroid disease in her mother.    Allergies: Patient is allergic to codeine, fosamax [alendronate], and iron analogues.    ROS  Constitutional: Negative for fever   Respiratory: Negative for cough, shortness of breath   Cardiovascular: Negative for chest pain, palpitations  Gastrointestinal: Negative for abdominal pain, constipation, diarrhea, vomiting. + intermittent nausea, belching  Genitourinary: Negative for dysuria, frequency   Musculoskeletal:  + generalized weakness.  +back pain  Neurological: Negative for  headaches. + intermittent dizziness  Psychiatric/Behavioral: Negative for depression. The patient is nervous/anxious.      24 hour Vital Sign Range   Temp:  [98.1 °F (36.7 °C)-98.5 °F (36.9 °C)]   Pulse:  [67-74]   Resp:  [18]   BP: (111-113)/(53-54)   SpO2:  [96 %]     Current BMI: Body mass index is 21.81 kg/m².    PEx  Constitutional: Patient appears debilitated.  mild distress noted  Cardiovascular: Normal rate, regular rhythm and normal heart sounds.    Pulmonary/Chest: Effort normal and breath sounds are clear  Abdominal: Soft. Bowel sounds are normal.   Musculoskeletal: Normal range of motion.  "  Neurological: Alert and oriented to person, place.  Disoriented to time.  Impaired higher level thinking  Psychiatric: confused, anxious   Skin: Skin is warm and dry.     No results for input(s): "GLUCOSE", "NA", "K", "CL", "CO2", "BUN", "CREATININE", "CALCIUM", "MG" in the last 24 hours.          No results for input(s): "WBC", "RBC", "HGB", "HCT", "PLT", "MCV", "MCH", "MCHC" in the last 24 hours.          No results for input(s): "POCTGLUCOSE" in the last 168 hours.       Assessment and Plan:    Acute cystitis without hematuria  - Presenting with nausea/ generalized weakness/ lightheadedness and suprapubic discomfort and confusion   - CT AP without acute findings   - UA infectious, Urine culture with mixed growth; no predominant organism  - treated with IV ceftriaxone empirically  - completed cefpodoxime 100 mg BID, ended 3/13    Nausea and vomiting  - Suspected related to UTI or possibly viral illness   - CT AP without acute findings  - Flu/Covid negative   - continue Zofran PRN     Belching  Constipation, new 3/30  Last Bowel Movement: 03/27/25, 3 days ago.   - KUB ordered.    - PRN doug colace has not been given since 3/20.   - Order placed for 1 time dose 10 mg EC Bisacodyl PO tonight.  - she may benefit from a prn Maalox dose in addition to doug colace.   - She also has prn Tums.  - Initiate 20 mg Pepcid daily starting this morning - dosed per Cr Cl.  - Encourage fluid intake  - Encourage safe physical activity as tolerated  - Monitor bowel movement regularity and consistency    Dizziness  - possible vertigo  - CTH negative  - Per [facility], "Given concern for posterior circulation CVA that could be the etiology of her dizziness we did offer MR brain, but she declined." Per daughter, MRI was ordered and scheduled for day of discharge and was not able to be completed prior to transportation showing up for them to transferred to SNF, will have rescheduled  - possible ENT follow-up  - persisting, [facility].5 [facility]   - MRI completed, no acute findings   - ENT referral placed, soonest appointment was set for 5/15    Hypotension  Mild, stable  - maintaining MAP > 65  - monitor    Dementia without behavioral disturbance, psychotic disturbance, mood disturbance, or anxiety, unspecified dementia severity, unspecified dementia type   - per past PCP notes patient has opted out of medication for dementia  Delirium precautions:  - Avoid antihistamines, anticholinergics, hypnotics, and minimize opiates while controlling for pain as these medications may exacerbate delirium. Cues for day/night will assist with keeping patient calm and oriented - during daytime, please keep adequate light in room (open windows, lights on) and please keep room dim at night-time to encourage normal sleep-wake cycles. Continuing to have nursing and family reorient the patient and encourage family to visit  - per daughter patient has not yet at her mental baseline patient is typically more oriented.      CKD Stage 3   - stable, continue monitor twice weekly BMPs, avoid nephrotoxic agents, renally dose medications as appropriate    Back pain  - XR (3/15) Five non rib-bearing lumbar type vertebral bodies are present. There is widening of the intervertebral joint space between L4 and L5, unchanged when compared to 03/05/2025. Multiple compression deformities in the lower thoracic spine are unchanged when compared to prior imaging. Degenerative changes are seen throughout the spine, as before. Facet arthropathy of the lower lumbar spine. The abdominal aorta is heavily calcified.   - continue acetaminophen 650 mg q.8 hours, lidocaine patch qHS, prn tramadol  - pain management referral placed, soonest appointment set was 4 4/3    Hypothyroid  - TSH slightly elevated with normal free T4  - stable, Continue home levothyroxine 50 mcg daily    Age-related osteoporosis without current pathological fracture   - per PCP notes, patient is  supposed to be on Prolia.  Do not see on dispensed report    Advance care planning  - ACP discussion held with patient and her daughter on 03/10, per daughter patient had living will that states patient is a DNR    Debility   - Continue with PT/OT for gait training and strengthening and restoration of ADL's   - Encourage mobility, OOB in chair, and early ambulation as appropriate  - Fall precautions   - Monitor for bowel and bladder dysfunction  - Monitor for and prevent skin breakdown and pressure ulcers  - Continue DVT prophylaxis with       Anticipate disposition:  [facility] Model[facility]     Follow-up needed during SNF stay-    Follow-up needed after discharge from SNF: PCP, pain management (4/24), ENT (5/15)     Future Appointments   Date Time Provider Department Center   4/24/2025  2:20 PM Sierra Gibson DO [facility] PAINMA [place]   5/13/2025 12:00 PM Danielle Vaughan FNP [facility] IM [address] PCW   5/15/2025 11:00 AM Lucila Paez Au.D CCC-A [facility] AUDIO [address]   5/15/2025 11:30 AM Karlos Muniz MD [facility] ENT [address]   5/28/2025  2:15 PM Colten Oleary DPM [facility] POD [address] Ort   6/2/2025  2:45 PM Kristian Shabazz MD [facility] PRICRE [place]   7/21/2025  2:45 PM Kristian Shabazz MD [facility] PRICRE [place]     I certify that SNF services are required to be given on an inpatient basis because Isabel Hernandez needs for skilled nursing care and/or skilled rehabilitation are required on a daily basis and such services can only practically be provided in a skilled nursing facility setting and are for an ongoing condition for which she received inpatient care in the hospital.     I spent 47 minutes reviewing patient records, examining, and counseling the patient/ patient's family with greater than 50% of the time spent in direct patient care and coordination.  Care coordinated with staff      Gabriela  YAHIR Kaba  Department of Hospital Medicine   [facility]     DOS:3/30/2025      Patient note was created using MModal Dictation.  Any errors in syntax or even information may not have been identified and edited on initial review prior to signing this note.

## 2025-03-31 LAB
ABSOLUTE EOSINOPHIL (OHS): 0.17 K/UL
ABSOLUTE MONOCYTE (OHS): 0.69 K/UL (ref 0.3–1)
ABSOLUTE NEUTROPHIL COUNT (OHS): 4.75 K/UL (ref 1.8–7.7)
ANION GAP (OHS): 10 MMOL/L (ref 8–16)
BASOPHILS # BLD AUTO: 0.07 K/UL
BASOPHILS NFR BLD AUTO: 0.7 %
BUN SERPL-MCNC: 23 MG/DL (ref 8–23)
CALCIUM SERPL-MCNC: 8.7 MG/DL (ref 8.7–10.5)
CHLORIDE SERPL-SCNC: 103 MMOL/L (ref 95–110)
CO2 SERPL-SCNC: 21 MMOL/L (ref 23–29)
CREAT SERPL-MCNC: 0.9 MG/DL (ref 0.5–1.4)
ERYTHROCYTE [DISTWIDTH] IN BLOOD BY AUTOMATED COUNT: 13.2 % (ref 11.5–14.5)
GFR SERPLBLD CREATININE-BSD FMLA CKD-EPI: >60 ML/MIN/1.73/M2
GLUCOSE SERPL-MCNC: 84 MG/DL (ref 70–110)
HCT VFR BLD AUTO: 40.4 % (ref 37–48.5)
HGB BLD-MCNC: 13.1 GM/DL (ref 12–16)
IMM GRANULOCYTES # BLD AUTO: 0.06 K/UL (ref 0–0.04)
IMM GRANULOCYTES NFR BLD AUTO: 0.6 % (ref 0–0.5)
LYMPHOCYTES # BLD AUTO: 3.81 K/UL (ref 1–4.8)
MAGNESIUM SERPL-MCNC: 2.1 MG/DL (ref 1.6–2.6)
MCH RBC QN AUTO: 30.8 PG (ref 27–50)
MCHC RBC AUTO-ENTMCNC: 32.4 G/DL (ref 32–36)
MCV RBC AUTO: 95 FL (ref 82–98)
NUCLEATED RBC (/100WBC) (OHS): 0 /100 WBC
PHOSPHATE SERPL-MCNC: 4.3 MG/DL (ref 2.7–4.5)
PLATELET # BLD AUTO: 291 K/UL (ref 150–450)
PMV BLD AUTO: 10.8 FL (ref 9.2–12.9)
POTASSIUM SERPL-SCNC: 4.3 MMOL/L (ref 3.5–5.1)
RBC # BLD AUTO: 4.25 M/UL (ref 4–5.4)
RELATIVE EOSINOPHIL (OHS): 1.8 %
RELATIVE LYMPHOCYTE (OHS): 39.9 % (ref 18–48)
RELATIVE MONOCYTE (OHS): 7.2 % (ref 4–15)
RELATIVE NEUTROPHIL (OHS): 49.8 % (ref 38–73)
SODIUM SERPL-SCNC: 134 MMOL/L (ref 136–145)
WBC # BLD AUTO: 9.55 K/UL (ref 3.9–12.7)

## 2025-03-31 PROCEDURE — 25000003 PHARM REV CODE 250: Mod: HCNC | Performed by: HOSPITALIST

## 2025-03-31 PROCEDURE — 86580 TB INTRADERMAL TEST: CPT | Mod: HCNC | Performed by: NURSE PRACTITIONER

## 2025-03-31 PROCEDURE — 11000004 HC SNF PRIVATE: Mod: HCNC

## 2025-03-31 PROCEDURE — 85025 COMPLETE CBC W/AUTO DIFF WBC: CPT | Mod: HCNC | Performed by: HOSPITALIST

## 2025-03-31 PROCEDURE — 97530 THERAPEUTIC ACTIVITIES: CPT | Mod: HCNC,CQ

## 2025-03-31 PROCEDURE — 25000003 PHARM REV CODE 250: Mod: HCNC | Performed by: NURSE PRACTITIONER

## 2025-03-31 PROCEDURE — 97116 GAIT TRAINING THERAPY: CPT | Mod: HCNC,CQ

## 2025-03-31 PROCEDURE — 97110 THERAPEUTIC EXERCISES: CPT | Mod: HCNC

## 2025-03-31 PROCEDURE — 84100 ASSAY OF PHOSPHORUS: CPT | Mod: HCNC | Performed by: HOSPITALIST

## 2025-03-31 PROCEDURE — 30200315 PPD INTRADERMAL TEST REV CODE 302: Mod: HCNC | Performed by: NURSE PRACTITIONER

## 2025-03-31 PROCEDURE — 82374 ASSAY BLOOD CARBON DIOXIDE: CPT | Mod: HCNC | Performed by: HOSPITALIST

## 2025-03-31 PROCEDURE — 83735 ASSAY OF MAGNESIUM: CPT | Mod: HCNC | Performed by: HOSPITALIST

## 2025-03-31 PROCEDURE — 25000003 PHARM REV CODE 250: Mod: HCNC | Performed by: FAMILY MEDICINE

## 2025-03-31 PROCEDURE — 97110 THERAPEUTIC EXERCISES: CPT | Mod: HCNC,CQ

## 2025-03-31 PROCEDURE — 36415 COLL VENOUS BLD VENIPUNCTURE: CPT | Mod: HCNC | Performed by: HOSPITALIST

## 2025-03-31 PROCEDURE — 97129 THER IVNTJ 1ST 15 MIN: CPT | Mod: HCNC

## 2025-03-31 RX ADMIN — FAMOTIDINE 20 MG: 20 TABLET, FILM COATED ORAL at 08:03

## 2025-03-31 RX ADMIN — LEVOTHYROXINE SODIUM 50 MCG: 0.05 TABLET ORAL at 06:03

## 2025-03-31 RX ADMIN — ACETAMINOPHEN 650 MG: 325 TABLET ORAL at 03:03

## 2025-03-31 RX ADMIN — TUBERCULIN PURIFIED PROTEIN DERIVATIVE 5 UNITS: 5 INJECTION, SOLUTION INTRADERMAL at 12:03

## 2025-03-31 RX ADMIN — ACETAMINOPHEN 650 MG: 325 TABLET ORAL at 08:03

## 2025-03-31 NOTE — PLAN OF CARE
[facility]    - send medications to [facility] pharmacy  - print narcotics- no   - nursing home orders   - testing ordered for admission- CRX and TB needed.     - facility :  [facility] contact # [phone]

## 2025-03-31 NOTE — PT/OT/SLP PROGRESS
"Occupational Therapy   Treatment    Name: Isabel Hernandez  MRN: [id number]  Admit Date: 3/7/2025  Admitting Diagnosis:  Acute cystitis without hematuria    General Precautions: Standard, aspiration, fall   Orthopedic Precautions: N/A   Braces: N/A    Recommendations:     Discharge Recommendations:  home with home health  Level of Assistance Recommended at Discharge: 24 hours light assistance for ADL's and homemaking tasks  Discharge Equipment Recommendations: walker, rolling, wheelchair, bath bench  Barriers to discharge:  Decreased caregiver support    Assessment:     Isabel Hernandez is a 89 y.o. female with a medical diagnosis of Acute cystitis without hematuria. Pt tolerated session well and without incident and shows excellent motivation and potential to improve, but the pt continues to require assistance to perform self-care tasks and mobility. Pt strengths include Attention to task and Family support and Willingness to participate. However, pt would continue to benefit from cont'd OT services in the SNF setting to improve safety and independence /c functional tasks and ADLs upon discharge. Pt noted to have increased confusion this session /c poor orientation and req reminding of place/time spent at [facility].  Performance deficits affecting function are weakness, impaired endurance, impaired functional mobility, gait instability, impaired balance, impaired cognition, decreased safety awareness.     Rehab Potential is fair    Activity tolerance:  Fair    Plan:     Patient to be seen 5 x/week to address the above listed problems via self-care/home management, therapeutic activities, therapeutic exercises    Plan of Care Expires: 03/29/25  Plan of Care Reviewed with: patient    Subjective     Communicated with: BOLA prior to session.     "I am so confused. Where am I?" .    Pain/Comfort:  Pain Rating 1: 0/10  Pain Rating Post-Intervention 1: 0/10    Patient's cultural, spiritual, Yazidism conflicts given the current " situation:  no    Objective:     Patient found standing at sink /c NSG with  (no active lines) upon OT entry to room.NSG in room. Pt alert and agreeable to therapy.         Activities of Daily Living:  Grooming: minimum assistance standing to wash hands /c (A) for balance    AMPAC 6 Click ADL: 18    OT Exercises:     -Pt performed 2x10 reps of the following exercises with 2 lb dowel while seated using BUE.     Chest presses, Shoulder presses, Biceps curls, and Shoulder flexion    -Pt participated in 10 minute UBE activity seated in WC at moderate resistance to address endurance and strength of UE to improve performance of ADLs and other functional tasks.     -Pt participated in 2 trials functional cognition and error recognition/correction task while seated. Pt asked to copy patterns made /c pegs placed in pegboard. Trials graded to increase use of abstract thought and complexity of sequencing.  Pt req vcs throughout for sequencing, pattern recognition, and error recognition/correction.       Treatment & Education:  Pt educated on POC, role of OT, and safety. Pt performed tasks to improve safety and independence in functional tasks and ADLs as mentioned above.       Patient left up in chair with  tech present and all needs met    GOALS:   Multidisciplinary Problems       Occupational Therapy Goals          Problem: Occupational Therapy    Goal Priority Disciplines Outcome Interventions   Occupational Therapy Goal     OT, PT/OT Progressing    Description: Goals to be met by: 3/29/25     Patient will increase functional independence with ADLs by performing:    Feeding with [place]. -Ongoing  UE Dressing with [place].-REVISED  -UE Dressing /c SPV  LE Dressing with [place].--Ongoing  -LE Dressing /c SPV  Grooming while standing with Modified [place].--REVISED  -Grooming while standing /c SPV  Toileting from toilet with Modified Indep. for hygiene and clothing management.--REVISED  -Toileting from  toilet /c SPV- Ongoing  Bathing from shower chair/bench with Supervision.--Ongoing  Sitting at edge of bed x10 minutes with [place]  Step transfer with Modified [place]--REVISED  -Step transfer /c SPV- Ongoing  Toilet transfer to toilet with Modified [place].---REVISED  -Toilet transfer to toilet /c SPV                         Time Tracking:     OT Date of Treatment: 03/31/25  OT Start Time: 0910    OT Stop Time: 0951  OT Total Time (min): 41 min    Billable Minutes:Therapeutic Exercise 26  Cognitive Retraining 15    3/31/2025

## 2025-03-31 NOTE — PROGRESS NOTES
[facility]                                  Skilled Nursing Facility                   Progress Note     Admit Date: 3/7/2025  MURPHY 2025  [id number] A  Principal Problem:  Acute cystitis without hematuria   HPI obtained from patient interview and chart review     Chief Complaint:Re-evaluation of medical treatment and therapy status, dizziness, back pain    HPI:   Isabel Hernandez is an 89 year old female with PMHx of thyroid disease, stage II CKD, history of compression fractures who presents to SNF following hospitalization for acute cystitis.  Admission to SNF for secondary weakness and debility.    Interval history:  24 hr vital sign ranges reviewed and listed below.  BP soft.   Back pain is persisting but improved, patient was set up with pain management clinic as outpatient.  Dizziness also continues to persist, patient was set up with soonest ENT appointment.  Continue to encourage patient to drink water.  Patient continues to have intermittent nausea.  Patient reports an ok appetite.  Patient denies dysuria.  Patient reports having regular bowel movements.  Patient progressing with PT/OT- patient ambulated with RW CGA 63 ft no LOB limited by fatigue/nausea. Continuing to follow and treat all acute and chronic conditions.  Patient is being worked up for nursing home/[facility] placement    Past Medical History: Patient has a past medical history of Arthritis, Cataract, Hypothyroidism, Mild malnutrition (3/10/2025), and Osteoporosis (2015).    Past Surgical History: Patient has a past surgical history that includes Thyroidectomy, partial; Hysterectomy; Cholecystectomy; Tonsillectomy; Adenoidectomy; Breast surgery (Bilateral);  section; Vitrectomy by pars plana approach (Left, 2018); and Appendectomy.    Social History: Patient reports that she has never smoked. She has never used smokeless tobacco. She reports that she  "does not drink alcohol and does not use drugs.    Family History: family history includes Cancer in her brother, father, and sister; Cataracts in her father; Glaucoma in her father; Hypertension in her mother; Kidney disease in her mother; Miscarriages / Stillbirths in her mother; No Known Problems in her brother and daughter; Thyroid disease in her mother.    Allergies: Patient is allergic to codeine, fosamax [alendronate], and iron analogues.    ROS  Constitutional: Negative for fever   Respiratory: Negative for cough, shortness of breath   Cardiovascular: Negative for chest pain, palpitations  Gastrointestinal: Negative for abdominal pain, constipation, diarrhea, vomiting. + intermittent nausea  Genitourinary: Negative for dysuria, frequency   Musculoskeletal:  + generalized weakness.  +back pain  Neurological: Negative for  headaches.  + intermittent dizziness  Psychiatric/Behavioral: Negative for depression. The patient is nervous/anxious.      24 hour Vital Sign Range   Temp:  [97.6 °F (36.4 °C)-98.4 °F (36.9 °C)]   Pulse:  [79-91]   Resp:  [18]   BP: (108-117)/(59-64)   SpO2:  [94 %-95 %]     Current BMI: Body mass index is 21.69 kg/m².    PEx  Constitutional: Patient appears debilitated.  mild distress noted  Cardiovascular: Normal rate, regular rhythm and normal heart sounds.    Pulmonary/Chest: Effort normal and breath sounds are clear  Abdominal: Soft. Bowel sounds are normal.   Musculoskeletal: Normal range of motion.   Neurological: Alert and oriented to person, place.  Disoriented to time.  Impaired higher level thinking  Psychiatric: confused, anxious   Skin: Skin is warm and dry.     No results for input(s): "POCTGLUCOSE" in the last 168 hours.       Assessment and Plan:    Acute cystitis without hematuria  - Presenting with nausea/ generalized weakness/ lightheadedness and suprapubic discomfort and confusion   - CT AP without acute findings   - UA infectious, Urine culture with mixed growth; no " "predominant organism  - treated with IV ceftriaxone empirically  - completed cefpodoxime 100 mg BID, ended 3/13    Nausea and vomiting  - Suspected related to UTI or possibly viral illness   - CT AP without acute findings  - Flu/Covid negative   - continue Zofran PRN     Dizziness  - possible vertigo  - CTH negative  - Per [facility], "Given concern for posterior circulation CVA that could be the etiology of her dizziness we did offer MR brain, but she declined." Per daughter, MRI was ordered and scheduled for day of discharge and was not able to be completed prior to transportation showing up for them to transferred to SNF, will have rescheduled  - possible ENT follow-up  - persisting, meclizine 12.5 mg TID DC'ed per daughters request   - MRI completed, no acute findings   - ENT referral placed, soonest appointment was set for 5/15    Dementia without behavioral disturbance, psychotic disturbance, mood disturbance, or anxiety, unspecified dementia severity, unspecified dementia type   - per past PCP notes patient has opted out of medication for dementia  Delirium precautions:  - Avoid antihistamines, anticholinergics, hypnotics, and minimize opiates while controlling for pain as these medications may exacerbate delirium. Cues for day/night will assist with keeping patient calm and oriented - during daytime, please keep adequate light in room (open windows, lights on) and please keep room dim at night-time to encourage normal sleep-wake cycles. Continuing to have nursing and family reorient the patient and encourage family to visit  - per daughter patient has not yet at her mental baseline patient is typically more oriented.      CKD Stage 3   - stable, continue monitor twice weekly BMPs, avoid nephrotoxic agents, renally dose medications as appropriate    Back pain  - XR (3/15) Five non rib-bearing lumbar type vertebral bodies are present. There is widening of the intervertebral joint space between L4 and L5, unchanged " when compared to 03/05/2025. Multiple compression deformities in the lower thoracic spine are unchanged when compared to prior imaging. Degenerative changes are seen throughout the spine, as before. Facet arthropathy of the lower lumbar spine. The abdominal aorta is heavily calcified.   - continue acetaminophen 650 mg q.8 hours, lidocaine patch qHS, prn tramadol  - pain management referral placed, soonest appointment set was 4 4/3    Hypothyroid  - TSH slightly elevated with normal free T4  - stable, Continue home levothyroxine 50 mcg daily    Age-related osteoporosis without current pathological fracture   - per PCP notes, patient is supposed to be on Prolia.  Do not see on dispensed report    Advance care planning  - ACP discussion held with patient and her daughter on 03/10, per daughter patient had living will that states patient is a DNR    Debility   - Continue with PT/OT for gait training and strengthening and restoration of ADL's   - Encourage mobility, OOB in chair, and early ambulation as appropriate  - Fall precautions   - Monitor for bowel and bladder dysfunction  - Monitor for and prevent skin breakdown and pressure ulcers  - Continue DVT prophylaxis with       Anticipate disposition:  Patient's [facility] Model: MODERATE     Follow-up needed during SNF stay-    Follow-up needed after discharge from SNF: PCP, pain management (4/24), ENT (5/15)     Future Appointments   Date Time Provider Department Center   4/24/2025  2:20 PM Sierra Gibson DO [facility] PAINMA [place]   5/13/2025 12:00 PM Danielle Vaughan FNP [facility] IM [address] PCW   5/15/2025 11:00 AM Lucila Paez Au.D CCC-A [facility] AUDIO [address]   5/15/2025 11:30 AM Karlos Muniz MD [facility] ENT [address]   5/28/2025  2:15 PM Colten Oleary DPM [facility] POD [address] [facility]   6/2/2025  2:45 PM Kristian Shabazz MD [facility] PRICRE [place]   7/21/2025  2:45 PM  Kristian Shabazz MD [facility]     I certify that SNF services are required to be given on an inpatient basis because Isabel Hernandez needs for skilled nursing care and/or skilled rehabilitation are required on a daily basis and such services can only practically be provided in a skilled nursing facility setting and are for an ongoing condition for which she received inpatient care in the hospital.     Radha Spencer NP  Department of Hospital Medicine   [facility]     DOS:3/29/2025      Patient note was created using MModal Dictation.  Any errors in syntax or even information may not have been identified and edited on initial review prior to signing this note.

## 2025-03-31 NOTE — PROGRESS NOTES
[facility]                                  Skilled Nursing Facility                   Progress Note     Admit Date: 3/7/2025  MURPHY 2025  [id number] A  Principal Problem:  Acute cystitis without hematuria   HPI obtained from patient interview and chart review     Chief Complaint:Re-evaluation of medical treatment and therapy status, lab review, nausea, dizziness, back pain    HPI:   Isabel Hernandez is an 89 year old female with PMHx of thyroid disease, stage II CKD, history of compression fractures who presents to SNF following hospitalization for acute cystitis.  Admission to SNF for secondary weakness and debility.    Interval history: All of today's labs reviewed and are listed below.  24 hr vital sign ranges reviewed and listed below.  BP soft.   Back pain is persisting but improved, patient was set up with pain management clinic as outpatient.  Dizziness also continues to persist, patient was set up with soonest ENT appointment.  Continue to encourage patient to drink water.  Continues to have intermittent nausea.  Patient denies shortness of breath, abdominal discomfort, or vomiting.  Patient reports an ok appetite.  Patient denies dysuria.  Patient had a positive bowel movement on  with last 1 prior to that being on .  Patient progressing with PT/OT- patient ambulated with RW CGA 63 ft no LOB limited by fatigue/nausea. Patient is being worked up for nursing home/detention placement    Past Medical History: Patient has a past medical history of Arthritis, Cataract, Hypothyroidism, Mild malnutrition (3/10/2025), and Osteoporosis (2015).    Past Surgical History: Patient has a past surgical history that includes Thyroidectomy, partial; Hysterectomy; Cholecystectomy; Tonsillectomy; Adenoidectomy; Breast surgery (Bilateral);  section; Vitrectomy by pars plana approach (Left, 2018); and Appendectomy.    Social  History: Patient reports that she has never smoked. She has never used smokeless tobacco. She reports that she does not drink alcohol and does not use drugs.    Family History: family history includes Cancer in her brother, father, and sister; Cataracts in her father; Glaucoma in her father; Hypertension in her mother; Kidney disease in her mother; Miscarriages / Stillbirths in her mother; No Known Problems in her brother and daughter; Thyroid disease in her mother.    Allergies: Patient is allergic to codeine, fosamax [alendronate], and iron analogues.    ROS  Constitutional: Negative for fever   Respiratory: Negative for cough, shortness of breath   Cardiovascular: Negative for chest pain, palpitations  Gastrointestinal: Negative for abdominal pain, constipation, diarrhea, vomiting. + intermittent nausea  Genitourinary: Negative for dysuria, frequency   Musculoskeletal:  + generalized weakness.  +back pain  Neurological: Negative for  headaches.  + intermittent dizziness  Psychiatric/Behavioral: Negative for depression. The patient is nervous/anxious.      24 hour Vital Sign Range   Temp:  [97.6 °F (36.4 °C)-98.4 °F (36.9 °C)]   Pulse:  [79-91]   Resp:  [18]   BP: (108-117)/(59-64)   SpO2:  [94 %-95 %]     Current BMI: Body mass index is 21.69 kg/m².    PEx  Constitutional: Patient appears debilitated.  no distress noted  Cardiovascular: Normal rate, regular rhythm and normal heart sounds.    Pulmonary/Chest: Effort normal and breath sounds are clear  Abdominal: Soft. Bowel sounds are normal.   Musculoskeletal: Normal range of motion.   Neurological: Alert and oriented to person, place.  Disoriented to time.  Impaired higher level thinking  Psychiatric: confused, anxious   Skin: Skin is warm and dry.     Recent Labs   Lab 03/31/25  0424   GLUCOSE 84   *   K 4.3      CO2 21*   BUN 23   CREATININE 0.9   CALCIUM 8.7   MG 2.1       Recent Labs   Lab 03/31/25 0424   WBC 9.55   RBC 4.25   HGB 13.1   HCT  "40.4      MCV 95   MCH 30.8   MCHC 32.4         No results for input(s): "POCTGLUCOSE" in the last 168 hours.       Assessment and Plan:    Acute cystitis without hematuria  - Presenting with nausea/ generalized weakness/ lightheadedness and suprapubic discomfort and confusion   - CT AP without acute findings   - UA infectious, Urine culture with mixed growth; no predominant organism  - treated with IV ceftriaxone empirically  - completed cefpodoxime 100 mg BID, ended 3/13    Nausea and vomiting  - Suspected related to UTI or possibly viral illness   - CT AP without acute findings  - Flu/Covid negative   - continue Zofran PRN     Dizziness  - possible vertigo  - CTH negative  - Per [facility], "Given concern for posterior circulation CVA that could be the etiology of her dizziness we did offer MR brain, but she declined." Per daughter, MRI was ordered and scheduled for day of discharge and was not able to be completed prior to transportation showing up for them to transferred to SNF, will have rescheduled  - possible ENT follow-up  - persisting, meclizine 12.5 mg TID DC'ed per daughters request   - MRI completed, no acute findings   - ENT referral placed, soonest appointment was set for 5/15    Dementia without behavioral disturbance, psychotic disturbance, mood disturbance, or anxiety, unspecified dementia severity, unspecified dementia type   - per past PCP notes patient has opted out of medication for dementia  Delirium precautions:  - Avoid antihistamines, anticholinergics, hypnotics, and minimize opiates while controlling for pain as these medications may exacerbate delirium. Cues for day/night will assist with keeping patient calm and oriented - during daytime, please keep adequate light in room (open windows, lights on) and please keep room dim at night-time to encourage normal sleep-wake cycles. Continuing to have nursing and family reorient the patient and encourage family to visit  - per daughter patient " has not yet at her mental baseline patient is typically more oriented.      CKD Stage 3   - stable, continue monitor twice weekly BMPs, avoid nephrotoxic agents, renally dose medications as appropriate    Back pain  - XR (3/15) Five non rib-bearing lumbar type vertebral bodies are present. There is widening of the intervertebral joint space between L4 and L5, unchanged when compared to 03/05/2025. Multiple compression deformities in the lower thoracic spine are unchanged when compared to prior imaging. Degenerative changes are seen throughout the spine, as before. Facet arthropathy of the lower lumbar spine. The abdominal aorta is heavily calcified.   - continue acetaminophen 650 mg q.8 hours, lidocaine patch qHS, prn tramadol  - pain management referral placed, soonest appointment set was 4 4/3    Hypothyroid  - TSH slightly elevated with normal free T4  - stable, Continue home levothyroxine 50 mcg daily    Age-related osteoporosis without current pathological fracture   - per PCP notes, patient is supposed to be on Prolia.  Do not see on dispensed report    Advance care planning  - ACP discussion held with patient and her daughter on 03/10, per daughter patient had living will that states patient is a DNR    Debility   - Continue with PT/OT for gait training and strengthening and restoration of ADL's   - Encourage mobility, OOB in chair, and early ambulation as appropriate  - Fall precautions   - Monitor for bowel and bladder dysfunction  - Monitor for and prevent skin breakdown and pressure ulcers  - Continue DVT prophylaxis with       Anticipate disposition:  Patient's daughter states she will be discharging to an [facility]    IP [facility] RISK OF UNPLANNED READMISSION Model: MODERATE     Follow-up needed during SNF stay-    Follow-up needed after discharge from SNF: PCP, pain management (4/24), ENT (5/15)     Future Appointments   Date Time Provider Department Center   4/24/2025  2:20 PM Sierra Gibson,  [facility] PAINMA  [place]   5/13/2025 12:00 PM Danielle Vaughan FNP [facility] IM [address] [facility]   5/15/2025 11:00 AM Lucila Paez Au.D, CCC-BOB [facility] AUDIO [address]   5/15/2025 11:30 AM Karlos Muniz MD [facility] ENT [address]   5/28/2025  2:15 PM Colten Oleary DPM [facility] POD [address] [facility]   6/2/2025  2:45 PM Kristian Shabazz MD [facility] PRICRE [place]   7/21/2025  2:45 PM Kristian Shabazz MD [facility] PRIDULCE [place]     I certify that SNF services are required to be given on an inpatient basis because Isabel Hernandez needs for skilled nursing care and/or skilled rehabilitation are required on a daily basis and such services can only practically be provided in a skilled nursing facility setting and are for an ongoing condition for which she received inpatient care in the hospital.     I spent 47 minutes reviewing patient records, examining, and counseling the patient/ patient's family with greater than 50% of the time spent in direct patient care and coordination.  Care coordinated with staff.  Daughter updated at bedside      Radha Spencer NP  Department of Hospital Medicine   [facility]     DOS:3/31/2025      Patient note was created using MModal Dictation.  Any errors in syntax or even information may not have been identified and edited on initial review prior to signing this note.

## 2025-03-31 NOTE — PT/OT/SLP PROGRESS
"Physical Therapy Treatment    Patient Name:  Isabel Hernandez   MRN:  [id number]  Admit Date: 3/7/2025  Admitting Diagnosis: Acute cystitis without hematuria  Recent Surgeries:     General Precautions: Standard, fall  Orthopedic Precautions: N/A  Braces: N/A    Recommendations:     Discharge Recommendations: home health PT  Level of Assistance Recommended at Discharge: 24 hours light assistance  Discharge Equipment Recommendations: walker, rolling, wheelchair, bath bench  Barriers to discharge: Inaccessible home environment    Assessment:     Isabel Hernandez is a 89 y.o. female admitted with a medical diagnosis of Acute cystitis without hematuria . Pt tolerated well, today pt had nor repts of dizziness nor nausea throughout session, just cramps, nsg notified/aware, pt would continue to benefit from skilled PT services to improve overall functional mobility, strength and endurance.  .      Performance deficits affecting function: weakness, impaired endurance, impaired self care skills, impaired functional mobility, gait instability, impaired balance, decreased upper extremity function, decreased lower extremity function, impaired cardiopulmonary response to activity.    Rehab Potential is good    Activity Tolerance: Fair    Plan:     Patient to be seen 5 x/week to address the above listed problems via gait training, therapeutic activities, therapeutic exercises, neuromuscular re-education, wheelchair management/training    Plan of Care Expires: 04/07/25  Plan of Care Reviewed with: patient, daughter    Subjective     "Where am I going?" Daughter present,"better today"     Pain/Comfort:  Pain Rating 1:  ("cramps" nsg notified)  Location - Orientation 1: generalized  Location 1: abdomen  Pain Addressed 1: Pre-medicate for activity, Reposition, Distraction, Nurse notified  Pain Rating Post-Intervention 1:  ("better" after toileting)    Patient's cultural, spiritual, Buddhist conflicts given the current " "situation:  no    Objective:       Patient found with  (in BSC, daughter present) upon PT entry to room.     Therapeutic Activities and Exercises: 2x10 reps AP,LAQ,hip flex mini elliptical x 10 minutes    Functional Mobility: simple vcs throughout session  Transfers:     Sit to Stand:  stand by assistance with rolling walker  BSC <> WCChair: stand by assistance with  no AD  using  Stand Pivot and using arm rest for support/stability  Toilet SBA with no AD, pt manges undergarment and pant with SBA, close SB for doug care in standing, set up for hand hygiene in sitting  Gait: amb with RW close SBA wc follow 170 ft no LOB    AM-PAC 6 CLICK MOBILITY  18    Patient left up in chair with call button in reach, camera notified, and belongings in reach .    GOALS:   Multidisciplinary Problems       Physical Therapy Goals          Problem: Physical Therapy    Goal Priority Disciplines Outcome Interventions   Physical Therapy Goal     PT, PT/OT Progressing    Description: Goals to be met by: 25     Patient will increase functional independence with mobility by performin. Supine to sit with Supervision  2. Sit to supine with Supervision  3. Rolling to Left and Right with Supervision  4. Sit to stand transfer with Supervision  5. Bed to chair transfer with Supervision using Rolling Walker  6. Gait  x 150 feet with Stand-by Assistance using Rolling Walker  7. Wheelchair propulsion x 50 feet with Supervision using bilateral upper extremities  8. Ascend/descend 4 stairs with bilateral Handrail and Contact Guard Assistance using No Assistive Device.  9. Ascend/Descend 4 inch curb step with Contact Guard Assistance using Rolling Walker    Rehab Services' DME recommendations    Walker Adult (5'1"-6"6")    Wheels Yes    Justification for walker: Mobility limitation cannot be sufficiently resolved by use of a cane/patient cannot safely ambulate with a cane, Patient's functional mobility deficit can be sufficiently resolved " with the use of a Rolling Walker of Walker, Patient's mobility limitation significantly impairs their ability to participate in one of more activities of daily living, The use of a Rolling Walker or Walker will significantly improve the patient's ability to participate in MRADLS and the patient will use it on a regular basis    Wheelchair  Number of hours up in a wheelchair per day 8      Style Light weight    Justification for light weight w/c: patient cannot propel in a standard wheelchair, patient can and does self-propel in a lightweight wheelchair, patient has impaired ability to participate in MRADLs, mobility limitations cannot be sifficiently resolved with a cane/walker, the home provides adequate access between rooms for a wheelchair, a wheelchair will significantly improve the ability to participate in MRADLs and will be used in the home on a regular basis, the patient is willing to use a wheelchair in the home, the patient has a caregiver who is available, willing, and able to provide assistance with the wheelchair, and the patient requires additional person for safety with mobility with walker  Seat Width 18 (Standard adult)  Seat Depth 18  Back Height Standard  Leg Support Standard, Heel Loops, and Swing Away  Arm Height Full and Swing Away  Lap Belt Velcro  Accessories Anti-tippers and Safety belt  Cushion Basic  Justification for Cushion maintain skin integrity    Tub bench      Galina Nugent, PTA 3/27/2025                             Time Tracking:     PT Received On: 03/31/25  PT Start Time: 1457  PT Stop Time: 1547  PT Total Time (min): 50 min    Billable Minutes: Gait Training 15, Therapeutic Activity 20, and Therapeutic Exercise 15    Treatment Type: Treatment  PT/PTA: PTA     Number of PTA visits since last PT visit: 5     03/31/2025

## 2025-03-31 NOTE — PLAN OF CARE
[facility] - Skilled Nursing  Initial Discharge Assessment     SW met with patient in room for Discharge Planning Assessment.     Preferred Name: Isabel Hernandez   Primary Care Provider:  Kristian Shabazz MD   Admission Diagnosis:  Acute cystitis without hematuria   Admission Date: 03/07/2025  Expected Discharge Date: 04/04/2025  Discharge Barriers Identified:  Confusion     Payor: Type: HUMA[employer] MANAGED MEDICARE/HUMANA MEDICARE HMO      Extended Emergency Contact Information:   Primary Emergency Contact: Barrington,Kadi   Mobile Phone: [phone]   Relation:  Daughter  Preferred language:  English   needed? No     Discharge Plan A: [facility]  Discharge Plan B:      Preferred Pharmacy:  [facility]      Initial Discharge Assessment        Assessment Type Discharge Planning Assessment       Source of Information       Communicated MURPHY with patient/caregiver Yes      Lives With Self      Do you expect to return to your current living situation?  Yes      Do you have help at home or someone to help you manage your care at home?  Yes      Prior to hospitalization cognitive status: It was okay      Current cognitive status: A O x3      Equipment Currently Used at Home None      Readmission within 30 days? No       Patient currently being followed by outpatient case management? No      Do you currently have service(s) that help you manage your care at home? No      Do you take prescription medications?  Yes      Do you have prescription coverage?  Yes      Do you have any problems affording any of your prescribed medications? No Issues      Who is going to help you get home at discharge? Daughter      How do you get to doctors' appointments? Daughter     Has lack of transportation kept you from medical appointments, meetings, work, or from getting things needed for daily living?  No        How often do you feel lonely or isolated from those around you? Sometimes     How often do  you need to have someone help you when you read instructions, pamphlets, or other written material from your doctor or pharmacy?  Always      Are you on dialysis? No      Do you take coumadin?  No      DME Needed Upon Discharge  TBD      Discharge Plan discussed with:  Daughter      Discharge Barriers Identified Confusion

## 2025-03-31 NOTE — PLAN OF CARE
Problem: Fall Injury Risk  Goal: Absence of Fall and Fall-Related Injury  Intervention: Promote Injury-Free Environment  Flowsheets (Taken 3/31/2025 4204)  Safety Promotion/Fall Prevention: assistive device/personal item within reach

## 2025-03-31 NOTE — PLAN OF CARE
SS spoke to MsHarris Yanelis at [facility] regarding placement. She stated that the patient is accept into the facility and waiting admission paperwork from family.

## 2025-04-01 PROCEDURE — 25000003 PHARM REV CODE 250: Mod: HCNC | Performed by: FAMILY MEDICINE

## 2025-04-01 PROCEDURE — 97110 THERAPEUTIC EXERCISES: CPT | Mod: HCNC

## 2025-04-01 PROCEDURE — 25000003 PHARM REV CODE 250: Mod: HCNC | Performed by: NURSE PRACTITIONER

## 2025-04-01 PROCEDURE — 97535 SELF CARE MNGMENT TRAINING: CPT | Mod: HCNC

## 2025-04-01 PROCEDURE — 11000004 HC SNF PRIVATE: Mod: HCNC

## 2025-04-01 PROCEDURE — 25000003 PHARM REV CODE 250: Mod: HCNC | Performed by: HOSPITALIST

## 2025-04-01 PROCEDURE — 97530 THERAPEUTIC ACTIVITIES: CPT | Mod: HCNC

## 2025-04-01 RX ORDER — LIDOCAINE 50 MG/G
1 PATCH TOPICAL DAILY
Qty: 30 PATCH | Refills: 0 | Status: SHIPPED | OUTPATIENT
Start: 2025-04-01

## 2025-04-01 RX ORDER — ONDANSETRON 4 MG/1
4 TABLET, ORALLY DISINTEGRATING ORAL EVERY 8 HOURS PRN
Qty: 30 TABLET | Refills: 0 | Status: SHIPPED | OUTPATIENT
Start: 2025-04-01

## 2025-04-01 RX ORDER — FAMOTIDINE 20 MG/1
20 TABLET, FILM COATED ORAL DAILY
Qty: 30 TABLET | Refills: 0 | Status: SHIPPED | OUTPATIENT
Start: 2025-04-02 | End: 2026-04-02

## 2025-04-01 RX ORDER — MECLIZINE HCL 12.5 MG 12.5 MG/1
12.5 TABLET ORAL 2 TIMES DAILY PRN
Qty: 30 TABLET | Refills: 0 | Status: SHIPPED | OUTPATIENT
Start: 2025-04-01

## 2025-04-01 RX ORDER — CALCIUM CARBONATE 200(500)MG
500 TABLET,CHEWABLE ORAL 2 TIMES DAILY PRN
Qty: 30 TABLET | Refills: 0 | Status: SHIPPED | OUTPATIENT
Start: 2025-04-01 | End: 2026-04-01

## 2025-04-01 RX ORDER — LEVOTHYROXINE SODIUM 50 UG/1
50 TABLET ORAL
Qty: 90 TABLET | Refills: 0 | Status: SHIPPED | OUTPATIENT
Start: 2025-04-01

## 2025-04-01 RX ORDER — ACETAMINOPHEN 325 MG/1
650 TABLET ORAL 3 TIMES DAILY
Qty: 90 TABLET | Refills: 0 | Status: SHIPPED | OUTPATIENT
Start: 2025-04-01

## 2025-04-01 RX ADMIN — FAMOTIDINE 20 MG: 20 TABLET, FILM COATED ORAL at 08:04

## 2025-04-01 RX ADMIN — ACETAMINOPHEN 650 MG: 325 TABLET ORAL at 08:04

## 2025-04-01 RX ADMIN — ACETAMINOPHEN 650 MG: 325 TABLET ORAL at 02:04

## 2025-04-01 RX ADMIN — LEVOTHYROXINE SODIUM 50 MCG: 0.05 TABLET ORAL at 05:04

## 2025-04-01 RX ADMIN — CALCIUM CARBONATE (ANTACID) CHEW TAB 500 MG 500 MG: 500 CHEW TAB at 06:04

## 2025-04-01 RX ADMIN — ONDANSETRON 4 MG: 4 TABLET, ORALLY DISINTEGRATING ORAL at 08:04

## 2025-04-01 NOTE — PT/OT/SLP PROGRESS
"Occupational Therapy   Treatment    Name: Isabel Hernandez  MRN: [id number]  Admit Date: 3/7/2025  Admitting Diagnosis:  Acute cystitis without hematuria    General Precautions: Standard, aspiration, fall   Orthopedic Precautions: N/A   Braces: N/A    Recommendations:     Discharge Recommendations:  home with home health  Level of Assistance Recommended at Discharge: 24 hours supervision for safety in performing ADL's and home management tasks  Discharge Equipment Recommendations: walker, rolling, wheelchair, bath bench  Barriers to discharge:  Decreased caregiver support    Assessment:     Isabel Hernandez is a 89 y.o. female with a medical diagnosis of Acute cystitis without hematuria. Pt tolerated session well and without incident and shows excellent motivation and potential to improve, but the pt continues to require assistance to perform self-care tasks and mobility. Pt strengths include Attention to task and Family support, Motivation, and Willingness to participate. Pt improved Toileting and Toilet transfers. However, pt would continue to benefit from cont'd OT services in the SNF setting to improve safety and independence /c functional tasks and ADLs upon discharge. Performance deficits affecting function are weakness, impaired endurance, impaired functional mobility, gait instability, impaired balance, impaired cognition, decreased safety awareness.     Rehab Potential is good    Activity tolerance:  Good    Plan:     Patient to be seen 5 x/week to address the above listed problems via self-care/home management, therapeutic activities, therapeutic exercises    Plan of Care Expires: 03/29/25  Plan of Care Reviewed with: patient    Subjective     Communicated with: BOLA prior to session.     "I might as well go." RE: Bathroom .    Pain/Comfort:  Pain Rating 1: 0/10  Pain Rating Post-Intervention 1: 0/10    Patient's cultural, spiritual, Zoroastrian conflicts given the current situation:  no    Objective:     Patient " found up in chair with  (no active lines) upon OT entry to room. Pt alert and agreeable to therapy.         Functional Mobility/Transfers:  Patient completed Sit <> Stand Transfer with stand by assistance  with  rolling walker   Patient completed  Chair Transfer using Step Transfer technique with stand by assistance with rolling walker  Patient completed Toilet Transfer to standard height toilet Step Transfer technique with stand by assistance with  rolling walker  Functional Mobility: Pt ambulated around room for performance of functional tasks /c CGA and RW    Activities of Daily Living:  Toileting: supervision /c RW for balance /c clothing mgmt    [facility] 6 Click ADL: 18    OT Exercises:     ~Pt performed 1x12 reps of the following exercises with 2 lb dowel while seated using BUE.     Chest presses, Shoulder presses, Biceps curls, and Shoulder flexion    ~Pt participated in task to improve hip flexion, core strength, and trunk mobility. Activity graded by increasing depth of hip flexion needed for proper performance.     ~Pt performed 1x10 abdominal rollouts and B abdominal circles /c small theraball while seated in WC    ~Pt participated in task to improve standing balance, functional reach, command follow, and visual scanning. Pt complete task /c CGA and no AD.     ~Pt participated in 10 minute UBE activity seated in WC at moderate resistance to address endurance and strength of UE to improve performance of ADLs and other functional tasks.       Treatment & Education:  Pt educated on POC, role of OT, and safety. Pt performed tasks to improve safety and independence in functional tasks and ADLs as mentioned above.       Patient left up in chair with call button in reach and all needs met    GOALS:   Multidisciplinary Problems       Occupational Therapy Goals          Problem: Occupational Therapy    Goal Priority Disciplines Outcome Interventions   Occupational Therapy Goal     OT, PT/OT Progressing     Description: Goals to be met by: 3/29/25     Patient will increase functional independence with ADLs by performing:    Feeding with Jay. -Ongoing  UE Dressing with Jay.-REVISED  -UE Dressing /c SPV  LE Dressing with Jay.--Ongoing  -LE Dressing /c SPV  Grooming while standing with Modified Jay.--REVISED  -Grooming while standing /c SPV  Toileting from toilet with Modified Indep. for hygiene and clothing management.--REVISED  -Toileting from toilet /c SPV- Ongoing  Bathing from shower chair/bench with Supervision.--Ongoing  Sitting at edge of bed x10 minutes with Jay  Step transfer with Modified Jay--REVISED  -Step transfer /c SPV- Ongoing  Toilet transfer to toilet with Modified Jay.---REVISED  -Toilet transfer to toilet /c SPV                         Time Tracking:     OT Date of Treatment: 04/01/25  OT Start Time: 1306    OT Stop Time: 1344  OT Total Time (min): 38 min    Billable Minutes:Self Care/Home Management 8  Therapeutic Activity 15  Therapeutic Exercise 15    4/1/2025

## 2025-04-01 NOTE — PLAN OF CARE
Skilled Nursing IDT Note  Date: 3/31/25       Patient Name:  Isabel Hernandez       Medical Record Number: [id number]   YOB: 1935  Sex: Female          Room/Bed:  [id number]/[id number] A  Payor Info:  Payor: [employer] MEDICARE / Plan: HU[employer]NA MEDICARE HMO / Product Type: Capitation /      Admitting Diagnosis:   Acute cystitis without hematuria [N30.00]  Acute cystitis without hematuria [N30.00]   Admit Date/Time:  3/7/2025  6:33 PM    Primary Diagnosis:  Acute cystitis without hematuria  Principal Problem: Acute cystitis without hematuria    Patient Active Problem List    Diagnosis Date Noted    Hyponatremia 03/11/2025    Mild malnutrition 03/10/2025    Dizziness 03/07/2025    LBBB (left bundle branch block) 03/06/2025    Generalized weakness 03/06/2025    Hypokalemia 03/05/2025    Acute cystitis without hematuria 03/05/2025    Nausea and vomiting 03/05/2025    Dementia without behavioral disturbance, psychotic disturbance, mood disturbance, or anxiety, unspecified dementia severity, unspecified dementia type 08/22/2023    Stage 3 chronic kidney disease, unspecified whether stage 3a or 3b CKD 08/22/2023    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    Macular hole, full thickness, left 06/26/2018    Stage 2 chronic kidney disease 07/26/2017    Nephrolithiasis 09/23/2015    Aortic atherosclerosis 09/23/2015    Age-related osteoporosis without current pathological fracture 09/23/2015    Hypothyroid 03/05/2013       Team Members Present: Dara Christie, RN Nurse Manager, Barb Suero, RN Charge Nurse, Latosha Alford LPN, , Mariel Portillo, , Zari Pryor, PT, Rehab, Xochitl Puri, Activities, and Merari Hartmann, Dietician     Patient/Family Present: Patient and patient's daughter, Kadi                                                Issues/Consults: None    Caregiver at Discharge: Staff    Living Setting at Discharge: [facility]  Discharge Date: 4/4/25    Supervision Recommended at Discharge: 24 hours light assistance    Follow-up Services:   Home Health  physical therapy and occupational therapy     Preferred Home Health: [facility]    Preferred Pharmacy: [facility] [place]

## 2025-04-01 NOTE — PLAN OF CARE
NURSING HOME ORDERS    04/01/2025  [facility] - SKILLED NURSING  [address] [place] [address]  Dept: [phone]  Loc: [phone]     Admit to Nursing Home:  [facility]     Diagnoses:  Active Hospital Problems    Diagnosis  POA    *Acute cystitis without hematuria [N30.00]  Yes    Hyponatremia [E87.1]  Yes    Mild malnutrition [E44.1]  Yes    Dizziness [R42]  Yes    LBBB (left bundle branch block) [I44.7]  Yes    Generalized weakness [R53.1]  Yes    Nausea and vomiting [R11.2]  Yes    Dementia without behavioral disturbance, psychotic disturbance, mood disturbance, or anxiety, unspecified dementia severity, unspecified dementia type [F03.90]  Yes    Stage 2 chronic kidney disease [N18.2]  Yes     Las gfr > 60 11/22        Age-related osteoporosis without current pathological fracture [M81.0]  Yes     Leslie ot tolerate oral medicatiions due to nausea . Will start prolia      Hypothyroid [E03.9]  Yes     Repeat tsh today         Resolved Hospital Problems   No resolved problems to display.       Patient is homebound due to:  Acute cystitis without hematuria    Allergies:  Review of patient's allergies indicates:   Allergen Reactions    Codeine Nausea And Vomiting    Fosamax [alendronate] Nausea Only    Iron analogues Nausea And Vomiting       Vitals:  Routine    Diet: regular diet    Activities:   Up in a chair each morning as tolerated, Ambulate with assistance to bathroom, and Activity as tolerated    Goals of Care Treatment Preferences:  Code Status: DNR    Living Will: Yes              Labs:  Per facility protocol    Nursing Precautions:  Aspiration , Fall, and Pressure ulcer prevention    Consults:   PT to evaluate and treat- 3 times a week, OT to evaluate and treat- 3 times a week, ST to evaluate and treat- 3 times a week, and Nutrition to evaluate and recommend diet     Medications: Discontinue all previous medication orders, if any. See new list  below.     Medication List        START taking these medications      acetaminophen 325 MG tablet  Commonly known as: TYLENOL  Take 2 tablets (650 mg total) by mouth 3 (three) times daily.     calcium carbonate 200 mg calcium (500 mg) chewable tablet  Commonly known as: TUMS  Take 1 tablet (500 mg total) by mouth 2 (two) times daily as needed for Heartburn.     famotidine 20 MG tablet  Commonly known as: PEPCID  Take 1 tablet (20 mg total) by mouth once daily.  Start taking on: April 2, 2025     LIDOcaine 5 %  Commonly known as: LIDODERM  Place 1 patch onto the skin once daily. Place to lower back.  Remove & Discard patch within 12 hours or as directed by MD     ondansetron 4 MG Tbdl  Commonly known as: ZOFRAN-ODT  Take 1 tablet (4 mg total) by mouth every 8 (eight) hours as needed.            CHANGE how you take these medications      levothyroxine 50 MCG tablet  Commonly known as: SYNTHROID  Take 1 tablet (50 mcg total) by mouth before breakfast.  What changed: when to take this            CONTINUE taking these medications      meclizine 12.5 mg tablet  Commonly known as: ANTIVERT  Take 1 tablet (12.5 mg total) by mouth 2 (two) times daily as needed for Dizziness.            STOP taking these medications      cefpodoxime 100 MG tablet  Commonly known as: VANTIN                Immunizations Administered as of 4/1/2025       Name Date Dose VIS Date Route Exp Date    COVID-19, MRNA, LN-S, PF (Pfizer) (Purple Cap) 10/20/2021  1:42 PM 0.3 mL 12/12/2020 Intramuscular 10/31/2021    Site: Left deltoid     Given By: Heena Lentz     : [employer]     Lot: [id number]     COVID-19, MRNA, LN-S, PF (Pfizer) (Purple Cap) 1/26/2021  1:57 PM 0.3 mL 12/12/2020 Intramuscular 5/31/2021    Site: Left deltoid     Given By: Emilie Costello     : [employer]     Lot: [id number]     COVID-19, MRNA, LN-S, PF (Pfizer) (Purple Cap) 1/4/2021 12:35 PM 0.3 mL 12/12/2020 Intramuscular 1/4/2021    Site: Left deltoid     Given By:  Justina Li, PharmD     : [employer]     Lot: [id number]             This patient has had both covid vaccinations    Some patients may experience side effects after vaccination.  These may include fever, headache, muscle or joint aches.  Most symptoms resolve with 24-48 hours and do not require urgent medical evaluation unless they persist for more than 72 hours or symptoms are concerning for an unrelated medical condition.          _________________________________  Radha Spencer NP  04/01/2025

## 2025-04-01 NOTE — PLAN OF CARE
Recommendation/Intervention:   1) Continue current regular diet, honor food preferences, assist with set up,RD following     Goals: PO to meet 85% of EEN/EPN by next RD follow up  Nutrition Goal Status: progressing towards goal  Communication of RD Recs: other (comment) (POC)

## 2025-04-01 NOTE — PROGRESS NOTES
[facility] - Skilled Nursing  Adult Nutrition  Progress Note    SUMMARY     Recommendation/Intervention:   1) Continue current regular diet, honor food preferences, assist with set up,RD following    Goals: PO to meet 85% of EEN/EPN by next RD follow up  Nutrition Goal Status: progressing towards goal  Communication of RD Recs: other (comment) (POC)    Nutrition Discharge Planning    Nutrition Discharge Planning: General healthy diet    Assessment and Plan    Endocrine  Mild malnutrition  Malnutrition Type:  Context: acute illness or injury  Level: mild     Related to (etiology):   Inadequate oral intake, nausea     Signs and Symptoms (as evidenced by):   Decreased appetite      Malnutrition Characteristic Summary:  Energy Intake (Malnutrition): less than 75% for greater than 7 days  Subcutaneous Fat (Malnutrition): mild depletion  Muscle Mass (Malnutrition): moderate depletion        Interventions/Recommendations (treatment strategy):  Continue regular diet, honor food preferences, assist with set up,RD following     Nutrition Diagnosis Status:   Continues    Malnutrition Assessment    Malnutrition Context: acute illness or injury  Malnutrition Level: mild  Skin (Micronutrient): pallor, bruised, other (see comments), thinned (skin tears)  Hair/Scalp (Micronutrient): dry  Eyes (Micronutrient): conjunctiva dull  Teeth (Micronutrient): none  Neck/Chest (Micronutrient): muscle wasting  Musculoskeletal/Lower Extremities: muscle wasting, subcutaneous fat loss       Energy Intake (Malnutrition): less than 75% for greater than 7 days  Subcutaneous Fat (Malnutrition): mild depletion  Muscle Mass (Malnutrition): moderate depletion   Orbital Region (Subcutaneous Fat Loss): mild depletion (mild buccal)  Upper Arm Region (Subcutaneous Fat Loss): mild depletion  Thoracic and Lumbar Region: well nourished   [place] Region (Muscle Loss): mild depletion  Clavicle and Acromion Bone Region (Muscle Loss): moderate depletion  Dorsal  "Hand (Muscle Loss): moderate depletion  Patellar Region (Muscle Loss): mild depletion  Anterior Thigh Region (Muscle Loss): moderate depletion  Posterior Calf Region (Muscle Loss): mild depletion     Reason for Assessment    Reason For Assessment: RD follow-up  Diagnosis:  (Acute Cystitiis, N/V)  General Information Comments: Pt followed by RD team. Remains on regular diet, prefers sandwiches for lunch (food prefs added to diet order). Ok appetite, consuming 75% at recent meals per flowsheet. No vomiting/diarrhea but recent intermittent nausea and constipation. LBM yesterday, 3/31. Pt weight is stable since admit at 117-119 #    Nutrition/Diet History    Nutrition Intake History: Patient has never been a breakfast eater, she eats about 11 am, she is eating breakfast here, today she ate well, one morning she was too nauseated to eat. She will not drink ONS that daughter purchases for her, so will hold off on that for now.  Food Preferences: She likes ghramn crackers and ice cream. milkshakes but not ONS  Spiritual, Cultural Beliefs, Catholic Practices, Values that Affect Care: no  Food Allergies: NKFA  Factors Affecting Nutritional Intake: nausea/vomiting    Anthropometrics    Height: 4' 11" (149.9 cm)  Height (inches): 59 in  Weight: 53.8 kg (118 lb 9.7 oz) (stand)  Weight (lb): 118.61 lb  Weight Method: Standard Scale  Ideal Body Weight (IBW), Female: 95 lb  % Ideal Body Weight, Female (lb): 125.78 %  BMI (Calculated): 21.7  BMI Grade: 18.5-24.9 - normal  Usual Body Weight (UBW), k.7 kg  Weight Change Amount:  (patient weight has been stable all year until this admission)  % Usual Body Weight: 95.79  % Weight Change From Usual Weight: -4.41 %       Lab/Procedures/Meds    Pertinent Labs Reviewed: reviewed  Pertinent Labs Comments: Na: 134, CO2: 21  Pertinent Medications Reviewed: reviewed   acetaminophen  650 mg Oral TID    famotidine  20 mg Oral Daily    levothyroxine  50 mcg Oral Before breakfast    " LIDOcaine  1 patch Transdermal Q24H       Estimated/Assessed Needs    Weight Used For Calorie Calculations: 54.2 kg (119 lb 7.8 oz)  Energy Calorie Requirements (kcal): 1235  Energy Need Method: [place]-St Jeor (x 1.4(PAL))  Protein Requirements: 65-70  Weight Used For Protein Calculations: 54.2 kg (119 lb 7.8 oz) (1.2-1.3g/kg)  Fluid Requirements (mL): 1235 or per MD  Estimated Fluid Requirement Method: RDA Method  RDA Method (mL): 1235  CHO Requirement: -    Nutrition Prescription Ordered    Current Diet Order: Regular  Nutrition Order Comments: PO 75%  Oral Nutrition Supplement: refuses    Evaluation of Received Nutrient/Fluid Intake    I/O: + 10.5 L since 3/18  Energy Calories Required: meeting needs  Protein Required: not meeting needs  Fluid Required: other (see comments) (As per MD)  Comments: LBM 3/31  Tolerance: tolerating  % Intake of Estimated Energy Needs: 75 - 100 %  % Meal Intake: 75 - 100 %    Nutrition Risk    Level of Risk/Frequency of Follow-up: low (one time per week)     Monitor and Evaluation    Monitor and Evaluation: Food and beverage intake, Weight, Electrolyte and renal panel, Gastrointestinal profile     Nutrition Follow-Up    RD Follow-up?: Yes

## 2025-04-01 NOTE — PLAN OF CARE
CHW served per facility NOMNC to patient. Patient signed NOMNC, a copy was given and faxed to [facility].

## 2025-04-01 NOTE — PROGRESS NOTES
[facility]                                  Skilled Nursing Facility                   Progress Note     Admit Date: 3/7/2025  MURPHY 2025  [id number] A  Principal Problem:  Acute cystitis without hematuria   HPI obtained from patient interview and chart review     Chief Complaint:Re-evaluation of medical treatment and therapy status, nausea, dizziness, back pain    HPI:   Isabel Hernandez is an 89 year old female with PMHx of thyroid disease, stage II CKD, history of compression fractures who presents to SNF following hospitalization for acute cystitis.  Admission to SNF for secondary weakness and debility.    Interval history:  24 hr vital sign ranges reviewed and listed below.  BP soft.  Dizziness continues to persist, patient was set up with soonest ENT appointment.  Back pain has improved.  Continue to encourage patient to drink water.  Continues to have intermittent nausea.  Patient denies shortness of breath, abdominal discomfort, or vomiting.  Patient reports an ok appetite.  Patient denies dysuria.  Patient had a positive bowel movement on  with last 1 prior to that being on .  Patient progressing with PT/OT- patient ambulated with RW close SBA wc follow 170 ft no LOB. Patient is being worked up for nursing home/[facility] placement    Past Medical History: Patient has a past medical history of Arthritis, Cataract, Hypothyroidism, Mild malnutrition (3/10/2025), and Osteoporosis (2015).    Past Surgical History: Patient has a past surgical history that includes Thyroidectomy, partial; Hysterectomy; Cholecystectomy; Tonsillectomy; Adenoidectomy; Breast surgery (Bilateral);  section; Vitrectomy by pars plana approach (Left, 2018); and Appendectomy.    Social History: Patient reports that she has never smoked. She has never used smokeless tobacco. She reports that she does not drink alcohol and does not use  "drugs.    Family History: family history includes Cancer in her brother, father, and sister; Cataracts in her father; Glaucoma in her father; Hypertension in her mother; Kidney disease in her mother; Miscarriages / Stillbirths in her mother; No Known Problems in her brother and daughter; Thyroid disease in her mother.    Allergies: Patient is allergic to codeine, fosamax [alendronate], and iron analogues.    ROS  Constitutional: Negative for fever   Respiratory: Negative for cough, shortness of breath   Cardiovascular: Negative for chest pain, palpitations  Gastrointestinal: Negative for abdominal pain, constipation, diarrhea, vomiting. + intermittent nausea  Genitourinary: Negative for dysuria, frequency   Musculoskeletal:  + generalized weakness.  +back pain  Neurological: Negative for  headaches.  + intermittent dizziness  Psychiatric/Behavioral: Negative for depression. The patient is nervous/anxious.      24 hour Vital Sign Range   Temp:  [98.4 °F (36.9 °C)-98.5 °F (36.9 °C)]   Pulse:  [67-91]   Resp:  [17-18]   BP: (114)/(55-64)   SpO2:  [94 %-97 %]     Current BMI: Body mass index is 21.69 kg/m².    PEx  Constitutional: Patient appears debilitated.  no distress noted  Cardiovascular: Normal rate, regular rhythm and normal heart sounds.    Pulmonary/Chest: Effort normal and breath sounds are clear  Abdominal: Soft. Bowel sounds are normal.   Musculoskeletal: Normal range of motion.   Neurological: Alert and oriented to person, place.  Disoriented to time.  Impaired higher level thinking  Psychiatric: confused, anxious   Skin: Skin is warm and dry.     No results for input(s): "GLUCOSE", "NA", "K", "CL", "CO2", "BUN", "CREATININE", "CALCIUM", "MG" in the last 24 hours.      No results for input(s): "WBC", "RBC", "HGB", "HCT", "PLT", "MCV", "MCH", "MCHC" in the last 24 hours.        No results for input(s): "POCTGLUCOSE" in the last 168 hours.       Assessment and Plan:    Acute cystitis without hematuria  - " "Presenting with nausea/ generalized weakness/ lightheadedness and suprapubic discomfort and confusion   - CT AP without acute findings   - UA infectious, Urine culture with mixed growth; no predominant organism  - treated with IV ceftriaxone empirically  - completed cefpodoxime 100 mg BID, ended 3/13    Nausea and vomiting  - Suspected related to UTI or possibly viral illness   - CT AP without acute findings  - Flu/Covid negative   - continue Zofran PRN     Dizziness  - possible vertigo  - CTH negative  - Per [facility], "Given concern for posterior circulation CVA that could be the etiology of her dizziness we did offer MR brain, but she declined." Per daughter, MRI was ordered and scheduled for day of discharge and was not able to be completed prior to transportation showing up for them to transferred to SNF, will have rescheduled  - possible ENT follow-up  - persisting, meclizine 12.5 mg TID DC'ed per daughters request   - MRI completed, no acute findings   - ENT referral placed, soonest appointment was set for 5/15    Dementia without behavioral disturbance, psychotic disturbance, mood disturbance, or anxiety, unspecified dementia severity, unspecified dementia type   - per past PCP notes patient has opted out of medication for dementia  Delirium precautions:  - Avoid antihistamines, anticholinergics, hypnotics, and minimize opiates while controlling for pain as these medications may exacerbate delirium. Cues for day/night will assist with keeping patient calm and oriented - during daytime, please keep adequate light in room (open windows, lights on) and please keep room dim at night-time to encourage normal sleep-wake cycles. Continuing to have nursing and family reorient the patient and encourage family to visit  - per daughter patient has not yet at her mental baseline patient is typically more oriented.      CKD Stage 3   - stable, continue monitor twice weekly BMPs, avoid nephrotoxic agents, renally dose " medications as appropriate    Back pain  - XR (3/15) Five non rib-bearing lumbar type vertebral bodies are present. There is widening of the intervertebral joint space between L4 and L5, unchanged when compared to 03/05/2025. Multiple compression deformities in the lower thoracic spine are unchanged when compared to prior imaging. Degenerative changes are seen throughout the spine, as before. Facet arthropathy of the lower lumbar spine. The abdominal aorta is heavily calcified.   - continue acetaminophen 650 mg q.8 hours, lidocaine patch qHS, prn tramadol  - pain management referral placed, soonest appointment set was 4 4/3    GERD  - with famotidine    Hypothyroid  - TSH slightly elevated with normal free T4  - stable, Continue home levothyroxine 50 mcg daily    Age-related osteoporosis without current pathological fracture   - per PCP notes, patient is supposed to be on Prolia.  Do not see on dispensed report    Advance care planning  - ACP discussion held with patient and her daughter on 03/10, per daughter patient had living will that states patient is a DNR    Debility   - Continue with PT/OT for gait training and strengthening and restoration of ADL's   - Encourage mobility, OOB in chair, and early ambulation as appropriate  - Fall precautions   - Monitor for bowel and bladder dysfunction  - Monitor for and prevent skin breakdown and pressure ulcers  - Continue DVT prophylaxis with       Anticipate disposition:  Patient's daughter states she will be discharging to an [facility]    IP [facility] RISK OF UNPLANNED READMISSION Model: MODERATE     Follow-up needed during SNF stay-    Follow-up needed after discharge from SNF: PCP, pain management (4/24), ENT (5/15)     Future Appointments   Date Time Provider Department Center   4/24/2025  2:20 PM Sierra Gibson DO [facility] [place]   5/13/2025 12:00 PM Danielle Vaughan FNP [facility] [place] [facility]   5/15/2025 11:00 AM Lucila Paez Au.D, JODI-A [facility] [address]   5/15/2025 11:30 AM Karlos Muniz MD [facility] [address]   5/28/2025  2:15 PM Colten Oleary DPM [facility] POD [address]   6/2/2025  2:45 PM Kristian Shabazz MD [facility] [place]   7/21/2025  2:45 PM Kristian Shabazz MD [facility] [place]     I certify that SNF services are required to be given on an inpatient basis because Isabel Hernandez needs for skilled nursing care and/or skilled rehabilitation are required on a daily basis and such services can only practically be provided in a skilled nursing facility setting and are for an ongoing condition for which she received inpatient care in the hospital.     I spent 46 minutes reviewing patient records, examining, and counseling the patient/ patient's family with greater than 50% of the time spent in direct patient care and coordination.  Care coordinated with staff.  Daughter updated.  Letter provided      Radha Spencer NP  Department of Hospital Medicine   [facility]     DOS:4/1/2025      Patient note was created using MModal Dictation.  Any errors in syntax or even information may not have been identified and edited on initial review prior to signing this note.

## 2025-04-01 NOTE — PLAN OF CARE
Problem: Adult Inpatient Plan of Care  Goal: Plan of Care Review  Outcome: Progressing  Goal: Patient-Specific Goal (Individualized)  Outcome: Progressing  Goal: Absence of Hospital-Acquired Illness or Injury  Outcome: Progressing  Goal: Optimal Comfort and Wellbeing  Outcome: Progressing  Goal: Readiness for Transition of Care  Outcome: Progressing     Problem: Fall Injury Risk  Goal: Absence of Fall and Fall-Related Injury  Outcome: Progressing     Problem: Skin Injury Risk Increased  Goal: Skin Health and Integrity  Outcome: Progressing     Problem: Malnutrition  Goal: Improved Nutritional Intake  Outcome: Progressing     Problem: Infection  Goal: Absence of Infection Signs and Symptoms  Outcome: Progressing     Problem: Dementia Signs/Symptoms  Goal: Optimized Cognitive Function (Dementia Signs/Symptoms)  Outcome: Progressing     Problem: Fall Injury Risk  Goal: Absence of Fall and Fall-Related Injury  Outcome: Progressing

## 2025-04-01 NOTE — PLAN OF CARE
SS. Spoke to Yanelis at [facility]. Pt is onboard to d/c on Friday 4/4/25. Will fax needed documentation on tomorrow.  DME needs:Pt. will need w/c.

## 2025-04-02 PROCEDURE — 11000004 HC SNF PRIVATE: Mod: HCNC

## 2025-04-02 PROCEDURE — 25000003 PHARM REV CODE 250: Mod: HCNC | Performed by: NURSE PRACTITIONER

## 2025-04-02 PROCEDURE — 25000003 PHARM REV CODE 250: Mod: HCNC | Performed by: HOSPITALIST

## 2025-04-02 PROCEDURE — 25000003 PHARM REV CODE 250: Mod: HCNC | Performed by: FAMILY MEDICINE

## 2025-04-02 RX ADMIN — CALCIUM CARBONATE (ANTACID) CHEW TAB 500 MG 500 MG: 500 CHEW TAB at 10:04

## 2025-04-02 RX ADMIN — FAMOTIDINE 20 MG: 20 TABLET, FILM COATED ORAL at 08:04

## 2025-04-02 RX ADMIN — ACETAMINOPHEN 650 MG: 325 TABLET ORAL at 08:04

## 2025-04-02 RX ADMIN — ACETAMINOPHEN 650 MG: 325 TABLET ORAL at 02:04

## 2025-04-02 RX ADMIN — ONDANSETRON 4 MG: 4 TABLET, ORALLY DISINTEGRATING ORAL at 08:04

## 2025-04-02 RX ADMIN — LEVOTHYROXINE SODIUM 50 MCG: 0.05 TABLET ORAL at 05:04

## 2025-04-02 RX ADMIN — LIDOCAINE 1 PATCH: 50 PATCH CUTANEOUS at 08:04

## 2025-04-02 NOTE — PROGRESS NOTES
Tuberculin test to left forearm read at 48 hrs, is 0 mm ,pea size bruise present only. No signs of allergic reaction noted.

## 2025-04-02 NOTE — PT/OT/SLP PROGRESS
Physical Therapy      Patient Name:  Isabel Hernandez   MRN:  [id number]    Patient not seen today secondary to  (Patient declining therapy in AM/PM secondary to not feeling well with nausea and fatigue. Patient seated up in bedside chair upon PT arrival. Nurse aware of patient symptoms.). Will follow-up tomorrow as per POC.    Zari Pryor, PT  4/2/2025

## 2025-04-02 NOTE — PLAN OF CARE
Patient assessed for use of telemonitor. Patient denies any of the following: fear, anxiety or agitation related to alarm sound, no decreased mobility, no sleep disturbances, or infringement on freedom of movement, dignity, or privacy. Appropriate to continue use of telemonitor.  BIMS= 4  Transfer status= SBA  Patient is not able to safely transfer or maintain standing balance independently.

## 2025-04-02 NOTE — PT/OT/SLP PROGRESS
"Occupational Therapy    Not Seen    Isabel Hernandez  MRN: [id number]  Room/Bed: [id number] A    Pt not seen on this day by OT. Attempted 2x in AM with pt reporting nausea. Nursing aware. Attempted in PM with pt stating, "I really don't feel like it today." Will follow up per plan of care.     WILLIAMS [place]  4/2/2025       "

## 2025-04-02 NOTE — PROGRESS NOTES
[facility]                                  Skilled Nursing Facility                   Progress Note     Admit Date: 3/7/2025  MURPHY 2025  [id number] A  Principal Problem:  Acute cystitis without hematuria   HPI obtained from patient interview and chart review     Chief Complaint:Re-evaluation of medical treatment and therapy status, nausea, dizziness, back pain    HPI:   Isabel Hernandez is an 89 year old female with PMHx of thyroid disease, stage II CKD, history of compression fractures who presents to SNF following hospitalization for acute cystitis.  Admission to SNF for secondary weakness and debility.    Interval history:  24 hr vital sign ranges reviewed and listed below.  BP soft.  Dizziness continues to persist, patient was set up with soonest ENT appointment.  Back pain has improved.  Continue to encourage patient to drink water.  Continues to have intermittent nausea.  Patient denies shortness of breath, abdominal discomfort, or vomiting.  Patient reports an ok appetite.  Patient denies dysuria.  Patient had a positive bowel movement on  with last 1 prior to that being on .  Patient progressing with PT/OT- patient has declined PT and OT today due to nausea and dizziness.  Continuing to treat all acute and chronic illnesses. Patient is being worked up for nursing home/[facility] placement    Past Medical History: Patient has a past medical history of Arthritis, Cataract, Hypothyroidism, Mild malnutrition (3/10/2025), and Osteoporosis (2015).    Past Surgical History: Patient has a past surgical history that includes Thyroidectomy, partial; Hysterectomy; Cholecystectomy; Tonsillectomy; Adenoidectomy; Breast surgery (Bilateral);  section; Vitrectomy by pars plana approach (Left, 2018); and Appendectomy.    Social History: Patient reports that she has never smoked. She has never used smokeless tobacco. She  "reports that she does not drink alcohol and does not use drugs.    Family History: family history includes Cancer in her brother, father, and sister; Cataracts in her father; Glaucoma in her father; Hypertension in her mother; Kidney disease in her mother; Miscarriages / Stillbirths in her mother; No Known Problems in her brother and daughter; Thyroid disease in her mother.    Allergies: Patient is allergic to codeine, fosamax [alendronate], and iron analogues.    ROS  Constitutional: Negative for fever   Respiratory: Negative for cough, shortness of breath   Cardiovascular: Negative for chest pain, palpitations  Gastrointestinal: Negative for abdominal pain, constipation, diarrhea, vomiting. + intermittent nausea  Genitourinary: Negative for dysuria, frequency   Musculoskeletal:  + generalized weakness.  +back pain  Neurological: Negative for  headaches.  + intermittent dizziness  Psychiatric/Behavioral: Negative for depression. The patient is nervous/anxious.      24 hour Vital Sign Range   Temp:  [98.5 °F (36.9 °C)-99.1 °F (37.3 °C)]   Pulse:  [69-84]   Resp:  [18]   BP: (117-122)/(55-58)   SpO2:  [93 %-96 %]     Current BMI: Body mass index is 21.69 kg/m².    PEx  Constitutional: Patient appears debilitated.  no distress noted  Cardiovascular: Normal rate, regular rhythm and normal heart sounds.    Pulmonary/Chest: Effort normal and breath sounds are clear  Abdominal: Soft. Bowel sounds are normal.   Musculoskeletal: Normal range of motion.   Neurological: Alert and oriented to person, place.  Disoriented to time.  Impaired higher level thinking  Psychiatric: confused, anxious   Skin: Skin is warm and dry.     No results for input(s): "GLUCOSE", "NA", "K", "CL", "CO2", "BUN", "CREATININE", "CALCIUM", "MG" in the last 24 hours.      No results for input(s): "WBC", "RBC", "HGB", "HCT", "PLT", "MCV", "MCH", "MCHC" in the last 24 hours.        No results for input(s): "POCTGLUCOSE" in the last 168 hours.   " "    Assessment and Plan:    Acute cystitis without hematuria  - Presenting with nausea/ generalized weakness/ lightheadedness and suprapubic discomfort and confusion   - CT AP without acute findings   - UA infectious, Urine culture with mixed growth; no predominant organism  - treated with IV ceftriaxone empirically  - completed cefpodoxime 100 mg BID, ended 3/13    Nausea and vomiting  - Suspected related to UTI or possibly viral illness   - CT AP without acute findings  - Flu/Covid negative   - continue Zofran PRN     Dizziness  - possible vertigo  - CTH negative  - Per [facility], "Given concern for posterior circulation CVA that could be the etiology of her dizziness we did offer MR brain, but she declined." Per daughter, MRI was ordered and scheduled for day of discharge and was not able to be completed prior to transportation showing up for them to transferred to SNF, will have rescheduled  - possible ENT follow-up  - persisting, meclizine 12.5 mg TID DC'ed per daughters request   - MRI completed, no acute findings   - ENT referral placed, soonest appointment was set for 5/15    Dementia without behavioral disturbance, psychotic disturbance, mood disturbance, or anxiety, unspecified dementia severity, unspecified dementia type   - per past PCP notes patient has opted out of medication for dementia  Delirium precautions:  - Avoid antihistamines, anticholinergics, hypnotics, and minimize opiates while controlling for pain as these medications may exacerbate delirium. Cues for day/night will assist with keeping patient calm and oriented - during daytime, please keep adequate light in room (open windows, lights on) and please keep room dim at night-time to encourage normal sleep-wake cycles. Continuing to have nursing and family reorient the patient and encourage family to visit  - per daughter patient has not yet at her mental baseline patient is typically more oriented.      CKD Stage 3   - stable, continue monitor " twice weekly BMPs, avoid nephrotoxic agents, renally dose medications as appropriate    Back pain  - XR (3/15) Five non rib-bearing lumbar type vertebral bodies are present. There is widening of the intervertebral joint space between L4 and L5, unchanged when compared to 03/05/2025. Multiple compression deformities in the lower thoracic spine are unchanged when compared to prior imaging. Degenerative changes are seen throughout the spine, as before. Facet arthropathy of the lower lumbar spine. The abdominal aorta is heavily calcified.   - continue acetaminophen 650 mg q.8 hours, lidocaine patch qHS, prn tramadol  - pain management referral placed, soonest appointment set was 4 4/3    GERD  - with famotidine    Hypothyroid  - TSH slightly elevated with normal free T4  - stable, Continue home levothyroxine 50 mcg daily    Age-related osteoporosis without current pathological fracture   - per PCP notes, patient is supposed to be on Prolia.  Do not see on dispensed report    Advance care planning  - ACP discussion held with patient and her daughter on 03/10, per daughter patient had living will that states patient is a DNR    Debility   - Continue with PT/OT for gait training and strengthening and restoration of ADL's   - Encourage mobility, OOB in chair, and early ambulation as appropriate  - Fall precautions   - Monitor for bowel and bladder dysfunction  - Monitor for and prevent skin breakdown and pressure ulcers  - Continue DVT prophylaxis with       Anticipate disposition:  Patient's daughter states she will be discharging to an [facility]    IP [facility] RISK OF UNPLANNED READMISSION Model: MODERATE     Follow-up needed during SNF stay-    Follow-up needed after discharge from SNF: PCP, pain management (4/24), ENT (5/15)     Future Appointments   Date Time Provider Department Center   4/24/2025  2:20 PM Sierra Gibson DO [facility]IN[facility] [place]   5/13/2025 12:00 PM Danielle Vaughan, TRINI [facility]  [address]    5/15/2025 11:00 AM Lucila Paez Au.D, CCC-A [facility] AUDIO [address]   5/15/2025 11:30 AM Karlos Muniz MD [facility] ENT [address]   5/28/2025  2:15 PM Colten Oleary DPM [facility] POD [address]   6/2/2025  2:45 PM Kristian Shabazz MD [facility] [place]   7/21/2025  2:45 PM Kristian Shabazz MD [facility] [place]     I certify that SNF services are required to be given on an inpatient basis because Isabel Hernandez needs for skilled nursing care and/or skilled rehabilitation are required on a daily basis and such services can only practically be provided in a skilled nursing facility setting and are for an ongoing condition for which she received inpatient care in the hospital.     I spent 47 minutes reviewing patient records, examining, and counseling the patient/ patient's family with greater than 50% of the time spent in direct patient care and coordination.  Care coordinated with staff.  Discharge coordination    Radha Spencer NP  Department of Hospital Medicine   [facility]     DOS:4/2/2025      Patient note was created using MModal Dictation.  Any errors in syntax or even information may not have been identified and edited on initial review prior to signing this note.

## 2025-04-03 LAB
ABSOLUTE EOSINOPHIL (OHS): 0.12 K/UL
ABSOLUTE MONOCYTE (OHS): 0.63 K/UL (ref 0.3–1)
ABSOLUTE NEUTROPHIL COUNT (OHS): 3.52 K/UL (ref 1.8–7.7)
ANION GAP (OHS): 7 MMOL/L (ref 8–16)
BASOPHILS # BLD AUTO: 0.04 K/UL
BASOPHILS NFR BLD AUTO: 0.6 %
BUN SERPL-MCNC: 22 MG/DL (ref 8–23)
CALCIUM SERPL-MCNC: 8.2 MG/DL (ref 8.7–10.5)
CHLORIDE SERPL-SCNC: 105 MMOL/L (ref 95–110)
CO2 SERPL-SCNC: 24 MMOL/L (ref 23–29)
CREAT SERPL-MCNC: 0.7 MG/DL (ref 0.5–1.4)
ERYTHROCYTE [DISTWIDTH] IN BLOOD BY AUTOMATED COUNT: 13.1 % (ref 11.5–14.5)
GFR SERPLBLD CREATININE-BSD FMLA CKD-EPI: >60 ML/MIN/1.73/M2
GLUCOSE SERPL-MCNC: 83 MG/DL (ref 70–110)
HCT VFR BLD AUTO: 35.6 % (ref 37–48.5)
HGB BLD-MCNC: 11.5 GM/DL (ref 12–16)
IMM GRANULOCYTES # BLD AUTO: 0.03 K/UL (ref 0–0.04)
IMM GRANULOCYTES NFR BLD AUTO: 0.4 % (ref 0–0.5)
LYMPHOCYTES # BLD AUTO: 2.7 K/UL (ref 1–4.8)
MAGNESIUM SERPL-MCNC: 2 MG/DL (ref 1.6–2.6)
MCH RBC QN AUTO: 30.7 PG (ref 27–31)
MCHC RBC AUTO-ENTMCNC: 32.3 G/DL (ref 32–36)
MCV RBC AUTO: 95 FL (ref 82–98)
NUCLEATED RBC (/100WBC) (OHS): 0 /100 WBC
PHOSPHATE SERPL-MCNC: 4.5 MG/DL (ref 2.7–4.5)
PLATELET # BLD AUTO: 225 K/UL (ref 150–450)
PMV BLD AUTO: 10.5 FL (ref 9.2–12.9)
POTASSIUM SERPL-SCNC: 4.3 MMOL/L (ref 3.5–5.1)
RBC # BLD AUTO: 3.74 M/UL (ref 4–5.4)
RELATIVE EOSINOPHIL (OHS): 1.7 %
RELATIVE LYMPHOCYTE (OHS): 38.4 % (ref 18–48)
RELATIVE MONOCYTE (OHS): 8.9 % (ref 4–15)
RELATIVE NEUTROPHIL (OHS): 50 % (ref 38–73)
SODIUM SERPL-SCNC: 136 MMOL/L (ref 136–145)
WBC # BLD AUTO: 7.04 K/UL (ref 3.9–12.7)

## 2025-04-03 PROCEDURE — 25000003 PHARM REV CODE 250: Mod: HCNC | Performed by: FAMILY MEDICINE

## 2025-04-03 PROCEDURE — 94761 N-INVAS EAR/PLS OXIMETRY MLT: CPT | Mod: HCNC

## 2025-04-03 PROCEDURE — 80048 BASIC METABOLIC PNL TOTAL CA: CPT | Mod: HCNC | Performed by: HOSPITALIST

## 2025-04-03 PROCEDURE — 97110 THERAPEUTIC EXERCISES: CPT | Mod: HCNC

## 2025-04-03 PROCEDURE — 25000003 PHARM REV CODE 250: Mod: HCNC | Performed by: NURSE PRACTITIONER

## 2025-04-03 PROCEDURE — 36415 COLL VENOUS BLD VENIPUNCTURE: CPT | Mod: HCNC | Performed by: HOSPITALIST

## 2025-04-03 PROCEDURE — 85025 COMPLETE CBC W/AUTO DIFF WBC: CPT | Mod: HCNC | Performed by: HOSPITALIST

## 2025-04-03 PROCEDURE — 84100 ASSAY OF PHOSPHORUS: CPT | Mod: HCNC | Performed by: HOSPITALIST

## 2025-04-03 PROCEDURE — 97530 THERAPEUTIC ACTIVITIES: CPT | Mod: HCNC

## 2025-04-03 PROCEDURE — 83735 ASSAY OF MAGNESIUM: CPT | Mod: HCNC | Performed by: HOSPITALIST

## 2025-04-03 PROCEDURE — 97116 GAIT TRAINING THERAPY: CPT | Mod: HCNC

## 2025-04-03 PROCEDURE — 25000003 PHARM REV CODE 250: Mod: HCNC | Performed by: HOSPITALIST

## 2025-04-03 PROCEDURE — 97535 SELF CARE MNGMENT TRAINING: CPT | Mod: HCNC,CO

## 2025-04-03 PROCEDURE — 11000004 HC SNF PRIVATE: Mod: HCNC

## 2025-04-03 RX ADMIN — FAMOTIDINE 20 MG: 20 TABLET, FILM COATED ORAL at 08:04

## 2025-04-03 RX ADMIN — LIDOCAINE 1 PATCH: 50 PATCH CUTANEOUS at 08:04

## 2025-04-03 RX ADMIN — LEVOTHYROXINE SODIUM 50 MCG: 0.05 TABLET ORAL at 05:04

## 2025-04-03 RX ADMIN — ACETAMINOPHEN 650 MG: 325 TABLET ORAL at 08:04

## 2025-04-03 RX ADMIN — ACETAMINOPHEN 650 MG: 325 TABLET ORAL at 03:04

## 2025-04-03 NOTE — PLAN OF CARE
Problem: Fall Injury Risk  Goal: Absence of Fall and Fall-Related Injury  Outcome: Progressing  Intervention: Promote Injury-Free Environment  Flowsheets (Taken 4/3/2025 6128)  Safety Promotion/Fall Prevention: assistive device/personal item within reach

## 2025-04-03 NOTE — PLAN OF CARE
"D/C scheduled on 25    Problem: Physical Therapy  Goal: Physical Therapy Goal  Description: Goals to be met by: 25     Patient will increase functional independence with mobility by performin. Supine to sit with Supervision - not met, SBA with increased time to complete  2. Sit to supine with Supervision - MET  3. Rolling to Left and Right with Supervision - MET  4. Sit to stand transfer with Supervision - not met, SBA for safety  5. Bed to chair transfer with Supervision using Rolling Walker - not met, SBA for safety  6. Gait  x 150 feet with Stand-by Assistance using Rolling Walker - not met, limited distance traveled  7. Wheelchair propulsion x 50 feet with Supervision using bilateral upper extremities - MET  8. Ascend/descend 4 stairs with bilateral Handrail and Contact Guard Assistance using No Assistive Device - MET  9. Ascend/Descend 4 inch curb step with Contact Guard Assistance using Rolling Walker - MET    Rehab Services' DME recommendations    Walker Adult (5'1"-6"6")    Wheels Yes    Wheelchair  Number of hours up in a wheelchair per day 8      Style Light weight    Justification for light weight w/c: patient cannot propel in a standard wheelchair, patient can and does self-propel in a lightweight wheelchair, patient has impaired ability to participate in MRADLs, mobility limitations cannot be sifficiently resolved with a cane/walker, the home provides adequate access between rooms for a wheelchair, a wheelchair will significantly improve the ability to participate in MRADLs and will be used in the home on a regular basis, the patient is willing to use a wheelchair in the home, the patient has a caregiver who is available, willing, and able to provide assistance with the wheelchair, and the patient requires additional person for safety with mobility with walker  Seat Width 18 (Standard adult)  Seat Depth 18  Back Height Standard  Leg Support Standard, Heel Loops, and Swing Away  Arm Height " Full and Swing Away  Lap Belt Velcro  Accessories Anti-tippers and Safety belt  Cushion Basic  Justification for Cushion maintain skin integrity    Tub bench      Galina Nugent, PTA 3/27/2025        Outcome: Adequate for Care Transition   4/3/2025

## 2025-04-03 NOTE — PROGRESS NOTES
[facility]                                  Skilled Nursing Facility                   Progress Note     Admit Date: 3/7/2025  MURPHY 4/4/2025  [id number] A  Principal Problem:  Acute cystitis without hematuria   HPI obtained from patient interview and chart review     Chief Complaint:Re-evaluation of medical treatment and therapy status, lab review, nausea, dizziness, back pain    HPI:   Isabel Hernandez is an 89 year old female with PMHx of thyroid disease, stage II CKD, history of compression fractures who presents to SNF following hospitalization for acute cystitis.  Admission to SNF for secondary weakness and debility.    Interval history:  All of today's labs reviewed and are listed below.  Sodium improved to 136.  24 hr vital sign ranges reviewed and listed below.  BP remains soft.  Dizziness continues to persist, patient was set up with soonest ENT appointment.  Back pain has improved.  Continue to encourage patient to drink water.  Continues to have intermittent nausea.  Patient denies shortness of breath, abdominal discomfort, or vomiting.  Patient reports an ok appetite.  Patient denies dysuria.  Patient had a positive bowel movement on 03/31 with last 1 prior to that being on 03/27.  Patient progressing with PT/OT- Patient completed Sit <> Stand Transfer x 3 with contact guard assistance  with  rolling walker. Patient completed Bedside chair <> Chair Transfer x 2 using Step Transfer technique with contact guard assistance with rolling walker. Continuing to treat all acute and chronic illnesses.  Patient will be discharging nursing home.    Past Medical History: Patient has a past medical history of Arthritis, Cataract, Hypothyroidism, Mild malnutrition (3/10/2025), and Osteoporosis (9/23/2015).    Past Surgical History: Patient has a past surgical history that includes Thyroidectomy, partial; Hysterectomy; Cholecystectomy;  Tonsillectomy; Adenoidectomy; Breast surgery (Bilateral);  section; Vitrectomy by pars plana approach (Left, 2018); and Appendectomy.    Social History: Patient reports that she has never smoked. She has never used smokeless tobacco. She reports that she does not drink alcohol and does not use drugs.    Family History: family history includes Cancer in her brother, father, and sister; Cataracts in her father; Glaucoma in her father; Hypertension in her mother; Kidney disease in her mother; Miscarriages / Stillbirths in her mother; No Known Problems in her brother and daughter; Thyroid disease in her mother.    Allergies: Patient is allergic to codeine, fosamax [alendronate], and iron analogues.    ROS  Constitutional: Negative for fever   Respiratory: Negative for cough, shortness of breath   Cardiovascular: Negative for chest pain, palpitations  Gastrointestinal: Negative for abdominal pain, constipation, diarrhea, vomiting. + intermittent nausea  Genitourinary: Negative for dysuria, frequency   Musculoskeletal:  + generalized weakness.  +back pain  Neurological: Negative for  headaches.  + intermittent dizziness  Psychiatric/Behavioral: Negative for depression. The patient is nervous/anxious.      24 hour Vital Sign Range   Temp:  [97.6 °F (36.4 °C)-98.5 °F (36.9 °C)]   Pulse:  [60]   Resp:  [16]   BP: (107-112)/(50-53)   SpO2:  [95 %-96 %]     Current BMI: Body mass index is 21.69 kg/m².    PEx  Constitutional: Patient appears debilitated.  no distress noted  Cardiovascular: Normal rate, regular rhythm and normal heart sounds.    Pulmonary/Chest: Effort normal and breath sounds are clear  Abdominal: Soft. Bowel sounds are normal.   Musculoskeletal: Normal range of motion.   Neurological: Alert and oriented to person, place.  Disoriented to time.  Impaired higher level thinking  Psychiatric: confused, anxious   Skin: Skin is warm and dry.     Recent Labs   Lab 25  0639   GLUCOSE 83      K  "4.3      CO2 24   BUN 22   CREATININE 0.7   CALCIUM 8.2*   MG 2.0         Recent Labs   Lab 04/03/25  0639   WBC 7.04   RBC 3.74*   HGB 11.5*   HCT 35.6*      MCV 95   MCH 30.7   MCHC 32.3           No results for input(s): "POCTGLUCOSE" in the last 168 hours.       Assessment and Plan:    Acute cystitis without hematuria  - Presenting with nausea/ generalized weakness/ lightheadedness and suprapubic discomfort and confusion   - CT AP without acute findings   - UA infectious, Urine culture with mixed growth; no predominant organism  - treated with IV ceftriaxone empirically  - completed cefpodoxime 100 mg BID, ended 3/13    Nausea and vomiting  - Suspected related to UTI or possibly viral illness   - CT AP without acute findings  - Flu/Covid negative   - continue Zofran PRN     Dizziness  - possible vertigo  - CTH negative  - Per [facility], "Given concern for posterior circulation CVA that could be the etiology of her dizziness we did offer MR brain, but she declined." Per daughter, MRI was ordered and scheduled for day of discharge and was not able to be completed prior to transportation showing up for them to transferred to SNF, will have rescheduled  - possible ENT follow-up  - persisting, meclizine 12.5 mg TID DC'ed per daughters request   - MRI completed, no acute findings   - ENT referral placed, soonest appointment was set for 5/15    Dementia without behavioral disturbance, psychotic disturbance, mood disturbance, or anxiety, unspecified dementia severity, unspecified dementia type   - per past PCP notes patient has opted out of medication for dementia  Delirium precautions:  - Avoid antihistamines, anticholinergics, hypnotics, and minimize opiates while controlling for pain as these medications may exacerbate delirium. Cues for day/night will assist with keeping patient calm and oriented - during daytime, please keep adequate light in room (open windows, lights on) and please keep room dim at " night-time to encourage normal sleep-wake cycles. Continuing to have nursing and family reorient the patient and encourage family to visit  - per daughter patient has not yet at her mental baseline patient is typically more oriented.      CKD Stage 3   - stable, continue monitor twice weekly BMPs, avoid nephrotoxic agents, renally dose medications as appropriate    Back pain  - XR (3/15) Five non rib-bearing lumbar type vertebral bodies are present. There is widening of the intervertebral joint space between L4 and L5, unchanged when compared to 03/05/2025. Multiple compression deformities in the lower thoracic spine are unchanged when compared to prior imaging. Degenerative changes are seen throughout the spine, as before. Facet arthropathy of the lower lumbar spine. The abdominal aorta is heavily calcified.   - continue acetaminophen 650 mg q.8 hours, lidocaine patch qHS, prn tramadol  - pain management referral placed, soonest appointment set was 4 4/3    GERD  - with famotidine    Hypothyroid  - TSH slightly elevated with normal free T4  - stable, Continue home levothyroxine 50 mcg daily    Age-related osteoporosis without current pathological fracture   - per PCP notes, patient is supposed to be on Prolia.  Do not see on dispensed report    Advance care planning  - ACP discussion held with patient and her daughter on 03/10, per daughter patient had living will that states patient is a DNR    Debility   - Continue with PT/OT for gait training and strengthening and restoration of ADL's   - Encourage mobility, OOB in chair, and early ambulation as appropriate  - Fall precautions   - Monitor for bowel and bladder dysfunction  - Monitor for and prevent skin breakdown and pressure ulcers  - Continue DVT prophylaxis with       Anticipate disposition:  Patient's daughter states she will be discharging to an long term    IP [facility] RISK OF UNPLANNED READMISSION Model: MODERATE     Follow-up needed during SNF stay-    Follow-up  needed after discharge from SNF: PCP, pain management (4/24), ENT (5/15)     Future Appointments   Date Time Provider Department Center   4/24/2025  2:20 PM Sierra Gibson DO [facility] PAIN[facility] [place]   5/13/2025 12:00 PM Danielle Vaughan FNP [facility] IM [address] [facility]   5/15/2025 11:00 AM Lcuila Paez Au.D, CCC-A [facility] AUDIO [address]   5/15/2025 11:30 AM Karlos Muniz MD [facility] ENT [address]   5/28/2025  2:15 PM Colten Oleary DPM [facility] POD [address]   6/2/2025  2:45 PM Kristian Shabazz MD [facility] PRICRE [place]   7/21/2025  2:45 PM Kristian Shabazz MD [facility] PR[facility] [place]     I certify that SNF services are required to be given on an inpatient basis because Isabel Hernandez needs for skilled nursing care and/or skilled rehabilitation are required on a daily basis and such services can only practically be provided in a skilled nursing facility setting and are for an ongoing condition for which she received inpatient care in the hospital.     I spent 48 minutes reviewing patient records, examining, and counseling the patient/ patient's family with greater than 50% of the time spent in direct patient care and coordination.    Discharge coordination    Radha Spencer NP  Department of Hospital Medicine   [facility]     DOS:4/3/2025      Patient note was created using MModal Dictation.  Any errors in syntax or even information may not have been identified and edited on initial review prior to signing this note.

## 2025-04-03 NOTE — PLAN OF CARE
[facility]AYDER[facility] faxed Nursin Home orders and TB skin test results to [email or web address] @ [email or web address]

## 2025-04-03 NOTE — PT/OT/SLP PROGRESS
"Physical Therapy Treatment/Discharge Note    Patient Name:  Isabel Hernandez   MRN:  [id number]  Admit Date: 3/7/2025  Admitting Diagnosis: Acute cystitis without hematuria  Recent Surgeries: N/A    General Precautions: Standard, aspiration, fall  Orthopedic Precautions: N/A  Braces: N/A    Recommendations:     Discharge Recommendations: home health PT  Level of Assistance Recommended at Discharge: 24 hours light assistance  Discharge Equipment Recommendations: wheelchair, walker, rolling, bath bench  Barriers to discharge: Decreased caregiver support    Assessment:     Isabel Hernandez is a 89 y.o. female admitted with a medical diagnosis of Acute cystitis without hematuria. Patient agreeable to PT treatment this AM. D/C GG lobito completed this session. Patient with gradual progression of mobility over course of therapy. Patient pleasant throughout session. Patient remains with confusion and disorientation to time, place and situation. Patient scheduled for D/C on 4/3/25 with recommendation for Home Health PT/OT to continue with progression of mobility toward increased functional independence.       Performance deficits affecting function: weakness, impaired endurance, impaired self care skills, impaired functional mobility, gait instability, impaired balance, decreased lower extremity function, decreased safety awareness, impaired cardiopulmonary response to activity.    Rehab Potential is good    Activity Tolerance: Good    Plan:     Patient to be seen 5 x/week to address the above listed problems via gait training, therapeutic activities, therapeutic exercises, neuromuscular re-education, wheelchair management/training    Plan of Care Expires: 04/07/25  Plan of Care Reviewed with: patient    Subjective     "Where are we going?"     Pain/Comfort:  Pain Rating 1: 0/10  Pain Rating Post-Intervention 1: 0/10    Patient's cultural, spiritual, Ep[employer] conflicts given the current situation:  no    Objective: "     Communicated with nursing staff prior to session.  Patient found up in chair with  (telesitter camera) upon PT entry to room.     Therapeutic Activities and Exercises:   LE ergometer x 10 minutes at minimal resistance for LE strengthening, ROM, and endurance. No rest break required.     Functional Mobility:     04/03/25 1210   Prior Functioning: Everyday Activities   Self Care Independent   Indoor Mobility (Ambulation) Independent   Stairs Unknown   Functional Cognition Needed some help   Functional Limitation in Range of Motion   Upper Extremity No impairment   Lower Extremity No impairment   Mobility Devices Walker;Wheelchair (manual or electric)   Roll Left and Right   Was the activity attempted? Yes   Was the activity done independently? No   Assistance Needed Supervision   Was adaptive equipment used? Yes   CARE Score - Roll Left and Right 4   Sit to Lying   Was the activity attempted? Yes   Was the activity done independently? No   Assistance Needed Supervision   Was adaptive equipment used? Yes   CARE Score - Sit to Lying 4   Lying to Sitting on Side of Bed   Was the activity attempted? Yes   Was the activity done independently? No   Assistance Needed Supervision  (SBA  for safety)   Was adaptive equipment used? Yes   CARE Score - Lying to Sitting on Side of Bed 4   Sit to Stand   Was the activity attempted? Yes   Was the activity done independently? No   Assistance Needed Supervision  (SBA using RW)   Was adaptive equipment used? Yes   CARE Score - Sit to Stand 4   Chair/Bed-to-Chair Transfer   Was the activity attempted? Yes   Was the activity done independently? No   Assistance Needed Supervision  (SBA using RW)   Was adaptive equipment used? Yes   CARE Score - Chair/Bed-to-Chair Transfer 4   Car Transfer   Was the activity attempted? No   Reason if not Attempted Environmental limitations   CARE Score - Car Transfer 10   Walk 10 Feet   Was the activity attempted? Yes   Was the activity done  independently? No   Assistance Needed Supervision  (SBA for safety)   Was adaptive equipment used? Yes   CARE Score - Walk 10 Feet 4   Walk 50 Feet with Two Turns   Was the activity attempted? Yes   Was the activity done independently? No   Assistance Needed Supervision  (Patient ambulated 135 feet using RW with close SBA. Patient requiring cues for direction change. Patient occasionally pushing wakler far forward with cues to stand within YAMILA of walker. No LOB. Patient ambulated an additional 113 feet using RW with SBA.)   Was adaptive equipment used? Yes   CARE Score - Walk 50 Feet with Two Turns 4   Walk 150 Feet   Was the activity attempted? No   Reason if not Attempted Safety concerns   CARE Score - Walk 150 Feet 88   Walking 10 Feet on Uneven Surfaces   Was the activity attempted? Yes   Was the activity done independently? No   Assistance Needed Supervision  (Close SBA using RW)   Was adaptive equipment used? Yes   CARE Score - Walking 10 Feet on Uneven Surfaces 4   1 Step (Curb)   Was the activity attempted? Yes   Was the activity done independently? No   Assistance Needed Supervision  (Close SBA using RW)   Was adaptive equipment used? Yes   CARE Score - 1 Step (Curb) 4   4 Steps   Was the activity attempted? Yes   Was the activity done independently? No   Assistance Needed Supervision  (SBA for safety)   Was adaptive equipment used? Yes   CARE Score - 4 Steps 4   12 Steps   Was the activity attempted? Yes   Was the activity done independently? No   Assistance Needed Supervision  (SBA for safety)   Was adaptive equipment used? Yes   CARE Score - 12 Steps 4   Picking Up Object   Was the activity attempted? Yes   Was the activity done independently? No   Assistance Needed Supervision  (SBA using reacher and RW)   Was adaptive equipment used? Yes   CARE Score - Picking Up Object 4   OTHER   Uses a Wheelchair/Scooter? Yes   Wheel 50 Feet with Two Turns   Was the activity attempted? Yes   Was the activity done  "independently? No   Assistance Needed Supervision   Type of Wheelchair/Scooter Manual   CARE Score - Wheel 50 Feet with Two Turns 4   Wheel 150 Feet   Was the activity attempted? Yes   Was the activity done independently? No   Assistance Needed Supervision  (Patient propelled W/C 185 feet using BUE with Supervision and occasional cues for direction change and steering.)   Type of Wheelchair/Scooter Manual   CARE Score - Wheel 150 Feet 4       AM-PAC 6 CLICK MOBILITY  18    Patient left up in chair with call button in reach and nursing staff and telesitter notified.    GOALS:   Multidisciplinary Problems       Physical Therapy Goals          Problem: Physical Therapy    Goal Priority Disciplines Outcome Interventions   Physical Therapy Goal     PT, PT/OT Adequate for Care Transition    Description: Goals to be met by: 25     Patient will increase functional independence with mobility by performin. Supine to sit with Supervision - not met, SBA with increased time to complete  2. Sit to supine with Supervision - MET  3. Rolling to Left and Right with Supervision - MET  4. Sit to stand transfer with Supervision - not met, SBA for safety  5. Bed to chair transfer with Supervision using Rolling Walker - not met, SBA for safety  6. Gait  x 150 feet with Stand-by Assistance using Rolling Walker - not met, limited distance traveled  7. Wheelchair propulsion x 50 feet with Supervision using bilateral upper extremities - MET  8. Ascend/descend 4 stairs with bilateral Handrail and Contact Guard Assistance using No Assistive Device - MET  9. Ascend/Descend 4 inch curb step with Contact Guard Assistance using Rolling Walker - MET    Rehab Services' DME recommendations    Walker Adult (5'1"-6"6")    Wheels Yes    Wheelchair  Number of hours up in a wheelchair per day 8      Style Light weight    Justification for light weight w/c: patient cannot propel in a standard wheelchair, patient can and does self-propel in a " lightweight wheelchair, patient has impaired ability to participate in MRADLs, mobility limitations cannot be sifficiently resolved with a cane/walker, the home provides adequate access between rooms for a wheelchair, a wheelchair will significantly improve the ability to participate in MRADLs and will be used in the home on a regular basis, the patient is willing to use a wheelchair in the home, the patient has a caregiver who is available, willing, and able to provide assistance with the wheelchair, and the patient requires additional person for safety with mobility with walker  Seat Width 18 (Standard adult)  Seat Depth 18  Back Height Standard  Leg Support Standard, Heel Loops, and Swing Away  Arm Height Full and Swing Away  Lap Belt Velcro  Accessories Anti-tippers and Safety belt  Cushion Basic  Justification for Cushion maintain skin integrity    Tub bench Galina Nugent, BOYD 3/27/2025                             Time Tracking:     PT Received On: 04/03/25  PT Start Time: 1021  PT Stop Time: 1109  PT Total Time (min): 48 min    Billable Minutes: Gait Training 20, Therapeutic Activity 18, and Therapeutic Exercise 10    Treatment Type: Treatment  PT/PTA: PT     Number of PTA visits since last PT visit: 0     04/03/2025

## 2025-04-03 NOTE — PLAN OF CARE
Problem: Occupational Therapy  Goal: Occupational Therapy Goal  Description: Goals to be met by: 3/29/25     Patient will increase functional independence with ADLs by performing:    Feeding with [place]. - Met  UE Dressing with [place].-REVISED  -UE Dressing /c Met  LE Dressing with [place].--Ongoing  -LE Dressing /c SPV - Not Met  Grooming while standing with Modified [place].--REVISED  -Grooming while standing /c SPV - Not Met  Toileting from toilet with Modified Indep. for hygiene and clothing management.--REVISED  -Toileting from toilet /c SPV-  Met  Bathing from shower chair/bench with Supervision.--Ongoing  Sitting at edge of bed x10 minutes with [place]  Step transfer with Modified [place]--REVISED  -Step transfer /c SPV-  Not Met  Toilet transfer to toilet with Modified [place].---REVISED  -Toilet transfer to toilet /c SPV -  Not Met    Outcome: Unable to Meet

## 2025-04-03 NOTE — PLAN OF CARE
Pt. Expected to D/C to [facility] on 4/4/25. Will set up transpiration for 9:00 am.

## 2025-04-03 NOTE — PLAN OF CARE
Problem: Adult Inpatient Plan of Care  Goal: Plan of Care Review  Outcome: Progressing  Goal: Patient-Specific Goal (Individualized)  Outcome: Progressing  Goal: Absence of Hospital-Acquired Illness or Injury  Outcome: Progressing  Goal: Optimal Comfort and Wellbeing  Outcome: Progressing  Goal: Readiness for Transition of Care  Outcome: Progressing     Problem: Fall Injury Risk  Goal: Absence of Fall and Fall-Related Injury  Outcome: Progressing     Problem: Skin Injury Risk Increased  Goal: Skin Health and Integrity  Outcome: Progressing     Problem: Malnutrition  Goal: Improved Nutritional Intake  Outcome: Progressing     Problem: Electrolyte Imbalance  Goal: Electrolyte Balance  Outcome: Progressing     Problem: Infection  Goal: Absence of Infection Signs and Symptoms  Outcome: Progressing     Problem: Dementia Signs/Symptoms  Goal: Optimized Cognitive Function (Dementia Signs/Symptoms)  Outcome: Progressing     Problem: Fall Injury Risk  Goal: Absence of Fall and Fall-Related Injury  Outcome: Progressing

## 2025-04-03 NOTE — PT/OT/SLP PROGRESS
"Occupational Therapy   Treatment/Discharge Summary    Name: Isabel Hernandez  MRN: [id number]  Admit Date: 3/7/2025  Admitting Diagnosis:  Acute cystitis without hematuria    General Precautions: Standard, aspiration, fall   Orthopedic Precautions: N/A   Braces: N/A    Recommendations:     Discharge Recommendations:  home with home health  Level of Assistance Recommended at Discharge: 24 hours supervision for safety in performing ADL's and home management tasks  Discharge Equipment Recommendations: walker, rolling, wheelchair, bath bench  Barriers to discharge:  Decreased caregiver support    Assessment:     Isabel Hernandez is a 89 y.o. female with a medical diagnosis of Acute cystitis without hematuria .  She presents with performance deficits affecting function are weakness, impaired endurance, impaired functional mobility, gait instability, impaired balance, impaired cognition, decreased safety awareness.   Pt participated well during today's session. Pt tolerated tx session without incident and is making progress, however, continues to demonstrate deficits with self care skills, balance, functional mobility, UB strength and endurance. Pt will benefit from continued OT services to progress towards goals.     Rehab Potential is good    Activity tolerance:  Good    Plan:     Patient to be seen 5 x/week to address the above listed problems via self-care/home management, therapeutic activities, therapeutic exercises    Plan of Care Expires: 03/29/25  Plan of Care Reviewed with: patient    Subjective   "I just feel sick"  Communicated with: Missy prior to session.      Pain/Comfort:  Pain Rating 1: 0/10  Pain Rating Post-Intervention 1: 0/10    Patient's cultural, spiritual, Hinduism conflicts given the current situation:  no    Objective:     Patient found  in bedside chair  with  ([employer]) upon OT entry to room.    Functional Mobility/Transfers:  Patient completed Sit <> Stand Transfer x 3 with contact guard assistance  " with  rolling walker   Patient completed Bedside chair <> Chair Transfer x 2 using Step Transfer technique with contact guard assistance with rolling walker  Patient completed Toilet Transfer Step Transfer technique with contact guard assistance with  rolling walker  Patient completed  Shower Transfer Step Transfer technique with contact guard assistance with rolling walker    Activities of Daily Living:  Bathing: stand by assistance to perform cleansing while seated on shower bench  Upper Body Dressing: supervision to doff/don pullover sweater while seated   Lower Body Dressing: minimum assistance to doff/don pants and manage over hips in stance with use of RW  Toileting: stand by assistance to perform doug hygiene seated on 3-in-1 commode over standard toilet.     [facility] 6 Click ADL: 18    Treatment & Education:  Pt educated on:  - role of OT  - level of assistance  - energy conservation and task modification to maximize independence with ADL's and mobility   -  safety while performing functional transfers and self care tasks  - progress towards OT goals     Patient left  in bedside chair  with call button in reach    GOALS:   Multidisciplinary Problems       Occupational Therapy Goals          Problem: Occupational Therapy    Goal Priority Disciplines Outcome Interventions   Occupational Therapy Goal     OT, PT/OT Unable to Meet    Description: Goals to be met by: 3/29/25     Patient will increase functional independence with ADLs by performing:    Feeding with [place]. - Met  UE Dressing with [place].-REVISED  -UE Dressing /c Met  LE Dressing with [place].--Ongoing  -LE Dressing /c SPV - Not Met  Grooming while standing with Modified [place].--REVISED  -Grooming while standing /c SPV - Not Met  Toileting from toilet with Modified Indep. for hygiene and clothing management.--REVISED  -Toileting from toilet /c SPV-  Met  Bathing from shower chair/bench with Supervision.--Ongoing  Sitting at edge  of bed x10 minutes with [place]  Step transfer with Modified [place]--REVISED  -Step transfer /c SPV-  Not Met  Toilet transfer to toilet with Modified [place].---REVISED  -Toilet transfer to toilet /c SPV -  Not Met                         Time Tracking:     OT Date of Treatment: 04/04/25  OT Start Time: 0821    OT Stop Time: 0853  OT Total Time (min): 32 min    Billable Minutes:Self Care/Home Management 32    4/3/2025

## 2025-04-03 NOTE — PLAN OF CARE
SS.faxed Nursing Home orders, CXR and TB skin test results to [email or web address]

## 2025-04-04 VITALS
HEART RATE: 60 BPM | DIASTOLIC BLOOD PRESSURE: 57 MMHG | TEMPERATURE: 98 F | HEIGHT: 59 IN | BODY MASS INDEX: 23.92 KG/M2 | SYSTOLIC BLOOD PRESSURE: 119 MMHG | WEIGHT: 118.63 LBS | OXYGEN SATURATION: 92 % | RESPIRATION RATE: 16 BRPM

## 2025-04-04 PROCEDURE — 25000003 PHARM REV CODE 250: Mod: HCNC | Performed by: NURSE PRACTITIONER

## 2025-04-04 PROCEDURE — 25000003 PHARM REV CODE 250: Mod: HCNC | Performed by: HOSPITALIST

## 2025-04-04 PROCEDURE — 25000003 PHARM REV CODE 250: Mod: HCNC | Performed by: FAMILY MEDICINE

## 2025-04-04 RX ADMIN — FAMOTIDINE 20 MG: 20 TABLET, FILM COATED ORAL at 08:04

## 2025-04-04 RX ADMIN — ACETAMINOPHEN 650 MG: 325 TABLET ORAL at 08:04

## 2025-04-04 RX ADMIN — LEVOTHYROXINE SODIUM 50 MCG: 0.05 TABLET ORAL at 06:04

## 2025-04-04 NOTE — PLAN OF CARE
Problem: Adult Inpatient Plan of Care  Goal: Plan of Care Review  Outcome: Progressing  Goal: Patient-Specific Goal (Individualized)  Outcome: Progressing  Goal: Absence of Hospital-Acquired Illness or Injury  Outcome: Progressing  Goal: Optimal Comfort and Wellbeing  Outcome: Progressing  Goal: Readiness for Transition of Care  Outcome: Progressing     Problem: Fall Injury Risk  Goal: Absence of Fall and Fall-Related Injury  Outcome: Progressing     Problem: Skin Injury Risk Increased  Goal: Skin Health and Integrity  Outcome: Progressing     Problem: Malnutrition  Goal: Improved Nutritional Intake  Outcome: Progressing     Problem: Electrolyte Imbalance  Goal: Electrolyte Balance  Outcome: Progressing     Problem: Infection  Goal: Absence of Infection Signs and Symptoms  Outcome: Progressing     Problem: Dementia Signs/Symptoms  Goal: Optimized Cognitive Function (Dementia Signs/Symptoms)  Outcome: Progressing     Problem: Fall Injury Risk  Goal: Absence of Fall and Fall-Related Injury  Outcome: Progressing     Problem: Fall Injury Risk  Goal: Absence of Fall and Fall-Related Injury  Outcome: Progressing     Problem: Fall Injury Risk  Goal: Absence of Fall and Fall-Related Injury  Outcome: Progressing

## 2025-04-04 NOTE — DISCHARGE SUMMARY
[facility] Medicine  Discharge Summary      Patient Name: Isabel Hernandez  MRN: [id number]  PAPO: [id number]  Patient Class: IP- SNF  Admission Date: 3/7/2025  Hospital Length of Stay: 28 days  Discharge Date and Time: 4/4/2025  9:10 AM  Attending Physician: No att. providers found   Discharging Provider: Radha House NP  Primary Care Provider: Kristian Shabazz MD    Primary Care Team: LT[facility] 8 RADHA HOUSE     HPI:   Isabel Hernandez is an 89 year old female with PMHx of thyroid disease, stage II CKD, history of compression fractures who presents to SNF following hospitalization for acute cystitis.  Admission to SNF for secondary weakness and debility.     Patient originally presented to [facility] ED on 3/5 reporting increased fatigue x2 days, diarrhea, nausea/vomiting.  Per hospital notes, Hisotry is obtained with assistance from daughter at bedside. Daughter states pt lives next door on her own, normally ambulates independently. On Monday, daughter found patient on the ground. It looked as though she had fallen because a shoe was out of place and she had vomited on herself. Daughter checks her for injuries and pt has reported just slipping. Tuesday pt had very poor appetite, still felt nauseated. Today, patient was again unable to get herself up at home and was complaining of persistent nausea, generalized weakness, lightheadedness,  belching. They had gone to get her a walker the day prior since she was so weak and they attempted to have her use it but she was too weak to hold on and take a few steps which is very far from her baseline. Patient reports feeling confused, mild headache, weakness, abdominal cramping, belching. Also reports left sided low back/flank pain.  In the ED: Afebrile and HDS. UA with 3+ LE with >100 WBC. WBC 10. K 3.0. TB 1.2, . TSH 4.342 and normal free T4. Lipase 24. Flu negative. CT head no acute intracranial abnormality. CT cervical w/ no acute fracture  "or subluxation of the cervical spine. CT A/P w/ no acute abnormality of the abdomen or pelvis, remote compression fractures of the T8, T10, T11, and T12 vertebral bodies, colonic diverticulosis without acute diverticulitis. Admitted for dizziness, falls, weakness.  Noted to have UTI.  She was started on rocephin in the ED which has been continued.  She denies dizziness to me but daughter says that she was complaining of some earlier while she was working with PT."  Urine culture with multiple organisms isolated but none in predominance.  PT/OT evaluated patient in his recommending for moderate intensity therapy, patient transferred to SNF.     Today, patient is continuing to report dizziness.  Daughter at bedside states that she is not at her mental baseline.  Patient states her age at 89 which is close to her correct age of 89.  She is oriented to place.  She does not know the year.  Patient's daughter reports that she has been having a lot of belching and acid reflux/GERD symptoms.  Will start patient on Pepcid.  We discussed that tizanidine that is on patient's orders, I feel that this would possibly make her dizziness worse, patient's daughter was not familiar with her ever taking that medication, will discontinue.       Patient will be treated at [facility] with PT and OT to improve functional status and ability to perform ADLs.     Hospital Course:   Patient progressed well with PT and OT- last PT note states that patient ambulated 135 feet using RW with close SBA. Patient requiring cues for direction change. Patient occasionally pushing wakler far forward with cues to stand within YAMILA of walker. No LOB. Patient ambulated an additional 113 feet using RW with SBA.  Patient had no significant events during their stay at SNF.  Patient's dizziness is severe and persistent.  Etiology was not discovered at [facility], they had suggested an MRI to rule out stroke.  MRI was completed while at SNF and was negative for stroke " or any other acute findings.  Patient was set up with the earliest ENT appointment that was available.  Patient also had reported intermittent back pain that became severe during the beginning of her stay, this pain has improved during her stay, patient was set up with outpatient pain management.  Patient discharged to a nursing home setting. DME was ordered if needed. Follow up appointment to be made by patient within one week. All prescriptions and discharge instructions were ordered to be given to the patient prior to discharge.        Goals of Care Treatment Preferences:  Code Status: DNR    Living Will: Yes                 Consults:   Consults (From admission, onward)          Status Ordering Provider     Inpatient consult to Registered Dietitian/Nutritionist  Once        Provider:  (Not yet assigned)    Completed CHRISS ALVES            Assessment & Plan  Acute cystitis without hematuria      Hypothyroid      Age-related osteoporosis without current pathological fracture      Stage 2 chronic kidney disease    Dementia without behavioral disturbance, psychotic disturbance, mood disturbance, or anxiety, unspecified dementia severity, unspecified dementia type    Nausea and vomiting      LBBB (left bundle branch block)      Generalized weakness      Dizziness      Mild malnutrition      Hyponatremia    Recent Labs     04/03/25  0639          Final Active Diagnoses:    Diagnosis Date Noted POA    PRINCIPAL PROBLEM:  Acute cystitis without hematuria [N30.00] 03/05/2025 Yes    Hyponatremia [E87.1] 03/11/2025 Yes    Mild malnutrition [E44.1] 03/10/2025 Yes    Dizziness [R42] 03/07/2025 Yes    LBBB (left bundle branch block) [I44.7] 03/06/2025 Yes    Generalized weakness [R53.1] 03/06/2025 Yes    Nausea and vomiting [R11.2] 03/05/2025 Yes    Dementia without behavioral disturbance, psychotic disturbance, mood disturbance, or anxiety, unspecified dementia severity, unspecified dementia type [F03.90]  "08/22/2023 Yes    Stage 2 chronic kidney disease [N18.2] 07/26/2017 Yes    Age-related osteoporosis without current pathological fracture [M81.0] 09/23/2015 Yes    Hypothyroid [E03.9] 03/05/2013 Yes      Problems Resolved During this Admission:       Discharged Condition: stable    Disposition: Discharged to Nursing Fa*    Follow Up:    Patient Instructions:      WHEELCHAIR FOR HOME USE     Order Specific Question Answer Comments   Hours in W/C per day: 8    Type of Wheelchair: Lightweight    Patient unable to propel in Standard wheelchair? Yes    Size(Width): 18"(STD adult)    Leg Support: STD footrests    Leg Support: Swing Away    Arm Height: Full length    Arm Height: Swing away    Lap Belt: Buckle    Accessories: Anti-tippers    Cushion: Basic    Justification for cushion: Prevent pressure ulcers    Reclining Back No    Height: 4' 11" (1.499 m)    Weight: 53.8 kg (118 lb 9.7 oz) stand   Does patient have medical equipment at home? walker, rolling built in bench for walk in shower   Length of need (1-99 months): 99    Please check all that apply: Caregiver is capable and willing to operate wheelchair safely.    Please check all that apply: The patient requires the use of a w/c for activities of daily living within the Home.    Please check all that apply: Patient mobility limitations cannot be sufficiently resolved by the use of other ambulatory therapies.      Ambulatory referral/consult to ENT   Standing Status: Future   Referral Priority: Urgent Referral Type: Consultation   Referral Reason: Specialty Services Required   Requested Specialty: Otolaryngology   Number of Visits Requested: 1     Ambulatory referral/consult to Pain Clinic   Standing Status: Future   Referral Priority: Routine Referral Type: Consultation   Referral Reason: Specialty Services Required   Requested Specialty: Pain Medicine   Number of Visits Requested: 1       Significant Diagnostic Studies: N/A    Pending Diagnostic Studies:       " None           Medications:  Reconciled Home Medications:      Medication List        START taking these medications      acetaminophen 325 MG tablet  Commonly known as: TYLENOL  Take 2 tablets (650 mg total) by mouth 3 (three) times daily.     calcium carbonate 200 mg calcium (500 mg) chewable tablet  Commonly known as: TUMS  Take 1 tablet (500 mg total) by mouth 2 (two) times daily as needed for Heartburn.     famotidine 20 MG tablet  Commonly known as: PEPCID  Take 1 tablet (20 mg total) by mouth once daily.     LIDOcaine 5 %  Commonly known as: LIDODERM  Place 1 patch onto the skin once daily. Place to lower back.  Remove & Discard patch within 12 hours or as directed by MD     ondansetron 4 MG Tbdl  Commonly known as: ZOFRAN-ODT  Take 1 tablet (4 mg total) by mouth every 8 (eight) hours as needed.            CHANGE how you take these medications      levothyroxine 50 MCG tablet  Commonly known as: SYNTHROID  Take 1 tablet (50 mcg total) by mouth before breakfast.  What changed: when to take this            CONTINUE taking these medications      meclizine 12.5 mg tablet  Commonly known as: ANTIVERT  Take 1 tablet (12.5 mg total) by mouth 2 (two) times daily as needed for Dizziness.            STOP taking these medications      cefpodoxime 100 MG tablet  Commonly known as: VANTIN              Indwelling Lines/Drains at time of discharge:   Lines/Drains/Airways       None                   Time spent on the discharge of patient: 38 minutes         Radha Spencer NP  Department of [facility] Medicine  [facility]

## 2025-04-04 NOTE — NURSING
Transport here to transport pt. to [facility] per w/c accompanied by daughter.pt. personal belongings accompanied pt.attempted to call report to nurse x4 times.will continue to call report.

## 2025-04-04 NOTE — NURSING
Attempted to call report at facility no answer at ext [phone]  phone number [phone].connected to nurses' station no answer.will attempt to call again.

## 2025-04-04 NOTE — HOSPITAL COURSE
Patient progressed well with PT and OT- last PT note states that patient ambulated 135 feet using RW with close SBA. Patient requiring cues for direction change. Patient occasionally pushing wakler far forward with cues to stand within YAMILA of walker. No LOB. Patient ambulated an additional 113 feet using RW with SBA.  Patient had no significant events during their stay at SNF.  Patient's dizziness is severe and persistent.  Etiology was not discovered at [facility], they had suggested an MRI to rule out stroke.  MRI was completed while at SNF and was negative for stroke or any other acute findings.  Patient was set up with the earliest ENT appointment that was available.  Patient also had reported intermittent back pain that became severe during the beginning of her stay, this pain has improved during her stay, patient was set up with outpatient pain management.  Patient discharged to a nursing home setting. DME was ordered if needed. Follow up appointment to be made by patient within one week. All prescriptions and discharge instructions were ordered to be given to the patient prior to discharge.

## 2025-04-04 NOTE — PLAN OF CARE
Problem: Fall Injury Risk  Goal: Absence of Fall and Fall-Related Injury  Outcome: Met  Intervention: Promote Injury-Free Environment  Flowsheets (Taken 4/4/2025 6574)  Safety Promotion/Fall Prevention: assistive device/personal item within reach

## 2025-04-05 ENCOUNTER — PATIENT MESSAGE (OUTPATIENT)
Dept: PRIMARY CARE CLINIC | Facility: CLINIC | Age: 89
End: 2025-04-05
Payer: MEDICARE

## 2025-04-06 ENCOUNTER — HOSPITAL ENCOUNTER (INPATIENT)
Facility: HOSPITAL | Age: 89
LOS: 2 days | Discharge: HOME | DRG: 392 | End: 2025-04-09
Attending: STUDENT IN AN ORGANIZED HEALTH CARE EDUCATION/TRAINING PROGRAM | Admitting: STUDENT IN AN ORGANIZED HEALTH CARE EDUCATION/TRAINING PROGRAM
Payer: MEDICARE

## 2025-04-06 DIAGNOSIS — K92.2 GI BLEED: ICD-10-CM

## 2025-04-06 DIAGNOSIS — K52.9 COLITIS: Primary | ICD-10-CM

## 2025-04-06 DIAGNOSIS — R07.9 CHEST PAIN: ICD-10-CM

## 2025-04-06 PROBLEM — E89.0 POST-SURGICAL HYPOTHYROIDISM: Status: ACTIVE | Noted: 2025-04-06

## 2025-04-06 PROBLEM — D72.829 LEUKOCYTOSIS: Status: ACTIVE | Noted: 2025-04-06

## 2025-04-06 LAB
ABORH RETYPE: NORMAL
ABSOLUTE EOSINOPHIL (OHS): 0.01 K/UL
ABSOLUTE MONOCYTE (OHS): 0.83 K/UL (ref 0.3–1)
ABSOLUTE NEUTROPHIL COUNT (OHS): 16.73 K/UL (ref 1.8–7.7)
ALBUMIN SERPL BCP-MCNC: 3.7 G/DL (ref 3.5–5.2)
ALP SERPL-CCNC: 98 UNIT/L (ref 40–150)
ALT SERPL W/O P-5'-P-CCNC: 13 UNIT/L (ref 10–44)
ANION GAP (OHS): 13 MMOL/L (ref 8–16)
AST SERPL-CCNC: 20 UNIT/L (ref 11–45)
BASOPHILS # BLD AUTO: 0.08 K/UL
BASOPHILS NFR BLD AUTO: 0.4 %
BILIRUB SERPL-MCNC: 0.7 MG/DL (ref 0.1–1)
BIPAP: 0
BUN SERPL-MCNC: 29 MG/DL (ref 8–23)
CALCIUM SERPL-MCNC: 9.1 MG/DL (ref 8.7–10.5)
CHLORIDE SERPL-SCNC: 104 MMOL/L (ref 95–110)
CO2 SERPL-SCNC: 18 MMOL/L (ref 23–29)
CREAT SERPL-MCNC: 0.9 MG/DL (ref 0.5–1.4)
ERYTHROCYTE [DISTWIDTH] IN BLOOD BY AUTOMATED COUNT: 13 % (ref 11.5–14.5)
FIO2: 21 %
GFR SERPLBLD CREATININE-BSD FMLA CKD-EPI: >60 ML/MIN/1.73/M2
GLUCOSE SERPL-MCNC: 152 MG/DL (ref 70–110)
HCT VFR BLD AUTO: 42.7 % (ref 37–48.5)
HGB BLD-MCNC: 13.6 GM/DL (ref 12–16)
IMM GRANULOCYTES # BLD AUTO: 0.11 K/UL (ref 0–0.04)
IMM GRANULOCYTES NFR BLD AUTO: 0.6 % (ref 0–0.5)
INDIRECT COOMBS: NORMAL
LDH SERPL L TO P-CCNC: 2.1 MMOL/L (ref 0.5–2.2)
LYMPHOCYTES # BLD AUTO: 0.9 K/UL (ref 1–4.8)
MCH RBC QN AUTO: 30.4 PG (ref 27–31)
MCHC RBC AUTO-ENTMCNC: 31.9 G/DL (ref 32–36)
MCV RBC AUTO: 95 FL (ref 82–98)
NUCLEATED RBC (/100WBC) (OHS): 0 /100 WBC
PLATELET # BLD AUTO: 216 K/UL (ref 150–450)
PMV BLD AUTO: 10.3 FL (ref 9.2–12.9)
POC PERFORMED BY: NORMAL
POC TEMPERATURE: 37 C
POTASSIUM SERPL-SCNC: 4.6 MMOL/L (ref 3.5–5.1)
PROT SERPL-MCNC: 6.8 GM/DL (ref 6–8.4)
RBC # BLD AUTO: 4.48 M/UL (ref 4–5.4)
RELATIVE EOSINOPHIL (OHS): 0.1 %
RELATIVE LYMPHOCYTE (OHS): 4.8 % (ref 18–48)
RELATIVE MONOCYTE (OHS): 4.4 % (ref 4–15)
RELATIVE NEUTROPHIL (OHS): 89.7 % (ref 38–73)
RH BLD: NORMAL
SODIUM SERPL-SCNC: 135 MMOL/L (ref 136–145)
SPECIMEN OUTDATE: NORMAL
SPECIMEN SOURCE: NORMAL
TROPONIN I SERPL HS-MCNC: <3 NG/L
WBC # BLD AUTO: 18.66 K/UL (ref 3.9–12.7)

## 2025-04-06 PROCEDURE — 25000003 PHARM REV CODE 250: Mod: HCNC | Performed by: STUDENT IN AN ORGANIZED HEALTH CARE EDUCATION/TRAINING PROGRAM

## 2025-04-06 PROCEDURE — 86920 COMPATIBILITY TEST SPIN: CPT | Mod: HCNC | Performed by: STUDENT IN AN ORGANIZED HEALTH CARE EDUCATION/TRAINING PROGRAM

## 2025-04-06 PROCEDURE — G0378 HOSPITAL OBSERVATION PER HR: HCPCS | Mod: HCNC

## 2025-04-06 PROCEDURE — 85025 COMPLETE CBC W/AUTO DIFF WBC: CPT | Mod: HCNC | Performed by: STUDENT IN AN ORGANIZED HEALTH CARE EDUCATION/TRAINING PROGRAM

## 2025-04-06 PROCEDURE — 86900 BLOOD TYPING SEROLOGIC ABO: CPT | Mod: HCNC | Performed by: STUDENT IN AN ORGANIZED HEALTH CARE EDUCATION/TRAINING PROGRAM

## 2025-04-06 PROCEDURE — 84484 ASSAY OF TROPONIN QUANT: CPT | Mod: HCNC | Performed by: STUDENT IN AN ORGANIZED HEALTH CARE EDUCATION/TRAINING PROGRAM

## 2025-04-06 PROCEDURE — 80053 COMPREHEN METABOLIC PANEL: CPT | Mod: HCNC | Performed by: STUDENT IN AN ORGANIZED HEALTH CARE EDUCATION/TRAINING PROGRAM

## 2025-04-06 PROCEDURE — 63600175 PHARM REV CODE 636 W HCPCS: Mod: HCNC | Performed by: STUDENT IN AN ORGANIZED HEALTH CARE EDUCATION/TRAINING PROGRAM

## 2025-04-06 PROCEDURE — 83605 ASSAY OF LACTIC ACID: CPT | Mod: HCNC

## 2025-04-06 PROCEDURE — 99900035 HC TECH TIME PER 15 MIN (STAT): Mod: HCNC

## 2025-04-06 PROCEDURE — 93005 ELECTROCARDIOGRAM TRACING: CPT | Mod: HCNC

## 2025-04-06 PROCEDURE — 93010 ELECTROCARDIOGRAM REPORT: CPT | Mod: HCNC,,, | Performed by: INTERNAL MEDICINE

## 2025-04-06 PROCEDURE — 25500020 PHARM REV CODE 255: Mod: HCNC | Performed by: STUDENT IN AN ORGANIZED HEALTH CARE EDUCATION/TRAINING PROGRAM

## 2025-04-06 RX ORDER — SIMETHICONE 80 MG
1 TABLET,CHEWABLE ORAL 4 TIMES DAILY PRN
Status: DISCONTINUED | OUTPATIENT
Start: 2025-04-06 | End: 2025-04-09 | Stop reason: HOSPADM

## 2025-04-06 RX ORDER — NALOXONE HCL 0.4 MG/ML
0.02 VIAL (ML) INJECTION
Status: DISCONTINUED | OUTPATIENT
Start: 2025-04-06 | End: 2025-04-09 | Stop reason: HOSPADM

## 2025-04-06 RX ORDER — SODIUM CHLORIDE 9 MG/ML
INJECTION, SOLUTION INTRAVENOUS CONTINUOUS
Status: DISCONTINUED | OUTPATIENT
Start: 2025-04-06 | End: 2025-04-09 | Stop reason: HOSPADM

## 2025-04-06 RX ORDER — ACETAMINOPHEN 325 MG/1
650 TABLET ORAL EVERY 4 HOURS PRN
Status: DISCONTINUED | OUTPATIENT
Start: 2025-04-06 | End: 2025-04-09 | Stop reason: HOSPADM

## 2025-04-06 RX ORDER — ALUMINUM HYDROXIDE, MAGNESIUM HYDROXIDE, AND SIMETHICONE 1200; 120; 1200 MG/30ML; MG/30ML; MG/30ML
30 SUSPENSION ORAL 4 TIMES DAILY PRN
Status: DISCONTINUED | OUTPATIENT
Start: 2025-04-06 | End: 2025-04-09 | Stop reason: HOSPADM

## 2025-04-06 RX ORDER — IBUPROFEN 200 MG
24 TABLET ORAL
Status: DISCONTINUED | OUTPATIENT
Start: 2025-04-06 | End: 2025-04-09 | Stop reason: HOSPADM

## 2025-04-06 RX ORDER — PROCHLORPERAZINE EDISYLATE 5 MG/ML
5 INJECTION INTRAMUSCULAR; INTRAVENOUS EVERY 6 HOURS PRN
Status: DISCONTINUED | OUTPATIENT
Start: 2025-04-06 | End: 2025-04-09 | Stop reason: HOSPADM

## 2025-04-06 RX ORDER — IPRATROPIUM BROMIDE AND ALBUTEROL SULFATE 2.5; .5 MG/3ML; MG/3ML
3 SOLUTION RESPIRATORY (INHALATION) EVERY 6 HOURS PRN
Status: DISCONTINUED | OUTPATIENT
Start: 2025-04-06 | End: 2025-04-09 | Stop reason: HOSPADM

## 2025-04-06 RX ORDER — SODIUM CHLORIDE 0.9 % (FLUSH) 0.9 %
10 SYRINGE (ML) INJECTION EVERY 8 HOURS
Status: DISCONTINUED | OUTPATIENT
Start: 2025-04-06 | End: 2025-04-09 | Stop reason: HOSPADM

## 2025-04-06 RX ORDER — IBUPROFEN 200 MG
16 TABLET ORAL
Status: DISCONTINUED | OUTPATIENT
Start: 2025-04-06 | End: 2025-04-09 | Stop reason: HOSPADM

## 2025-04-06 RX ORDER — POLYETHYLENE GLYCOL 3350 17 G/17G
17 POWDER, FOR SOLUTION ORAL DAILY PRN
Status: DISCONTINUED | OUTPATIENT
Start: 2025-04-06 | End: 2025-04-09 | Stop reason: HOSPADM

## 2025-04-06 RX ORDER — GLUCAGON 1 MG
1 KIT INJECTION
Status: DISCONTINUED | OUTPATIENT
Start: 2025-04-06 | End: 2025-04-09 | Stop reason: HOSPADM

## 2025-04-06 RX ORDER — TALC
6 POWDER (GRAM) TOPICAL NIGHTLY PRN
Status: DISCONTINUED | OUTPATIENT
Start: 2025-04-06 | End: 2025-04-09 | Stop reason: HOSPADM

## 2025-04-06 RX ORDER — ONDANSETRON HYDROCHLORIDE 2 MG/ML
4 INJECTION, SOLUTION INTRAVENOUS EVERY 8 HOURS PRN
Status: DISCONTINUED | OUTPATIENT
Start: 2025-04-06 | End: 2025-04-09 | Stop reason: HOSPADM

## 2025-04-06 RX ORDER — LEVOTHYROXINE SODIUM 50 UG/1
50 TABLET ORAL
Status: DISCONTINUED | OUTPATIENT
Start: 2025-04-07 | End: 2025-04-09 | Stop reason: HOSPADM

## 2025-04-06 RX ORDER — LIDOCAINE 50 MG/G
1 PATCH TOPICAL
Status: DISCONTINUED | OUTPATIENT
Start: 2025-04-06 | End: 2025-04-09 | Stop reason: HOSPADM

## 2025-04-06 RX ADMIN — SODIUM CHLORIDE 500 ML: 9 INJECTION, SOLUTION INTRAVENOUS at 04:04

## 2025-04-06 RX ADMIN — IOHEXOL 75 ML: 350 INJECTION, SOLUTION INTRAVENOUS at 02:04

## 2025-04-06 RX ADMIN — PIPERACILLIN SODIUM AND TAZOBACTAM SODIUM 4.5 G: 4; .5 INJECTION, POWDER, FOR SOLUTION INTRAVENOUS at 11:04

## 2025-04-06 RX ADMIN — PIPERACILLIN SODIUM AND TAZOBACTAM SODIUM 4.5 G: 4; .5 INJECTION, POWDER, FOR SOLUTION INTRAVENOUS at 03:04

## 2025-04-06 RX ADMIN — SODIUM CHLORIDE: 9 INJECTION, SOLUTION INTRAVENOUS at 05:04

## 2025-04-06 NOTE — Clinical Note
Diagnosis: GI bleed [160067]   Future Attending Provider: URSZULA NICOLAS [[id number]]   Special Needs:: Disoriented [14]

## 2025-04-06 NOTE — ASSESSMENT & PLAN NOTE
- Cr baseline at admission  - Renally dosing all medications  - Avoiding nephrotoxins  - Renal diet  - Will continue to monitor on daily labs

## 2025-04-06 NOTE — ASSESSMENT & PLAN NOTE
- Interval history and physical exam findings as described above  - Acute imaging findings reviewed  - WBCs 18.66 on admission, afebrile  - H/H grossly stable; prophylactically consented for blood in the ED  - Will continue zosyn at this time  - Stool studies pending  - IVF NS at 125cc/hr  - Will consider GI vs. gen surg consult  - Remain NPO for now  - PRN analgesics provided  - Further dispo pending clinical course

## 2025-04-06 NOTE — ED PROVIDER NOTES
Encounter Date: 2025       History     Chief Complaint   Patient presents with    Rectal Bleeding     Pt coming from [facility] with reports of rectal bleeding since this morning.      89 y.o. female with hypothyroidism, dementia, CKD presents for rectal bleeding for the past day.  Patient presents via EMS from [facility].  She has been having yani blood per rectum all day with multiple bowel movements.  She does not have a history of GI bleeding.  Patient has dementia and is at her mental status baseline.  She was not on blood thinners.  Patient denies any symptoms currently.  She received 4 mg Zofran prior to arrival    The history is provided by medical records, the EMS personnel and the patient. The history is limited by the condition of the patient.     Review of patient's allergies indicates:   Allergen Reactions    Codeine Nausea And Vomiting    Fosamax [alendronate] Nausea Only    Iron analogues Nausea And Vomiting     Past Medical History:   Diagnosis Date    Arthritis     Cataract     Hypothyroidism     Mild malnutrition 3/10/2025    Osteoporosis 2015     Past Surgical History:   Procedure Laterality Date    ADENOIDECTOMY      APPENDECTOMY      BREAST SURGERY Bilateral     cyst removal     SECTION      CHOLECYSTECTOMY      HYSTERECTOMY      THYROIDECTOMY, PARTIAL      TONSILLECTOMY      VITRECTOMY BY PARS PLANA APPROACH Left 2018    Procedure: VITRECTOMY, PARS PLANA APPROch   internal limitng membrane peel;  Surgeon: Johnathan Christopher MD;  Location: [facility] OR [address] [place] [address];  Service: Ophthalmology;  Laterality: Left;     Family History   Problem Relation Name Age of Onset    Hypertension Mother Isabel Langford         Heart stopped at age 90 possible dementia    Thyroid disease Mother Isabel Langford     Kidney disease Mother Isabel Langford         Born with one kidney    Miscarriages / Stillbirths Mother Isabel Langford          One miscarriage    Cancer Father Fernando Langford         Lung cancer - he smoked    Cataracts Father Fernando Langford     Glaucoma Father Fernando Langford     Cancer Sister Karen Berrios         Stomach cancer    Cancer Brother tristen         prostate    No Known Problems Brother kim     No Known Problems Daughter Catarina     Amblyopia Neg Hx      Blindness Neg Hx      Diabetes Neg Hx      Macular degeneration Neg Hx      Retinal detachment Neg Hx      Strabismus Neg Hx      Stroke Neg Hx      Melanoma Neg Hx       Social History[1]  Review of Systems   Reason unable to perform ROS: See HPI for relevant ROS.       Physical Exam     Initial Vitals [04/06/25 1229]   BP Pulse Resp Temp SpO2   106/83 85 20 97.6 °F (36.4 °C) 99 %      MAP       --         Physical Exam    Nursing note and vitals reviewed.  Constitutional:   Alert, normal work of breathing, no acute distress   Eyes: Conjunctivae are normal. No scleral icterus.   Cardiovascular:  Normal rate, regular rhythm and intact distal pulses.           Pulmonary/Chest: Breath sounds normal. No stridor. No respiratory distress.   Abdominal: Abdomen is soft. She exhibits no distension. There is no abdominal tenderness.   Genitourinary:    Genitourinary Comments: Chaperone was present for entirety of examination  David bright red blood and stool per rectum, no melena     Musculoskeletal:         General: No edema.     Neurological: She is alert.   Oriented to person only  Grossly normal strength of upper and lower extremity   Skin: Skin is warm and dry.         ED Course   Procedures  Labs Reviewed   COMPREHENSIVE METABOLIC PANEL - Abnormal       Result Value    Sodium 135 (*)     Potassium 4.6      Chloride 104      CO2 18 (*)     Glucose 152 (*)     BUN 29 (*)     Creatinine 0.9      Calcium 9.1      Protein Total 6.8      Albumin 3.7      Bilirubin Total 0.7      ALP 98      AST 20      ALT 13      Anion Gap 13      eGFR >60     CBC WITH DIFFERENTIAL - Abnormal    WBC  18.66 (*)     RBC 4.48      HGB 13.6      HCT 42.7      MCV 95      MCH 30.4      MCHC 31.9 (*)     RDW 13.0      Platelet Count 216      MPV 10.3      Nucleated RBC 0      Neut % 89.7 (*)     Lymph % 4.8 (*)     Mono % 4.4      Eos % 0.1      Basophil % 0.4      Imm Grans % 0.6 (*)     Neut # 16.73 (*)     Lymph # 0.90 (*)     Mono # 0.83      Eos # 0.01      Baso # 0.08      Imm Grans # 0.11 (*)    TROPONIN I HIGH SENSITIVITY - Normal    Troponin High Sensitive <3     CBC W/ AUTO DIFFERENTIAL    Narrative:     The following orders were created for panel order CBC auto differential.  Procedure                               Abnormality         Status                     ---------                               -----------         ------                     CBC with Differential[[id number]]       Abnormal            Final result                 Please view results for these tests on the individual orders.   TYPE & SCREEN    Specimen Outdate 04/09/2025 23:59      Group & Rh A POS      Indirect Ariane NEG     ABORH RETYPE    ABORH Retype A POS     PREPARE RBC SOFT    UNIT NUMBER [id number]      UNIT ABO/RH A POS      DISPENSE STATUS Selected      Unit Expiration [id number]      Product Code [id number]      Unit Blood Type Code 6200      CROSSMATCH INTERPRETATION Compatible            Imaging Results               CTA Acute GI Bleed, Abdomen and Pelvis (Final result)  Result time 04/06/25 14:42:14      Final result by Thom Taylor MD (04/06/25 14:42:14)                   Impression:      1. Long segment of acute appearing inflammatory change involving the majority of the transverse colon and essentially the entirety of the descending and sigmoid colon extending to the rectum.  Findings may reflect a nonspecific infectious or noninfectious inflammatory versus ischemic colitis.  Clinical correlation recommended in this regard.  2. Relatively diffuse mucosal hyperemia throughout the colon and also involving the  cecum and distal small bowel seen on the CTA phase, becoming somewhat less intense on the delayed venous phase imaging.  Additionally, there are questioned few areas of intraluminal contrast enhancement, equivocal for contrast extravasation versus apposed hyperenhancing mucosal folds within the sigmoid colon.  3. Additional details of chronic and incidental findings, as provided in the body of the report.  This report was flagged in Epic as abnormal.      Electronically signed by: Thom Taylor  Date:    04/06/2025  Time:    14:42               Narrative:    EXAMINATION:  CTA ACUTE GI BLEED, ABDOMEN AND PELVIS    CLINICAL HISTORY:  GIB;    TECHNIQUE:  Contiguous 2.5-mm axial images were obtained through the abdomen and pelvis following the administration of 75 cc of intravenous Omnipaque 350.  Sagittal and coronal reformats and maximum intensity projections were also submitted.    COMPARISON:  CT of the abdomen and pelvis performed 03/05/2025.    FINDINGS:  BOWEL AND PERITONEUM    Bowel: Small hiatal hernia.  No evidence of bowel obstruction.  Colonic diverticulosis.  Suspect duodenal diverticulum.    Relatively long segment of acute appearing inflammatory change involving the majority of the transverse colon and essentially the entirety of the descending and sigmoid colon extending to the rectum.  There is wall thickening and adjoining inflammatory changes noted.    There is relatively diffuse mucosal hyperemia throughout the colon and also involving the cecum and distal small bowel seen on the CTA phase, becoming somewhat less intense on the delayed venous phase imaging.  Additionally, there are questioned few areas of intraluminal contrast enhancement, equivocal for contrast extravasation versus apposed hyperenhancing mucosal folds within the sigmoid colon (for example as seen on axial series 301, image 100 of the CTA phase).    Intraluminal intestinal hemorrhage: See comments above.    Free air or fluid:  None.    VASCULATURE    Visceral arteries: Celiac, superior mesenteric, renal, and inferior mesenteric arteries appear patent.  Short segment high-grade stenosis at the CHRISTIAN origin with poststenotic dilatation.  Scattered moderate atherosclerosis elsewhere.    Abdominal aorta and iliac arteries: No high-grade stenosis or aneurysmal dilatation.  Moderate atherosclerosis.    Mesenteric and portal veins: Normally patent.    Inferior vena cava: Normally patent.    ABDOMEN/PELVIS    Lower chest: Atelectasis and/or scar at the visualized lung bases.    Liver: Unremarkable.    Gallbladder/bile ducts: Gallbladder not confidently identified, favored surgically absent.    Pancreas: Unremarkable.    Spleen: Normal contour.  Suspected calcified granuloma.    Adrenals: Unremarkable.    Kidneys: Cortical atrophy of both kidneys.  Right extrarenal pelvis.  Bilateral parapelvic cysts.  Subcentimeter hypodense lesions are too small to definitely characterize.  Suspect a tiny nonobstructing calculus right lower pole calyx.    Lymph nodes: No abdominal or pelvic lymphadenopathy.    Pelvis: Unremarkable.    Urinary bladder: Asymmetric thickening and enhancement at the anterior aspect of the urinary bladder.    Body wall: Nonspecific fluid attenuation nodule noted in the anterior midline abdomen measuring up to 12 mm.  Nonspecific tiny cystic lesion in the medial right gluteal cleft of doubtful clinical significance.    Bones: Suspected osseous demineralization.  No definite acute change.  Similar configuration of compression fractures of the T10-T12 vertebral bodies and widening of the L4-5 anterior disc space when compared to the 03/18/2025 CT of the abdomen and pelvis.                                       Medications   piperacillin-tazobactam (ZOSYN) 4.5 g in D5W 100 mL IVPB (MB+) (4.5 g Intravenous New Bag 4/6/25 1521)   sodium chloride 0.9% bolus 500 mL 500 mL (has no administration in time range)   iohexoL (OMNIPAQUE 350)  injection 75 mL (75 mLs Intravenous Given 4/6/25 1404)     Medical Decision Making  89 y.o. female with hypothyroidism, dementia, CKD presents for rectal bleeding for the past day.  Differentials include lower GI bleed, angiodysplasia, diverticulitis, colon cancer, upper GI bleed  Patient presenting with yani bloody bowel movements for the past day.  No blood thinner use, abdomen is nontender.  Patient has significant dementia but denies any symptoms currently, blood pressure was 106/83 with EMS, now improved.  No tachycardia, no pallor  No reported hematemesis, received Zofran with EMS  EKG does show a new left bundle-branch block.  However, patient denies any chest pain, no abnormal breathing pattern, no shortness of breath, no diaphoresis, less likely ACS in context of presentation    Amount and/or Complexity of Data Reviewed  External Data Reviewed: labs, ECG and notes.  Labs: ordered. Decision-making details documented in ED Course.  Radiology: ordered. Decision-making details documented in ED Course.  ECG/medicine tests:  Decision-making details documented in ED Course.    Risk  Prescription drug management.               ED Course as of 04/06/25 1542   Sun Apr 06, 2025   1309 EKG 12-lead  Findings, per independent interpretation:  Sinus rhythm with left bundle-branch block, regular, wide complex, rate of 86, Sgarbossa negative, left bundle-branch block now present compared to prior [OK]   1329 CBC auto differential(!)  Leukocytosis, no anemia [OK]   1506 CTA Acute GI Bleed, Abdomen and Pelvis(!)  Findings consistent with colitis, possible ischemic colitis, point of care lactate was ordered, Zosyn ordered.  Patient continues to be hemodynamically stable [OK]   1541 Lactate within normal limits, patient remained hemodynamically stable.  Admitted to hospital medicine for further management [OK]      ED Course User Index  [OK] Morgan Hardy MD                           Clinical Impression:  Final  diagnoses:  [K92.2] GI bleed  [K52.9] Colitis (Primary)          ED Disposition Condition    Observation Stable                  [1]   Social History  Tobacco Use    Smoking status: Never    Smokeless tobacco: Never   Substance Use Topics    Alcohol use: No    Drug use: No        Morgan Hardy MD  04/06/25 1542

## 2025-04-06 NOTE — ED TRIAGE NOTES
Patient arrives to ED via EMS from [facility] with reports of a rectal bleed since this morning. Ems reports patient has a history of dementia and is not on blood thinners.

## 2025-04-06 NOTE — H&P
"  [facility] - Emergency Dept  [facility] Medicine  History & Physical    Patient Name: Isabel Hernandez  MRN: [id number]  Patient Class: OP- Observation  Admission Date: 4/6/2025  Attending Physician: Ulices Merino MD   Primary Care Provider: Kristian Shabazz MD         Patient information was obtained from relative(s) and ER records.     Subjective:     Principal Problem:Acute lower gastrointestinal hemorrhage    Chief Complaint:   Chief Complaint   Patient presents with    Rectal Bleeding     Pt coming from [facility] with reports of rectal bleeding since this morning.         HPI: Isabel Hernandez is an 88 yo F with a PMH of hypothyroidism, CKD2, and dementia who presented to the [facility] ED on 4/6/2025 from her detention NH with [facility]. Of note, pt is unable to provide history 2/2 dementia; daughter Kadi at bedside assists with story. Kadi reports that pt was recently discharged from [facility] and was doing well at her NH until evening PTA when she noticed her mother had a poor appetite. Per NH staff reports to EMS, she was "having yani blood per rectum all day with multiple bowel movements." Unable to obtain further collateral.    In the ED, VSS, though pressures soft at 109/52. Labs remarkable for WBC 18.66, Hb stable at 13.6, and BUN 29. CTA AP showed:  1. Long segment of acute appearing inflammatory change involving the majority of the transverse colon and essentially the entirety of the descending and sigmoid colon extending to the rectum.  Findings may reflect a nonspecific infectious or noninfectious inflammatory versus ischemic colitis.  Clinical correlation recommended in this regard.  2. Relatively diffuse mucosal hyperemia throughout the colon and also involving the cecum and distal small bowel seen on the CTA phase, becoming somewhat less intense on the delayed venous phase imaging.  Additionally, there are questioned few areas of intraluminal contrast enhancement, equivocal for contrast " extravasation versus apposed hyperenhancing mucosal folds within the sigmoid colon.    ED gave IVF and zosyn, and hospital medicine was consulted for admission and further management.    Past Medical History:   Diagnosis Date    Arthritis     Cataract     Hypothyroidism     Mild malnutrition 3/10/2025    Osteoporosis 2015       Past Surgical History:   Procedure Laterality Date    ADENOIDECTOMY      APPENDECTOMY      BREAST SURGERY Bilateral     cyst removal     SECTION      CHOLECYSTECTOMY      HYSTERECTOMY      THYROIDECTOMY, PARTIAL      TONSILLECTOMY      VITRECTOMY BY PARS PLANA APPROACH Left 2018    Procedure: VITRECTOMY, PARS PLANA APPROch   internal limitng membrane peel;  Surgeon: Johnathan Christopher MD;  Location: [facility] OR [address] [place] [address];  Service: Ophthalmology;  Laterality: Left;       Review of patient's allergies indicates:   Allergen Reactions    Codeine Nausea And Vomiting    Fosamax [alendronate] Nausea Only    Iron analogues Nausea And Vomiting       No current facility-administered medications on file prior to encounter.     Current Outpatient Medications on File Prior to Encounter   Medication Sig    acetaminophen (TYLENOL) 325 MG tablet Take 2 tablets (650 mg total) by mouth 3 (three) times daily.    calcium carbonate (TUMS) 200 mg calcium (500 mg) chewable tablet Take 1 tablet (500 mg total) by mouth 2 (two) times daily as needed for Heartburn.    famotidine (PEPCID) 20 MG tablet Take 1 tablet (20 mg total) by mouth once daily.    levothyroxine (SYNTHROID) 50 MCG tablet Take 1 tablet (50 mcg total) by mouth before breakfast.    LIDOcaine (LIDODERM) 5 % Place 1 patch onto the skin once daily. Place to lower back.  Remove & Discard patch within 12 hours or as directed by MD    meclizine (ANTIVERT) 12.5 mg tablet Take 1 tablet (12.5 mg total) by mouth 2 (two) times daily as needed for Dizziness.    ondansetron (ZOFRAN-ODT) 4 MG TbDL Take 1 tablet (4 mg total) by mouth every 8 (eight)  hours as needed.     Family History       Problem Relation (Age of Onset)    Cancer Father, Sister, Brother    Cataracts Father    Glaucoma Father    Hypertension Mother    Kidney disease Mother    Miscarriages / Stillbirths Mother    No Known Problems Brother, Daughter    Thyroid disease Mother          Tobacco Use    Smoking status: Never    Smokeless tobacco: Never   Substance and Sexual Activity    Alcohol use: No    Drug use: No    Sexual activity: Not Currently     Partners: Male     Birth control/protection: None       ROS  - Unable to obtain due to dementia    Objective:     Vital Signs (Most Recent):  Temp: 97.6 °F (36.4 °C) (04/06/25 1229)  Pulse: 84 (04/06/25 1602)  Resp: (!) 22 (04/06/25 1442)  BP: (!) 109/52 (04/06/25 1602)  SpO2: 95 % (04/06/25 1602) Vital Signs (24h Range):  Temp:  [97.6 °F (36.4 °C)] 97.6 °F (36.4 °C)  Pulse:  [83-85] 84  Resp:  [18-24] 22  SpO2:  [95 %-99 %] 95 %  BP: (106-125)/(52-83) 109/52     Weight: 53.5 kg (118 lb)  Body mass index is 22.3 kg/m².       Physical Exam  Gen: in NAD, appears stated age  Neuro: AAO to self only, unable to further participate in neuro exam  HEENT: NTNC, EOMI, PERRLA, MMM; no thyromegaly or lymphadenopathy; no JVD appreciated  CVS: RRR, no m/r/g; S1/S2 auscultated with no S3 or S4; capillary refill < 2 sec  Resp: lungs CTAB, no w/r/r; no belabored breathing or accessory muscle use appreciated   Abd: BS+ in all 4 quadrants; epigastric TTP, soft to palpation; no organomegaly appreciated   Extrem: pulses full, equal, and regular over all 4 extremities; no UE or LE edema BL       Significant Labs: All pertinent labs within the past 24 hours have been reviewed.    Significant Imaging: I have reviewed all pertinent imaging results/findings within the past 24 hours.  Assessment/Plan:     Assessment & Plan  Acute lower gastrointestinal hemorrhage  - Interval history and physical exam findings as described above  - Acute imaging findings reviewed  - WBCs  18.66 on admission, afebrile  - H/H grossly stable; prophylactically consented for blood in the ED  - Will continue zosyn at this time  - Stool studies pending  - IVF NS at 125cc/hr  - Will consider GI vs. gen surg consult  - Remain NPO for now  - PRN analgesics provided  - Further dispo pending clinical course  Leukocytosis  - As above  Post-surgical hypothyroidism  - Currently asymptomatic  - Continue home synthroid regimen  Stage 2 chronic kidney disease  - Cr baseline at admission  - Renally dosing all medications  - Avoiding nephrotoxins  - Renal diet  - Will continue to monitor on daily labs  Dementia without behavioral disturbance  - AAO to self only  - At baseline per daughter  - Delirium precautions in place  VTE Risk Mitigation (From admission, onward)           Ordered     IP VTE HIGH RISK PATIENT  Once         04/06/25 1621     Place sequential compression device  Until discontinued         04/06/25 1621     Reason for No Pharmacological VTE Prophylaxis  Once        Question:  Reasons:  Answer:  Active Bleeding    04/06/25 1621                       On 04/06/2025, patient should be placed in hospital observation services under my care.      Ulices Merino MD, FACP   -  Operations  Medical Director - [facility] Observation Unit  Department of Hospital Medicine  4/6/2025

## 2025-04-06 NOTE — ED NOTES
Telemetry Verification   Patient placed on Telemetry Box  Verified with War Room  Box # [id number]   Monitor Tech a'león   Rate 79   Rhythm Normal sinus

## 2025-04-06 NOTE — ACP (ADVANCE CARE PLANNING)
[facility] Medicine Code Status Documentation Note    Spoke with Mrs. Hernandez' daughter (Kadi) regarding pt's end of life intentions and goals of care. Discussed code status and explained differences between full code and DNR, and answered all questions to the pt's satisfaction.     At this time, family desires for pt to be DNR. Order has been placed in chart to reflect their wishes.    Ulices Merino MD, FACP   -  Operations  Medical Director - [facility] [place] [facility] Observation Unit  Department of [facility] Medicine  4/6/2025      Advance Care Planning     Date: 04/06/2025    Code Status  In light of the patients advanced and life limiting illness,I engaged the the family in a voluntary conversation about the patient's preferences for care  at the very end of life. The patient wishes to have a natural, peaceful death.  Along those lines, the patient does not wish to have CPR or other invasive treatments performed when her heart and/or breathing stops. I communicated to the family that a DNR order would be placed in her medical record to reflect this preference.    A total of 16 min was spent on advance care planning, goals of care discussion, emotional support, formulating and communicating prognosis and exploring burden/benefit of various approaches of treatment. This discussion occurred on a fully voluntary basis with the verbal consent of the patient and/or family.

## 2025-04-06 NOTE — SUBJECTIVE & OBJECTIVE
Past Medical History:   Diagnosis Date    Arthritis     Cataract     Hypothyroidism     Mild malnutrition 3/10/2025    Osteoporosis 2015       Past Surgical History:   Procedure Laterality Date    ADENOIDECTOMY      APPENDECTOMY      BREAST SURGERY Bilateral     cyst removal     SECTION      CHOLECYSTECTOMY      HYSTERECTOMY      THYROIDECTOMY, PARTIAL      TONSILLECTOMY      VITRECTOMY BY PARS PLANA APPROACH Left 2018    Procedure: VITRECTOMY, PARS PLANA APPROch   internal limitng membrane peel;  Surgeon: Johnathan Christopher MD;  Location: [facility] OR [address] [place] [address];  Service: Ophthalmology;  Laterality: Left;       Review of patient's allergies indicates:   Allergen Reactions    Codeine Nausea And Vomiting    Fosamax [alendronate] Nausea Only    Iron analogues Nausea And Vomiting       No current facility-administered medications on file prior to encounter.     Current Outpatient Medications on File Prior to Encounter   Medication Sig    acetaminophen (TYLENOL) 325 MG tablet Take 2 tablets (650 mg total) by mouth 3 (three) times daily.    calcium carbonate (TUMS) 200 mg calcium (500 mg) chewable tablet Take 1 tablet (500 mg total) by mouth 2 (two) times daily as needed for Heartburn.    famotidine (PEPCID) 20 MG tablet Take 1 tablet (20 mg total) by mouth once daily.    levothyroxine (SYNTHROID) 50 MCG tablet Take 1 tablet (50 mcg total) by mouth before breakfast.    LIDOcaine (LIDODERM) 5 % Place 1 patch onto the skin once daily. Place to lower back.  Remove & Discard patch within 12 hours or as directed by MD    meclizine (ANTIVERT) 12.5 mg tablet Take 1 tablet (12.5 mg total) by mouth 2 (two) times daily as needed for Dizziness.    ondansetron (ZOFRAN-ODT) 4 MG TbDL Take 1 tablet (4 mg total) by mouth every 8 (eight) hours as needed.     Family History       Problem Relation (Age of Onset)    Cancer Father, Sister, Brother    Cataracts Father    Glaucoma Father    Hypertension Mother    Kidney disease  Mother    Miscarriages / Stillbirths Mother    No Known Problems Brother, Daughter    Thyroid disease Mother          Tobacco Use    Smoking status: Never    Smokeless tobacco: Never   Substance and Sexual Activity    Alcohol use: No    Drug use: No    Sexual activity: Not Currently     Partners: Male     Birth control/protection: None       ROS  - Unable to obtain due to dementia    Objective:     Vital Signs (Most Recent):  Temp: 97.6 °F (36.4 °C) (04/06/25 1229)  Pulse: 84 (04/06/25 1602)  Resp: (!) 22 (04/06/25 1442)  BP: (!) 109/52 (04/06/25 1602)  SpO2: 95 % (04/06/25 1602) Vital Signs (24h Range):  Temp:  [97.6 °F (36.4 °C)] 97.6 °F (36.4 °C)  Pulse:  [83-85] 84  Resp:  [18-24] 22  SpO2:  [95 %-99 %] 95 %  BP: (106-125)/(52-83) 109/52     Weight: 53.5 kg (118 lb)  Body mass index is 22.3 kg/m².       Physical Exam  Gen: in NAD, appears stated age  Neuro: AAO to self only, unable to further participate in neuro exam  HEENT: NTNC, EOMI, PERRLA, MMM; no thyromegaly or lymphadenopathy; no JVD appreciated  CVS: RRR, no m/r/g; S1/S2 auscultated with no S3 or S4; capillary refill < 2 sec  Resp: lungs CTAB, no w/r/r; no belabored breathing or accessory muscle use appreciated   Abd: BS+ in all 4 quadrants; epigastric TTP, soft to palpation; no organomegaly appreciated   Extrem: pulses full, equal, and regular over all 4 extremities; no UE or LE edema BL       Significant Labs: All pertinent labs within the past 24 hours have been reviewed.    Significant Imaging: I have reviewed all pertinent imaging results/findings within the past 24 hours.

## 2025-04-06 NOTE — HPI
"Isabel Hernandez is an 88 yo F with a PMH of hypothyroidism, CKD2, and dementia who presented to the [facility] ED on 4/6/2025 from her [facility] NH with BRBPR. Of note, pt is unable to provide history 2/2 dementia; daughter Kadi at bedside assists with story. Kadi reports that pt was recently discharged from [facility] and was doing well at her NH until evening PTA when she noticed her mother had a poor appetite. Per NH staff reports to EMS, she was "having yani blood per rectum all day with multiple bowel movements." Unable to obtain further collateral.    In the ED, VSS, though pressures soft at 109/52. Labs remarkable for WBC 18.66, Hb stable at 13.6, and BUN 29. CTA AP showed:  1. Long segment of acute appearing inflammatory change involving the majority of the transverse colon and essentially the entirety of the descending and sigmoid colon extending to the rectum.  Findings may reflect a nonspecific infectious or noninfectious inflammatory versus ischemic colitis.  Clinical correlation recommended in this regard.  2. Relatively diffuse mucosal hyperemia throughout the colon and also involving the cecum and distal small bowel seen on the CTA phase, becoming somewhat less intense on the delayed venous phase imaging.  Additionally, there are questioned few areas of intraluminal contrast enhancement, equivocal for contrast extravasation versus apposed hyperenhancing mucosal folds within the sigmoid colon.    ED gave IVF and zosyn, and hospital medicine was consulted for admission and further management.  "

## 2025-04-07 LAB
ABSOLUTE EOSINOPHIL (OHS): 0.05 K/UL
ABSOLUTE MONOCYTE (OHS): 1.07 K/UL (ref 0.3–1)
ABSOLUTE NEUTROPHIL COUNT (OHS): 9.81 K/UL (ref 1.8–7.7)
ALBUMIN SERPL BCP-MCNC: 2.7 G/DL (ref 3.5–5.2)
ALP SERPL-CCNC: 62 UNIT/L (ref 40–150)
ALT SERPL W/O P-5'-P-CCNC: 10 UNIT/L (ref 10–44)
ANION GAP (OHS): 9 MMOL/L (ref 8–16)
AST SERPL-CCNC: 15 UNIT/L (ref 11–45)
BASOPHILS # BLD AUTO: 0.05 K/UL
BASOPHILS NFR BLD AUTO: 0.4 %
BILIRUB SERPL-MCNC: 0.9 MG/DL (ref 0.1–1)
BUN SERPL-MCNC: 17 MG/DL (ref 8–23)
CALCIUM SERPL-MCNC: 7.5 MG/DL (ref 8.7–10.5)
CHLORIDE SERPL-SCNC: 107 MMOL/L (ref 95–110)
CK SERPL-CCNC: 54 U/L (ref 20–180)
CO2 SERPL-SCNC: 21 MMOL/L (ref 23–29)
CREAT SERPL-MCNC: 0.7 MG/DL (ref 0.5–1.4)
ERYTHROCYTE [DISTWIDTH] IN BLOOD BY AUTOMATED COUNT: 13.3 % (ref 11.5–14.5)
ERYTHROCYTE [SEDIMENTATION RATE] IN BLOOD BY PHOTOMETRIC METHOD: <2 MM/HR
GFR SERPLBLD CREATININE-BSD FMLA CKD-EPI: >60 ML/MIN/1.73/M2
GLUCOSE SERPL-MCNC: 89 MG/DL (ref 70–110)
HCT VFR BLD AUTO: 27.8 % (ref 37–48.5)
HGB BLD-MCNC: 9 GM/DL (ref 12–16)
IMM GRANULOCYTES # BLD AUTO: 0.17 K/UL (ref 0–0.04)
IMM GRANULOCYTES NFR BLD AUTO: 1.3 % (ref 0–0.5)
LYMPHOCYTES # BLD AUTO: 2.27 K/UL (ref 1–4.8)
MAGNESIUM SERPL-MCNC: 2 MG/DL (ref 1.6–2.6)
MCH RBC QN AUTO: 30.9 PG (ref 27–31)
MCHC RBC AUTO-ENTMCNC: 32.4 G/DL (ref 32–36)
MCV RBC AUTO: 96 FL (ref 82–98)
NUCLEATED RBC (/100WBC) (OHS): 0 /100 WBC
OHS QRS DURATION: 130 MS
OHS QTC CALCULATION: 516 MS
PHOSPHATE SERPL-MCNC: 3.9 MG/DL (ref 2.7–4.5)
PLATELET # BLD AUTO: 135 K/UL (ref 150–450)
PLATELET BLD QL SMEAR: ABNORMAL
PMV BLD AUTO: 10.6 FL (ref 9.2–12.9)
POTASSIUM SERPL-SCNC: 3.7 MMOL/L (ref 3.5–5.1)
PROCALCITONIN SERPL-MCNC: 0.06 NG/ML
PROT SERPL-MCNC: 5.3 GM/DL (ref 6–8.4)
RBC # BLD AUTO: 2.91 M/UL (ref 4–5.4)
RELATIVE EOSINOPHIL (OHS): 0.4 %
RELATIVE LYMPHOCYTE (OHS): 16.9 % (ref 18–48)
RELATIVE MONOCYTE (OHS): 8 % (ref 4–15)
RELATIVE NEUTROPHIL (OHS): 73 % (ref 38–73)
SODIUM SERPL-SCNC: 137 MMOL/L (ref 136–145)
WBC # BLD AUTO: 13.42 K/UL (ref 3.9–12.7)

## 2025-04-07 PROCEDURE — 25000003 PHARM REV CODE 250: Mod: HCNC

## 2025-04-07 PROCEDURE — 84145 PROCALCITONIN (PCT): CPT | Mod: HCNC | Performed by: STUDENT IN AN ORGANIZED HEALTH CARE EDUCATION/TRAINING PROGRAM

## 2025-04-07 PROCEDURE — 63600175 PHARM REV CODE 636 W HCPCS: Mod: HCNC | Performed by: STUDENT IN AN ORGANIZED HEALTH CARE EDUCATION/TRAINING PROGRAM

## 2025-04-07 PROCEDURE — 85652 RBC SED RATE AUTOMATED: CPT | Mod: HCNC | Performed by: STUDENT IN AN ORGANIZED HEALTH CARE EDUCATION/TRAINING PROGRAM

## 2025-04-07 PROCEDURE — 11000001 HC ACUTE MED/SURG PRIVATE ROOM: Mod: HCNC

## 2025-04-07 PROCEDURE — 83735 ASSAY OF MAGNESIUM: CPT | Performed by: STUDENT IN AN ORGANIZED HEALTH CARE EDUCATION/TRAINING PROGRAM

## 2025-04-07 PROCEDURE — 84100 ASSAY OF PHOSPHORUS: CPT | Performed by: STUDENT IN AN ORGANIZED HEALTH CARE EDUCATION/TRAINING PROGRAM

## 2025-04-07 PROCEDURE — 36415 COLL VENOUS BLD VENIPUNCTURE: CPT | Mod: HCNC | Performed by: STUDENT IN AN ORGANIZED HEALTH CARE EDUCATION/TRAINING PROGRAM

## 2025-04-07 PROCEDURE — 99223 1ST HOSP IP/OBS HIGH 75: CPT | Mod: HCNC,GC,, | Performed by: INTERNAL MEDICINE

## 2025-04-07 PROCEDURE — 82550 ASSAY OF CK (CPK): CPT | Performed by: STUDENT IN AN ORGANIZED HEALTH CARE EDUCATION/TRAINING PROGRAM

## 2025-04-07 PROCEDURE — 80053 COMPREHEN METABOLIC PANEL: CPT | Performed by: STUDENT IN AN ORGANIZED HEALTH CARE EDUCATION/TRAINING PROGRAM

## 2025-04-07 PROCEDURE — 25000003 PHARM REV CODE 250: Mod: HCNC | Performed by: STUDENT IN AN ORGANIZED HEALTH CARE EDUCATION/TRAINING PROGRAM

## 2025-04-07 PROCEDURE — 94761 N-INVAS EAR/PLS OXIMETRY MLT: CPT | Mod: HCNC

## 2025-04-07 PROCEDURE — A4216 STERILE WATER/SALINE, 10 ML: HCPCS | Mod: HCNC | Performed by: STUDENT IN AN ORGANIZED HEALTH CARE EDUCATION/TRAINING PROGRAM

## 2025-04-07 PROCEDURE — 85025 COMPLETE CBC W/AUTO DIFF WBC: CPT | Mod: HCNC | Performed by: STUDENT IN AN ORGANIZED HEALTH CARE EDUCATION/TRAINING PROGRAM

## 2025-04-07 RX ORDER — POLYETHYLENE GLYCOL 3350, SODIUM SULFATE ANHYDROUS, SODIUM BICARBONATE, SODIUM CHLORIDE, POTASSIUM CHLORIDE 236; 22.74; 6.74; 5.86; 2.97 G/4L; G/4L; G/4L; G/4L; G/4L
4000 POWDER, FOR SOLUTION ORAL ONCE
Status: COMPLETED | OUTPATIENT
Start: 2025-04-07 | End: 2025-04-07

## 2025-04-07 RX ADMIN — SODIUM CHLORIDE, PRESERVATIVE FREE 10 ML: 5 INJECTION INTRAVENOUS at 05:04

## 2025-04-07 RX ADMIN — PIPERACILLIN SODIUM AND TAZOBACTAM SODIUM 4.5 G: 4; .5 INJECTION, POWDER, FOR SOLUTION INTRAVENOUS at 11:04

## 2025-04-07 RX ADMIN — PIPERACILLIN SODIUM AND TAZOBACTAM SODIUM 4.5 G: 4; .5 INJECTION, POWDER, FOR SOLUTION INTRAVENOUS at 04:04

## 2025-04-07 RX ADMIN — LEVOTHYROXINE SODIUM 50 MCG: 0.05 TABLET ORAL at 05:04

## 2025-04-07 RX ADMIN — SODIUM CHLORIDE: 9 INJECTION, SOLUTION INTRAVENOUS at 05:04

## 2025-04-07 RX ADMIN — PIPERACILLIN SODIUM AND TAZOBACTAM SODIUM 4.5 G: 4; .5 INJECTION, POWDER, FOR SOLUTION INTRAVENOUS at 08:04

## 2025-04-07 RX ADMIN — POLYETHYLENE GLYCOL 3350, SODIUM SULFATE ANHYDROUS, SODIUM BICARBONATE, SODIUM CHLORIDE, POTASSIUM CHLORIDE 4000 ML: 236; 22.74; 6.74; 5.86; 2.97 POWDER, FOR SOLUTION ORAL at 05:04

## 2025-04-07 RX ADMIN — LIDOCAINE 1 PATCH: 50 PATCH CUTANEOUS at 05:04

## 2025-04-07 NOTE — ASSESSMENT & PLAN NOTE
- Interval history and physical exam findings as described above  - Acute imaging findings reviewed  - WBCs 18.66 on admission (now downtrending), afebrile  - Hb down to 9.0 this morning  - Will continue zosyn at this time  - Stool studies pending  - IVF NS at 125cc/hr  - GI consulted: cscope tomorrow  - CLD for now, NPO at MN  - PRN analgesics provided  - Further dispo pending clinical course

## 2025-04-07 NOTE — ED NOTES
Pt care assumed. Report received by CHASE Gamboa. Pt lying in stretcher in low and locked position and side rails raised x2. Call light, pt's belongings, and bedside table within pt's reach. Pt on continuous cardiac monitoring, pulse oximetry, and BP cycling every 30 minutes. Pt in NAD and verbalized no needs at this time. Family member x1 at bedside.

## 2025-04-07 NOTE — ASSESSMENT & PLAN NOTE
Isabel Hernandez is an 88 yo F presenting from her [facility] NH with BRBPR. CTA AP showed acute appearing inflammatory change involving the majority of the transverse colon and essentially the entirety of the descending and sigmoid colon extending to the rectum. Labs notable for WBC 18; CBC of 13 with drop to 9 the following day; baseline around 12-13. Last colonoscopy in 2009.    Plan:  - Plan for colonoscopy tomorrow 4/8  - okay for clear liquids today  - NPO at midnight

## 2025-04-07 NOTE — CONSULTS
"[facility] - [facility]  Gastroenterology  Consult Note    Patient Name: Isabel Hernandez  MRN: [id number]  Admission Date: 2025  Hospital Length of Stay: 0 days  Code Status: DNR   Attending Provider: Ulices Merino MD   Consulting Provider: Heidi Suazo MD  Primary Care Physician: Kristian Shabazz MD  Principal Problem:Acute lower gastrointestinal hemorrhage    Inpatient consult to Gastroenterology  Consult performed by: Heidi Suazo MD  Consult ordered by: Ulices Merino MD        Subjective:     HPI:  Isabel Hernandez is an 89 y/o F with a PMH of hypothyroidism, CKD2, and dementia who presented to the [facility] ED on 2025 from her residential NH with BRBPR. Of note, pt is unable to provide history 2/2 dementia; daughter Kadi at bedside assists with story. Kadi reports that pt was recently discharged from OS and was doing well at her NH. Per NH staff reports to EMS, she was "having yani blood per rectum all day with multiple bowel movements." Unable to obtain further collateral. Daughter states patient has had ongoing dizziness for the past few weeks but is otherwise well. Patient has an ENT appointment in May. Daughter states the patient has not had any more bowel movements overnight, but has been NPO. Last colonoscopy was in  w/o any findings.     Past Medical History:   Diagnosis Date    Arthritis     Cataract     Hypothyroidism     Mild malnutrition 3/10/2025    Osteoporosis 2015       Past Surgical History:   Procedure Laterality Date    ADENOIDECTOMY      APPENDECTOMY      BREAST SURGERY Bilateral     cyst removal     SECTION      CHOLECYSTECTOMY      HYSTERECTOMY      THYROIDECTOMY, PARTIAL      TONSILLECTOMY      VITRECTOMY BY PARS PLANA APPROACH Left 2018    Procedure: VITRECTOMY, PARS PLANA APPROch   internal limitng membrane peel;  Surgeon: Johnathan Christopher MD;  Location: [facility] OR [address] [place] [address];  Service: Ophthalmology;  Laterality: Left;       Review of patient's " allergies indicates:   Allergen Reactions    Codeine Nausea And Vomiting    Fosamax [alendronate] Nausea Only    Iron analogues Nausea And Vomiting     Family History       Problem Relation (Age of Onset)    Cancer Father, Sister, Brother    Cataracts Father    Glaucoma Father    Hypertension Mother    Kidney disease Mother    Miscarriages / Stillbirths Mother    No Known Problems Brother, Daughter    Thyroid disease Mother          Tobacco Use    Smoking status: Never    Smokeless tobacco: Never   Substance and Sexual Activity    Alcohol use: No    Drug use: No    Sexual activity: Not Currently     Partners: Male     Birth control/protection: None     Review of Systems   Reason unable to perform ROS: AMS 2/2 to history of dementia.     Objective:     Vital Signs (Most Recent):  Temp: 97.9 °F (36.6 °C) (04/07/25 0730)  Pulse: 70 (04/07/25 0730)  Resp: 18 (04/07/25 0730)  BP: (!) 112/55 (04/07/25 0730)  SpO2: 99 % (04/07/25 0730) Vital Signs (24h Range):  Temp:  [97.6 °F (36.4 °C)-99.6 °F (37.6 °C)] 97.9 °F (36.6 °C)  Pulse:  [70-85] 70  Resp:  [16-24] 18  SpO2:  [95 %-100 %] 99 %  BP: ()/(49-94) 112/55     Weight: 54.9 kg (121 lb 0.5 oz) (04/06/25 1939)  Body mass index is 24.45 kg/m².      Intake/Output Summary (Last 24 hours) at 4/7/2025 1139  Last data filed at 4/7/2025 0505  Gross per 24 hour   Intake 240 ml   Output 400 ml   Net -160 ml       Lines/Drains/Airways       Drain  Duration             Female External Urinary Catheter w/ Suction 04/06/25 1645 <1 day              Peripheral Intravenous Line  Duration                  Peripheral IV - Single Lumen 04/06/25 20 G Anterior;Left Forearm 1 day         Peripheral IV - Single Lumen 04/06/25 1250 18 G Posterior;Right Forearm <1 day                     Physical Exam  Constitutional:       General: She is sleeping.   Cardiovascular:      Rate and Rhythm: Normal rate and regular rhythm.      Heart sounds: Normal heart sounds.   Pulmonary:      Breath sounds:  Normal breath sounds.   Abdominal:      General: Abdomen is flat. There is no distension.      Tenderness: There is no abdominal tenderness.   Musculoskeletal:      Right lower leg: No edema.      Left lower leg: No edema.   Neurological:      General: No focal deficit present.      Mental Status: She is easily aroused. Mental status is at baseline. She is disoriented.          Significant Labs:  CBC:   Recent Labs   Lab 04/06/25  1257 04/07/25  0715   WBC 18.66* 13.42*   HGB 13.6 9.0*   HCT 42.7 27.8*    135*     CMP:   Recent Labs   Lab 04/07/25  0939   CALCIUM 7.5*   ALBUMIN 2.7*      K 3.7   CO2 21*      BUN 17   CREATININE 0.7   ALKPHOS 62   ALT 10   AST 15   BILITOT 0.9       Significant Imaging:  Imaging results within the past 24 hours have been reviewed.    CTA Acute GI Bleed, Abdomen and Pelvis   Final Result   Abnormal      1. Long segment of acute appearing inflammatory change involving the majority of the transverse colon and essentially the entirety of the descending and sigmoid colon extending to the rectum.  Findings may reflect a nonspecific infectious or noninfectious inflammatory versus ischemic colitis.  Clinical correlation recommended in this regard.   2. Relatively diffuse mucosal hyperemia throughout the colon and also involving the cecum and distal small bowel seen on the CTA phase, becoming somewhat less intense on the delayed venous phase imaging.  Additionally, there are questioned few areas of intraluminal contrast enhancement, equivocal for contrast extravasation versus apposed hyperenhancing mucosal folds within the sigmoid colon.   3. Additional details of chronic and incidental findings, as provided in the body of the report.   This report was flagged in Epic as abnormal.         Electronically signed by: Thom Taylor   Date:    04/06/2025   Time:    14:42          Assessment/Plan:     GI  * Acute lower gastrointestinal hemorrhage  Isabel Hernandez is an 89 yo F  presenting from her [facility] NH with BRBPR. CTA AP showed acute appearing inflammatory change involving the majority of the transverse colon and essentially the entirety of the descending and sigmoid colon extending to the rectum. Labs notable for WBC 18; CBC of 13 with drop to 9 the following day; baseline around 12-13. Last colonoscopy in 2009.    Plan:  - Plan for colonoscopy tomorrow 4/8  - okay for clear liquids today  - NPO at midnight         Thank you for your consult. I will follow-up with patient. Please contact us if you have any additional questions.    Heidi Suazo MD  Gastroenterology  [facility] - Observation 11H

## 2025-04-07 NOTE — SUBJECTIVE & OBJECTIVE
Past Medical History:   Diagnosis Date    Arthritis     Cataract     Hypothyroidism     Mild malnutrition 3/10/2025    Osteoporosis 2015       Past Surgical History:   Procedure Laterality Date    ADENOIDECTOMY      APPENDECTOMY      BREAST SURGERY Bilateral     cyst removal     SECTION      CHOLECYSTECTOMY      HYSTERECTOMY      THYROIDECTOMY, PARTIAL      TONSILLECTOMY      VITRECTOMY BY PARS PLANA APPROACH Left 2018    Procedure: VITRECTOMY, PARS PLANA APPROch   internal limitng membrane peel;  Surgeon: Johnathan Christopher MD;  Location: [facility] OR [address] [place] [address];  Service: Ophthalmology;  Laterality: Left;       Review of patient's allergies indicates:   Allergen Reactions    Codeine Nausea And Vomiting    Fosamax [alendronate] Nausea Only    Iron analogues Nausea And Vomiting     Family History       Problem Relation (Age of Onset)    Cancer Father, Sister, Brother    Cataracts Father    Glaucoma Father    Hypertension Mother    Kidney disease Mother    Miscarriages / Stillbirths Mother    No Known Problems Brother, Daughter    Thyroid disease Mother          Tobacco Use    Smoking status: Never    Smokeless tobacco: Never   Substance and Sexual Activity    Alcohol use: No    Drug use: No    Sexual activity: Not Currently     Partners: Male     Birth control/protection: None     Review of Systems   Reason unable to perform ROS: AMS 2/2 to history of dementia.     Objective:     Vital Signs (Most Recent):  Temp: 97.9 °F (36.6 °C) (25)  Pulse: 70 (25)  Resp: 18 (25)  BP: (!) 112/55 (25)  SpO2: 99 % (25) Vital Signs (24h Range):  Temp:  [97.6 °F (36.4 °C)-99.6 °F (37.6 °C)] 97.9 °F (36.6 °C)  Pulse:  [70-85] 70  Resp:  [16-24] 18  SpO2:  [95 %-100 %] 99 %  BP: ()/(49-94) 112/55     Weight: 54.9 kg (121 lb 0.5 oz) (25)  Body mass index is 24.45 kg/m².      Intake/Output Summary (Last 24 hours) at 2025 7823  Last data filed at  4/7/2025 0505  Gross per 24 hour   Intake 240 ml   Output 400 ml   Net -160 ml       Lines/Drains/Airways       Drain  Duration             Female External Urinary Catheter w/ Suction 04/06/25 1645 <1 day              Peripheral Intravenous Line  Duration                  Peripheral IV - Single Lumen 04/06/25 20 G Anterior;Left Forearm 1 day         Peripheral IV - Single Lumen 04/06/25 1250 18 G Posterior;Right Forearm <1 day                     Physical Exam  Constitutional:       General: She is sleeping.   Cardiovascular:      Rate and Rhythm: Normal rate and regular rhythm.      Heart sounds: Normal heart sounds.   Pulmonary:      Breath sounds: Normal breath sounds.   Abdominal:      General: Abdomen is flat. There is no distension.      Tenderness: There is no abdominal tenderness.   Musculoskeletal:      Right lower leg: No edema.      Left lower leg: No edema.   Neurological:      General: No focal deficit present.      Mental Status: She is easily aroused. Mental status is at baseline. She is disoriented.          Significant Labs:  CBC:   Recent Labs   Lab 04/06/25  1257 04/07/25  0715   WBC 18.66* 13.42*   HGB 13.6 9.0*   HCT 42.7 27.8*    135*     CMP:   Recent Labs   Lab 04/07/25  0939   CALCIUM 7.5*   ALBUMIN 2.7*      K 3.7   CO2 21*      BUN 17   CREATININE 0.7   ALKPHOS 62   ALT 10   AST 15   BILITOT 0.9       Significant Imaging:  Imaging results within the past 24 hours have been reviewed.    CTA Acute GI Bleed, Abdomen and Pelvis   Final Result   Abnormal      1. Long segment of acute appearing inflammatory change involving the majority of the transverse colon and essentially the entirety of the descending and sigmoid colon extending to the rectum.  Findings may reflect a nonspecific infectious or noninfectious inflammatory versus ischemic colitis.  Clinical correlation recommended in this regard.   2. Relatively diffuse mucosal hyperemia throughout the colon and also  involving the cecum and distal small bowel seen on the CTA phase, becoming somewhat less intense on the delayed venous phase imaging.  Additionally, there are questioned few areas of intraluminal contrast enhancement, equivocal for contrast extravasation versus apposed hyperenhancing mucosal folds within the sigmoid colon.   3. Additional details of chronic and incidental findings, as provided in the body of the report.   This report was flagged in Epic as abnormal.         Electronically signed by: Thom Taylor   Date:    04/06/2025   Time:    14:42

## 2025-04-07 NOTE — SUBJECTIVE & OBJECTIVE
Interval History: Pt seen and examined this morning on abram MUÑOZ. Bedside interview limited by dementia, though daughter Kadi provided updates. Care plan reviewed. Otherwise, doing well and with no further complaints at this time.        Objective:     Vital Signs (Most Recent):  Temp: 98 °F (36.7 °C) (04/07/25 1141)  Pulse: (!) 59 (04/07/25 1141)  Resp: 18 (04/07/25 1141)  BP: (!) 112/55 (04/07/25 0730)  SpO2: 98 % (04/07/25 1141) Vital Signs (24h Range):  Temp:  [97.9 °F (36.6 °C)-99.6 °F (37.6 °C)] 98 °F (36.7 °C)  Pulse:  [59-84] 59  Resp:  [16-24] 18  SpO2:  [95 %-100 %] 98 %  BP: ()/(49-94) 112/55     Weight: 54.9 kg (121 lb 0.5 oz)  Body mass index is 24.45 kg/m².    Intake/Output Summary (Last 24 hours) at 4/7/2025 1243  Last data filed at 4/7/2025 0505  Gross per 24 hour   Intake 240 ml   Output 400 ml   Net -160 ml         Physical Exam  Gen: in NAD, appears stated age  Neuro: AAO to self only, unable to further participate in neuro exam  HEENT: NTNC, EOMI, PERRLA, MMM; no thyromegaly or lymphadenopathy; no JVD appreciated  CVS: RRR, no m/r/g; S1/S2 auscultated with no S3 or S4; capillary refill < 2 sec  Resp: lungs CTAB, no w/r/r; no belabored breathing or accessory muscle use appreciated   Abd: BS+ in all 4 quadrants; epigastric TTP, soft to palpation; no organomegaly appreciated   Extrem: pulses full, equal, and regular over all 4 extremities; no UE or LE edema BL      Significant Labs: All pertinent labs within the past 24 hours have been reviewed.    Significant Imaging: I have reviewed all pertinent imaging results/findings within the past 24 hours.

## 2025-04-07 NOTE — PLAN OF CARE
04/07/25 1416   Post-Acute Status   Post-Acute Authorization Placement   Post-Acute Placement Status Referrals Sent     Pt is a resident of [facility] and is expected to return once she is medically stable. SW sent referral via [employer] for review.    Discharge Plan A and Plan B have been determined by review of patient's clinical status, future medical and therapeutic needs, and coverage/benefits for post-acute care in coordination with multidisciplinary team members.    SW will continue to follow up.      Ada Hyde LMSW  [facility]  Ext. [phone]

## 2025-04-07 NOTE — PLAN OF CARE
Problem: Adult Inpatient Plan of Care  Goal: Plan of Care Review  Outcome: Progressing  Goal: Patient-Specific Goal (Individualized)  Outcome: Progressing  Goal: Absence of Hospital-Acquired Illness or Injury  Outcome: Progressing  Goal: Optimal Comfort and Wellbeing  Outcome: Progressing  Goal: Readiness for Transition of Care  Outcome: Progressing     Problem: Infection  Goal: Absence of Infection Signs and Symptoms  Outcome: Progressing     Problem: Fall Injury Risk  Goal: Absence of Fall and Fall-Related Injury  Outcome: Progressing     Problem: Chronic Kidney Disease  Goal: Optimal Coping with Chronic Illness  Outcome: Progressing  Goal: Electrolyte Balance  Outcome: Progressing  Goal: Fluid Balance  Outcome: Progressing  Goal: Optimal Functional Ability  Outcome: Progressing  Goal: Absence of Anemia Signs and Symptoms  Outcome: Progressing  Goal: Optimal Oral Intake  Outcome: Progressing  Goal: Acceptable Pain Control  Outcome: Progressing  Goal: Minimize Renal Failure Effects  Outcome: Progressing     Problem: Gastrointestinal Bleeding  Goal: Optimal Coping with Acute Illness  Outcome: Progressing  Goal: Hemostasis  Outcome: Progressing     Problem: Skin Injury Risk Increased  Goal: Skin Health and Integrity  Outcome: Progressing

## 2025-04-07 NOTE — PROGRESS NOTES
"[facility] [address] [place] [address] Medicine  Progress Note    Patient Name: Isabel Hernandez  MRN: [id number]  Patient Class: OP- Observation   Admission Date: 4/6/2025  Length of Stay: 0 days  Attending Physician: Ulices Merino MD  Primary Care Provider: Kristian Shabazz MD        Subjective     Principal Problem:Acute lower gastrointestinal hemorrhage        HPI:  Isabel Hernandez is an 88 yo F with a PMH of hypothyroidism, CKD2, and dementia who presented to the [facility] ED on 4[facility]6/[facility] with BRBPR. Of note, pt is unable to provide history 2/2 dementia; daughter Kadi at bedside assists with story. Kadi reports that pt was recently discharged from [facility] and was doing well at her NH until evening PTA when she noticed her mother had a poor appetite. Per NH staff reports to EMS, she was "having yani blood per rectum all day with multiple bowel movements." Unable to obtain further collateral.    In the ED, VSS, though pressures soft at 109/52. Labs remarkable for WBC 18.66, Hb stable at 13.6, and BUN 29. CTA AP showed:  1. Long segment of acute appearing inflammatory change involving the majority of the transverse colon and essentially the entirety of the descending and sigmoid colon extending to the rectum.  Findings may reflect a nonspecific infectious or noninfectious inflammatory versus ischemic colitis.  Clinical correlation recommended in this regard.  2. Relatively diffuse mucosal hyperemia throughout the colon and also involving the cecum and distal small bowel seen on the CTA phase, becoming somewhat less intense on the delayed venous phase imaging.  Additionally, there are questioned few areas of intraluminal contrast enhancement, equivocal for contrast extravasation versus apposed hyperenhancing mucosal folds within the sigmoid colon.    ED gave IVF and zosyn, and hospital medicine was consulted for admission and further management.    Overview/Hospital Course:  Pt admitted to observation " with IMG and remained stable overnight and into 4/7/2025. She was initiated on IVF and empiric zosyn, with improvement in her leukocytosis. No further BRBPR noted, though Hb noted to drop to 9.0, and GI was consulted for assistance. Planning for colonoscopy on 4/8.    Interval History: Pt seen and examined this morning on abram MUÑOZ. Bedside interview limited by dementia, though daughter Kadi provided updates. Care plan reviewed. Otherwise, doing well and with no further complaints at this time.        Objective:     Vital Signs (Most Recent):  Temp: 98 °F (36.7 °C) (04/07/25 1141)  Pulse: (!) 59 (04/07/25 1141)  Resp: 18 (04/07/25 1141)  BP: (!) 112/55 (04/07/25 0730)  SpO2: 98 % (04/07/25 1141) Vital Signs (24h Range):  Temp:  [97.9 °F (36.6 °C)-99.6 °F (37.6 °C)] 98 °F (36.7 °C)  Pulse:  [59-84] 59  Resp:  [16-24] 18  SpO2:  [95 %-100 %] 98 %  BP: ()/(49-94) 112/55     Weight: 54.9 kg (121 lb 0.5 oz)  Body mass index is 24.45 kg/m².    Intake/Output Summary (Last 24 hours) at 4/7/2025 1243  Last data filed at 4/7/2025 0505  Gross per 24 hour   Intake 240 ml   Output 400 ml   Net -160 ml         Physical Exam  Gen: in NAD, appears stated age  Neuro: AAO to self only, unable to further participate in neuro exam  HEENT: NTNC, EOMI, PERRLA, MMM; no thyromegaly or lymphadenopathy; no JVD appreciated  CVS: RRR, no m/r/g; S1/S2 auscultated with no S3 or S4; capillary refill < 2 sec  Resp: lungs CTAB, no w/r/r; no belabored breathing or accessory muscle use appreciated   Abd: BS+ in all 4 quadrants; epigastric TTP, soft to palpation; no organomegaly appreciated   Extrem: pulses full, equal, and regular over all 4 extremities; no UE or LE edema BL      Significant Labs: All pertinent labs within the past 24 hours have been reviewed.    Significant Imaging: I have reviewed all pertinent imaging results/findings within the past 24 hours.      Assessment & Plan  Acute lower gastrointestinal hemorrhage  - Interval  history and physical exam findings as described above  - Acute imaging findings reviewed  - WBCs 18.66 on admission (now downtrending), afebrile  - Hb down to 9.0 this morning  - Will continue zosyn at this time  - Stool studies pending  - IVF NS at 125cc/hr  - GI consulted: cscope tomorrow  - CLD for now, NPO at MN  - PRN analgesics provided  - Further dispo pending clinical course  Leukocytosis  - As above  Post-surgical hypothyroidism  - Currently asymptomatic  - Continue home synthroid regimen  Stage 2 chronic kidney disease  - Cr baseline at admission  - Renally dosing all medications  - Avoiding nephrotoxins  - Renal diet  - Will continue to monitor on daily labs  Dementia without behavioral disturbance  - AAO to self only  - At baseline per daughter  - Delirium precautions in place  VTE Risk Mitigation (From admission, onward)           Ordered     IP VTE HIGH RISK PATIENT  Once         04/06/25 1621     Place sequential compression device  Until discontinued         04/06/25 1621     Reason for No Pharmacological VTE Prophylaxis  Once        Question:  Reasons:  Answer:  Active Bleeding    04/06/25 1621                    Discharge Planning   MURPHY: 4/8/2025     Code Status: DNR   Medical Readiness for Discharge Date:            Ulices Merino MD, FACP   -  Operations  Medical Director - [facility]  Department of Hospital Medicine  4/7/2025

## 2025-04-07 NOTE — PLAN OF CARE
Problem: Adult Inpatient Plan of Care  Goal: Plan of Care Review  Outcome: Met  Goal: Patient-Specific Goal (Individualized)  Outcome: Met  Goal: Absence of Hospital-Acquired Illness or Injury  Outcome: Met  Goal: Optimal Comfort and Wellbeing  Outcome: Met  Goal: Readiness for Transition of Care  Outcome: Met     Problem: Infection  Goal: Absence of Infection Signs and Symptoms  Outcome: Met     Problem: Fall Injury Risk  Goal: Absence of Fall and Fall-Related Injury  Outcome: Met   Awakened to encourage to call for help , remain in bed .  Bed alarm is on . Easily,  redirected confused regarding current time and situation .Respirations unlabored on RA.  Skin w/d.  Tolerates meds whole with water without difficulty.  VSS.  See flowsheet for full assessment.  Able to verbalize wants/needs.  No s/s of distress.  Fall/safety precautions maintained.

## 2025-04-07 NOTE — HOSPITAL COURSE
Pt admitted to observation with IMG and remained stable overnight and into 4/7/2025. She was initiated on IVF and empiric zosyn, with improvement in her leukocytosis. No further BRBPR noted, though Hb noted to drop to 9.0, and GI was consulted for assistance. Planning for colonoscopy on 4/9, however, pt was unable to tolerate prep. Given stable H/H, no more BRBPR, and improved leukocytosis, GI felt scope could be delayed in lieu of continuing abx, which were transitioned to PO cipro/flagyl for outpatient. Pt deemed appropriate for discharge; seen and examined prior to departure. Plan discussed with pt, who was agreeable and amenable; medications were discussed and reviewed, outpatient follow-up scheduled, ER precautions were given, all questions were answered to the pt's satisfaction, and Mrs. Hernandez was subsequently discharged.

## 2025-04-07 NOTE — PLAN OF CARE
[facility]  Initial Discharge Assessment       Primary Care Provider: Kristian Shabazz MD    Admission Diagnosis: Colitis [K52.9]  GI bleed [K92.2]  Chest pain [R07.9]    Admission Date: 4/6/2025  Expected Discharge Date: 4/8/2025         Payor: HU[employer] MANAGED MEDICARE / Plan: HU[employer] MEDICARE HMO / Product Type: Capitation /     Extended Emergency Contact Information  Primary Emergency Contact: Kadi Ferris  Address: [address] [place] [address] [place]  Mobile Phone: [phone]  Relation: Daughter    Discharge Plan A: (P) Return to nursing home  Discharge Plan B: (P) Return to Nursing Home      [id number] - [place], [place] - [address] [facility] [address] [facility]  [address] [address]  Phone: [phone] Fax: [phone]    [facility] - [place], [place] [facility] [address]  [address]  [place] [address]  Phone: [phone] Fax: [phone]    [facility] - [facility] [address] [facility]  [address] [address]  Phone: [phone] Fax: [phone]               LUISANA completed Discharge Planning Assessment with patient's daughter, Kadi via bedside. Discharge planning booklet given to patient/family and whiteboard updated with MURPHY and phone #. All questions answered.    Patient will need assistance with transportation upon discharge.     Kadi reported that patient is currently at [facility], and is expected to return once she is medically stable. Prior to hospitalization patient needed assistance with her ADL's. Patient uses a wheelchair. Kadi reported that patient is not on dialysis and does not go to a Coumadin clinic.     Discharge Plan A and Plan B have been determined by review of patient's clinical status, future medical and therapeutic needs, and coverage/benefits for post-acute care in coordination with multidisciplinary team  members.      Ada Hyde LMSW  [facility]  Ext. [phone]

## 2025-04-07 NOTE — HPI
"Isabel Hernandez is an 89 y/o F with a PMH of hypothyroidism, CKD2, and dementia who presented to the [facility] ED on 4/6/2025 from her [facility] NH with BRBPR. Of note, pt is unable to provide history 2/2 dementia; daughter Kadi at bedside assists with story. Kadi reports that pt was recently discharged from [facility] and was doing well at her NH. Per NH staff reports to EMS, she was "having yani blood per rectum all day with multiple bowel movements." Unable to obtain further collateral. Daughter states patient has had ongoing dizziness for the past few weeks but is otherwise well. Patient has an ENT appointment in May. Daughter states the patient has not had any more bowel movements overnight, but has been NPO. Last colonoscopy was in 2009 w/o any findings.   "

## 2025-04-08 LAB
ABSOLUTE EOSINOPHIL (OHS): 0.18 K/UL
ABSOLUTE MONOCYTE (OHS): 0.96 K/UL (ref 0.3–1)
ABSOLUTE NEUTROPHIL COUNT (OHS): 12.25 K/UL (ref 1.8–7.7)
ALBUMIN SERPL BCP-MCNC: 2.4 G/DL (ref 3.5–5.2)
ALP SERPL-CCNC: 62 UNIT/L (ref 40–150)
ALT SERPL W/O P-5'-P-CCNC: 9 UNIT/L (ref 10–44)
ANION GAP (OHS): 7 MMOL/L (ref 8–16)
AST SERPL-CCNC: 15 UNIT/L (ref 11–45)
BASOPHILS # BLD AUTO: 0.08 K/UL
BASOPHILS NFR BLD AUTO: 0.5 %
BILIRUB SERPL-MCNC: 1.1 MG/DL (ref 0.1–1)
BUN SERPL-MCNC: 13 MG/DL (ref 8–23)
CALCIUM SERPL-MCNC: 7.5 MG/DL (ref 8.7–10.5)
CHLORIDE SERPL-SCNC: 106 MMOL/L (ref 95–110)
CO2 SERPL-SCNC: 20 MMOL/L (ref 23–29)
CREAT SERPL-MCNC: 0.7 MG/DL (ref 0.5–1.4)
ERYTHROCYTE [DISTWIDTH] IN BLOOD BY AUTOMATED COUNT: 13.2 % (ref 11.5–14.5)
GFR SERPLBLD CREATININE-BSD FMLA CKD-EPI: >60 ML/MIN/1.73/M2
GLUCOSE SERPL-MCNC: 73 MG/DL (ref 70–110)
HCT VFR BLD AUTO: 33.8 % (ref 37–48.5)
HGB BLD-MCNC: 10.8 GM/DL (ref 12–16)
IMM GRANULOCYTES # BLD AUTO: 0.09 K/UL (ref 0–0.04)
IMM GRANULOCYTES NFR BLD AUTO: 0.6 % (ref 0–0.5)
LYMPHOCYTES # BLD AUTO: 2.6 K/UL (ref 1–4.8)
MAGNESIUM SERPL-MCNC: 1.9 MG/DL (ref 1.6–2.6)
MCH RBC QN AUTO: 30.3 PG (ref 27–31)
MCHC RBC AUTO-ENTMCNC: 32 G/DL (ref 32–36)
MCV RBC AUTO: 95 FL (ref 82–98)
NUCLEATED RBC (/100WBC) (OHS): 0 /100 WBC
PHOSPHATE SERPL-MCNC: 3 MG/DL (ref 2.7–4.5)
PLATELET # BLD AUTO: 144 K/UL (ref 150–450)
PMV BLD AUTO: 11.3 FL (ref 9.2–12.9)
POTASSIUM SERPL-SCNC: 3.3 MMOL/L (ref 3.5–5.1)
PROT SERPL-MCNC: 5 GM/DL (ref 6–8.4)
RBC # BLD AUTO: 3.56 M/UL (ref 4–5.4)
RELATIVE EOSINOPHIL (OHS): 1.1 %
RELATIVE LYMPHOCYTE (OHS): 16.1 % (ref 18–48)
RELATIVE MONOCYTE (OHS): 5.9 % (ref 4–15)
RELATIVE NEUTROPHIL (OHS): 75.8 % (ref 38–73)
SODIUM SERPL-SCNC: 133 MMOL/L (ref 136–145)
WBC # BLD AUTO: 16.16 K/UL (ref 3.9–12.7)

## 2025-04-08 PROCEDURE — 25000003 PHARM REV CODE 250: Mod: HCNC | Performed by: STUDENT IN AN ORGANIZED HEALTH CARE EDUCATION/TRAINING PROGRAM

## 2025-04-08 PROCEDURE — 63600175 PHARM REV CODE 636 W HCPCS: Mod: HCNC | Performed by: STUDENT IN AN ORGANIZED HEALTH CARE EDUCATION/TRAINING PROGRAM

## 2025-04-08 PROCEDURE — 83735 ASSAY OF MAGNESIUM: CPT | Mod: HCNC | Performed by: STUDENT IN AN ORGANIZED HEALTH CARE EDUCATION/TRAINING PROGRAM

## 2025-04-08 PROCEDURE — 25000003 PHARM REV CODE 250: Mod: HCNC

## 2025-04-08 PROCEDURE — A4216 STERILE WATER/SALINE, 10 ML: HCPCS | Mod: HCNC | Performed by: STUDENT IN AN ORGANIZED HEALTH CARE EDUCATION/TRAINING PROGRAM

## 2025-04-08 PROCEDURE — 11000001 HC ACUTE MED/SURG PRIVATE ROOM: Mod: HCNC

## 2025-04-08 PROCEDURE — 36415 COLL VENOUS BLD VENIPUNCTURE: CPT | Mod: HCNC | Performed by: STUDENT IN AN ORGANIZED HEALTH CARE EDUCATION/TRAINING PROGRAM

## 2025-04-08 PROCEDURE — 85025 COMPLETE CBC W/AUTO DIFF WBC: CPT | Mod: HCNC | Performed by: STUDENT IN AN ORGANIZED HEALTH CARE EDUCATION/TRAINING PROGRAM

## 2025-04-08 PROCEDURE — 80053 COMPREHEN METABOLIC PANEL: CPT | Mod: HCNC | Performed by: STUDENT IN AN ORGANIZED HEALTH CARE EDUCATION/TRAINING PROGRAM

## 2025-04-08 PROCEDURE — 84100 ASSAY OF PHOSPHORUS: CPT | Mod: HCNC | Performed by: STUDENT IN AN ORGANIZED HEALTH CARE EDUCATION/TRAINING PROGRAM

## 2025-04-08 RX ORDER — POTASSIUM CHLORIDE 7.45 MG/ML
10 INJECTION INTRAVENOUS
Status: COMPLETED | OUTPATIENT
Start: 2025-04-08 | End: 2025-04-08

## 2025-04-08 RX ORDER — POLYETHYLENE GLYCOL 3350, SODIUM SULFATE ANHYDROUS, SODIUM BICARBONATE, SODIUM CHLORIDE, POTASSIUM CHLORIDE 236; 22.74; 6.74; 5.86; 2.97 G/4L; G/4L; G/4L; G/4L; G/4L
4000 POWDER, FOR SOLUTION ORAL ONCE
Status: COMPLETED | OUTPATIENT
Start: 2025-04-08 | End: 2025-04-08

## 2025-04-08 RX ADMIN — LIDOCAINE 1 PATCH: 50 PATCH CUTANEOUS at 05:04

## 2025-04-08 RX ADMIN — POTASSIUM CHLORIDE 10 MEQ: 7.46 INJECTION, SOLUTION INTRAVENOUS at 07:04

## 2025-04-08 RX ADMIN — ONDANSETRON 4 MG: 2 INJECTION INTRAMUSCULAR; INTRAVENOUS at 05:04

## 2025-04-08 RX ADMIN — LEVOTHYROXINE SODIUM 50 MCG: 0.05 TABLET ORAL at 05:04

## 2025-04-08 RX ADMIN — PIPERACILLIN SODIUM AND TAZOBACTAM SODIUM 4.5 G: 4; .5 INJECTION, POWDER, FOR SOLUTION INTRAVENOUS at 04:04

## 2025-04-08 RX ADMIN — POLYETHYLENE GLYCOL 3350, SODIUM SULFATE ANHYDROUS, SODIUM BICARBONATE, SODIUM CHLORIDE, POTASSIUM CHLORIDE 4000 ML: 236; 22.74; 6.74; 5.86; 2.97 POWDER, FOR SOLUTION ORAL at 05:04

## 2025-04-08 RX ADMIN — POTASSIUM CHLORIDE 10 MEQ: 7.46 INJECTION, SOLUTION INTRAVENOUS at 10:04

## 2025-04-08 RX ADMIN — PIPERACILLIN SODIUM AND TAZOBACTAM SODIUM 4.5 G: 4; .5 INJECTION, POWDER, FOR SOLUTION INTRAVENOUS at 11:04

## 2025-04-08 RX ADMIN — POTASSIUM CHLORIDE 10 MEQ: 7.46 INJECTION, SOLUTION INTRAVENOUS at 08:04

## 2025-04-08 RX ADMIN — POTASSIUM CHLORIDE 10 MEQ: 7.46 INJECTION, SOLUTION INTRAVENOUS at 09:04

## 2025-04-08 RX ADMIN — SODIUM CHLORIDE, PRESERVATIVE FREE 10 ML: 5 INJECTION INTRAVENOUS at 10:04

## 2025-04-08 RX ADMIN — PIPERACILLIN SODIUM AND TAZOBACTAM SODIUM 4.5 G: 4; .5 INJECTION, POWDER, FOR SOLUTION INTRAVENOUS at 09:04

## 2025-04-08 NOTE — ASSESSMENT & PLAN NOTE
- Interval history and physical exam findings as described above  - Acute imaging findings reviewed  - WBCs 18.66 on admission (now downtrending), afebrile  - Hb down to 9.0 this morning  - Will continue zosyn at this time  - Stool studies pending  - IVF NS at 125cc/hr  - GI consulted: cscope tomorrow (unable to tolerate all of prep last night)  - CLD for now, NPO at MN  - PRN analgesics provided  - Further dispo pending clinical course

## 2025-04-08 NOTE — PLAN OF CARE
Problem: Fatigue  Goal: Improved Activity Tolerance  4/8/2025 0202 by Gwen Godfrey RN  Outcome: Progressing  4/8/2025 0159 by Gwen Godfrey RN  Outcome: Progressing     Problem: Gastrointestinal Bleeding  Goal: Optimal Coping with Acute Illness  Outcome: Progressing  Goal: Hemostasis  Outcome: Progressing     Problem: Fall Injury Risk  Goal: Absence of Fall and Fall-Related Injury  Outcome: Progressing     Problem: Chronic Kidney Disease  Goal: Optimal Coping with Chronic Illness  Outcome: Progressing  Goal: Electrolyte Balance  Outcome: Progressing  Goal: Fluid Balance  Outcome: Progressing  Goal: Optimal Functional Ability  Outcome: Progressing  Goal: Absence of Anemia Signs and Symptoms  Outcome: Progressing  Goal: Optimal Oral Intake  Outcome: Progressing  Goal: Acceptable Pain Control  Outcome: Progressing  Goal: Minimize Renal Failure Effects  Outcome: Progressing     Problem: Confusion Chronic  Goal: Optimal Cognitive Function  Outcome: Progressing

## 2025-04-08 NOTE — PROGRESS NOTES
"[facility] [address] [place] [address] Medicine  Progress Note    Patient Name: Isabel Hernandez  MRN: [id number]  Patient Class: IP- Inpatient   Admission Date: 4/6/2025  Length of Stay: 1 days  Attending Physician: Ulices Merino MD  Primary Care Provider: Kristian Shabazz MD        Subjective     Principal Problem:Acute lower gastrointestinal hemorrhage        HPI:  Isabel Hernandez is an 89 yo F with a PMH of hypothyroidism, CKD2, and dementia who presented to the [facility] ED on 4/6/2025 from her group home NH with BRBPR. Of note, pt is unable to provide history 2/2 dementia; daughter Kadi at bedside assists with story. Kadi reports that pt was recently discharged from OS[facility] and was doing well at her NH until evening PTA when she noticed her mother had a poor appetite. Per NH staff reports to EMS, she was "having yani blood per rectum all day with multiple bowel movements." Unable to obtain further collateral.    In the ED, VSS, though pressures soft at 109/52. Labs remarkable for WBC 18.66, Hb stable at 13.6, and BUN 29. CTA AP showed:  1. Long segment of acute appearing inflammatory change involving the majority of the transverse colon and essentially the entirety of the descending and sigmoid colon extending to the rectum.  Findings may reflect a nonspecific infectious or noninfectious inflammatory versus ischemic colitis.  Clinical correlation recommended in this regard.  2. Relatively diffuse mucosal hyperemia throughout the colon and also involving the cecum and distal small bowel seen on the CTA phase, becoming somewhat less intense on the delayed venous phase imaging.  Additionally, there are questioned few areas of intraluminal contrast enhancement, equivocal for contrast extravasation versus apposed hyperenhancing mucosal folds within the sigmoid colon.    ED gave IVF and zosyn, and hospital medicine was consulted for admission and further management.    Overview/Hospital Course:  Pt admitted to observation " with IMG and remained stable overnight and into 4/7/2025. She was initiated on IVF and empiric zosyn, with improvement in her leukocytosis. No further BRBPR noted, though Hb noted to drop to 9.0, and GI was consulted for assistance. Planning for colonoscopy on 4/9.    Interval History: Pt seen and examined this morning on abram MUÑOZ. Bedside interview limited by dementia, though daughter Kadi provided updates. Unable to tolerate all of prep last night, so colonoscopy moved to tomorrow. Care plan reviewed. Otherwise, doing well and with no further complaints at this time.        Objective:     Vital Signs (Most Recent):  Temp: 98.2 °F (36.8 °C) (04/08/25 1129)  Pulse: 62 (04/08/25 1129)  Resp: 18 (04/08/25 1129)  BP: (!) 109/56 (04/08/25 1129)  SpO2: 97 % (04/08/25 1129) Vital Signs (24h Range):  Temp:  [97.8 °F (36.6 °C)-98.2 °F (36.8 °C)] 98.2 °F (36.8 °C)  Pulse:  [61-76] 62  Resp:  [16-18] 18  SpO2:  [95 %-98 %] 97 %  BP: ()/(51-60) 109/56     Weight: 54.9 kg (121 lb 0.5 oz)  Body mass index is 24.45 kg/m².    Intake/Output Summary (Last 24 hours) at 4/8/2025 1328  Last data filed at 4/8/2025 0733  Gross per 24 hour   Intake 2855 ml   Output 400 ml   Net 2455 ml         Physical Exam  Gen: in NAD, appears stated age  Neuro: AAO to self only, unable to further participate in neuro exam  HEENT: NTNC, EOMI, PERRLA, MMM; no thyromegaly or lymphadenopathy; no JVD appreciated  CVS: RRR, no m/r/g; S1/S2 auscultated with no S3 or S4; capillary refill < 2 sec  Resp: lungs CTAB, no w/r/r; no belabored breathing or accessory muscle use appreciated   Abd: BS+ in all 4 quadrants; epigastric TTP, soft to palpation; no organomegaly appreciated   Extrem: pulses full, equal, and regular over all 4 extremities; no UE or LE edema BL      Significant Labs: All pertinent labs within the past 24 hours have been reviewed.    Significant Imaging: I have reviewed all pertinent imaging results/findings within the past 24  hours.      Assessment & Plan  Acute lower gastrointestinal hemorrhage  - Interval history and physical exam findings as described above  - Acute imaging findings reviewed  - WBCs 18.66 on admission (now downtrending), afebrile  - Hb down to 9.0 this morning  - Will continue zosyn at this time  - Stool studies pending  - IVF NS at 125cc/hr  - GI consulted: cscope tomorrow (unable to tolerate all of prep last night)  - CLD for now, NPO at MN  - PRN analgesics provided  - Further dispo pending clinical course  Leukocytosis  - As above  Post-surgical hypothyroidism  - Currently asymptomatic  - Continue home synthroid regimen  Stage 2 chronic kidney disease  - Cr baseline at admission  - Renally dosing all medications  - Avoiding nephrotoxins  - Renal diet  - Will continue to monitor on daily labs  Dementia without behavioral disturbance  - AAO to self only  - At baseline per daughter  - Delirium precautions in place  VTE Risk Mitigation (From admission, onward)           Ordered     IP VTE HIGH RISK PATIENT  Once         04/06/25 1621     Place sequential compression device  Until discontinued         04/06/25 1621     Reason for No Pharmacological VTE Prophylaxis  Once        Question:  Reasons:  Answer:  Active Bleeding    04/06/25 1621                    Discharge Planning   MURPHY: 4/9/2025     Code Status: DNR   Medical Readiness for Discharge Date:   Discharge Plan A: Return to nursing home              Ulices Merino MD, FACP   -  Operations  Medical Director - [facility]  Department of Hospital Medicine  4/8/2025

## 2025-04-08 NOTE — TREATMENT PLAN
GI Treatment Plan    Patient was unable to fully prep last night. Will do CLD today, repeat prep tonight.  Heena Fischer MD  Gastroenterology and Hepatology Fellow, PGY-4

## 2025-04-08 NOTE — NURSING
Provider on call notified that patient is at baseline altered mental state and not able to complete the bowel prep.

## 2025-04-08 NOTE — SUBJECTIVE & OBJECTIVE
Interval History: Pt seen and examined this morning on abram MUÑOZ. Bedside interview limited by dementia, though daughter Kadi provided updates. Unable to tolerate all of prep last night, so colonoscopy moved to tomorrow. Care plan reviewed. Otherwise, doing well and with no further complaints at this time.        Objective:     Vital Signs (Most Recent):  Temp: 98.2 °F (36.8 °C) (04/08/25 1129)  Pulse: 62 (04/08/25 1129)  Resp: 18 (04/08/25 1129)  BP: (!) 109/56 (04/08/25 1129)  SpO2: 97 % (04/08/25 1129) Vital Signs (24h Range):  Temp:  [97.8 °F (36.6 °C)-98.2 °F (36.8 °C)] 98.2 °F (36.8 °C)  Pulse:  [61-76] 62  Resp:  [16-18] 18  SpO2:  [95 %-98 %] 97 %  BP: ()/(51-60) 109/56     Weight: 54.9 kg (121 lb 0.5 oz)  Body mass index is 24.45 kg/m².    Intake/Output Summary (Last 24 hours) at 4/8/2025 1328  Last data filed at 4/8/2025 0733  Gross per 24 hour   Intake 2855 ml   Output 400 ml   Net 2455 ml         Physical Exam  Gen: in NAD, appears stated age  Neuro: AAO to self only, unable to further participate in neuro exam  HEENT: NTNC, EOMI, PERRLA, MMM; no thyromegaly or lymphadenopathy; no JVD appreciated  CVS: RRR, no m/r/g; S1/S2 auscultated with no S3 or S4; capillary refill < 2 sec  Resp: lungs CTAB, no w/r/r; no belabored breathing or accessory muscle use appreciated   Abd: BS+ in all 4 quadrants; epigastric TTP, soft to palpation; no organomegaly appreciated   Extrem: pulses full, equal, and regular over all 4 extremities; no UE or LE edema BL      Significant Labs: All pertinent labs within the past 24 hours have been reviewed.    Significant Imaging: I have reviewed all pertinent imaging results/findings within the past 24 hours.

## 2025-04-09 VITALS
RESPIRATION RATE: 16 BRPM | SYSTOLIC BLOOD PRESSURE: 106 MMHG | BODY MASS INDEX: 24.4 KG/M2 | TEMPERATURE: 98 F | OXYGEN SATURATION: 94 % | DIASTOLIC BLOOD PRESSURE: 50 MMHG | HEIGHT: 59 IN | HEART RATE: 78 BPM | WEIGHT: 121.06 LBS

## 2025-04-09 LAB
ABSOLUTE EOSINOPHIL (OHS): 0.22 K/UL
ABSOLUTE MONOCYTE (OHS): 0.64 K/UL (ref 0.3–1)
ABSOLUTE NEUTROPHIL COUNT (OHS): 8.17 K/UL (ref 1.8–7.7)
ALBUMIN SERPL BCP-MCNC: 2.3 G/DL (ref 3.5–5.2)
ALP SERPL-CCNC: 56 UNIT/L (ref 40–150)
ALT SERPL W/O P-5'-P-CCNC: 7 UNIT/L (ref 10–44)
ANION GAP (OHS): 7 MMOL/L (ref 8–16)
AST SERPL-CCNC: 13 UNIT/L (ref 11–45)
BASOPHILS # BLD AUTO: 0.05 K/UL
BASOPHILS NFR BLD AUTO: 0.4 %
BILIRUB SERPL-MCNC: 0.7 MG/DL (ref 0.1–1)
BUN SERPL-MCNC: 8 MG/DL (ref 8–23)
CALCIUM SERPL-MCNC: 7.3 MG/DL (ref 8.7–10.5)
CHLORIDE SERPL-SCNC: 109 MMOL/L (ref 95–110)
CO2 SERPL-SCNC: 18 MMOL/L (ref 23–29)
CREAT SERPL-MCNC: 0.6 MG/DL (ref 0.5–1.4)
ERYTHROCYTE [DISTWIDTH] IN BLOOD BY AUTOMATED COUNT: 13.2 % (ref 11.5–14.5)
GFR SERPLBLD CREATININE-BSD FMLA CKD-EPI: >60 ML/MIN/1.73/M2
GLUCOSE SERPL-MCNC: 65 MG/DL (ref 70–110)
HCT VFR BLD AUTO: 31.8 % (ref 37–48.5)
HGB BLD-MCNC: 10.1 GM/DL (ref 12–16)
IMM GRANULOCYTES # BLD AUTO: 0.05 K/UL (ref 0–0.04)
IMM GRANULOCYTES NFR BLD AUTO: 0.4 % (ref 0–0.5)
LYMPHOCYTES # BLD AUTO: 2.12 K/UL (ref 1–4.8)
MAGNESIUM SERPL-MCNC: 1.8 MG/DL (ref 1.6–2.6)
MCH RBC QN AUTO: 30.1 PG (ref 27–31)
MCHC RBC AUTO-ENTMCNC: 31.8 G/DL (ref 32–36)
MCV RBC AUTO: 95 FL (ref 82–98)
NUCLEATED RBC (/100WBC) (OHS): 0 /100 WBC
PHOSPHATE SERPL-MCNC: 2.5 MG/DL (ref 2.7–4.5)
PLATELET # BLD AUTO: 112 K/UL (ref 150–450)
PMV BLD AUTO: 11.9 FL (ref 9.2–12.9)
POTASSIUM SERPL-SCNC: 3.4 MMOL/L (ref 3.5–5.1)
PROT SERPL-MCNC: 4.7 GM/DL (ref 6–8.4)
RBC # BLD AUTO: 3.35 M/UL (ref 4–5.4)
RELATIVE EOSINOPHIL (OHS): 2 %
RELATIVE LYMPHOCYTE (OHS): 18.8 % (ref 18–48)
RELATIVE MONOCYTE (OHS): 5.7 % (ref 4–15)
RELATIVE NEUTROPHIL (OHS): 72.7 % (ref 38–73)
SODIUM SERPL-SCNC: 134 MMOL/L (ref 136–145)
WBC # BLD AUTO: 11.25 K/UL (ref 3.9–12.7)

## 2025-04-09 PROCEDURE — 80053 COMPREHEN METABOLIC PANEL: CPT | Mod: HCNC | Performed by: STUDENT IN AN ORGANIZED HEALTH CARE EDUCATION/TRAINING PROGRAM

## 2025-04-09 PROCEDURE — 25000003 PHARM REV CODE 250: Mod: HCNC | Performed by: STUDENT IN AN ORGANIZED HEALTH CARE EDUCATION/TRAINING PROGRAM

## 2025-04-09 PROCEDURE — 83735 ASSAY OF MAGNESIUM: CPT | Mod: HCNC | Performed by: STUDENT IN AN ORGANIZED HEALTH CARE EDUCATION/TRAINING PROGRAM

## 2025-04-09 PROCEDURE — 36415 COLL VENOUS BLD VENIPUNCTURE: CPT | Mod: HCNC | Performed by: STUDENT IN AN ORGANIZED HEALTH CARE EDUCATION/TRAINING PROGRAM

## 2025-04-09 PROCEDURE — 63600175 PHARM REV CODE 636 W HCPCS: Mod: HCNC | Performed by: STUDENT IN AN ORGANIZED HEALTH CARE EDUCATION/TRAINING PROGRAM

## 2025-04-09 PROCEDURE — 85025 COMPLETE CBC W/AUTO DIFF WBC: CPT | Mod: HCNC | Performed by: STUDENT IN AN ORGANIZED HEALTH CARE EDUCATION/TRAINING PROGRAM

## 2025-04-09 PROCEDURE — 84100 ASSAY OF PHOSPHORUS: CPT | Mod: HCNC | Performed by: STUDENT IN AN ORGANIZED HEALTH CARE EDUCATION/TRAINING PROGRAM

## 2025-04-09 PROCEDURE — A4216 STERILE WATER/SALINE, 10 ML: HCPCS | Mod: HCNC | Performed by: STUDENT IN AN ORGANIZED HEALTH CARE EDUCATION/TRAINING PROGRAM

## 2025-04-09 RX ORDER — CIPROFLOXACIN 750 MG/1
750 TABLET, FILM COATED ORAL EVERY 12 HOURS
Qty: 24 TABLET | Refills: 0 | Status: SHIPPED | OUTPATIENT
Start: 2025-04-09 | End: 2025-04-21

## 2025-04-09 RX ORDER — METRONIDAZOLE 500 MG/1
500 TABLET ORAL EVERY 8 HOURS
Status: DISCONTINUED | OUTPATIENT
Start: 2025-04-09 | End: 2025-04-09 | Stop reason: HOSPADM

## 2025-04-09 RX ORDER — SODIUM,POTASSIUM PHOSPHATES 280-250MG
2 POWDER IN PACKET (EA) ORAL ONCE
Status: COMPLETED | OUTPATIENT
Start: 2025-04-09 | End: 2025-04-09

## 2025-04-09 RX ORDER — CIPROFLOXACIN 750 MG/1
750 TABLET, FILM COATED ORAL EVERY 12 HOURS
Status: DISCONTINUED | OUTPATIENT
Start: 2025-04-09 | End: 2025-04-09 | Stop reason: HOSPADM

## 2025-04-09 RX ORDER — POTASSIUM CHLORIDE 20 MEQ/1
40 TABLET, EXTENDED RELEASE ORAL ONCE
Status: COMPLETED | OUTPATIENT
Start: 2025-04-09 | End: 2025-04-09

## 2025-04-09 RX ORDER — METRONIDAZOLE 500 MG/1
500 TABLET ORAL EVERY 8 HOURS
Qty: 36 TABLET | Refills: 0 | Status: SHIPPED | OUTPATIENT
Start: 2025-04-09 | End: 2025-04-21

## 2025-04-09 RX ADMIN — SODIUM CHLORIDE, PRESERVATIVE FREE 10 ML: 5 INJECTION INTRAVENOUS at 05:04

## 2025-04-09 RX ADMIN — LEVOTHYROXINE SODIUM 50 MCG: 0.05 TABLET ORAL at 05:04

## 2025-04-09 RX ADMIN — CIPROFLOXACIN HYDROCHLORIDE 750 MG: 750 TABLET, FILM COATED ORAL at 08:04

## 2025-04-09 RX ADMIN — POTASSIUM & SODIUM PHOSPHATES POWDER PACK 280-160-250 MG 2 PACKET: 280-160-250 PACK at 10:04

## 2025-04-09 RX ADMIN — POTASSIUM CHLORIDE 40 MEQ: 1500 TABLET, EXTENDED RELEASE ORAL at 11:04

## 2025-04-09 RX ADMIN — PIPERACILLIN SODIUM AND TAZOBACTAM SODIUM 4.5 G: 4; .5 INJECTION, POWDER, FOR SOLUTION INTRAVENOUS at 08:04

## 2025-04-09 NOTE — TREATMENT PLAN
GI Treatment Plan    Patient still unable to prep for colonoscopy. Given CT findings and prior bleeding, agree with completing course of abx for colitis. Pts Hb is stable and at this time given her age and comorbidities we will no longer recommend continuing to distress her with colon prep.    GI will sign off.    Heena Fischer MD  Gastroenterology and Hepatology Fellow, PGY-4

## 2025-04-09 NOTE — PLAN OF CARE
04/09/25 1341   Post-Acute Status   Post-Acute Authorization Placement   Post-Acute Placement Status Set-up Complete/Auth obtained     Pt is discharging to [facility] located at [address] [place] [address]. Pt is going to  [address] [facility]. CHASE Wynn can call report to [phone] ext [phone].    SW arranged stretcher transport via [facility]. Requested  time is 2:45PM. Requested  time does not guarantee arrival time.      Ada Hyde LMSW  [facility]  Ext. [phone]

## 2025-04-09 NOTE — DISCHARGE SUMMARY
"[facility] [address] [place] [address] Medicine  Discharge Summary      Patient Name: Isabel Hernandez  MRN: [id number]  PAPO: [id number]  Patient Class: IP- Inpatient  Admission Date: 4/6/2025  Hospital Length of Stay: 2 days  Discharge Date and Time: 04/09/2025 12:28 PM  Attending Physician: Ulices Merino MD   Discharging Provider: Ulices Merino MD  Primary Care Provider: Kristian Shabazz MD  Hospital Medicine Team: [facility] HOSP MED  Ulices Merino MD  Primary Care Team: [facility]    HPI:   Isabel Hernandez is an 89 yo F with a PMH of hypothyroidism, CKD2, and dementia who presented to the [facility] ED on 4/6/2025 from [facility] with BRBPR. Of note, pt is unable to provide history 2/2 dementia; daughter Kadi at bedside assists with story. Kadi reports that pt was recently discharged from OS and was doing well at her NH until evening PTA when she noticed her mother had a poor appetite. Per NH staff reports to EMS, she was "having yani blood per rectum all day with multiple bowel movements." Unable to obtain further collateral.    In the ED, VSS, though pressures soft at 109/52. Labs remarkable for WBC 18.66, Hb stable at 13.6, and BUN 29. CTA AP showed:  1. Long segment of acute appearing inflammatory change involving the majority of the transverse colon and essentially the entirety of the descending and sigmoid colon extending to the rectum.  Findings may reflect a nonspecific infectious or noninfectious inflammatory versus ischemic colitis.  Clinical correlation recommended in this regard.  2. Relatively diffuse mucosal hyperemia throughout the colon and also involving the cecum and distal small bowel seen on the CTA phase, becoming somewhat less intense on the delayed venous phase imaging.  Additionally, there are questioned few areas of intraluminal contrast enhancement, equivocal for contrast extravasation versus apposed hyperenhancing mucosal folds within the sigmoid colon.    ED gave IVF and zosyn, " and hospital medicine was consulted for admission and further management.    Procedure(s) (LRB):  COLONOSCOPY (N/A)      Hospital Course:   Pt admitted to observation with IMG and remained stable overnight and into 4/7/2025. She was initiated on IVF and empiric zosyn, with improvement in her leukocytosis. No further BRBPR noted, though Hb noted to drop to 9.0, and GI was consulted for assistance. Planning for colonoscopy on 4/9, however, pt was unable to tolerate prep. Given stable H/H, no more BRBPR, and improved leukocytosis, GI felt scope could be delayed in lieu of continuing abx, which were transitioned to PO cipro/flagyl for outpatient. Pt deemed appropriate for discharge; seen and examined prior to departure. Plan discussed with pt, who was agreeable and amenable; medications were discussed and reviewed, outpatient follow-up scheduled, ER precautions were given, all questions were answered to the pt's satisfaction, and Mrs. Hernandez was subsequently discharged.       Goals of Care Treatment Preferences:  Code Status: DNR    Living Will: Yes                 Consults:   Consults (From admission, onward)          Status Ordering Provider     Inpatient consult to Gastroenterology  Once        Provider:  (Not yet assigned)    Completed URSZULA NICOLAS            Assessment & Plan    Final Active Diagnoses:    Diagnosis Date Noted POA    PRINCIPAL PROBLEM:  Acute lower gastrointestinal hemorrhage [K92.2] 04/06/2025 Yes    Leukocytosis [D72.829] 04/06/2025 Yes    Post-surgical hypothyroidism [E89.0] 04/06/2025 Yes    Stage 2 chronic kidney disease [N18.2] 07/26/2017 Yes    Dementia without behavioral disturbance [F03.90] 08/22/2023 Yes      Problems Resolved During this Admission:       Discharged Condition: stable    Disposition: long term Nursing Facili*    Follow Up:   Follow-up Information       Kristian Shabazz MD. Schedule an appointment as soon as possible for a visit.    Specialty: Internal  Medicine  Contact information:  [address]  [address]  [facility] [address]  [phone]                           Patient Instructions:      Ambulatory referral/consult to Gastroenterology   Standing Status: Future   Referral Priority: Routine Referral Type: Consultation   Referral Reason: Specialty Services Required   Requested Specialty: Gastroenterology   Number of Visits Requested: 1     Ambulatory referral/consult to Internal Medicine   Standing Status: Future   Referral Priority: Routine Referral Type: Consultation   Referral Reason: Specialty Services Required   Requested Specialty: Internal Medicine   Number of Visits Requested: 1     Diet Adult Regular     Notify your health care provider if you experience any of the following:  increased confusion or weakness     Notify your health care provider if you experience any of the following:  persistent dizziness, light-headedness, or visual disturbances     Notify your health care provider if you experience any of the following:  worsening rash     Notify your health care provider if you experience any of the following:  severe persistent headache     Notify your health care provider if you experience any of the following:  difficulty breathing or increased cough     Notify your health care provider if you experience any of the following:  severe uncontrolled pain     Notify your health care provider if you experience any of the following:  persistent nausea and vomiting or diarrhea     Notify your health care provider if you experience any of the following:  temperature >100.4     Activity as tolerated       Significant Diagnostic Studies: Labs: All labs within the past 24 hours have been reviewed    Pending Diagnostic Studies:       Procedure Component Value Units Date/Time    Calprotectin, Stool [[id number]]     Order Status: Sent Lab Status: No result     Specimen: Stool     Gastrointestinal Pathogens Panel, PCR [[id number]]     Order  Status: Sent Lab Status: No result     Specimen: Stool     Giardia / Cryptosporidum, EIA [[id number]]     Order Status: Sent Lab Status: No result     Specimen: Stool     H. pylori antigen, stool [[id number]]     Order Status: Sent Lab Status: No result     Specimen: Stool     Rotavirus antigen, stool [[id number]]     Order Status: Sent Lab Status: No result     Specimen: Stool     WBC, Stool [[id number]]     Order Status: Sent Lab Status: No result     Specimen: Stool            Medications:  Reconciled Home Medications:      Medication List        START taking these medications      ciprofloxacin HCl 750 MG tablet  Commonly known as: CIPRO  Take 1 tablet (750 mg total) by mouth every 12 (twelve) hours. for 12 days     metroNIDAZOLE 500 MG tablet  Commonly known as: FLAGYL  Take 1 tablet (500 mg total) by mouth every 8 (eight) hours. for 12 days            CONTINUE taking these medications      acetaminophen 325 MG tablet  Commonly known as: TYLENOL  Take 2 tablets (650 mg total) by mouth 3 (three) times daily.     calcium carbonate 200 mg calcium (500 mg) chewable tablet  Commonly known as: TUMS  Take 1 tablet (500 mg total) by mouth 2 (two) times daily as needed for Heartburn.     famotidine 20 MG tablet  Commonly known as: PEPCID  Take 1 tablet (20 mg total) by mouth once daily.     levothyroxine 50 MCG tablet  Commonly known as: SYNTHROID  Take 1 tablet (50 mcg total) by mouth before breakfast.     LIDOcaine 5 %  Commonly known as: LIDODERM  Place 1 patch onto the skin once daily. Place to lower back.  Remove & Discard patch within 12 hours or as directed by MD     meclizine 12.5 mg tablet  Commonly known as: ANTIVERT  Take 1 tablet (12.5 mg total) by mouth 2 (two) times daily as needed for Dizziness.            STOP taking these medications      ondansetron 4 MG Tbdl  Commonly known as: ZOFRAN-ODT              Indwelling Lines/Drains at time of discharge:   Lines/Drains/Airways       Drain  Duration   NA               Time spent on the discharge of patient: 35 minutes         Ulices Merino MD  Department of Hospital Medicine  [facility]

## 2025-04-09 NOTE — PLAN OF CARE
[facility]  Discharge Final Note    Primary Care Provider: Kristian Shabazz MD    Expected Discharge Date: 4/9/2025    Patient discharged to [facility] via ambulance transportation.     Patient's bedside nurse and patient/daughter, Kadi notified of the above.    Discharge Plan A and Plan B have been determined by review of patient's clinical status, future medical and therapeutic needs, and coverage/benefits for post-acute care in coordination with multidisciplinary team members.        Final Discharge Note (most recent)       Final Note - 04/09/25 1609          Final Note    Assessment Type Final Discharge Note (P)      Anticipated Discharge Disposition [facility] (P)         Post-Acute Status    Post-Acute Authorization Placement (P)      Post-Acute Placement Status Set-up Complete/Auth obtained (P)                      Important Message from Medicare  Important Message from Medicare regarding Discharge Appeal Rights: Given to patient/caregiver, Explained to patient/caregiver, Signed/date by patient/caregiver     Date IMM was signed: 04/09/25  Time IMM was signed: 1510    Contact Info       Kristian Shabazz MD   Specialty: Internal Medicine   Relationship: PCP - General    [address]  [facility] [address]   Phone: [phone]       Next Steps: Schedule an appointment as soon as possible for a visit            Future Appointments   Date Time Provider Department Center   4/16/2025 10:00 AM Sneha Solorzano, FNP-C [facility]   5/15/2025 11:00 AM Lucila Paez Au.D CCC-A [facility] AUDIO [facility]   5/15/2025 11:30 AM Karlos Muniz MD [facility] ENT [address]   5/28/2025  2:15 PM Colten Oleary DPM [facility] POD [address]   6/2/2025  2:20 PM Sierra Gibson DO [facility]        scheduled post-discharge follow-up appointment and information added to AVS.     Ada Hyde, LMSW [facility]  [place]  Ext. [phone]

## 2025-04-09 NOTE — PLAN OF CARE
NURSING HOME ORDERS    04/09/2025  [facility]  [address]  [facility] [address]  Dept: [phone]  Loc: [phone]     Admit to Nursing Home:  residential nursing home    Diagnoses:  Active Hospital Problems    Diagnosis  POA    *Acute lower gastrointestinal hemorrhage [K92.2]  Yes     Priority: 1 - High    Leukocytosis [D72.829]  Yes     Priority: 2     Post-surgical hypothyroidism [E89.0]  Yes     Priority: 3     Stage 2 chronic kidney disease [N18.2]  Yes     Priority: 4      Las gfr > 60 11/22        Dementia without behavioral disturbance [F03.90]  Yes     Priority: 5       Resolved Hospital Problems   No resolved problems to display.       Patient is homebound due to:  Acute lower gastrointestinal hemorrhage    Allergies:  Review of patient's allergies indicates:   Allergen Reactions    Codeine Nausea And Vomiting    Fosamax [alendronate] Nausea Only    Iron analogues Nausea And Vomiting       Vitals:  Routine    Diet: regular diet    Activities:   Activity as tolerated    Goals of Care Treatment Preferences:  Code Status: DNR    Living Will: Yes              Labs:  Weekly CBC until GI appointment    Nursing Precautions:  Aspiration , Fall, and Pressure ulcer prevention    Consults:   NA     Miscellaneous Care: Routine Skin for Bedridden Patients:  Apply moisture barrier cream to all                   Diabetes Care:  NA      Medications: Discontinue all previous medication orders, if any. See new list below.     Medication List        START taking these medications      ciprofloxacin HCl 750 MG tablet  Commonly known as: CIPRO  Take 1 tablet (750 mg total) by mouth every 12 (twelve) hours. for 12 days     metroNIDAZOLE 500 MG tablet  Commonly known as: FLAGYL  Take 1 tablet (500 mg total) by mouth every 8 (eight) hours. for 12 days            CONTINUE taking these medications      acetaminophen 325 MG tablet  Commonly known as: TYLENOL  Take 2  tablets (650 mg total) by mouth 3 (three) times daily.     calcium carbonate 200 mg calcium (500 mg) chewable tablet  Commonly known as: TUMS  Take 1 tablet (500 mg total) by mouth 2 (two) times daily as needed for Heartburn.     famotidine 20 MG tablet  Commonly known as: PEPCID  Take 1 tablet (20 mg total) by mouth once daily.     levothyroxine 50 MCG tablet  Commonly known as: SYNTHROID  Take 1 tablet (50 mcg total) by mouth before breakfast.     LIDOcaine 5 %  Commonly known as: LIDODERM  Place 1 patch onto the skin once daily. Place to lower back.  Remove & Discard patch within 12 hours or as directed by MD     meclizine 12.5 mg tablet  Commonly known as: ANTIVERT  Take 1 tablet (12.5 mg total) by mouth 2 (two) times daily as needed for Dizziness.            STOP taking these medications      ondansetron 4 MG Tbdl  Commonly known as: ZOFRAN-ODT                Immunizations Administered as of 4/9/2025       Name Date Dose VIS Date Route Exp Date    COVID-19, MRNA, LN-S, PF (Pfizer) (Purple Cap) 10/20/2021  1:42 PM 0.3 mL 12/12/2020 Intramuscular 10/31/2021    Site: Left deltoid     Given By: Heena Lentz     : [employer]     Lot: [id number]     COVID-19, MRNA, LN-S, PF (Pfizer) (Purple Cap) 1/26/2021  1:57 PM 0.3 mL 12/12/2020 Intramuscular 5/31/2021    Site: Left deltoid     Given By: Emilie Costello     : [employer]     Lot: [id number]     COVID-19, MRNA, LN-S, PF (Pfizer) (Purple Cap) 1/4/2021 12:35 PM 0.3 mL 12/12/2020 Intramuscular 1/4/2021    Site: Left deltoid     Given By: Justina Li, PharmD     : [employer]     Lot: [id number]         Ulices Merino MD, FACP   -  Operations  Medical Director - [facility]  Department of [facility] Medicine  4/9/2025

## 2025-04-10 LAB
ABO + RH BLD: NORMAL
BLD PROD TYP BPU: NORMAL
BLOOD UNIT EXPIRATION DATE: NORMAL
BLOOD UNIT TYPE CODE: 6200
CROSSMATCH INTERPRETATION: NORMAL
DISPENSE STATUS: NORMAL
UNIT NUMBER: NORMAL

## 2025-04-11 ENCOUNTER — PATIENT OUTREACH (OUTPATIENT)
Dept: ADMINISTRATIVE | Facility: CLINIC | Age: 89
End: 2025-04-11
Payer: MEDICARE

## 2025-04-16 ENCOUNTER — OFFICE VISIT (OUTPATIENT)
Dept: GASTROENTEROLOGY | Facility: CLINIC | Age: 89
End: 2025-04-16
Payer: MEDICARE

## 2025-04-16 ENCOUNTER — APPOINTMENT (OUTPATIENT)
Dept: LAB | Facility: HOSPITAL | Age: 89
End: 2025-04-16
Payer: MEDICARE

## 2025-04-16 VITALS — BODY MASS INDEX: 23.64 KG/M2 | HEIGHT: 60 IN

## 2025-04-16 DIAGNOSIS — K52.9 COLITIS: Primary | ICD-10-CM

## 2025-04-16 DIAGNOSIS — Z09 HOSPITAL DISCHARGE FOLLOW-UP: ICD-10-CM

## 2025-04-16 PROCEDURE — 99999 PR PBB SHADOW E&M-EST. PATIENT-LVL III: CPT | Mod: PBBFAC,HCNC,,

## 2025-04-16 NOTE — PROGRESS NOTES
"   Gastroenterology Clinic Consultation Note    Reason for Visit:  The primary encounter diagnosis was Colitis. A diagnosis of Hospital discharge follow-up was also pertinent to this visit.    PCP:   Kristian Shabazz   [address] [facility] / [place] [address]      Initial HPI   This is a 89 y.o. female presenting for hospital follow-up for colitis  Patient in clinic for hospital follow-up for colitis.  She presented to the [facility] ED on 4/6/2025 from her snf NH with BRBPR. Per NH staff reports to EMS, she was "having yani blood per rectum all day with multiple bowel movements." CTA AP showed:1. Long segment of acute appearing inflammatory change involving the majority of the transverse colon and essentially the entirety of the descending and sigmoid colon extending to the rectum.  Findings may reflect a nonspecific infectious or noninfectious inflammatory versus ischemic colitis. .2. Relatively diffuse mucosal hyperemia throughout the colon and also involving the cecum and distal small bowel seen on the CTA phase, becoming somewhat less intense on the delayed venous phase imaging.  Additionally, there are questioned few areas of intraluminal contrast enhancement, equivocal for contrast extravasation versus apposed hyperenhancing mucosal folds within the sigmoid colon.  She was initiated on IVF and empiric zosyn, with improvement in her leukocytosis. No further BRBPR noted, though Hb noted to drop to 9.0, and GI was consulted for assistance. Planned for colonoscopy on 4/9, however, pt was unable to tolerate prep. transitioned to PO cipro/flagyl for outpatient.  Patient denies any further bleeding from rectum and melena. Of note, patient presented to clinic with NH  only - she does have dementia. Limited HPI d/t this.     ROS:  Review of Systems   Constitutional:  Negative for chills, fever, malaise/fatigue and weight loss.   Respiratory:  Negative for shortness of breath.  "   Cardiovascular:  Negative for chest pain.   Gastrointestinal:  Negative for abdominal pain, blood in stool, constipation, diarrhea, heartburn, melena, nausea and vomiting.   Genitourinary:  Negative for hematuria.   Neurological:  Negative for dizziness and weakness.        Medical History:  has a past medical history of Arthritis, Cataract, Hypothyroidism, Mild malnutrition (3/10/2025), and Osteoporosis (2015).    Surgical History:  has a past surgical history that includes Thyroidectomy, partial; Hysterectomy; Cholecystectomy; Tonsillectomy; Adenoidectomy; Breast surgery (Bilateral);  section; Vitrectomy by pars plana approach (Left, 2018); Appendectomy; and Colonoscopy (N/A, 2025).    Family History: family history includes Cancer in her brother, father, and sister; Cataracts in her father; Glaucoma in her father; Hypertension in her mother; Kidney disease in her mother; Miscarriages / Stillbirths in her mother; No Known Problems in her brother and daughter; Thyroid disease in her mother..       Review of patient's allergies indicates:   Allergen Reactions    Codeine Nausea And Vomiting    Fosamax [alendronate] Nausea Only    Iron analogues Nausea And Vomiting       Medications Ordered Prior to Encounter[1]      Objective Findings:    Vital Signs:  Ht 5' (1.524 m)   BMI 23.64 kg/m²   Body mass index is 23.64 kg/m².    Physical Exam:  Physical Exam  Vitals and nursing note reviewed.   Constitutional:       General: She is not in acute distress.     Appearance: She is not ill-appearing.   HENT:      Head: Normocephalic and atraumatic.   Eyes:      Extraocular Movements: Extraocular movements intact.   Cardiovascular:      Rate and Rhythm: Normal rate.   Pulmonary:      Effort: Pulmonary effort is normal. No respiratory distress.   Abdominal:      General: There is no distension.      Tenderness: There is no abdominal tenderness. There is no guarding.   Musculoskeletal:         General:  Normal range of motion.      Cervical back: Normal range of motion.      Comments: Wheelchair+   Skin:     General: Skin is warm and dry.   Neurological:      Mental Status: She is alert. Mental status is at baseline.   Psychiatric:         Cognition and Memory: Memory is impaired.             Labs:  Lab Results   Component Value Date    WBC 7.74 04/16/2025    HGB 11.9 (L) 04/16/2025    HCT 37.5 04/16/2025     04/16/2025    CHOL 215 (H) 11/01/2022    TRIG 126 11/01/2022    HDL 54 11/01/2022    ALKPHOS 56 04/09/2025    LIPASE 24 03/05/2025    ALT 7 (L) 04/09/2025    AST 13 04/09/2025     (L) 04/09/2025    K 3.4 (L) 04/09/2025     04/09/2025    CREATININE 0.6 04/09/2025    BUN 8 04/09/2025    CO2 18 (L) 04/09/2025    TSH 4.342 (H) 03/05/2025       Imaging reviewed:   CTA ACUTE GI BLEED, ABDOMEN AND PELVIS     CLINICAL HISTORY:  GIB;     TECHNIQUE:  Contiguous 2.5-mm axial images were obtained through the abdomen and pelvis following the administration of 75 cc of intravenous Omnipaque 350.  Sagittal and coronal reformats and maximum intensity projections were also submitted.     COMPARISON:  CT of the abdomen and pelvis performed 03/05/2025.     FINDINGS:  BOWEL AND PERITONEUM     Bowel: Small hiatal hernia.  No evidence of bowel obstruction.  Colonic diverticulosis.  Suspect duodenal diverticulum.     Relatively long segment of acute appearing inflammatory change involving the majority of the transverse colon and essentially the entirety of the descending and sigmoid colon extending to the rectum.  There is wall thickening and adjoining inflammatory changes noted.     There is relatively diffuse mucosal hyperemia throughout the colon and also involving the cecum and distal small bowel seen on the CTA phase, becoming somewhat less intense on the delayed venous phase imaging.  Additionally, there are questioned few areas of intraluminal contrast enhancement, equivocal for contrast extravasation  versus apposed hyperenhancing mucosal folds within the sigmoid colon (for example as seen on axial series 301, image 100 of the CTA phase).     Intraluminal intestinal hemorrhage: See comments above.     Free air or fluid: None.     VASCULATURE     Visceral arteries: Celiac, superior mesenteric, renal, and inferior mesenteric arteries appear patent.  Short segment high-grade stenosis at the CHRISTIAN origin with poststenotic dilatation.  Scattered moderate atherosclerosis elsewhere.     Abdominal aorta and iliac arteries: No high-grade stenosis or aneurysmal dilatation.  Moderate atherosclerosis.     Mesenteric and portal veins: Normally patent.     Inferior vena cava: Normally patent.     ABDOMEN/PELVIS     Lower chest: Atelectasis and/or scar at the visualized lung bases.     Liver: Unremarkable.     Gallbladder/bile ducts: Gallbladder not confidently identified, favored surgically absent.     Pancreas: Unremarkable.     Spleen: Normal contour.  Suspected calcified granuloma.     Adrenals: Unremarkable.     Kidneys: Cortical atrophy of both kidneys.  Right extrarenal pelvis.  Bilateral parapelvic cysts.  Subcentimeter hypodense lesions are too small to definitely characterize.  Suspect a tiny nonobstructing calculus right lower pole calyx.     Lymph nodes: No abdominal or pelvic lymphadenopathy.     Pelvis: Unremarkable.     Urinary bladder: Asymmetric thickening and enhancement at the anterior aspect of the urinary bladder.     Body wall: Nonspecific fluid attenuation nodule noted in the anterior midline abdomen measuring up to 12 mm.  Nonspecific tiny cystic lesion in the medial right gluteal cleft of doubtful clinical significance.     Bones: Suspected osseous demineralization.  No definite acute change.  Similar configuration of compression fractures of the T10-T12 vertebral bodies and widening of the L4-5 anterior disc space when compared to the 03/18/2025 CT of the abdomen and pelvis.     Impression:     1. Long  segment of acute appearing inflammatory change involving the majority of the transverse colon and essentially the entirety of the descending and sigmoid colon extending to the rectum.  Findings may reflect a nonspecific infectious or noninfectious inflammatory versus ischemic colitis.  Clinical correlation recommended in this regard.  2. Relatively diffuse mucosal hyperemia throughout the colon and also involving the cecum and distal small bowel seen on the CTA phase, becoming somewhat less intense on the delayed venous phase imaging.  Additionally, there are questioned few areas of intraluminal contrast enhancement, equivocal for contrast extravasation versus apposed hyperenhancing mucosal folds within the sigmoid colon.  3. Additional details of chronic and incidental findings, as provided in the body of the report.  This report was flagged in Epic as abnormal.        Electronically signed by:Thom Taylor  Date:                                            04/06/2025      Endoscopy reviewed: colonoscopy 2009       Assessment:  1. Colitis    2. Hospital discharge follow-up      Orders Placed This Encounter    CBC Auto Differential       Plan:  CBC to re-check blood count f/u hospital    2.   Continue flagyl and cipro entire course   3.   Defer colonoscopy d/t inability to complete prep   4.   RTC PRN       Thank you for allowing me to participate in this patient's care.    Sincerely,     BRANDEN PAREKH, TRINI-C  Gastroenterology Department  [facility] Office [phone]           [1]   Current Outpatient Medications on File Prior to Visit   Medication Sig Dispense Refill    acetaminophen (TYLENOL) 325 MG tablet Take 2 tablets (650 mg total) by mouth 3 (three) times daily. 90 tablet 0    calcium carbonate (TUMS) 200 mg calcium (500 mg) chewable tablet Take 1 tablet (500 mg total) by mouth 2 (two) times daily as needed for Heartburn. 30 tablet 0    ciprofloxacin HCl (CIPRO) 750 MG tablet Take  1 tablet (750 mg total) by mouth every 12 (twelve) hours. for 12 days 24 tablet 0    famotidine (PEPCID) 20 MG tablet Take 1 tablet (20 mg total) by mouth once daily. 30 tablet 0    levothyroxine (SYNTHROID) 50 MCG tablet Take 1 tablet (50 mcg total) by mouth before breakfast. 90 tablet 0    LIDOcaine (LIDODERM) 5 % Place 1 patch onto the skin once daily. Place to lower back.  Remove & Discard patch within 12 hours or as directed by MD 30 patch 0    meclizine (ANTIVERT) 12.5 mg tablet Take 1 tablet (12.5 mg total) by mouth 2 (two) times daily as needed for Dizziness. 30 tablet 0    metroNIDAZOLE (FLAGYL) 500 MG tablet Take 1 tablet (500 mg total) by mouth every 8 (eight) hours. for 12 days 36 tablet 0     No current facility-administered medications on file prior to visit.

## 2025-04-21 ENCOUNTER — PATIENT MESSAGE (OUTPATIENT)
Dept: ADMINISTRATIVE | Facility: HOSPITAL | Age: 89
End: 2025-04-21
Payer: MEDICARE

## 2025-05-15 ENCOUNTER — OFFICE VISIT (OUTPATIENT)
Dept: OTOLARYNGOLOGY | Facility: CLINIC | Age: 89
End: 2025-05-15
Payer: MEDICARE

## 2025-05-15 ENCOUNTER — CLINICAL SUPPORT (OUTPATIENT)
Dept: AUDIOLOGY | Facility: CLINIC | Age: 89
End: 2025-05-15
Payer: MEDICARE

## 2025-05-15 DIAGNOSIS — R26.89 IMBALANCE: Primary | ICD-10-CM

## 2025-05-15 DIAGNOSIS — H90.3 SENSORINEURAL HEARING LOSS (SNHL), BILATERAL: Primary | ICD-10-CM

## 2025-05-15 DIAGNOSIS — R42 DIZZINESS: ICD-10-CM

## 2025-05-15 DIAGNOSIS — H81.12 BPPV (BENIGN PAROXYSMAL POSITIONAL VERTIGO), LEFT: ICD-10-CM

## 2025-05-15 PROCEDURE — 99999 PR PBB SHADOW E&M-EST. PATIENT-LVL I: CPT | Mod: PBBFAC,HCNC,,

## 2025-05-15 PROCEDURE — 92567 TYMPANOMETRY: CPT | Mod: HCNC,S$GLB,,

## 2025-05-15 PROCEDURE — 92557 COMPREHENSIVE HEARING TEST: CPT | Mod: HCNC,S$GLB,,

## 2025-05-15 PROCEDURE — 99999 PR PBB SHADOW E&M-EST. PATIENT-LVL I: CPT | Mod: PBBFAC,HCNC,, | Performed by: OTOLARYNGOLOGY

## 2025-05-15 NOTE — PROGRESS NOTES
"5/15/2025    Audiologic Evaluation    Isabel Hernandez was seen today in the clinic for an audiologic evaluation.  Patient's main complaint was dizziness. Mrs. Hernandez reported she is unable to identify when her symptoms began. She reported feeling a "wooziness" with nausea and emesis. Mrs. Hernandez denied decreased hearing, tinnitus, otalgia, aural fullness, and history of noise exposure.    Tympanometry revealed Type A in the right ear and Type A in the left ear.     Audiogram results revealed a mild to severe sensorineural hearing loss (SNHL), bilaterally.    Speech reception thresholds were noted at 20 dBHL in the right ear and 25 dBHL in the left ear.    Speech discrimination scores were 88% in the right ear and 88% in the left ear.    Today's results were discussed with the patient and hearing aids were recommended bilaterally. We discussed the advantages and disadvantages of amplification. Mrs. Hernandez reported she is not interested in pursuing amplification at this time.  The risks of auditory depravation and the importance of annual audiologic follow-up were discussed in detail. Mrs. Hernandez was instructed to call the clinic at [phone] when she is due for her annual audiogram or if she should choose to pursue amplification. Chart review did indicate that Mrs. Hernandez has insurance benefits for hearing aids through [employer] ([id number]). I notified Mrs. Hernandez of this coverage and recommended that is she decides to pursue hearing aids Mrs. Hernandez contact her insurance provider to request a list of providers who accept coverage for hearing aids through her plan. Mrs. Hernandez expressed understanding of today's results and the plan.    Recommendations:  Otologic evaluation  Hearing aid consultation  Annual audiogram or sooner if change perceived  Hearing protection in noise        "

## 2025-05-15 NOTE — PROGRESS NOTES
Ear, Nose, & Throat  Otolaryngology - Head & Neck Surgery    Summary of Visit:  Isabel Hernandez was referred to me by Radha Spencer in consultation for No chief complaint on file.      Subjective:     Chief Complaint: No chief complaint on file.      Isabel Hernandez is a 89 y.o. female who was referred to me by Radha Spencer in consultation for dizziness and nausea.  She sustained a fall recently and was hospitalized briefly followed by a short stay in inpatient rehab in his now in a [facility] facility.  She has been doing physical therapy and she has been noted to have acceptable levels of extremity strength but continues with imbalance.  This is most exacerbated by lying down.  She notes imbalance when sitting up however and frequent episodes of nausea.  She denies any subjective hearing loss, otalgia, otorrhea.  She has been on meclizine 12.5 mg p.o. t.i.d. p.r.n. nausea, in the records provided to me make it difficult to know how many doses she is getting per day.    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    Dementia without behavioral disturbance 08/22/2023    Stage 3 chronic kidney disease, unspecified whether stage 3a or 3b CKD 08/22/2023    Hypokalemia 03/05/2025    Acute cystitis without hematuria 03/05/2025    Nausea and vomiting 03/05/2025    LBBB (left bundle branch block) 03/06/2025    Generalized weakness 03/06/2025    Dizziness 03/07/2025    Mild malnutrition 03/10/2025    Hyponatremia 03/11/2025    Acute lower gastrointestinal hemorrhage 04/06/2025    Leukocytosis 04/06/2025    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/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     Resolved Ambulatory  Problems     Diagnosis Date Noted    Kidney stone 2015    Right flank pain 2015    Dysuria 2015    Routine general medical examination at a health care facility 10/27/2021     Past Medical History:   Diagnosis Date    Arthritis     Cataract     Hypothyroidism     Osteoporosis 2015       Past Surgical History  She has a past surgical history that includes Thyroidectomy, partial; Hysterectomy; Cholecystectomy; Tonsillectomy; Adenoidectomy; Breast surgery (Bilateral);  section; Vitrectomy by pars plana approach (Left, 2018); Appendectomy; Colonoscopy (N/A, 2025); and Colonoscopy (N/A, 2025).    Past Surgical History:   Procedure Laterality Date    ADENOIDECTOMY      APPENDECTOMY      BREAST SURGERY Bilateral     cyst removal     SECTION      CHOLECYSTECTOMY      COLONOSCOPY N/A 2025    Procedure: COLONOSCOPY;  Surgeon: Jigar Samano MD;  Location: [facility] ([address] [place] [address]);  Service: Endoscopy;  Laterality: N/A;    COLONOSCOPY N/A 2025    Procedure: COLONOSCOPY;  Surgeon: Jigar Samano MD;  Location: [facility] ([address] [place] [address]);  Service: Endoscopy;  Laterality: N/A;    HYSTERECTOMY      THYROIDECTOMY, PARTIAL      TONSILLECTOMY      VITRECTOMY BY PARS PLANA APPROACH Left 2018    Procedure: VITRECTOMY, PARS PLANA APPROch   internal limitng membrane peel;  Surgeon: Johnathan Christopher MD;  Location: [address];  Service: Ophthalmology;  Laterality: Left;        Family History  Her family history includes Cancer in her brother, father, and sister; Cataracts in her father; Glaucoma in her father; Hypertension in her mother; Kidney disease in her mother; Miscarriages / Stillbirths in her mother; No Known Problems in her brother and daughter; Thyroid disease in her mother.    Social History  She reports that she has never smoked. She has never used smokeless tobacco. She reports that she does not drink alcohol and does not use drugs.    Allergies  She is  allergic to codeine, fosamax [alendronate], and iron analogues.    Medications  She has a current medication list which includes the following prescription(s): acetaminophen, calcium carbonate, famotidine, levothyroxine, lidocaine, and meclizine.    ROS:  Pertinent positive and negative review of systems as noted in HPI.     Objective:     There were no vitals taken for this visit.   General Appearance:   Awake, Alert and Oriented. NAD. Appropriate affect and appearance      Neuro:   Spontaneous eye opening, appropriate verbal responses, follows commands  Pupils equal, round & brisk. EOMI, no proptosis  Face is symmetric, HB I, non-edematous bilaterally  Vision grossly intact, Hearing grossly intact     Head and Face:   skin is intact with no lesions noted.  Parotid and submandibular glands are symmetric and non-tender.      Ears:  Periauricular regions non-erythematous, non-fluctuanct non-tender  Pinna normal bilaterally, no skin lesions  EACs patent and without drainage bilaterally   Tympanic membranes are normal in appearance bilaterally.  No middle ear effusion noted bilaterally.    Nose:   External nose is symmetric, no skin lesions  Septum midline, No inferior turbinate hypertrophy, No polyps or rhinorrhea     OC/OP:  Tongue midline on extension, non-edematous, soft  No labial, buccal, oral tongue or floor of mouth lesions  Soft palate symmetric, midline and without lesions or masses, tonsils symmetric  No masses or lesions of the visualized oropharynx     Neck:  Neck is symmetric, non-edematous, non-erythematous  Trachea is midline and easily palpable,  No palpable adenopathy or masses in levels I-VI  No thyroid nodules or masses, non-tender      Respiratory:  Normal work of breathing, no accessory muscle use, no stridor     Voice:  Normal vocal quality, volume and articulation    Vestibular:   Spontaneous Nystagmus: none   Smooth Pursuit: grossly unremarkable   Saccades: grossly unremarkable     Gaze-Evoked  Nystagmus: None   Head-Thrust: Normal   Gait: Staggering    Napa-Hallpike: Left turn, geotropic nystagmus    Post-Head Shake: No Nystagmus    Fukuda Step Test: DNT  Neuro/Psychiatric:     Affect: Normal   Cognition:   Cerebellar   Alternating Finger to Nose: DNT   Rapid Alternating Movements: DNT  Proprioception   Romberg: DNT               Decreased vibrotactile sensation of the left lower extremity below the knee    Data Review:   LABS    IMAGING        AUDIO      Procedures:       Assessment:     Multi sensory imbalance  BPPV left posterior canal  Plan:     I had a long discussion with the patient regarding her condition and the further workup and management options.  She has a physical exam findings consistent with multi sensory imbalance with decreased proprioception in the left lower extremity.  She also has abnormal Kendall-Hallpike on the left side consistent with left posterior canal BPPV.  She has been in physical therapy but not balance specific therapy.  My recommendation would be that she meet with our vestibular rehab therapists for several weeks.  I would also like to have her off the meclizine as it is likely increasing her level of confusion with minimal improvement in the types of deficits noted on exam.  Follow up in 4-6 weeks     No orders of the defined types were placed in this encounter.         Problem List Items Addressed This Visit    None        Answers submitted by the patient for this visit:  Review of Symptoms Questionnaire  (Submitted on 5/8/2025)  None of these: Yes  hearing loss: Yes  None of these : Yes  None of these: Yes  None of these : Yes  heartburn: Yes  Acid Reflux?: Yes  None of these: Yes  None of these: Yes  None of these : Yes  None of these: Yes  None of these : Yes  dizziness: Yes  Light-headedness: Yes  None of these: Yes  decreased concentration: Yes

## 2025-06-03 ENCOUNTER — CLINICAL SUPPORT (OUTPATIENT)
Dept: REHABILITATION | Facility: HOSPITAL | Age: 89
End: 2025-06-03
Attending: OTOLARYNGOLOGY
Payer: MEDICARE

## 2025-06-03 VITALS — SYSTOLIC BLOOD PRESSURE: 98 MMHG | HEART RATE: 104 BPM | DIASTOLIC BLOOD PRESSURE: 64 MMHG

## 2025-06-03 DIAGNOSIS — H81.12 BPPV (BENIGN PAROXYSMAL POSITIONAL VERTIGO), LEFT: ICD-10-CM

## 2025-06-03 DIAGNOSIS — R26.89 IMBALANCE: ICD-10-CM

## 2025-06-03 PROCEDURE — 97162 PT EVAL MOD COMPLEX 30 MIN: CPT

## 2025-06-05 ENCOUNTER — CLINICAL SUPPORT (OUTPATIENT)
Dept: REHABILITATION | Facility: HOSPITAL | Age: 89
End: 2025-06-05
Payer: MEDICARE

## 2025-06-05 DIAGNOSIS — F03.90 DEMENTIA WITHOUT BEHAVIORAL DISTURBANCE: Primary | ICD-10-CM

## 2025-06-05 DIAGNOSIS — R42 DIZZINESS: ICD-10-CM

## 2025-06-05 PROCEDURE — 95992 CANALITH REPOSITIONING PROC: CPT | Mod: HCNC,PO

## 2025-06-09 ENCOUNTER — CLINICAL SUPPORT (OUTPATIENT)
Dept: REHABILITATION | Facility: HOSPITAL | Age: 89
End: 2025-06-09
Payer: MEDICARE

## 2025-06-09 DIAGNOSIS — R42 DIZZINESS: Primary | ICD-10-CM

## 2025-06-09 PROCEDURE — 95992 CANALITH REPOSITIONING PROC: CPT | Mod: HCNC,PO

## 2025-06-09 NOTE — PROGRESS NOTES
Outpatient Rehab    Physical Therapy Visit    Patient Name: Isabel Hernandez  MRN: [id number]  YOB: 1935  Encounter Date: 6/9/2025    Therapy Diagnosis:   Encounter Diagnosis   Name Primary?    Dizziness Yes     Physician: Karlos Muniz MD    Physician Orders: Eval and Treat  Medical Diagnosis: Imbalance  BPPV (benign paroxysmal positional vertigo), left  Surgical Diagnosis: Not applicable for this Episode   Surgical Date: Not applicable for this Episode    Visit # / Visits Authorized:  2 / 20  Insurance Authorization Period: 6/3/2025 to 12/31/2025  Date of Evaluation: 6/3/2025  Plan of Care Certification: 6/3/2025 to 7/11/2025      PT/PTA: PT   Number of PTA visits since last PT visit:0  Time In: 1530   Time Out: 1600  Total Time (in minutes): 30   Total Billable Time (in minutes): 30    Precautions:     Standard, Falls, Cognitive impairement      Subjective   Patient's daughter reports that patient appears to be doign a little better..  Family / care giver present for this visit:   Pain reported as 0/10.      Objective      Vestibular Positional and Balance Testing  Positional Tests  Positive: Right Kendall-Hallpike Test, Left Kendall-Hallpike Test, Right Roll Test, and Left Roll Test  Kendall-Hallpike Test Details: Torsional and downbeating nystagmus  Roll Test Details: Torsional nystagmus                   Treatment:  Other Activities  Activity 1: CRM - includes time for positional testing and R Epley maneuver. See assessment.    Time Entry(in minutes):  Canalith Repositioning Time Entry: 30    Assessment & Plan   Assessment: Mrs. Cordoba positional testing was somewhat inconclusive today. She reported dizziness frequently throughout testing in multiple positions. Abnormal nystagmus was noted, but not consistent with BPPV. She exhibited downbeating nystagmus with R Epley, torsional component intermittently observed. R Epley maneuver was performed with continued abnormal nystagmus noted with PSCC testing.  Torsional nystagmus observed in roll test positions. PT suspects central involvement contributing to her dizziness episodes. Next session to reassess; if presentation is inconsistent with BPPV, move on to balance testing.       The patient will continue to benefit from skilled outpatient physical therapy in order to address the deficits listed in the problem list on the initial evaluation, provide patient and family education, and maximize the patients level of independence in the home and community environments.     The patient's spiritual, cultural, and educational needs were considered, and the patient is agreeable to the plan of care and goals.           Plan: Continue to reassess BPPV and treat as approrpiate. Continue to balance testing if positional testing is inconclusive.    Goals:   Active       Short term = Long Term       Patient will be negative for all positional canal Testing. (Progressing)       Start:  06/04/25    Expected End:  07/11/25            Patient will report a >/=75% improvement in symptoms. (Progressing)       Start:  06/04/25    Expected End:  07/11/25            Patient will be evaluated for balance deficits and goals will be established if indicated. (Progressing)       Start:  06/04/25    Expected End:  06/20/25                Mikala Watkins PT

## 2025-06-16 ENCOUNTER — CLINICAL SUPPORT (OUTPATIENT)
Dept: REHABILITATION | Facility: HOSPITAL | Age: 89
End: 2025-06-16
Payer: MEDICARE

## 2025-06-16 VITALS — HEART RATE: 74 BPM | DIASTOLIC BLOOD PRESSURE: 88 MMHG | SYSTOLIC BLOOD PRESSURE: 124 MMHG

## 2025-06-16 DIAGNOSIS — R42 DIZZINESS: Primary | ICD-10-CM

## 2025-06-16 PROCEDURE — 97530 THERAPEUTIC ACTIVITIES: CPT | Mod: HCNC,PO

## 2025-06-16 NOTE — PROGRESS NOTES
Outpatient Rehab    Physical Therapy Visit    Patient Name: Isabel Hernandez  MRN: [id number]  YOB: 1935  Encounter Date: 6/16/2025    Therapy Diagnosis: No diagnosis found.  Physician: Karlos Muniz MD    Physician Orders: Eval and Treat  Medical Diagnosis: Imbalance  BPPV (benign paroxysmal positional vertigo), left  Surgical Diagnosis: Not applicable for this Episode   Surgical Date: Not applicable for this Episode  Days Since Last Surgery: Not applicable for this Episode    Visit # / Visits Authorized:  3 / 20  Insurance Authorization Period: 6/3/2025 to 12/31/2025  Date of Evaluation: 6/3/2025  Plan of Care Certification: 6/3/2025 to 7/11/2025      PT/PTA: PT   Number of PTA visits since last PT visit:   Time In:   13:00  Time Out:  13:42  Total Time (in minutes):   42 min  Total Billable Time (in minutes):        Precautions:     Standard, Falls, Cognitive impairement      Subjective   Dizziness is a little better, but still there.  Daughter accompanies her to appts..  Family / care giver present for this visit:   Pain reported as 0/10.      Objective   Vital Signs  /88   Pulse 74   BP Location: Left arm  BP Position: Sitting          POSITIONAL CANAL TESTING  Looking for nystagmus (slow phase followed by quick phase to the affected side for BPPV)    [place] (posterior / CL anterior)   Right : Negative nystagmus, Negative dizziness   Left: Negative nystagmus, Negative dizziness      Postural control: MCTSIB: Evaluation 06/16/2025 (Average of 3 trials)   1. Eyes Open/feet together/Firm: 8 seconds (6, 8, 11 sec)   2. Eyes Closed/feet together/Firm:  10 seconds (4, 11, 16)   3. Eyes Open/feet together/Foam:  0 seconds   4. Eyes Closed/feet together/Foam:  0 seconds   TOTAL 18/120            Treatment:  Therapeutic Activity  TA 1: Ambulation around clinic with hand held assist and wheelchair follow  TA 2: Ambulation around clinic with RW and wheelchair follow  TA 3: Cervical rotationx  5 each direction: flex/ext x  each direction  TA 4: Discussion with daughter about ambulation with Isabel at home for increased mobility  TA 5: Time include objective measures    Time Entry(in minutes):  Therapeutic Activity Time Entry: 42    Assessment & Plan   Assessment: Mrs. Hernandez had negative positional testing today, indicating likely resolution of BPPV.  She notes some nausea with supine to sit and during ambulation in session, no nystagmus noted during those times.  Performed sit to stand and ambulation for increased functional mobility and motion tolerance, wheelchair follow secondary to instability.  Discussed safe ambulation at home with assistance, daughter verbalized understanding.  She remains appropriate for skilled Physical Therapy.        The patient will continue to benefit from skilled outpatient physical therapy in order to address the deficits listed in the problem list on the initial evaluation, provide patient and family education, and maximize the patients level of independence in the home and community environments.     The patient's spiritual, cultural, and educational needs were considered, and the patient is agreeable to the plan of care and goals.     Education  Education was done with Patient and Other recipient present. The patient's learning style includes Listening. The patient Requires assistance.                 Plan:  Cont to progress functional mobility     Goals:   Active       Short term = Long Term       Patient will be negative for all positional canal Testing. (Progressing)       Start:  06/04/25    Expected End:  07/11/25            Patient will report a >/=75% improvement in symptoms. (Progressing)       Start:  06/04/25    Expected End:  07/11/25            Patient will be evaluated for balance deficits and goals will be established if indicated. (Progressing)       Start:  06/04/25    Expected End:  06/20/25                Danna Haywood, PT

## 2025-06-23 ENCOUNTER — CLINICAL SUPPORT (OUTPATIENT)
Dept: REHABILITATION | Facility: HOSPITAL | Age: 89
End: 2025-06-23
Payer: MEDICARE

## 2025-06-23 DIAGNOSIS — R42 DIZZINESS: Primary | ICD-10-CM

## 2025-06-23 PROCEDURE — 97112 NEUROMUSCULAR REEDUCATION: CPT | Mod: HCNC,PO

## 2025-06-23 PROCEDURE — 97530 THERAPEUTIC ACTIVITIES: CPT | Mod: HCNC,PO

## 2025-06-23 NOTE — PROGRESS NOTES
Outpatient Rehab    Physical Therapy Visit    Patient Name: Isabel Hernandez  MRN: [id number]  YOB: 1935  Encounter Date: 6/23/2025    Therapy Diagnosis:   Encounter Diagnosis   Name Primary?    Dizziness Yes     Physician: Karlos Muniz MD    Physician Orders: Eval and Treat  Medical Diagnosis: Imbalance  BPPV (benign paroxysmal positional vertigo), left  Surgical Diagnosis: Not applicable for this Episode   Surgical Date: Not applicable for this Episode  Days Since Last Surgery: Not applicable for this Episode    Visit # / Visits Authorized:  4 / 20  Insurance Authorization Period: 6/3/2025 to 12/31/2025  Date of Evaluation: 6/3/2025  Plan of Care Certification: 6/3/2025 to 7/11/2025      PT/PTA:   PT  Number of PTA visits since last PT visit:   Time In:   13:00  Time Out:  13:40  Total Time (in minutes):   40 min  Total Billable Time (in minutes):        Precautions:     Standard, Falls, Cognitive impairement      Subjective   States she is feeling nauseated today, she is not sure why..  Pain reported as 0/10.      Objective            Treatment:  Therapeutic Exercise  TE 1: Scifit level 1 x 6 minutes, unable to keep screen on  Balance/Neuromuscular Re-Education  NMR 1: In parallel bars: 2 x 30 sec feet together head nods, head rotations, eyes closed.  Contact guard assist, patient reports nausea  NMR 2: In parallel bars, standing on foam pad with feet apart 2 x 30 sec head rotations, eyes closed. CGA  Therapeutic Activity  TA 1: Ambulation around clinic ~ 100 ft x 3 with contact guard/ min assist holding onto gait belt. Seated rest breaks between sets  TA 2: 2 x 5 Sit to stand without UE assist  TA 3: Ambulation in hallway x 120 ft, multiple standing rest breaks    Time Entry(in minutes):  Neuromuscular Re-Education Time Entry: 10  Therapeutic Activity Time Entry: 24  Therapeutic Exercise Time Entry: 6    Assessment & Plan   Assessment: Mrs. Hernandez reports some nausea today with activity, but  denies any dizziness.  Progressed static balance in parallel bars with head rotations and standing on foam with appropriate challenge. She requires multiple rest breaks secondary to fatigue. She remains appropriate for skilled Physical Therapy.        The patient will continue to benefit from skilled outpatient physical therapy in order to address the deficits listed in the problem list on the initial evaluation, provide patient and family education, and maximize the patients level of independence in the home and community environments.     The patient's spiritual, cultural, and educational needs were considered, and the patient is agreeable to the plan of care and goals.           Plan: Progress functional mobility and balance as able    Goals:   Active       Short term = Long Term       Patient will be negative for all positional canal Testing. (Progressing)       Start:  06/04/25    Expected End:  07/11/25            Patient will report a >/=75% improvement in symptoms. (Progressing)       Start:  06/04/25    Expected End:  07/11/25            Patient will be evaluated for balance deficits and goals will be established if indicated. (Progressing)       Start:  06/04/25    Expected End:  06/20/25                Danna Haywood, PT

## 2025-06-26 ENCOUNTER — OFFICE VISIT (OUTPATIENT)
Dept: OTOLARYNGOLOGY | Facility: CLINIC | Age: 89
End: 2025-06-26
Payer: MEDICARE

## 2025-06-26 DIAGNOSIS — H81.12 BPPV (BENIGN PAROXYSMAL POSITIONAL VERTIGO), LEFT: Primary | ICD-10-CM

## 2025-06-26 PROCEDURE — 1159F MED LIST DOCD IN RCRD: CPT | Mod: CPTII,HCNC,S$GLB, | Performed by: OTOLARYNGOLOGY

## 2025-06-26 PROCEDURE — 99999 PR PBB SHADOW E&M-EST. PATIENT-LVL II: CPT | Mod: PBBFAC,HCNC,, | Performed by: OTOLARYNGOLOGY

## 2025-06-26 PROCEDURE — 99213 OFFICE O/P EST LOW 20 MIN: CPT | Mod: HCNC,S$GLB,, | Performed by: OTOLARYNGOLOGY

## 2025-06-26 PROCEDURE — 1157F ADVNC CARE PLAN IN RCRD: CPT | Mod: CPTII,HCNC,S$GLB, | Performed by: OTOLARYNGOLOGY

## 2025-06-26 NOTE — PROGRESS NOTES
Ear, Nose, & Throat  Otolaryngology - Head & Neck Surgery    Summary of Visit:  Isabel Hernandez was seen in follow-up for vertigo.      Subjective:     Chief Complaint: No chief complaint on file.      Isabel Hernandez is a 89 y.o. female who returns to clinic with skilled nursing home aid and daughter for BPPV, left. She has been going to vestibular therapy, last visit 3 days ago. She is still experiencing some dizziness in the mornings, but does report improvement since therapy. She is no longer taking meclizine. There are no new symptoms today.    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    Dementia without behavioral disturbance 08/22/2023    Stage 3 chronic kidney disease, unspecified whether stage 3a or 3b CKD 08/22/2023    Hypokalemia 03/05/2025    Acute cystitis without hematuria 03/05/2025    Nausea and vomiting 03/05/2025    LBBB (left bundle branch block) 03/06/2025    Generalized weakness 03/06/2025    Dizziness 03/07/2025    Mild malnutrition 03/10/2025    Hyponatremia 03/11/2025    Acute lower gastrointestinal hemorrhage 04/06/2025    Leukocytosis 04/06/2025    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/06/2025     Resolved Ambulatory Problems     Diagnosis Date Noted    Kidney stone 05/22/2015    Right flank pain 07/07/2015    Dysuria 07/07/2015    Routine general medical examination at a health care facility 10/27/2021     Past Medical History:   Diagnosis Date    Arthritis     Cataract     Hypothyroidism     Osteoporosis 9/23/2015       Past Surgical History  She has a past surgical history that includes Thyroidectomy, partial; Hysterectomy; Cholecystectomy;  Tonsillectomy; Adenoidectomy; Breast surgery (Bilateral);  section; Vitrectomy by pars plana approach (Left, 2018); Appendectomy; Colonoscopy (N/A, 2025); and Colonoscopy (N/A, 2025).    Past Surgical History:   Procedure Laterality Date    ADENOIDECTOMY      APPENDECTOMY      BREAST SURGERY Bilateral     cyst removal     SECTION      CHOLECYSTECTOMY      COLONOSCOPY N/A 2025    Procedure: COLONOSCOPY;  Surgeon: Jigar Samano MD;  Location: [facility] (2ND FLR);  Service: Endoscopy;  Laterality: N/A;    COLONOSCOPY N/A 2025    Procedure: COLONOSCOPY;  Surgeon: Jigar Samano MD;  Location: [facility] ENDO (2ND FLR);  Service: Endoscopy;  Laterality: N/A;    HYSTERECTOMY      THYROIDECTOMY, PARTIAL      TONSILLECTOMY      VITRECTOMY BY PARS PLANA APPROACH Left 2018    Procedure: VITRECTOMY, PARS PLANA APPROch   internal limitng membrane peel;  Surgeon: Johnathan Christopher MD;  Location: [facility] OR [address] [place] [address];  Service: Ophthalmology;  Laterality: Left;        Family History  Her family history includes Cancer in her brother, father, and sister; Cataracts in her father; Glaucoma in her father; Hypertension in her mother; Kidney disease in her mother; Miscarriages / Stillbirths in her mother; No Known Problems in her brother and daughter; Thyroid disease in her mother.    Social History  She reports that she has never smoked. She has never used smokeless tobacco. She reports that she does not drink alcohol and does not use drugs.    Allergies  She is allergic to codeine, fosamax [alendronate], and iron analogues.    Medications  She has a current medication list which includes the following prescription(s): acetaminophen, calcium carbonate, famotidine, levothyroxine, lidocaine, and meclizine.    ROS:  Pertinent positive and negative review of systems as noted in HPI.     Objective:     There were no vitals taken for this visit.   General Appearance:   Awake, Alert and Oriented.  NAD. Appropriate affect and appearance      Neuro:   Spontaneous eye opening, appropriate verbal responses, follows commands  Pupils equal, round & brisk. EOMI, no proptosis,  Face is symmetric, HB I, non-edematous bilaterally    Head and Face:   Skin is intact with no lesions noted.       Ears:  Periauricular regions non-erythematous, non-fluctuanct, and non-tender  Pinna normal bilaterally, no skin lesions  EACs patent and without drainage bilaterally   Tympanic membranes are normal in appearance bilaterally.  No middle ear effusion noted bilaterally.    Nose:   External nose is symmetric, no skin lesions     OC/OP:  Tongue midline on extension, oral mucosa moist     Neck:  Neck is symmetric, non-edematous, non-erythematous     Respiratory:  Normal work of breathing, no accessory muscle use, no stridor     Voice:  Normal vocal quality, volume and articulation    [place]pike: left +    Data Review:   LABS    IMAGING    AUDIO    Procedures:       Assessment:     1. BPPV (benign paroxysmal positional vertigo), left        Plan:     I had a long discussion with the patient and her daughter regarding her condition and the further workup and management options. Recommend continued vestibular therapy. Also discussed slight dizziness may be part of her baseline despite her best efforts with therapy.  Some orthostatic imbalance.  Recommend follow up with primary care for measurement of the spread pressures.    No orders of the defined types were placed in this encounter.         Problem List Items Addressed This Visit    None     Answers submitted by the patient for this visit:  Review of Symptoms Questionnaire  (Submitted on 6/19/2025)  Fatigue (Tiredness)?: Yes  None of these: Yes  None of these : Yes  None of these: Yes  None of these : Yes  None of these: Yes  None of these: Yes  None of these: Yes  None of these : Yes  Seasonal Allergies?: Yes  None of these : Yes  dizziness: Yes  Light-headedness: Yes  None of  these: Yes  decreased concentration: Yes

## 2025-06-30 ENCOUNTER — CLINICAL SUPPORT (OUTPATIENT)
Dept: REHABILITATION | Facility: HOSPITAL | Age: 89
End: 2025-06-30
Payer: MEDICARE

## 2025-06-30 DIAGNOSIS — R42 DIZZINESS: Primary | ICD-10-CM

## 2025-06-30 PROCEDURE — 97530 THERAPEUTIC ACTIVITIES: CPT | Mod: HCNC,PO

## 2025-06-30 PROCEDURE — 97110 THERAPEUTIC EXERCISES: CPT | Mod: HCNC,PO

## 2025-06-30 PROCEDURE — 97112 NEUROMUSCULAR REEDUCATION: CPT | Mod: HCNC,PO

## 2025-06-30 NOTE — PROGRESS NOTES
Outpatient Rehab    Physical Therapy Visit    Patient Name: Isabel Hernandez  MRN: [id number]  YOB: 1935  Encounter Date: 6/30/2025    Therapy Diagnosis:   Encounter Diagnosis   Name Primary?    Dizziness Yes     Physician: Karlos Muniz MD    Physician Orders: Eval and Treat  Medical Diagnosis: Imbalance  BPPV (benign paroxysmal positional vertigo), left  Surgical Diagnosis: Not applicable for this Episode   Surgical Date: Not applicable for this Episode  Days Since Last Surgery: Not applicable for this Episode    Visit # / Visits Authorized:  5 / 20  Insurance Authorization Period: 6/3/2025 to 12/31/2025  Date of Evaluation: 6/3/2025  Plan of Care Certification: 6/3/2025 to 7/11/2025      PT/PTA:     Number of PTA visits since last PT visit:   Time In:   13:45  Time Out:  14:25  Total Time (in minutes):   40 min  Total Billable Time (in minutes):        Precautions:       Subjective   She is accompanied by caregiver from nursing home.  She states she is doing ok ..  Pain reported as 0/10.      Objective            Treatment:  Therapeutic Exercise  TE 1: Scifit level 3 x 8 minutes for endurance and strength  Balance/Neuromuscular Re-Education  NMR 2: In parallel bars, standing on foam pad with feet apart 2 x 30 sec head rotations, eyes closed. CGA  NMR 3: Standing on foam fitter, alternating LE cone tap X 10  NMR 4: Step up onto foam fitter with opposite high knee x 10 both sides, 1 UE assist  Therapeutic Activity  TA 1: Ambulation around clinic ~ 100 ft x 3 with contact guard/ min assist holding onto gait belt.  TA 2: x 3 laps ambulation around black mat to practice 90 degree turns  TA 3: X 4 trails 180 degree turns  TA 4: 2 x 10 sit to stand, no UE assist from black mat  TA 5: Walk in and out of clinic pushing her manual chair    Time Entry(in minutes):  Neuromuscular Re-Education Time Entry: 12  Therapeutic Activity Time Entry: 20  Therapeutic Exercise Time Entry: 8    Assessment & Plan    Assessment: Mrs. Hernandez did not have any nausea today and describes some dizziness as mostly lightheadedness.  She was able to progress endurance activity with less assist required for gait and increased resistance on stepper.        The patient will continue to benefit from skilled outpatient physical therapy in order to address the deficits listed in the problem list on the initial evaluation, provide patient and family education, and maximize the patients level of independence in the home and community environments.     The patient's spiritual, cultural, and educational needs were considered, and the patient is agreeable to the plan of care and goals.     Education  Education was done with Patient. The patient's learning style includes Listening. The patient Requires continuing/additional education.                 Plan: Progress functional mobility and balance as able    Goals:   Active       Short term = Long Term       Patient will be negative for all positional canal Testing. (Progressing)       Start:  06/04/25    Expected End:  07/11/25            Patient will report a >/=75% improvement in symptoms. (Progressing)       Start:  06/04/25    Expected End:  07/11/25            Patient will be evaluated for balance deficits and goals will be established if indicated. (Progressing)       Start:  06/04/25    Expected End:  06/20/25                Danna Haywood, PT

## 2025-07-03 ENCOUNTER — CLINICAL SUPPORT (OUTPATIENT)
Dept: REHABILITATION | Facility: HOSPITAL | Age: 89
End: 2025-07-03
Payer: MEDICARE

## 2025-07-03 DIAGNOSIS — R42 DIZZINESS: Primary | ICD-10-CM

## 2025-07-03 PROCEDURE — 97110 THERAPEUTIC EXERCISES: CPT

## 2025-07-03 NOTE — PROGRESS NOTES
Outpatient Rehab    Physical Therapy Discharge    Patient Name: Isabel Hernandez  MRN: [id number]  YOB: 1935  Encounter Date: 7/3/2025    Therapy Diagnosis:   Encounter Diagnosis   Name Primary?    Dizziness Yes     Physician: Karlos Muniz MD    Physician Orders: Eval and Treat  Medical Diagnosis: Imbalance  BPPV (benign paroxysmal positional vertigo), left  Surgical Diagnosis: Not applicable for this Episode   Surgical Date: Not applicable for this Episode  Days Since Last Surgery: Not applicable for this Episode    Visit # / Visits Authorized:  6 / 20  Insurance Authorization Period: 6/3/2025 to 12/31/2025  Date of Evaluation: 6/3/2025  Plan of Care Certification: 6/3/2025 to 7/11/2025      PT/PTA: PT   Number of PTA visits since last PT visit:0  Time In: 1120   Time Out: 1145  Total Time (in minutes): 25   Total Billable Time (in minutes): 25    FOTO:  Intake Score:  %  Survey Score 2:  %  Survey Score 3:  %    Precautions:     Standard, Falls, Cognitive impairement      Subjective   She is accompanied by caregiver from [facility]. States she is experiencing nausea.  Pain reported as 0/10.      Objective      Vestibular Positional and Balance Testing  Positional Tests  Negative: Right [place]-Hallpike Test, Left [place]-Hallpike Test, Right Roll Test, and Left Roll Test                      Treatment:  Therapeutic Exercise  TE 1: Scifit level 3 x 8 minutes for endurance and strength      Time Entry(in minutes):  Therapeutic Exercise Time Entry: 25    Assessment & Plan   Assessment: Mrs. Hernandez arrived to session and was re evaluated for BPPV. She was negative for all positional testing. She still reports dizziness occasionally but is not symptomatic with any peripheral vestibular testing. At this time she would benefit from continued skilled therapy for balance and gait training in the home health setting. She would also benefit from continued orthostatic evaluation as a potential cause from her  lingering dizziness. This plan of care was discussed with Mrs. Hernandez and her daughter and both were agreeable.   Evaluation/Treatment Tolerance: Patient tolerated treatment well    The patient's spiritual, cultural, and educational needs were considered, and the patient is agreeable to the plan of care and goals.           Plan: Discharge    Goals:   Resolved       Short term = Long Term       Patient will be negative for all positional canal Testing. (Met)       Start:  06/04/25    Expected End:  07/11/25    Resolved:  07/03/25         Patient will report a >/=75% improvement in symptoms. (Met)       Start:  06/04/25    Expected End:  07/11/25    Resolved:  07/03/25         Patient will be evaluated for balance deficits and goals will be established if indicated. (Met)       Start:  06/04/25    Expected End:  06/20/25    Resolved:  07/03/25             Tiffany Wright PT

## 2025-07-21 ENCOUNTER — PATIENT MESSAGE (OUTPATIENT)
Dept: PRIMARY CARE CLINIC | Facility: CLINIC | Age: 89
End: 2025-07-21

## 2025-07-22 ENCOUNTER — PATIENT MESSAGE (OUTPATIENT)
Dept: PRIMARY CARE CLINIC | Facility: CLINIC | Age: 89
End: 2025-07-22
Payer: MEDICARE

## (undated) DEVICE — FORCEP GRIESHABER MAXGRIP 25G

## (undated) DEVICE — TRAY MUSCLE LID EYE

## (undated) DEVICE — SOL GONAK

## (undated) DEVICE — SUT 7/0 18IN COATED VICRYL

## (undated) DEVICE — SYR 10CC LUER LOCK

## (undated) DEVICE — KIT GREY EYE

## (undated) DEVICE — SOL BSS BALANCED SALT

## (undated) DEVICE — PACK AUTO GAS FILL

## (undated) DEVICE — DRAPE EYE

## (undated) DEVICE — DRESSING EYE OVAL LF

## (undated) DEVICE — SEE MEDLINE ITEM 157131

## (undated) DEVICE — SOL BETADINE 5%

## (undated) DEVICE — CORD FOR BIPOLAR FORCEPS 12

## (undated) DEVICE — CONTAINER SPECIMEN STRL 4OZ

## (undated) DEVICE — COVER MAYO STAND REINFRCD 30

## (undated) DEVICE — LENS VITRCTMY OPHTH 30DEG 59DE

## (undated) DEVICE — SOL BALANCED SALT 500ML

## (undated) DEVICE — SOL WATER STRL IRR 1000ML

## (undated) DEVICE — SHEILD & GARTERS FOX METAL EYE

## (undated) DEVICE — GOWN SURGICAL X-LARGE

## (undated) DEVICE — PROBE ILLUM FLEX CURVE LASER

## (undated) DEVICE — FORCEP GRASPING 25GA SMOOTH